# Patient Record
Sex: MALE | Race: WHITE | NOT HISPANIC OR LATINO | Employment: OTHER | URBAN - NONMETROPOLITAN AREA
[De-identification: names, ages, dates, MRNs, and addresses within clinical notes are randomized per-mention and may not be internally consistent; named-entity substitution may affect disease eponyms.]

---

## 2017-03-08 ENCOUNTER — TRANSCRIBE ORDERS (OUTPATIENT)
Dept: ADMINISTRATIVE | Facility: HOSPITAL | Age: 72
End: 2017-03-08

## 2017-03-08 DIAGNOSIS — S46.091A OTHER INJURY OF MUSCLE(S) AND TENDON(S) OF THE ROTATOR CUFF OF RIGHT SHOULDER, INITIAL ENCOUNTER: Primary | ICD-10-CM

## 2017-03-13 ENCOUNTER — HOSPITAL ENCOUNTER (OUTPATIENT)
Dept: MRI IMAGING | Facility: HOSPITAL | Age: 72
Discharge: HOME | End: 2017-03-13
Admitting: FAMILY MEDICINE

## 2017-03-13 DIAGNOSIS — S46.091A OTHER INJURY OF MUSCLE(S) AND TENDON(S) OF THE ROTATOR CUFF OF RIGHT SHOULDER, INITIAL ENCOUNTER: ICD-10-CM

## 2017-03-13 PROCEDURE — 73221 MRI JOINT UPR EXTREM W/O DYE: CPT

## 2017-04-11 ENCOUNTER — HOSPITAL ENCOUNTER (OUTPATIENT)
Dept: CT IMAGING | Facility: HOSPITAL | Age: 72
Discharge: HOME | End: 2017-04-11
Admitting: NURSE PRACTITIONER

## 2017-04-11 ENCOUNTER — HOSPITAL ENCOUNTER (OUTPATIENT)
Dept: ULTRASOUND IMAGING | Facility: HOSPITAL | Age: 72
Discharge: HOME | End: 2017-04-11

## 2017-04-11 ENCOUNTER — APPOINTMENT (OUTPATIENT)
Dept: CT IMAGING | Facility: HOSPITAL | Age: 72
End: 2017-04-11

## 2017-04-11 ENCOUNTER — OFFICE VISIT (OUTPATIENT)
Dept: VASCULAR SURGERY | Facility: CLINIC | Age: 72
End: 2017-04-11

## 2017-04-11 VITALS
WEIGHT: 289 LBS | HEART RATE: 58 BPM | SYSTOLIC BLOOD PRESSURE: 158 MMHG | BODY MASS INDEX: 35.93 KG/M2 | HEIGHT: 75 IN | DIASTOLIC BLOOD PRESSURE: 88 MMHG

## 2017-04-11 DIAGNOSIS — I65.23 BILATERAL CAROTID ARTERY STENOSIS: ICD-10-CM

## 2017-04-11 DIAGNOSIS — Z72.0 TOBACCO ABUSE: ICD-10-CM

## 2017-04-11 DIAGNOSIS — I71.40 ABDOMINAL AORTIC ANEURYSM (AAA) WITHOUT RUPTURE (HCC): Primary | ICD-10-CM

## 2017-04-11 DIAGNOSIS — I71.40 ABDOMINAL AORTIC ANEURYSM (AAA) WITHOUT RUPTURE (HCC): ICD-10-CM

## 2017-04-11 DIAGNOSIS — I10 ESSENTIAL HYPERTENSION: ICD-10-CM

## 2017-04-11 DIAGNOSIS — E78.5 HYPERLIPIDEMIA, UNSPECIFIED HYPERLIPIDEMIA TYPE: ICD-10-CM

## 2017-04-11 LAB — CREAT BLDA-MCNC: 1.2 MG/DL (ref 0.6–1.3)

## 2017-04-11 PROCEDURE — 93880 EXTRACRANIAL BILAT STUDY: CPT

## 2017-04-11 PROCEDURE — 0 IOPAMIDOL PER 1 ML: Performed by: SURGERY

## 2017-04-11 PROCEDURE — 99213 OFFICE O/P EST LOW 20 MIN: CPT | Performed by: SURGERY

## 2017-04-11 PROCEDURE — 93880 EXTRACRANIAL BILAT STUDY: CPT | Performed by: SURGERY

## 2017-04-11 PROCEDURE — 74174 CTA ABD&PLVS W/CONTRAST: CPT

## 2017-04-11 PROCEDURE — 82565 ASSAY OF CREATININE: CPT

## 2017-04-11 RX ORDER — MELOXICAM 15 MG/1
15 TABLET ORAL DAILY
COMMUNITY
End: 2018-10-04

## 2017-04-11 RX ORDER — NITROGLYCERIN 0.4 MG/1
TABLET SUBLINGUAL AS NEEDED
COMMUNITY
Start: 2017-01-03 | End: 2022-04-12

## 2017-04-11 RX ADMIN — IOPAMIDOL 150 ML: 755 INJECTION, SOLUTION INTRAVENOUS at 09:00

## 2017-04-11 NOTE — PROGRESS NOTES
"04/11/2017      Zafar Funk MD  [address] [place] [address]        Michael CAASNOVA [facility]  1945    Chief Complaint   Patient presents with   • Follow-up     Carotid study and abdominal ultrasound results.Denies symptoms.       Dear Zafar Funk MD:    HPI     I had the pleasure of seeing you patient in the office today for follow up.  As you recall, the patient is a 72 y.o. male who we are currently following for abdominal aortic aneurysm disease. The patient is had a previous endovascular repair and had a type IA endoleak which was being managed conservatively by Dr. Boudreaux. His aneurysm had gotten larger up to 7.1 cm in greatest dimensions and he underwent repair with an extension cuff. He is doing great ever since and has not had any abdominal pain or back pain. He had a repeat CAT scan of the abdomen and pelvis performed today which I personally reviewed.      /88  Pulse 58  Ht 75\" (190.5 cm)  Wt 289 lb (131 kg)  BMI 36.12 kg/m2  Physical Exam  Constitutional: He is oriented to person, place, and time. He appears well-developed and well-nourished. No distress.   HENT:   Head: Normocephalic and atraumatic.   Mouth/Throat: Oropharynx is clear and moist and mucous membranes are normal.   Eyes: Pupils are equal, round, and reactive to light. No scleral icterus.   Neck: Normal range of motion. Neck supple. No JVD present. Carotid bruit is not present. No thyromegaly present.   Cardiovascular: Normal rate, regular rhythm, S1 normal, S2 normal, normal heart sounds, intact distal pulses and normal pulses. Exam reveals no gallop and no friction rub.   No murmur heard.  Pulses:  Femoral pulses are 2+ on the right side, and 2+ on the left side.  Popliteal pulses are 2+ on the right side, and 2+ on the left side.   Dorsalis pedis pulses are 2+ on the right side, and 2+ on the left side.   Posterior tibial pulses are 2+ on the right side, and 2+ on the left side.   Pulmonary/Chest: Effort normal and " breath sounds normal.   Abdominal: Soft. Normal appearance, normal aorta and bowel sounds are normal. He exhibits no abdominal bruit. There is no hepatosplenomegaly. There is no tenderness.   Musculoskeletal: Normal range of motion.     Vascular Status - His exam exhibits no right foot edema. His exam exhibits no left foot edema.  Neurological: He is alert and oriented to person, place, and time. He has normal strength. No cranial nerve deficit.   Skin: Skin is warm, dry and intact. He is not diaphoretic.   Psychiatric: He has a normal mood and affect. His behavior is normal. Judgment and thought content normal. Cognition and memory are normal.   Nursing note and vitals reviewed.    DIAGNOSTIC DATA:  EXAMINATION: CT ANGIOGRAM ABDOMEN PELVIS W WO CONTRAST-      4/11/2017 9:27 AM EDT      HISTORY: AAA; I71.4-Abdominal aortic aneurysm, without rupture      In order to have a CT radiation dose as low as reasonably achievable  Automated Exposure Control was utilized for adjustment of the mA and/or  KV according to patient size.      DLP in mGycm= 3043.      CT angiography protocol.   CT imaging with bolus IV contrast injection.   Under concurrent supervision axial, sagittal, coronal, and  three-dimensional data sets were constructed.      Comparison is made with 12/24/2016.      Unchanged appearance of the endograft.  Maximum infrarenal aneurysm diameter = 5.2 x 6.7 cm which is unchanged.  No endograft leak is seen.      Heart size is within normal limits.  Lung base scarring noted.      Normal liver and spleen.  Small layering calcified gallstones are noted.  No biliary dilation.  Normal pancreas.  Normal and symmetric kidneys.  No bowel dilation or free fluid.      Summary:  1. Endograft in place with no evidence of leak.  2. Stable AAA size.      Patient Active Problem List   Diagnosis   • Tobacco abuse   • Hypertension   • Hyperlipidemia   • Endoleak post endovascular aneurysm repair   • AAA (abdominal aortic  aneurysm)   • Aneurysm   • CAD (coronary artery disease)         ICD-10-CM ICD-9-CM   1. Abdominal aortic aneurysm (AAA) without rupture I71.4 441.4   2. Essential hypertension I10 401.9   3. Hyperlipidemia, unspecified hyperlipidemia type E78.5 272.4   4. Tobacco abuse Z72.0 305.1       PLAN: After thoroughly evaluating Michael Paz, I believe the best course of action is to remain conservative from a vascular standpoint.  We will see him back in 1 year with repeat noninvasive testing including an abdominal ultrasound for continued surveillance.  I did  extensively on smoking cessation, and the patient was advised of the continued risks of smoking.  I provided over 10 minutes counseling on this matter.  The patient is to continue taking their medications as previously discussed.   This was all discussed in full with complete understanding.  Thank you for allowing me to participate in the care of your patient.  Please do not hesitate to call with any questions or concerns.  We will keep you aware of any further encounters with Michael Paz.      Sincerely Yours,        IVY Mares

## 2018-04-09 NOTE — PROGRESS NOTES
"04/10/2018       Zafar Funk MD  [address] [place] [address]        Michael CASANOVA [facility]  1945    Chief Complaint   Patient presents with   • Follow-up     1 Year Follow Up. Patient states no stroke like symptoms. Test 04/10/2018 US pad abd aorta poe. Patient states tired, shortness of breath       Dear Zafar Funk MD:    HPI     I had the pleasure of seeing you patient in the office today for follow up.  As you recall, the patient is a 73 y.o. male who we are currently following for abdominal aortic aneurysm disease. The patient is had a previous endovascular repair and had a type IA endoleak which was being managed conservatively by Dr. Boudreaux. His aneurysm had gotten larger up to 7.1 cm in greatest dimensions and he underwent repair with an extension cuff. He is doing great ever since and has not had any abdominal pain or back pain. He is complaining of fatigue, shortness of breath with any exertion.  He did have repeat noninvasive testing performed today which I personally reviewed.      /90   Pulse 57   Ht 190.5 cm (75\")   Wt 134 kg (295 lb)   SpO2 98%   BMI 36.87 kg/m²   Physical Exam  Constitutional: He is oriented to person, place, and time. He appears well-developed and well-nourished. No distress.   HENT:   Head: Normocephalic and atraumatic.   Mouth/Throat: Oropharynx is clear and moist and mucous membranes are normal.   Eyes: Pupils are equal, round, and reactive to light. No scleral icterus.   Neck: Normal range of motion. Neck supple. No JVD present. Carotid bruit is not present. No thyromegaly present.   Cardiovascular: Normal rate, regular rhythm, S1 normal, S2 normal, normal heart sounds, intact distal pulses and normal pulses. Exam reveals no gallop and no friction rub.   No murmur heard.  Pulses:  Femoral pulses are 2+ on the right side, and 2+ on the left side.  Popliteal pulses are 2+ on the right side, and 2+ on the left side.   Dorsalis pedis pulses are 2+ on the right " side, and 2+ on the left side.   Posterior tibial pulses are 2+ on the right side, and 2+ on the left side.   Pulmonary/Chest: Effort normal and breath sounds normal.   Abdominal: Soft. Normal appearance, normal aorta and bowel sounds are normal. He exhibits no abdominal bruit. There is no hepatosplenomegaly. There is no tenderness.   Musculoskeletal: Normal range of motion.     Vascular Status - His exam exhibits no right foot edema. His exam exhibits no left foot edema.  Neurological: He is alert and oriented to person, place, and time. He has normal strength. No cranial nerve deficit.   Skin: Skin is warm, dry and intact. He is not diaphoretic.   Psychiatric: He has a normal mood and affect. His behavior is normal. Judgment and thought content normal. Cognition and memory are normal.   Nursing note and vitals reviewed.    DIAGNOSTIC DATA:    Us Aorta Limited    Result Date: 4/10/2018  Narrative: Examination: US AORTA LIMITED-  Date:  4/10/2018  Indication:    73 years-year-old Male with aaa; I71.4-Abdominal aortic aneurysm, without rupture  Comparison: CT angiogram abdomen pelvis dated 4/11/2017.  Findings: Grayscale sonographic imaging of the abdominal aorta was performed. Please note that measurements reflect inner luminal diameters as the true outer aspect of the abdominal aorta is difficult to appreciate by sonography. Color and pulse Doppler imaging was also performed. There has been endovascular repair. The cephalad abdominal aorta measures 2.7 x 3.2 cm. The mid abdominal aorta measures 2.5 x 2.7 cm. The caudal abdominal aorta measures 7 x 6.5 cm. The right common iliac artery proximally measures 1.6 cm. The left common iliac artery proximally measures 1.8 cm.      Impression: Impression: Status post endovascular repair of distal abdominal aorta aneurysm, with the native aorta measuring up to 7 cm on today's examination. This report was finalized on 04/10/2018 08:50 by Dr. Magy Sow MD.     Patient  Active Problem List   Diagnosis   • Tobacco abuse   • Hypertension   • Hyperlipidemia   • Endoleak post endovascular aneurysm repair   • AAA (abdominal aortic aneurysm)   • Aneurysm   • CAD (coronary artery disease)         ICD-10-CM ICD-9-CM   1. Abdominal aortic aneurysm (AAA) without rupture I71.4 441.4   2. Tobacco abuse Z72.0 305.1   3. Essential hypertension I10 401.9   4. Hyperlipidemia, unspecified hyperlipidemia type E78.5 272.4       PLAN: After thoroughly evaluating Michael Paz, I believe the best course of action is to remain conservative from a vascular standpoint.  We will see him back in 6 months with repeat noninvasive testing including a CTA of the abdomen and pelvis for continued surveillance.  Dr. Frey did review his testing as well. We were going to refer to Dr. Vicente, but he stated he would make his own appointment.  I did  extensively on smoking cessation, and the patient was advised of the continued risks of smoking.  I provided over 10 minutes counseling on this matter.  The patient is to continue taking their medications as previously discussed.   This was all discussed in full with complete understanding.  Thank you for allowing me to participate in the care of your patient.  Please do not hesitate to call with any questions or concerns.  We will keep you aware of any further encounters with Michael Paz.      Sincerely Yours,        IVY Mares

## 2018-04-10 ENCOUNTER — OFFICE VISIT (OUTPATIENT)
Dept: VASCULAR SURGERY | Facility: CLINIC | Age: 73
End: 2018-04-10

## 2018-04-10 ENCOUNTER — HOSPITAL ENCOUNTER (OUTPATIENT)
Dept: ULTRASOUND IMAGING | Facility: HOSPITAL | Age: 73
Discharge: HOME | End: 2018-04-10
Admitting: NURSE PRACTITIONER

## 2018-04-10 VITALS
WEIGHT: 295 LBS | BODY MASS INDEX: 36.68 KG/M2 | HEART RATE: 57 BPM | HEIGHT: 75 IN | SYSTOLIC BLOOD PRESSURE: 158 MMHG | DIASTOLIC BLOOD PRESSURE: 90 MMHG | OXYGEN SATURATION: 98 %

## 2018-04-10 DIAGNOSIS — E78.5 HYPERLIPIDEMIA, UNSPECIFIED HYPERLIPIDEMIA TYPE: ICD-10-CM

## 2018-04-10 DIAGNOSIS — I10 ESSENTIAL HYPERTENSION: ICD-10-CM

## 2018-04-10 DIAGNOSIS — I71.40 ABDOMINAL AORTIC ANEURYSM (AAA) WITHOUT RUPTURE (HCC): ICD-10-CM

## 2018-04-10 DIAGNOSIS — I71.40 ABDOMINAL AORTIC ANEURYSM (AAA) WITHOUT RUPTURE (HCC): Primary | ICD-10-CM

## 2018-04-10 DIAGNOSIS — Z72.0 TOBACCO ABUSE: ICD-10-CM

## 2018-04-10 PROCEDURE — 76775 US EXAM ABDO BACK WALL LIM: CPT

## 2018-04-10 PROCEDURE — 99214 OFFICE O/P EST MOD 30 MIN: CPT | Performed by: NURSE PRACTITIONER

## 2018-04-10 NOTE — PATIENT INSTRUCTIONS
Steps to Quit Smoking  Smoking tobacco can be harmful to your health and can affect almost every organ in your body. Smoking puts you, and those around you, at risk for developing many serious chronic diseases. Quitting smoking is difficult, but it is one of the best things that you can do for your health. It is never too late to quit.  What are the benefits of quitting smoking?  When you quit smoking, you lower your risk of developing serious diseases and conditions, such as:  · Lung cancer or lung disease, such as COPD.  · Heart disease.  · Stroke.  · Heart attack.  · Infertility.  · Osteoporosis and bone fractures.  Additionally, symptoms such as coughing, wheezing, and shortness of breath may get better when you quit. You may also find that you get sick less often because your body is stronger at fighting off colds and infections. If you are pregnant, quitting smoking can help to reduce your chances of having a baby of low birth weight.  How do I get ready to quit?  When you decide to quit smoking, create a plan to make sure that you are successful. Before you quit:  · Pick a date to quit. Set a date within the next two weeks to give you time to prepare.  · Write down the reasons why you are quitting. Keep this list in places where you will see it often, such as on your bathroom mirror or in your car or wallet.  · Identify the people, places, things, and activities that make you want to smoke (triggers) and avoid them. Make sure to take these actions:  ¨ Throw away all cigarettes at home, at work, and in your car.  ¨ Throw away smoking accessories, such as ashtrays and lighters.  ¨ Clean your car and make sure to empty the ashtray.  ¨ Clean your home, including curtains and carpets.  · Tell your family, friends, and coworkers that you are quitting. Support from your loved ones can make quitting easier.  · Talk with your health care provider about your options for quitting smoking.  · Find out what treatment  options are covered by your health insurance.  What strategies can I use to quit smoking?  Talk with your healthcare provider about different strategies to quit smoking. Some strategies include:  · Quitting smoking altogether instead of gradually lessening how much you smoke over a period of time. Research shows that quitting “cold turkey” is more successful than gradually quitting.  · Attending in-person counseling to help you build problem-solving skills. You are more likely to have success in quitting if you attend several counseling sessions. Even short sessions of 10 minutes can be effective.  · Finding resources and support systems that can help you to quit smoking and remain smoke-free after you quit. These resources are most helpful when you use them often. They can include:  ¨ Online chats with a counselor.  ¨ Telephone quitlines.  ¨ Printed self-help materials.  ¨ Support groups or group counseling.  ¨ Text messaging programs.  ¨ Mobile phone applications.  · Taking medicines to help you quit smoking. (If you are pregnant or breastfeeding, talk with your health care provider first.) Some medicines contain nicotine and some do not. Both types of medicines help with cravings, but the medicines that include nicotine help to relieve withdrawal symptoms. Your health care provider may recommend:  ¨ Nicotine patches, gum, or lozenges.  ¨ Nicotine inhalers or sprays.  ¨ Non-nicotine medicine that is taken by mouth.  Talk with your health care provider about combining strategies, such as taking medicines while you are also receiving in-person counseling. Using these two strategies together makes you more likely to succeed in quitting than if you used either strategy on its own.  If you are pregnant or breastfeeding, talk with your health care provider about finding counseling or other support strategies to quit smoking. Do not take medicine to help you quit smoking unless told to do so by your health care  provider.  What things can I do to make it easier to quit?  Quitting smoking might feel overwhelming at first, but there is a lot that you can do to make it easier. Take these important actions:  · Reach out to your family and friends and ask that they support and encourage you during this time. Call telephone quitlines, reach out to support groups, or work with a counselor for support.  · Ask people who smoke to avoid smoking around you.  · Avoid places that trigger you to smoke, such as bars, parties, or smoke-break areas at work.  · Spend time around people who do not smoke.  · Lessen stress in your life, because stress can be a smoking trigger for some people. To lessen stress, try:  ¨ Exercising regularly.  ¨ Deep-breathing exercises.  ¨ Yoga.  ¨ Meditating.  ¨ Performing a body scan. This involves closing your eyes, scanning your body from head to toe, and noticing which parts of your body are particularly tense. Purposefully relax the muscles in those areas.  · Download or purchase mobile phone or tablet apps (applications) that can help you stick to your quit plan by providing reminders, tips, and encouragement. There are many free apps, such as QuitGuide from the CDC (Centers for Disease Control and Prevention). You can find other support for quitting smoking (smoking cessation) through smokefree.gov and other websites.  How will I feel when I quit smoking?  Within the first 24 hours of quitting smoking, you may start to feel some withdrawal symptoms. These symptoms are usually most noticeable 2-3 days after quitting, but they usually do not last beyond 2-3 weeks. Changes or symptoms that you might experience include:  · Mood swings.  · Restlessness, anxiety, or irritation.  · Difficulty concentrating.  · Dizziness.  · Strong cravings for sugary foods in addition to nicotine.  · Mild weight gain.  · Constipation.  · Nausea.  · Coughing or a sore throat.  · Changes in how your medicines work in your  body.  · A depressed mood.  · Difficulty sleeping (insomnia).  After the first 2-3 weeks of quitting, you may start to notice more positive results, such as:  · Improved sense of smell and taste.  · Decreased coughing and sore throat.  · Slower heart rate.  · Lower blood pressure.  · Clearer skin.  · The ability to breathe more easily.  · Fewer sick days.  Quitting smoking is very challenging for most people. Do not get discouraged if you are not successful the first time. Some people need to make many attempts to quit before they achieve long-term success. Do your best to stick to your quit plan, and talk with your health care provider if you have any questions or concerns.  This information is not intended to replace advice given to you by your health care provider. Make sure you discuss any questions you have with your health care provider.  Document Released: 12/12/2002 Document Revised: 08/15/2017 Document Reviewed: 05/03/2016  [employer] Interactive Patient Education © 2017 [employer]

## 2018-09-07 ENCOUNTER — TELEPHONE (OUTPATIENT)
Dept: VASCULAR SURGERY | Facility: CLINIC | Age: 73
End: 2018-09-07

## 2018-09-07 NOTE — TELEPHONE ENCOUNTER
Spoke with Mr Chicash letting him know that Dr Frey stated it was okay for him to go through with a Heart Cath with Dr Vicente. Mr Paz verbalized understanding.

## 2018-10-03 ENCOUNTER — TELEPHONE (OUTPATIENT)
Dept: VASCULAR SURGERY | Facility: CLINIC | Age: 73
End: 2018-10-03

## 2018-10-03 NOTE — TELEPHONE ENCOUNTER
Left message reminding Mr Paz of his appointments for Thursday, October 4th, 2018. Reminded Mr Paz to arrive at the [facility] Entr[facility] of the [facility] at 8 am for CTA with nothing to eat or drink 6 hours prior and follow up afterwards at 945 am with Dr Frey. Also advised if he had any questions or needs to reschedule to please call the office at [phone].

## 2018-10-04 ENCOUNTER — HOSPITAL ENCOUNTER (OUTPATIENT)
Dept: CT IMAGING | Facility: HOSPITAL | Age: 73
Discharge: HOME | End: 2018-10-04
Admitting: NURSE PRACTITIONER

## 2018-10-04 ENCOUNTER — OFFICE VISIT (OUTPATIENT)
Dept: VASCULAR SURGERY | Facility: CLINIC | Age: 73
End: 2018-10-04

## 2018-10-04 VITALS
HEIGHT: 75 IN | BODY MASS INDEX: 36.56 KG/M2 | WEIGHT: 294 LBS | DIASTOLIC BLOOD PRESSURE: 86 MMHG | OXYGEN SATURATION: 97 % | HEART RATE: 64 BPM | SYSTOLIC BLOOD PRESSURE: 132 MMHG

## 2018-10-04 DIAGNOSIS — I72.4 POPLITEAL ARTERY ANEURYSM, BILATERAL (HCC): ICD-10-CM

## 2018-10-04 DIAGNOSIS — I71.40 AAA (ABDOMINAL AORTIC ANEURYSM) WITHOUT RUPTURE (HCC): Primary | ICD-10-CM

## 2018-10-04 DIAGNOSIS — I65.23 BILATERAL CAROTID ARTERY STENOSIS: ICD-10-CM

## 2018-10-04 DIAGNOSIS — Z72.0 TOBACCO ABUSE: ICD-10-CM

## 2018-10-04 DIAGNOSIS — E78.5 HYPERLIPIDEMIA, UNSPECIFIED HYPERLIPIDEMIA TYPE: ICD-10-CM

## 2018-10-04 DIAGNOSIS — I10 ESSENTIAL HYPERTENSION: ICD-10-CM

## 2018-10-04 DIAGNOSIS — I71.40 ABDOMINAL AORTIC ANEURYSM (AAA) WITHOUT RUPTURE (HCC): ICD-10-CM

## 2018-10-04 LAB — CREAT BLDA-MCNC: 1.2 MG/DL (ref 0.6–1.3)

## 2018-10-04 PROCEDURE — 99214 OFFICE O/P EST MOD 30 MIN: CPT | Performed by: SURGERY

## 2018-10-04 PROCEDURE — 0 IOPAMIDOL PER 1 ML: Performed by: NURSE PRACTITIONER

## 2018-10-04 PROCEDURE — 82565 ASSAY OF CREATININE: CPT

## 2018-10-04 PROCEDURE — 74174 CTA ABD&PLVS W/CONTRAST: CPT

## 2018-10-04 RX ORDER — ISOSORBIDE MONONITRATE 30 MG/1
TABLET, EXTENDED RELEASE ORAL
COMMUNITY
Start: 2018-08-16 | End: 2019-04-09

## 2018-10-04 RX ADMIN — IOPAMIDOL 150 ML: 755 INJECTION, SOLUTION INTRAVENOUS at 08:41

## 2018-10-04 NOTE — PROGRESS NOTES
"10/04/2018      Zafar Funk MD  [address] [place] [address]        Michael CASANOVA [facility]  1945    Chief Complaint   Patient presents with   • Follow-up     6 month f/u with CTA.  Pt states that he has been experiencing SOB with tightness in his chest.  He has been discussing this with Dr. Vicente.       Dear Zafar Funk MD:    HPI     I had the pleasure of seeing you patient in the office today for follow up.  As you recall, the patient is a 73 y.o. male who we are currently following for abdominal aortic aneurysm disease. The patient is had a previous endovascular repair and had a type IA endoleak which was being managed conservatively by Dr. Boudreaux. His aneurysm had gotten larger up to 7.1 cm in greatest dimensions and he underwent repair with an extension cuff. He is doing great ever since and has not had any abdominal pain or back pain. He is complaining of fatigue, shortness of breath with any exertion.  He did have repeat noninvasive testing performed today which I personally reviewed.      /86 (BP Location: Left arm, Patient Position: Sitting, Cuff Size: Adult)   Pulse 64   Ht 190.5 cm (75\")   Wt 133 kg (294 lb)   SpO2 97%   BMI 36.75 kg/m²   Physical Exam  Constitutional: He is oriented to person, place, and time. He appears well-developed and well-nourished. No distress.   HENT:   Head: Normocephalic and atraumatic.   Mouth/Throat: Oropharynx is clear and moist and mucous membranes are normal.   Eyes: Pupils are equal, round, and reactive to light. No scleral icterus.   Neck: Normal range of motion. Neck supple. No JVD present. Carotid bruit is not present. No thyromegaly present.   Cardiovascular: Normal rate, regular rhythm, S1 normal, S2 normal, normal heart sounds, intact distal pulses and normal pulses. Exam reveals no gallop and no friction rub.   No murmur heard.  Pulses:  Femoral pulses are 2+ on the right side, and 2+ on the left side.  Popliteal pulses are 2+ on the right side, " and 2+ on the left side.   Dorsalis pedis pulses are 2+ on the right side, and 2+ on the left side.   Posterior tibial pulses are 2+ on the right side, and 2+ on the left side.   Pulmonary/Chest: Effort normal and breath sounds normal.   Abdominal: Soft. Normal appearance, normal aorta and bowel sounds are normal. He exhibits no abdominal bruit. There is no hepatosplenomegaly. There is no tenderness.   Musculoskeletal: Normal range of motion.     Vascular Status - His exam exhibits no right foot edema. His exam exhibits no left foot edema.  Neurological: He is alert and oriented to person, place, and time. He has normal strength. No cranial nerve deficit.   Skin: Skin is warm, dry and intact. He is not diaphoretic.   Psychiatric: He has a normal mood and affect. His behavior is normal. Judgment and thought content normal. Cognition and memory are normal.   Nursing note and vitals reviewed.    DIAGNOSTIC DATA:    Ct Angiogram Abdomen Pelvis With & Without Contrast    Result Date: 10/4/2018  Narrative: CT ANGIOGRAM ABDOMEN PELVIS W WO CONTRAST- 10/4/2018 8:41 AM CDT  HISTORY: Abdominal aortic aneurysm (AAA), known, follow up; I71.4-Abdominal aortic aneurysm, without rupture  COMPARISON: None  DOSE LENGTH PRODUCT: 1425 mGy cm. Automated exposure control was also utilized to decrease patient radiation dose.  TECHNIQUE: Axial images of the and pelvis are obtained following IV contrast. 2-D and maximal intensity projection images are reconstructed and reviewed.  FINDINGS:  There is a stable positioning of the endovascular graft of the aorta with limbs extending into the iliac arteries. The aneurysm sac is stable in size measuring 7.1 cm. There is stable atherosclerotic plaque. Similar aneurysmal distention of the common iliac arteries measuring 2.3 cm. The external and internal iliac arteries are normal in caliber. There is aneurysmal distention left common femoral artery to a measurement of 1.9 cm, stable. The right  common femoral artery measures up to 1.8 cm, also unchanged. There is no abnormal periaortic fluid collection. Celiac trunk, superior mesenteric, bilateral renal arteries are patent. There is an accessory left renal artery.  There is hepatic steatosis. No focal liver splenic mass is identified. There is no pancreatic cyst or mass.  Gallstones. There are no adrenal nodules. There is no enhancing renal mass. There is no hydronephrosis. Bladder wall thickening may be due to underdistention. There is a fatty containing left inguinal hernia.  There is sigmoid colonic diverticulosis. There is no acute diverticulitis. There is no bowel obstruction. There is no pathologic lymphadenopathy. There is cardiomegaly. There is basilar scarring.  There is osteopenia and degenerative change of the regional skeleton.      Impression: 1. Endovascular repair of an abdominal aortic aneurysm. Aneurysm sac is stable in size measuring 7.1 cm. No evidence of endoleak. Similar aneurysmal distention of the common iliac and common femoral arteries with measurements provided above. 2. Hepatic steatosis. 3. Cholelithiasis. 4. Sigmoid colonic diverticulosis without acute diverticulitis. 4. Fatty containing left inguinal hernia. This report was finalized on 10/04/2018 09:05 by Dr. Мария Griffin MD.     Patient Active Problem List   Diagnosis   • Tobacco abuse   • Hypertension   • Hyperlipidemia   • Endoleak post endovascular aneurysm repair (CMS/HCC)   • AAA (abdominal aortic aneurysm) (CMS/HCC)   • Aneurysm (CMS/HCC)   • CAD (coronary artery disease)         ICD-10-CM ICD-9-CM   1. AAA (abdominal aortic aneurysm) without rupture (CMS/HCC) I71.4 441.4   2. Essential hypertension I10 401.9   3. Hyperlipidemia, unspecified hyperlipidemia type E78.5 272.4   4. Tobacco abuse Z72.0 305.1   5. Popliteal artery aneurysm, bilateral (CMS/HCC) I72.4 442.3   6. Bilateral carotid artery stenosis I65.23 433.10     433.30       PLAN: After thoroughly  evaluating Michael Paz, I believe the best course of action is to remain conservative from a vascular standpoint.  His CTA stable without endoleak and the aneurysm still measures 7.1 cm.  We will see him back in 6 months with repeat noninvasive testing including an ultrasound of the aorta, a carotid duplex, and an arterial duplex of the popliteal arteries.  I also reassured him that heart catheterization from the groin would be okay.  I did  extensively on smoking cessation, and the patient was advised of the continued risks of smoking.  I provided over 10 minutes counseling on this matter.  The patient is to continue taking their medications as previously discussed.   This was all discussed in full with complete understanding.  Thank you for allowing me to participate in the care of your patient.  Please do not hesitate to call with any questions or concerns.  We will keep you aware of any further encounters with Michael Paz.      Sincerely Yours,        Jah Frye, DO

## 2019-01-10 ENCOUNTER — TELEPHONE (OUTPATIENT)
Dept: VASCULAR SURGERY | Facility: CLINIC | Age: 74
End: 2019-01-10

## 2019-01-10 NOTE — TELEPHONE ENCOUNTER
CALLED PT TO GIVE HIM NEW APPT AND TESTING TIMES. HOWEVER, PT DID NOT ANSWER, SO I LEFT A VOICEMAIL WITH INSTRUCTIONS, AND TOLD THE PT TO CALL THE OFFICE REGARDING ANY QUESTIONS OR CONCERNS. I WILL ALSO BE MAILING REMINDERS AS WELL.

## 2019-04-08 ENCOUNTER — TELEPHONE (OUTPATIENT)
Dept: VASCULAR SURGERY | Facility: CLINIC | Age: 74
End: 2019-04-08

## 2019-04-08 NOTE — TELEPHONE ENCOUNTER
Left message reminding Mr Paz of his appointments for Tuesday, April 9th. Rem[facility]ed [facility] and follow up afterwards at 230 pm with Dr Frey. Also advised if he had any questions or needed to reschedule to please call the office at [phone].

## 2019-04-09 ENCOUNTER — HOSPITAL ENCOUNTER (OUTPATIENT)
Dept: ULTRASOUND IMAGING | Facility: HOSPITAL | Age: 74
Discharge: HOME | End: 2019-04-09
Admitting: NURSE PRACTITIONER

## 2019-04-09 ENCOUNTER — HOSPITAL ENCOUNTER (OUTPATIENT)
Dept: ULTRASOUND IMAGING | Facility: HOSPITAL | Age: 74
Discharge: HOME | End: 2019-04-09

## 2019-04-09 ENCOUNTER — OFFICE VISIT (OUTPATIENT)
Dept: VASCULAR SURGERY | Facility: CLINIC | Age: 74
End: 2019-04-09

## 2019-04-09 ENCOUNTER — TRANSCRIBE ORDERS (OUTPATIENT)
Dept: VASCULAR SURGERY | Facility: CLINIC | Age: 74
End: 2019-04-09

## 2019-04-09 VITALS
BODY MASS INDEX: 36.18 KG/M2 | WEIGHT: 291 LBS | DIASTOLIC BLOOD PRESSURE: 84 MMHG | HEIGHT: 75 IN | SYSTOLIC BLOOD PRESSURE: 140 MMHG | OXYGEN SATURATION: 98 % | HEART RATE: 61 BPM

## 2019-04-09 DIAGNOSIS — E78.5 HYPERLIPIDEMIA, UNSPECIFIED HYPERLIPIDEMIA TYPE: ICD-10-CM

## 2019-04-09 DIAGNOSIS — I71.40 ABDOMINAL AORTIC ANEURYSM (AAA) WITHOUT RUPTURE (HCC): Primary | ICD-10-CM

## 2019-04-09 DIAGNOSIS — I10 ESSENTIAL HYPERTENSION: ICD-10-CM

## 2019-04-09 DIAGNOSIS — Z72.0 TOBACCO ABUSE: ICD-10-CM

## 2019-04-09 DIAGNOSIS — I72.4 POPLITEAL ARTERY ANEURYSM, BILATERAL (HCC): ICD-10-CM

## 2019-04-09 DIAGNOSIS — IMO0001 ENDOLEAK POST ENDOVASCULAR ANEURYSM REPAIR, SUBSEQUENT ENCOUNTER: ICD-10-CM

## 2019-04-09 DIAGNOSIS — I65.23 BILATERAL CAROTID ARTERY STENOSIS: ICD-10-CM

## 2019-04-09 DIAGNOSIS — I71.40 AAA (ABDOMINAL AORTIC ANEURYSM) WITHOUT RUPTURE (HCC): ICD-10-CM

## 2019-04-09 PROCEDURE — 93880 EXTRACRANIAL BILAT STUDY: CPT

## 2019-04-09 PROCEDURE — 93880 EXTRACRANIAL BILAT STUDY: CPT | Performed by: SURGERY

## 2019-04-09 PROCEDURE — 99407 BEHAV CHNG SMOKING > 10 MIN: CPT | Performed by: SURGERY

## 2019-04-09 PROCEDURE — 99214 OFFICE O/P EST MOD 30 MIN: CPT | Performed by: SURGERY

## 2019-04-09 PROCEDURE — 93925 LOWER EXTREMITY STUDY: CPT | Performed by: SURGERY

## 2019-04-09 PROCEDURE — 76775 US EXAM ABDO BACK WALL LIM: CPT

## 2019-04-09 PROCEDURE — 93923 UPR/LXTR ART STDY 3+ LVLS: CPT

## 2019-04-09 NOTE — PROGRESS NOTES
"4/9/2019      Zafar Funk MD  [address] [place] [address]        Michael CASANOVA [facility]  1945    Chief Complaint   Patient presents with   • Follow-up     6 Month Follow UP For Abdominal Aortic Aneurysm without Rupture, Bilateral Carotid Artery Stenosis, and Popliteal Artery Aneurysm, bilateral. Test 04/09/2019 US pad aorta poe, US pad carotid bilateral, and US pad lower ext arteries bilat. Patient denies any stroke like symptoms.        Dear Zafar Funk MD:    HPI     I had the pleasure of seeing you patient in the office today for follow up.  As you recall, the patient is a 74 y.o. male who we are currently following for abdominal aortic aneurysm disease. The patient is had a previous endovascular repair and had a type IA endoleak which was being managed conservatively by Dr. Boudreaux. His aneurysm had gotten larger up to 7.1 cm in greatest dimensions and he underwent repair with an extension cuff. He is doing great ever since and has not had any abdominal pain or back pain. He is complaining of fatigue, shortness of breath with any exertion.  He did have repeat noninvasive testing performed today which I personally reviewed.      /84 (BP Location: Left arm, Patient Position: Sitting, Cuff Size: Adult)   Pulse 61   Ht 190.5 cm (75\")   Wt 132 kg (291 lb)   SpO2 98%   BMI 36.37 kg/m²   Physical Exam  Constitutional: He is oriented to person, place, and time. He appears well-developed and well-nourished. No distress.   HENT:   Head: Normocephalic and atraumatic.   Mouth/Throat: Oropharynx is clear and moist and mucous membranes are normal.   Eyes: Pupils are equal, round, and reactive to light. No scleral icterus.   Neck: Normal range of motion. Neck supple. No JVD present. Carotid bruit is not present. No thyromegaly present.   Cardiovascular: Normal rate, regular rhythm, S1 normal, S2 normal, normal heart sounds, intact distal pulses and normal pulses. Exam reveals no gallop and no friction rub. "   No murmur heard.  Pulses:  Femoral pulses are 2+ on the right side, and 2+ on the left side.  Popliteal pulses are 2+ on the right side, and 2+ on the left side.   Dorsalis pedis pulses are 2+ on the right side, and 2+ on the left side.   Posterior tibial pulses are 2+ on the right side, and 2+ on the left side.   Pulmonary/Chest: Effort normal and breath sounds normal.   Abdominal: Soft. Normal appearance, normal aorta and bowel sounds are normal. He exhibits no abdominal bruit. There is no hepatosplenomegaly. There is no tenderness.   Musculoskeletal: Normal range of motion.     Vascular Status - His exam exhibits no right foot edema. His exam exhibits no left foot edema.  Neurological: He is alert and oriented to person, place, and time. He has normal strength. No cranial nerve deficit.   Skin: Skin is warm, dry and intact. He is not diaphoretic.   Psychiatric: He has a normal mood and affect. His behavior is normal. Judgment and thought content normal. Cognition and memory are normal.   Nursing note and vitals reviewed.    DIAGNOSTIC DATA:    Us Carotid Bilateral    Result Date: 4/9/2019  Narrative: History: Carotid occlusive disease      Impression: Impression: 1. There is less than 50% stenosis of the right internal carotid artery. 2. There is less than 50% stenosis of the left internal carotid artery. 3. Antegrade flow is demonstrated in bilateral vertebral arteries.  Comments: Bilateral carotid vertebral arterial duplex scan was performed.  Grayscale imaging shows intimal thickening and calcified elements at the carotid bifurcation. The right internal carotid artery peak systolic velocity is 97.4 cm/sec. The end-diastolic velocity is 33.8 cm/sec. The right ICA/CCA ratio is approximately 1.02 . These findings correlate with less than 50% stenosis of the right internal carotid artery.  Grayscale imaging shows intimal thickening and calcified elements at the carotid bifurcation. The left internal carotid  artery peak systolic velocity is 93.5 cm/sec. The end-diastolic velocity is 32.2 cm/sec. The left ICA/CCA ratio is approximately 0.77 . These findings correlate with less than 50% stenosis of the left internal carotid artery.  Antegrade flow is demonstrated in bilateral vertebral arteries. Greater than 50% stenosis of the proximal right external carotid artery. This report was finalized on 04/09/2019 16:24 by Dr. Sahil Padron MD.    Us Aorta Limited    Result Date: 4/9/2019  Narrative: EXAMINATION: US AORTA LIMITED- 4/9/2019 1:16 PM CDT  HISTORY: Abdominal aortic aneurysm  COMPARISON: 04/10/2018  FINDINGS: Sonographic evaluation of the aorta was performed in the transverse and longitudinal projections.  Proximal abdominal aorta measures 2.7 x 3.2 cm. The mid abdominal aorta measures 2.9 x 2.4 cm. The distal abdominal aorta measures 7.5 x 6.7 cm with an endovascular stent graft identified. Just proximal to the bifurcation, the aorta measures up to 4.8 cm in diameter. The common iliac arteries are dilated to 1.9 cm bilaterally.      Impression: Infrarenal abdominal aortic aneurysm measuring up to 7.5 cm in diameter, previously measuring 7.0 cm in diameter. An endovascular stent graft is noted. This report was finalized on 04/09/2019 13:17 by Dr. Cal Almazan MD.    Us Arterial Doppler Lower Extremity Bilateral    Result Date: 4/9/2019  Narrative:  History: Screen for popliteal artery aneurysm  Comment: Limited bilateral lower extremity arterial duplex was performed using gray scale imaging and color flow Doppler.  FINDINGS:  On the right the proximal popliteal artery measures 0.96 x 1.04 cm. The mid popliteal artery measures 0.64 x 0.49 cm. The distal popliteal artery measures 0.59 x 0.54 cm. The artery appears widely patent with a peak systolic velocity of 90.8 cm/s.  On the left the proximal popliteal artery measures 1.20 x 1.28 cm. The mid popliteal artery measures 0.90 x 0.80 cm. The distal popliteal artery  measures 0.65 x 0.53 cm. The artery appears patent with peak systolic velocity of 75.47 m/s.      Impression: Aneurysmal dilatation of the proximal right popliteal artery to maximal dimensions of 0.96 x 1.04 cm. As well as aneurysmal dilatation of the proximal left popliteal artery with maximum dimensions of 1.20 x 1.28 cm. Both arteries appear patent.   This report was finalized on 04/09/2019 16:27 by Dr. Sahil Padron MD.     Patient Active Problem List   Diagnosis   • Tobacco abuse   • Hypertension   • Hyperlipidemia   • Endoleak post endovascular aneurysm repair (CMS/HCC)   • AAA (abdominal aortic aneurysm) (CMS/[facility])   • Aneurysm (CMS/[facility])   • CAD (coronary artery disease)         ICD-10-CM ICD-9-CM   1. Abdominal aortic aneurysm (AAA) without rupture (CMS/[facility]) I71.4 441.4   2. Endoleak post endovascular aneurysm repair, subsequent encounter T82.330D V58.89     996.1   3. Tobacco abuse Z72.0 305.1   4. Essential hypertension I10 401.9   5. Hyperlipidemia, unspecified hyperlipidemia type E78.5 272.4       PLAN: After thoroughly evaluating Michael Paz, I believe the best course of action is to proceed with some further testing.  I would like for him to undergo a repeat CTA of the abdomen and pelvis.  The abdominal ultrasound was not as accurate as I would have expected.  Since he had a previous endoleak that was repaired I would like to recheck it and have him come back in the next week or two.  The rest of his testing including the carotids and popliteal arteries are all essentially unchanged.  I will recheck those in 1 year.  Otherwise we will continue with CTAs of the abdomen and pelvis every 6 months.  I did  extensively on smoking cessation, and the patient was advised of the continued risks of smoking.  I provided over 10 minutes counseling on this matter.  The patient is to continue taking their medications as previously discussed.   This was all discussed in full with complete  understanding.  Thank you for allowing me to participate in the care of your patient.  Please do not hesitate to call with any questions or concerns.  We will keep you aware of any further encounters with Michael Paz.      Sincerely Yours,        Jah Frey DO    Scribed for Jah Frey DO by Winsome Couch 4/9/2019  5:26 PM

## 2019-04-10 ENCOUNTER — TELEPHONE (OUTPATIENT)
Dept: VASCULAR SURGERY | Facility: CLINIC | Age: 74
End: 2019-04-10

## 2019-04-10 NOTE — TELEPHONE ENCOUNTER
I called patient with his testing and office visit for 4/25/19.  I went over the directions and he expressed understanding.  I will also mail the reminder.

## 2019-04-24 ENCOUNTER — TELEPHONE (OUTPATIENT)
Dept: VASCULAR SURGERY | Facility: CLINIC | Age: 74
End: 2019-04-24

## 2019-04-24 NOTE — TELEPHONE ENCOUNTER
Left message reminding Mr Paz of his appointments for Wednesday, April 25th, 2019. Reminded Mr Paz to arrive at the [facility] at 815 am with nothing to eat or drink 6 hours prior to testing and follow up afterwards at 9 am with Dr Frey. Mr Paz confirmed he would be here.

## 2019-04-25 ENCOUNTER — HOSPITAL ENCOUNTER (OUTPATIENT)
Dept: CT IMAGING | Facility: HOSPITAL | Age: 74
Discharge: HOME | End: 2019-04-25
Admitting: SURGERY

## 2019-04-25 ENCOUNTER — OFFICE VISIT (OUTPATIENT)
Dept: VASCULAR SURGERY | Facility: CLINIC | Age: 74
End: 2019-04-25

## 2019-04-25 VITALS
OXYGEN SATURATION: 98 % | DIASTOLIC BLOOD PRESSURE: 82 MMHG | BODY MASS INDEX: 36.18 KG/M2 | WEIGHT: 291 LBS | HEART RATE: 69 BPM | SYSTOLIC BLOOD PRESSURE: 130 MMHG | HEIGHT: 75 IN

## 2019-04-25 DIAGNOSIS — I71.40 ABDOMINAL AORTIC ANEURYSM (AAA) WITHOUT RUPTURE (HCC): Primary | ICD-10-CM

## 2019-04-25 DIAGNOSIS — I72.4 POPLITEAL ARTERY ANEURYSM, BILATERAL (HCC): ICD-10-CM

## 2019-04-25 DIAGNOSIS — I71.40 ABDOMINAL AORTIC ANEURYSM (AAA) WITHOUT RUPTURE (HCC): ICD-10-CM

## 2019-04-25 DIAGNOSIS — I65.23 BILATERAL CAROTID ARTERY STENOSIS: ICD-10-CM

## 2019-04-25 LAB — CREAT BLDA-MCNC: 1.2 MG/DL (ref 0.6–1.3)

## 2019-04-25 PROCEDURE — 99214 OFFICE O/P EST MOD 30 MIN: CPT | Performed by: SURGERY

## 2019-04-25 PROCEDURE — 74174 CTA ABD&PLVS W/CONTRAST: CPT

## 2019-04-25 PROCEDURE — 0 IOPAMIDOL PER 1 ML: Performed by: SURGERY

## 2019-04-25 PROCEDURE — 82565 ASSAY OF CREATININE: CPT

## 2019-04-25 RX ADMIN — IOPAMIDOL 100 ML: 755 INJECTION, SOLUTION INTRAVENOUS at 08:50

## 2019-04-25 NOTE — PROGRESS NOTES
"4/25/2019      Zafar Funk MD  [address] [place] [address]        Michael CASANOVA [facility]  1945    Chief Complaint   Patient presents with   • Follow-up     2 Week Follow Up For Abdominal Aortic Aneurysm without Rupture. Test 04/25/19 CT pad angiogram abd pelvis w wo. Patient denies any stroke like symptoms.        Dear Zafar Funk MD:    HPI     I had the pleasure of seeing you patient in the office today for follow up.  As you recall, the patient is a 74 y.o. male who we are currently following for abdominal aortic aneurysm disease. The patient is had a previous endovascular repair and had a type IA endoleak which was being managed conservatively by Dr. Boudreaux. His aneurysm had gotten larger up to 7.1 cm in greatest dimensions and he underwent repair with an extension cuff. He is doing great ever since and has not had any abdominal pain or back pain. He is complaining of fatigue, shortness of breath with any exertion.  He did have repeat noninvasive testing performed today which I did review in office.      Review of Systems   Constitutional: Negative.    HENT: Negative.    Eyes: Negative.    Respiratory: Negative.    Cardiovascular: Negative.    Gastrointestinal: Negative.    Endocrine: Negative.    Genitourinary: Negative.    Musculoskeletal: Negative.    Skin: Negative.    Allergic/Immunologic: Negative.    Neurological: Negative.    Hematological: Negative.    Psychiatric/Behavioral: Negative.    All other systems reviewed and are negative.       /82 (BP Location: Right arm, Patient Position: Sitting, Cuff Size: Adult)   Pulse 69   Ht 190.5 cm (75\")   Wt 132 kg (291 lb)   SpO2 98%   BMI 36.37 kg/m²   Physical Exam   Constitutional: He is oriented to person, place, and time. He appears well-developed and well-nourished. No distress.   HENT:   Head: Normocephalic and atraumatic.   Mouth/Throat: Oropharynx is clear and moist and mucous membranes are normal.   Neck: No JVD present. Carotid " bruit is not present.   Cardiovascular: Normal rate, regular rhythm, S1 normal, S2 normal, normal heart sounds, intact distal pulses and normal pulses. Exam reveals no gallop and no friction rub.   No murmur heard.  Pulses:       Femoral pulses are 2+ on the right side, and 2+ on the left side.       Popliteal pulses are 2+ on the right side, and 2+ on the left side.        Dorsalis pedis pulses are 2+ on the right side, and 2+ on the left side.        Posterior tibial pulses are 2+ on the right side, and 2+ on the left side.   Pulmonary/Chest: Effort normal and breath sounds normal.   Abdominal: Soft. Normal appearance, normal aorta and bowel sounds are normal. He exhibits no abdominal bruit. There is no hepatosplenomegaly. There is no tenderness.   Musculoskeletal: Normal range of motion.     Vascular Status -  His right foot exhibits no edema. His left foot exhibits no edema.  Neurological: He is alert and oriented to person, place, and time. He has normal strength. No cranial nerve deficit.   Skin: Skin is warm, dry and intact. He is not diaphoretic.   Psychiatric: He has a normal mood and affect. His behavior is normal. Judgment and thought content normal. Cognition and memory are normal.         DIAGNOSTIC DATA:    Ct Angiogram Abdomen Pelvis With & Without Contrast    Result Date: 4/25/2019  Narrative: EXAMINATION: CT angiogram of the abdomen and pelvis with and without contrast 04/25/2019  HISTORY: Abdominal aortic aneurysm with history of repair.  DOSE: 2424 mGycm. Automated exposure control was utilized to diminish patient radiation dose..  FINDINGS: Multiple contiguous axial images are obtained through the abdomen and pelvis both with and without contrast-enhancement with reformatted images obtained in sagittal and coronal projections from the original dataset as well as MIPS.  There is linear scarring within the lung bases. Lung bases are otherwise clear. There is mild cardiomegaly. Coronary artery  calcifications are noted in the right coronary distribution. No evidence of pericardial effusion. A small hiatal hernia is present.  No evidence of focal hepatic mass. There is mild diffuse steatosis of the liver. There is normal enhancement of the hepatic vasculature.  Cholelithiasis is present with dependent stones layering within the gallbladder. No pericholecystic inflammatory change or biliary dilatation are present.  The pancreas and spleen are normal in appearance.  Both adrenals are unremarkable. No evidence of discrete renal mass or nephrolithiasis.  A infrarenal abdominal aortic aneurysm is present which has been treated with an endovascular stent graft. The patient's native aneurysmal sac measures 7.2 cm in anterior to posterior dimension by 5.4 cm in transverse dimension essentially unchanged from the previous study. Both common iliac arteries are mildly aneurysmal with the right measuring 2.6 cm in maximum transverse dimension unchanged from the previous study. The left common iliac measures 2.5 cm in transverse dimension also unchanged from the previous study. No evidence of endovascular stent graft leak. Mild aneurysmal dilatation of the common femoral arteries are also present with the right common femoral measuring 1.8 cm in anterior to posterior dimension and the left common femoral 1.9 cm. This is unchanged from the previous study. No evidence of retroperitoneal hematoma or adenopathy.  No evidence of mechanical bowel obstruction. Diverticulosis is noted of the descending and sigmoid colon without evidence of acute diverticulitis. No focal bowel wall thickening is present. The appendix is surgically absent.  The prostate gland is enlarged. Small bilateral fat-containing inguinal hernias are present. The urinary bladder wall is thickened but this may be related to incomplete distention. No free fluid or localized inflammation is present.  There is calcific plaquing at the origin of the celiac, SMA  and SLOANE with associated stenosis involving the proximal segment of the SLOANE. No evidence of rate limiting stenosis of the more proximal mesenteric vessels. Calcific plaquing is also noted at the origin of the renal arteries with minimal associated stenosis.      Impression: 1.. Endovascular stent graft placement for an abdominal aortic aneurysm as well as aneurysmal dilatation of both common iliac arteries. No evidence of endoleak. Aneurysms are stable from the previous study. There is also mild aneurysmal dilatation of both common femoral arteries. No evidence of retroperitoneal hematoma or stranding. 2. There is mild calcific plaquing involving the origin of the mesenteric vessels and renal arteries with associated stenosis of the proximal SLOANE. No evidence of rate limiting stenosis of the other branch vessels. 3. Cholelithiasis. No pericholecystic inflammatory changes are present. 4. Steatosis of the liver. 5. Small bilateral fat-containing inguinal hernias. 6. Diverticulosis of the descending and sigmoid colon without evidence of acute diverticulitis. 7. Urinary bladder wall is prominent. This may be related to incomplete distention. This report was finalized on 04/25/2019 09:34 by Dr. Dio Ulloa MD.    Us Carotid Bilateral    Result Date: 4/9/2019  Narrative: History: Carotid occlusive disease      Impression: Impression: 1. There is less than 50% stenosis of the right internal carotid artery. 2. There is less than 50% stenosis of the left internal carotid artery. 3. Antegrade flow is demonstrated in bilateral vertebral arteries.  Comments: Bilateral carotid vertebral arterial duplex scan was performed.  Grayscale imaging shows intimal thickening and calcified elements at the carotid bifurcation. The right internal carotid artery peak systolic velocity is 97.4 cm/sec. The end-diastolic velocity is 33.8 cm/sec. The right ICA/CCA ratio is approximately 1.02 . These findings correlate with less than 50%  stenosis of the right internal carotid artery.  Grayscale imaging shows intimal thickening and calcified elements at the carotid bifurcation. The left internal carotid artery peak systolic velocity is 93.5 cm/sec. The end-diastolic velocity is 32.2 cm/sec. The left ICA/CCA ratio is approximately 0.77 . These findings correlate with less than 50% stenosis of the left internal carotid artery.  Antegrade flow is demonstrated in bilateral vertebral arteries. Greater than 50% stenosis of the proximal right external carotid artery. This report was finalized on 04/09/2019 16:24 by Dr. Sahil Padron MD.    Us Aorta Limited    Result Date: 4/9/2019  Narrative: EXAMINATION: US AORTA LIMITED- 4/9/2019 1:16 PM CDT  HISTORY: Abdominal aortic aneurysm  COMPARISON: 04/10/2018  FINDINGS: Sonographic evaluation of the aorta was performed in the transverse and longitudinal projections.  Proximal abdominal aorta measures 2.7 x 3.2 cm. The mid abdominal aorta measures 2.9 x 2.4 cm. The distal abdominal aorta measures 7.5 x 6.7 cm with an endovascular stent graft identified. Just proximal to the bifurcation, the aorta measures up to 4.8 cm in diameter. The common iliac arteries are dilated to 1.9 cm bilaterally.      Impression: Infrarenal abdominal aortic aneurysm measuring up to 7.5 cm in diameter, previously measuring 7.0 cm in diameter. An endovascular stent graft is noted. This report was finalized on 04/09/2019 13:17 by Dr. Cal Almazan MD.    Us Arterial Doppler Lower Extremity Bilateral    Result Date: 4/9/2019  Narrative:  History: Screen for popliteal artery aneurysm  Comment: Limited bilateral lower extremity arterial duplex was performed using gray scale imaging and color flow Doppler.  FINDINGS:  On the right the proximal popliteal artery measures 0.96 x 1.04 cm. The mid popliteal artery measures 0.64 x 0.49 cm. The distal popliteal artery measures 0.59 x 0.54 cm. The artery appears widely patent with a peak systolic  velocity of 90.8 cm/s.  On the left the proximal popliteal artery measures 1.20 x 1.28 cm. The mid popliteal artery measures 0.90 x 0.80 cm. The distal popliteal artery measures 0.65 x 0.53 cm. The artery appears patent with peak systolic velocity of 75.47 m/s.      Impression: Aneurysmal dilatation of the proximal right popliteal artery to maximal dimensions of 0.96 x 1.04 cm. As well as aneurysmal dilatation of the proximal left popliteal artery with maximum dimensions of 1.20 x 1.28 cm. Both arteries appear patent.   This report was finalized on 04/09/2019 16:27 by Dr. Sahil Padron MD.     Patient Active Problem List   Diagnosis   • Tobacco abuse   • Hypertension   • Hyperlipidemia   • Endoleak post endovascular aneurysm repair (CMS/HCC)   • AAA (abdominal aortic aneurysm) (CMS/HCC)   • Aneurysm (CMS/HCC)   • CAD (coronary artery disease)         ICD-10-CM ICD-9-CM   1. Abdominal aortic aneurysm (AAA) without rupture (CMS/HCC) I71.4 441.4   2. 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 Bilateral carotid artery stenosis I65.23 433.10     433.30       PLAN: After thoroughly evaluating Michael Paz, I believe the best course of action is to remain conservative from vascular standpoint.  I did review his CTA closely and shows no evidence of endoleak.  The rest of his testing including the carotids and popliteal arteries are all essentially unchanged. Otherwise we will continue with CTAs of the abdomen and pelvis every year, as the ultrasounds have not been very accurate.  I did  extensively on smoking cessation, and the patient was advised of the continued risks of smoking.  I provided over 10 minutes counseling on this matter.  The patient is to continue taking their medications as previously discussed.   This was all discussed in full with complete understanding.  Thank you for allowing me to participate in the care of your patient.  Please do not hesitate to call with any questions  or concerns.  We will keep you aware of any further encounters with Michael Paz.      Sincerely Yours,        IVY Mares

## 2020-03-26 ENCOUNTER — TELEPHONE (OUTPATIENT)
Dept: VASCULAR SURGERY | Facility: CLINIC | Age: 75
End: 2020-03-26

## 2020-04-06 ENCOUNTER — TELEPHONE (OUTPATIENT)
Dept: VASCULAR SURGERY | Facility: CLINIC | Age: 75
End: 2020-04-06

## 2020-04-13 ENCOUNTER — TELEPHONE (OUTPATIENT)
Dept: VASCULAR SURGERY | Facility: CLINIC | Age: 75
End: 2020-04-13

## 2020-04-13 NOTE — TELEPHONE ENCOUNTER
Patient called and stated that he wanted to RS the up coming appointment with Dr. Frey due to Covid 19.  Advised that once we were able to RS we would contact the patient.

## 2020-04-16 ENCOUNTER — APPOINTMENT (OUTPATIENT)
Dept: ULTRASOUND IMAGING | Facility: HOSPITAL | Age: 75
End: 2020-04-16

## 2020-04-16 ENCOUNTER — APPOINTMENT (OUTPATIENT)
Dept: CT IMAGING | Facility: HOSPITAL | Age: 75
End: 2020-04-16

## 2020-05-04 ENCOUNTER — TELEPHONE (OUTPATIENT)
Dept: VASCULAR SURGERY | Facility: CLINIC | Age: 75
End: 2020-05-04

## 2020-05-04 NOTE — TELEPHONE ENCOUNTER
Left message for a return call. We are trying to get testing and appointment rescheduled.  Pt must have both on the same day .

## 2020-05-12 ENCOUNTER — TELEPHONE (OUTPATIENT)
Dept: VASCULAR SURGERY | Facility: CLINIC | Age: 75
End: 2020-05-12

## 2020-06-29 ENCOUNTER — TELEPHONE (OUTPATIENT)
Dept: VASCULAR SURGERY | Facility: CLINIC | Age: 75
End: 2020-06-29

## 2020-06-29 NOTE — TELEPHONE ENCOUNTER
Spoke with Mr Paz reminding him of his appointments for Tuesday, June 30th, 2020. Reminded Mr Paz to arrive at 830 am for testing with nothing to eat or drink 6 hours prior and follow up afterwards at 1145 am with Dr Frey. Mr Paz confirmed he would be here.

## 2020-06-30 ENCOUNTER — HOSPITAL ENCOUNTER (OUTPATIENT)
Dept: ULTRASOUND IMAGING | Facility: HOSPITAL | Age: 75
Discharge: HOME | End: 2020-06-30

## 2020-06-30 ENCOUNTER — HOSPITAL ENCOUNTER (OUTPATIENT)
Dept: CT IMAGING | Facility: HOSPITAL | Age: 75
Discharge: HOME | End: 2020-06-30
Admitting: NURSE PRACTITIONER

## 2020-06-30 ENCOUNTER — OFFICE VISIT (OUTPATIENT)
Dept: VASCULAR SURGERY | Facility: CLINIC | Age: 75
End: 2020-06-30

## 2020-06-30 VITALS
BODY MASS INDEX: 36.18 KG/M2 | HEART RATE: 68 BPM | OXYGEN SATURATION: 95 % | SYSTOLIC BLOOD PRESSURE: 128 MMHG | WEIGHT: 291 LBS | DIASTOLIC BLOOD PRESSURE: 80 MMHG | HEIGHT: 75 IN

## 2020-06-30 DIAGNOSIS — I72.4 POPLITEAL ARTERY ANEURYSM, BILATERAL (HCC): ICD-10-CM

## 2020-06-30 DIAGNOSIS — E78.5 HYPERLIPIDEMIA, UNSPECIFIED HYPERLIPIDEMIA TYPE: ICD-10-CM

## 2020-06-30 DIAGNOSIS — I65.23 BILATERAL CAROTID ARTERY STENOSIS: ICD-10-CM

## 2020-06-30 DIAGNOSIS — I71.40 ABDOMINAL AORTIC ANEURYSM (AAA) WITHOUT RUPTURE (HCC): ICD-10-CM

## 2020-06-30 DIAGNOSIS — I10 ESSENTIAL HYPERTENSION: ICD-10-CM

## 2020-06-30 DIAGNOSIS — Z72.0 TOBACCO ABUSE: ICD-10-CM

## 2020-06-30 DIAGNOSIS — I71.40 ABDOMINAL AORTIC ANEURYSM (AAA) WITHOUT RUPTURE (HCC): Primary | ICD-10-CM

## 2020-06-30 LAB — CREAT BLDA-MCNC: 1.1 MG/DL (ref 0.6–1.3)

## 2020-06-30 PROCEDURE — 93880 EXTRACRANIAL BILAT STUDY: CPT

## 2020-06-30 PROCEDURE — 99214 OFFICE O/P EST MOD 30 MIN: CPT | Performed by: SURGERY

## 2020-06-30 PROCEDURE — 74174 CTA ABD&PLVS W/CONTRAST: CPT

## 2020-06-30 PROCEDURE — 93925 LOWER EXTREMITY STUDY: CPT

## 2020-06-30 PROCEDURE — 93880 EXTRACRANIAL BILAT STUDY: CPT | Performed by: SURGERY

## 2020-06-30 PROCEDURE — 82565 ASSAY OF CREATININE: CPT

## 2020-06-30 PROCEDURE — 93925 LOWER EXTREMITY STUDY: CPT | Performed by: SURGERY

## 2020-06-30 PROCEDURE — 0 IOPAMIDOL PER 1 ML: Performed by: NURSE PRACTITIONER

## 2020-06-30 RX ADMIN — IOPAMIDOL 100 ML: 755 INJECTION, SOLUTION INTRAVENOUS at 09:22

## 2020-06-30 NOTE — PROGRESS NOTES
"6/30/2020      Zafar Funk MD  [address] [place] [address]        Michael CASANOVA [facility]  1945    Chief Complaint   Patient presents with   • Follow-up     1 Year Follow Up For Popliteal Artery Aneurysm, Bilateral Carotid Artery Stenosis, and Abdominal Aortic Aneurysm without Rupture. Test [id number] US pad lower ext arteries bilat, US pad carotid bilateral, and CT pad angiogram abd pelvis w wo. Patient denies any stroke like symptoms.        Dear Zafar Funk MD:    HPI     I had the pleasure of seeing you patient in the office today for follow up.  As you recall, the patient is a 75 y.o. male who we are currently following for abdominal aortic aneurysm disease. The patient is had a previous endovascular repair and had a type IA endoleak which was being managed conservatively by Dr. Boudreaux. His aneurysm had gotten larger up to 7.1 cm in greatest dimensions and he underwent repair with an extension cuff. He is doing great since that time and has not had any abdominal pain or back pain.  He is maintained on aspirin, Coumadin, and Zocor.  He did have repeat noninvasive testing performed today which I did review in office.      Review of Systems   Constitutional: Negative.    HENT: Negative.    Eyes: Negative.    Respiratory: Negative.    Cardiovascular: Negative.    Gastrointestinal: Negative.    Endocrine: Negative.    Genitourinary: Negative.    Musculoskeletal: Negative.    Skin: Negative.    Allergic/Immunologic: Negative.    Neurological: Negative.    Hematological: Negative.    Psychiatric/Behavioral: Negative.    All other systems reviewed and are negative.       /80 (BP Location: Right arm, Patient Position: Sitting, Cuff Size: Adult)   Pulse 68   Ht 190.5 cm (75\")   Wt 132 kg (291 lb)   SpO2 95%   BMI 36.37 kg/m²   Physical Exam   Constitutional: He is oriented to person, place, and time. Vital signs are normal. He appears well-developed and well-nourished. He is cooperative. No distress. "   HENT:   Head: Normocephalic and atraumatic.   Mouth/Throat: Oropharynx is clear and moist and mucous membranes are normal.   Eyes: Pupils are equal, round, and reactive to light.   Neck: Neck supple. No JVD present. Carotid bruit is not present.   Cardiovascular: Normal rate, regular rhythm, S1 normal, S2 normal, normal heart sounds, intact distal pulses and normal pulses. Exam reveals no gallop and no friction rub.   No murmur heard.  Pulses:       Femoral pulses are 2+ on the right side, and 2+ on the left side.       Popliteal pulses are 2+ on the right side, and 2+ on the left side.        Dorsalis pedis pulses are 2+ on the right side, and 2+ on the left side.        Posterior tibial pulses are 2+ on the right side, and 2+ on the left side.   Pulmonary/Chest: Effort normal and breath sounds normal.   Abdominal: Soft. Normal appearance, normal aorta and bowel sounds are normal. He exhibits no abdominal bruit. There is no hepatosplenomegaly. There is no tenderness.   Obese   Musculoskeletal: Normal range of motion.     Vascular Status -  His right foot exhibits no edema. His left foot exhibits no edema.  Neurological: He is alert and oriented to person, place, and time. He has normal strength. No cranial nerve deficit.   Skin: Skin is warm, dry and intact. He is not diaphoretic.   Psychiatric: He has a normal mood and affect. His behavior is normal. Judgment and thought content normal. Cognition and memory are normal.   Nursing note and vitals reviewed.        DIAGNOSTIC DATA:    Ct Angiogram Abdomen Pelvis    Result Date: 6/30/2020  Narrative: EXAM: CT ANGIOGRAM ABDOMEN PELVIS- - 6/30/2020 9:12 AM CDT  HISTORY: Abdominal aortic aneurysm (AAA), known, follow up; I71.4-Abdominal aortic aneurysm, without rupture   COMPARISON: 4/25/2019.  DOSE LENGTH PRODUCT: 2201 mGy cm. Automatic exposure control was utilized to make radiation dose as low as reasonably achievable.  TECHNIQUE: Unenhanced and enhanced axial images  of the abdomen and pelvis obtained with multiplanar reformats. 3D postprocessing, including MIPs, performed and images saved to PACS.  FINDINGS: Arterial phase of imaging limits evaluation of solid organs.  VISUALIZED CHEST: No pleural or pericardial effusion. Lung bases clear.  LIVER: Diffuse low-density of the liver, may be due to fatty liver phase of imaging.  BILIARY: Gallstones. No bile duct dilation.  PANCREAS: Normal pancreas contour and enhancement.  SPLEEN: Normal size and contour.  ADRENAL: Normal appearance of the bilateral adrenal glands.  GENITOURINARY: No hydronephrosis, urolithiasis or solid renal lesion. Renovascular calcifications. Thickening of the urinary bladder, which can be seen with cystitis or chronic bladder outlet obstruction. Normal prostate size.  PERITONEUM: No free air or ascites.  GI TRACT: Normal configuration of the stomach and duodenum.  Colonic diverticula. No evidence of acute diverticulitis. No evidence of bowel obstruction. Nonvisualized appendix. No secondary signs of appendicitis.  VESSELS: Infrarenal abdominal aortic aneurysm with stent graft. Native aneurysm sac measures 7.2 x 5.4 cm. Patent stent graft. No evidence of leak.  Celiac, superior mesenteric, single right renal, 2 left renal arteries are normally opacified. Inferior mesenteric artery diminutive, limited in evaluation.  RETROPERITONEUM: No mass, lymphadenopathy or hemorrhage.  SOFT TISSUES: Normal appearance of the overlying abdominal soft tissues.   BONES: No acute or aggressive bony lesion. Degenerative changes in the visualized spine.       Impression: 1. Similar size of fusiform infrarenal abdominal aortic aneurysm with native sac measuring 7.2 x 5.4 cm. Patent stent graft without evidence of leak. 2. Thickening of the urinary bladder wall, could be seen with cystitis or chronic bladder outlet obstruction. 3. Gallstones. Diffuse low-density of the liver, may be due to fatty liver phase of imaging. 4. Colonic  diverticula. No evidence of acute diverticulitis. This report was finalized on 06/30/2020 09:58 by Dr Celine Barrios MD.    Us Carotid Bilateral    Result Date: 6/30/2020  Narrative: History: Carotid occlusive disease      Impression: Impression: 1. There is less than 50% stenosis of the right internal carotid artery. 2. There is less than 50% stenosis of the left internal carotid artery. 3. Antegrade flow is demonstrated in bilateral vertebral arteries.  Comments: Bilateral carotid vertebral arterial duplex scan was performed.  Grayscale imaging shows intimal thickening and calcified elements at the carotid bifurcation. The right internal carotid artery peak systolic velocity is 92.6 cm/sec. The end-diastolic velocity is 34 cm/sec. The right ICA/CCA ratio is approximately 0.88 . These findings correlate with less than 50% stenosis of the right internal carotid artery.  Grayscale imaging shows intimal thickening and calcified elements at the carotid bifurcation. The left internal carotid artery peak systolic velocity is 122 cm/sec. The end-diastolic velocity is 32.2 cm/sec. The left ICA/CCA ratio is approximately 1.05 . These findings correlate with less than 50% stenosis of the left internal carotid artery.  Antegrade flow is demonstrated in bilateral vertebral arteries. There is greater than 50% stenosis of the right external carotid artery. This report was finalized on 06/30/2020 10:25 by Dr. Jah Frey MD.    Us Arterial Doppler Lower Extremity Complete    Result Date: 6/30/2020  Narrative: History: Popliteal aneurysm  Comments: Arterial duplex exam of bilateral popliteal arteries was performed.  Right leg: The proximal left popliteal artery diameter is 1.23 cm.  The mid left popliteal artery diameter is 0.93 cm.  The distal left popliteal artery diameter is 0.83 cm.  The peak systolic velocities in the popliteal artery measured 49.8 cm/s.   Left leg: The proximal right popliteal artery diameter is 1.41  cm.  The mid right popliteal artery diameter is 1.25 cm.  The distal right  popliteal artery diameter is 0.70 cm. The peak systolic velocity in the popliteal artery measured 84.2 cm/s.      Impression: Impression: Patent bilateral popliteal arteries without evidence of significant stenosis. There are small popliteal aneurysms bilaterally.   This report was finalized on 06/30/2020 10:21 by Dr. Jah Frey MD.     Patient Active Problem List   Diagnosis   • Tobacco abuse   • Hypertension   • Hyperlipidemia   • Endoleak post endovascular aneurysm repair (CMS/HCC)   • AAA (abdominal aortic aneurysm) (CMS/HCC)   • Aneurysm (CMS/HCC)   • CAD (coronary artery disease)         ICD-10-CM ICD-9-CM   1. Abdominal aortic aneurysm (AAA) without rupture (CMS/HCC) I71.4 441.4   2. 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 Bilateral carotid artery stenosis I65.23 433.10     433.30   4. Tobacco abuse Z72.0 305.1   5. Essential hypertension I10 401.9   6. Hyperlipidemia, unspecified hyperlipidemia type E78.5 272.4       PLAN: After thoroughly evaluating Michael Paz, I believe the best course of action is to remain conservative from vascular surgery standpoint.  I did review his testing which has remains stable.  There is no change in size of his aneurysm and no evidence of endoleak.  He does have small popliteal artery aneurysms bilaterally however this only requires annual monitoring.  His carotid duplex shows less than 50% carotid stenosis bilaterally.  We will see him back in 1 year with repeat noninvasive testing for continued surveillance, including ABIs, a carotid duplex and a CTA of the abdomen pelvis.  We will not proceed with ultrasounds due to the inaccuracies.   I did  extensively on smoking cessation, and the patient was advised of the continued risks of smoking.  I provided over 3 minutes counseling on this matter.  He does not have a desire to stop smoking at this time.  I did discuss  vascular risk factors as they pertain to the progression of vascular disease including controlling his hypertension and hyperlipidemia.  His blood pressure is well controlled on his current medication regimen.  He is maintained on Zocor for his hyperlipidemia.  The patient is to continue taking their medications as previously discussed.   This was all discussed in full with complete understanding.  Thank you for allowing me to participate in the care of your patient.  Please do not hesitate to call with any questions or concerns.  We will keep you aware of any further encounters with Michael Paz.      Sincerely Yours,        IVY Mares

## 2020-06-30 NOTE — PATIENT INSTRUCTIONS

## 2020-07-07 ENCOUNTER — HOSPITAL ENCOUNTER (EMERGENCY)
Facility: HOSPITAL | Age: 75
Discharge: HOME | End: 2020-07-08
Attending: EMERGENCY MEDICINE | Admitting: EMERGENCY MEDICINE

## 2020-07-07 DIAGNOSIS — R04.0 EPISTAXIS: Primary | ICD-10-CM

## 2020-07-07 PROCEDURE — 85730 THROMBOPLASTIN TIME PARTIAL: CPT | Performed by: EMERGENCY MEDICINE

## 2020-07-07 PROCEDURE — 85025 COMPLETE CBC W/AUTO DIFF WBC: CPT | Performed by: EMERGENCY MEDICINE

## 2020-07-07 PROCEDURE — 99284 EMERGENCY DEPT VISIT MOD MDM: CPT

## 2020-07-07 PROCEDURE — 85610 PROTHROMBIN TIME: CPT | Performed by: EMERGENCY MEDICINE

## 2020-07-08 ENCOUNTER — APPOINTMENT (OUTPATIENT)
Dept: GENERAL RADIOLOGY | Facility: HOSPITAL | Age: 75
End: 2020-07-08

## 2020-07-08 ENCOUNTER — HOSPITAL ENCOUNTER (OUTPATIENT)
Facility: HOSPITAL | Age: 75
Setting detail: OBSERVATION
Discharge: HOME | End: 2020-07-13
Attending: EMERGENCY MEDICINE | Admitting: FAMILY MEDICINE

## 2020-07-08 VITALS
TEMPERATURE: 97.9 F | WEIGHT: 280 LBS | DIASTOLIC BLOOD PRESSURE: 84 MMHG | SYSTOLIC BLOOD PRESSURE: 172 MMHG | BODY MASS INDEX: 34.82 KG/M2 | OXYGEN SATURATION: 93 % | HEART RATE: 52 BPM | RESPIRATION RATE: 18 BRPM | HEIGHT: 75 IN

## 2020-07-08 DIAGNOSIS — Z79.01 CHRONIC ANTICOAGULATION: ICD-10-CM

## 2020-07-08 DIAGNOSIS — I10 ESSENTIAL HYPERTENSION: ICD-10-CM

## 2020-07-08 DIAGNOSIS — Z78.9 IMPAIRED MOBILITY AND ADLS: ICD-10-CM

## 2020-07-08 DIAGNOSIS — Z74.09 IMPAIRED MOBILITY AND ADLS: ICD-10-CM

## 2020-07-08 DIAGNOSIS — R04.0 EPISTAXIS: Primary | ICD-10-CM

## 2020-07-08 PROBLEM — Z86.711 HX PULMONARY EMBOLISM: Status: ACTIVE | Noted: 2020-07-08

## 2020-07-08 PROBLEM — Z86.718 HX OF DEEP VENOUS THROMBOSIS: Status: ACTIVE | Noted: 2020-07-08

## 2020-07-08 LAB
ABO GROUP BLD: NORMAL
ALBUMIN SERPL-MCNC: 4.3 G/DL (ref 3.5–5.2)
ALBUMIN/GLOB SERPL: 1.6 G/DL
ALP SERPL-CCNC: 64 U/L (ref 39–117)
ALT SERPL W P-5'-P-CCNC: 23 U/L (ref 1–41)
ANION GAP SERPL CALCULATED.3IONS-SCNC: 10 MMOL/L (ref 5–15)
APTT PPP: 38.7 SECONDS (ref 24.1–35)
APTT PPP: 39 SECONDS (ref 24.1–35)
AST SERPL-CCNC: 29 U/L (ref 1–40)
BASOPHILS # BLD AUTO: 0.02 10*3/MM3 (ref 0–0.2)
BASOPHILS # BLD AUTO: 0.03 10*3/MM3 (ref 0–0.2)
BASOPHILS NFR BLD AUTO: 0.3 % (ref 0–1.5)
BASOPHILS NFR BLD AUTO: 0.4 % (ref 0–1.5)
BILIRUB SERPL-MCNC: 0.9 MG/DL (ref 0–1.2)
BLD GP AB SCN SERPL QL: NEGATIVE
BUN SERPL-MCNC: 18 MG/DL (ref 8–23)
BUN/CREAT SERPL: 20.9 (ref 7–25)
CALCIUM SPEC-SCNC: 9.2 MG/DL (ref 8.6–10.5)
CHLORIDE SERPL-SCNC: 104 MMOL/L (ref 98–107)
CO2 SERPL-SCNC: 25 MMOL/L (ref 22–29)
CREAT SERPL-MCNC: 0.86 MG/DL (ref 0.76–1.27)
DEPRECATED RDW RBC AUTO: 44.5 FL (ref 37–54)
DEPRECATED RDW RBC AUTO: 45 FL (ref 37–54)
EOSINOPHIL # BLD AUTO: 0.16 10*3/MM3 (ref 0–0.4)
EOSINOPHIL # BLD AUTO: 0.27 10*3/MM3 (ref 0–0.4)
EOSINOPHIL NFR BLD AUTO: 2.1 % (ref 0.3–6.2)
EOSINOPHIL NFR BLD AUTO: 4.3 % (ref 0.3–6.2)
ERYTHROCYTE [DISTWIDTH] IN BLOOD BY AUTOMATED COUNT: 13.3 % (ref 12.3–15.4)
ERYTHROCYTE [DISTWIDTH] IN BLOOD BY AUTOMATED COUNT: 13.3 % (ref 12.3–15.4)
GFR SERPL CREATININE-BSD FRML MDRD: 87 ML/MIN/1.73
GLOBULIN UR ELPH-MCNC: 2.7 GM/DL
GLUCOSE SERPL-MCNC: 124 MG/DL (ref 65–99)
HCT VFR BLD AUTO: 39.9 % (ref 37.5–51)
HCT VFR BLD AUTO: 41.3 % (ref 37.5–51)
HGB BLD-MCNC: 13.7 G/DL (ref 13–17.7)
HGB BLD-MCNC: 14.3 G/DL (ref 13–17.7)
HOLD SPECIMEN: NORMAL
IMM GRANULOCYTES # BLD AUTO: 0.02 10*3/MM3 (ref 0–0.05)
IMM GRANULOCYTES # BLD AUTO: 0.02 10*3/MM3 (ref 0–0.05)
IMM GRANULOCYTES NFR BLD AUTO: 0.3 % (ref 0–0.5)
IMM GRANULOCYTES NFR BLD AUTO: 0.3 % (ref 0–0.5)
INR PPP: 2.86 (ref 0.91–1.09)
INR PPP: 3.21 (ref 0.91–1.09)
LYMPHOCYTES # BLD AUTO: 1.19 10*3/MM3 (ref 0.7–3.1)
LYMPHOCYTES # BLD AUTO: 1.43 10*3/MM3 (ref 0.7–3.1)
LYMPHOCYTES NFR BLD AUTO: 15.9 % (ref 19.6–45.3)
LYMPHOCYTES NFR BLD AUTO: 22.7 % (ref 19.6–45.3)
MCH RBC QN AUTO: 31.4 PG (ref 26.6–33)
MCH RBC QN AUTO: 31.4 PG (ref 26.6–33)
MCHC RBC AUTO-ENTMCNC: 34.3 G/DL (ref 31.5–35.7)
MCHC RBC AUTO-ENTMCNC: 34.6 G/DL (ref 31.5–35.7)
MCV RBC AUTO: 90.8 FL (ref 79–97)
MCV RBC AUTO: 91.3 FL (ref 79–97)
MONOCYTES # BLD AUTO: 0.74 10*3/MM3 (ref 0.1–0.9)
MONOCYTES # BLD AUTO: 0.83 10*3/MM3 (ref 0.1–0.9)
MONOCYTES NFR BLD AUTO: 13.2 % (ref 5–12)
MONOCYTES NFR BLD AUTO: 9.9 % (ref 5–12)
NEUTROPHILS NFR BLD AUTO: 3.74 10*3/MM3 (ref 1.7–7)
NEUTROPHILS NFR BLD AUTO: 5.35 10*3/MM3 (ref 1.7–7)
NEUTROPHILS NFR BLD AUTO: 59.2 % (ref 42.7–76)
NEUTROPHILS NFR BLD AUTO: 71.4 % (ref 42.7–76)
NRBC BLD AUTO-RTO: 0 /100 WBC (ref 0–0.2)
NRBC BLD AUTO-RTO: 0 /100 WBC (ref 0–0.2)
NT-PROBNP SERPL-MCNC: 936.1 PG/ML (ref 0–900)
PLATELET # BLD AUTO: 161 10*3/MM3 (ref 140–450)
PLATELET # BLD AUTO: 172 10*3/MM3 (ref 140–450)
PMV BLD AUTO: 10.1 FL (ref 6–12)
PMV BLD AUTO: 10.5 FL (ref 6–12)
POTASSIUM SERPL-SCNC: 4.2 MMOL/L (ref 3.5–5.2)
PROT SERPL-MCNC: 7 G/DL (ref 6–8.5)
PROTHROMBIN TIME: 30.1 SECONDS (ref 11.9–14.6)
PROTHROMBIN TIME: 33 SECONDS (ref 11.9–14.6)
RBC # BLD AUTO: 4.37 10*6/MM3 (ref 4.14–5.8)
RBC # BLD AUTO: 4.55 10*6/MM3 (ref 4.14–5.8)
RH BLD: POSITIVE
SARS-COV-2 RNA PNL SPEC NAA+PROBE: NOT DETECTED
SODIUM SERPL-SCNC: 139 MMOL/L (ref 136–145)
T&S EXPIRATION DATE: NORMAL
TROPONIN T SERPL-MCNC: <0.01 NG/ML (ref 0–0.03)
WBC # BLD AUTO: 6.31 10*3/MM3 (ref 3.4–10.8)
WBC # BLD AUTO: 7.49 10*3/MM3 (ref 3.4–10.8)
WHOLE BLOOD HOLD SPECIMEN: NORMAL

## 2020-07-08 PROCEDURE — 85610 PROTHROMBIN TIME: CPT | Performed by: EMERGENCY MEDICINE

## 2020-07-08 PROCEDURE — 25010000002 TDAP 5-2.5-18.5 LF-MCG/0.5 SUSPENSION: Performed by: FAMILY MEDICINE

## 2020-07-08 PROCEDURE — 87635 SARS-COV-2 COVID-19 AMP PRB: CPT | Performed by: EMERGENCY MEDICINE

## 2020-07-08 PROCEDURE — 93010 ELECTROCARDIOGRAM REPORT: CPT | Performed by: INTERNAL MEDICINE

## 2020-07-08 PROCEDURE — 25010000002 ONDANSETRON PER 1 MG: Performed by: EMERGENCY MEDICINE

## 2020-07-08 PROCEDURE — 85025 COMPLETE CBC W/AUTO DIFF WBC: CPT | Performed by: EMERGENCY MEDICINE

## 2020-07-08 PROCEDURE — 36430 TRANSFUSION BLD/BLD COMPNT: CPT

## 2020-07-08 PROCEDURE — 86850 RBC ANTIBODY SCREEN: CPT | Performed by: EMERGENCY MEDICINE

## 2020-07-08 PROCEDURE — G0378 HOSPITAL OBSERVATION PER HR: HCPCS

## 2020-07-08 PROCEDURE — 96374 THER/PROPH/DIAG INJ IV PUSH: CPT

## 2020-07-08 PROCEDURE — 96375 TX/PRO/DX INJ NEW DRUG ADDON: CPT

## 2020-07-08 PROCEDURE — 94640 AIRWAY INHALATION TREATMENT: CPT

## 2020-07-08 PROCEDURE — 90471 IMMUNIZATION ADMIN: CPT | Performed by: FAMILY MEDICINE

## 2020-07-08 PROCEDURE — 71045 X-RAY EXAM CHEST 1 VIEW: CPT

## 2020-07-08 PROCEDURE — 85730 THROMBOPLASTIN TIME PARTIAL: CPT | Performed by: EMERGENCY MEDICINE

## 2020-07-08 PROCEDURE — 93005 ELECTROCARDIOGRAM TRACING: CPT | Performed by: EMERGENCY MEDICINE

## 2020-07-08 PROCEDURE — 25010000003 AMPICILLIN-SULBACTAM PER 1.5 G: Performed by: FAMILY MEDICINE

## 2020-07-08 PROCEDURE — C9803 HOPD COVID-19 SPEC COLLECT: HCPCS

## 2020-07-08 PROCEDURE — 96376 TX/PRO/DX INJ SAME DRUG ADON: CPT

## 2020-07-08 PROCEDURE — 99285 EMERGENCY DEPT VISIT HI MDM: CPT

## 2020-07-08 PROCEDURE — 36415 COLL VENOUS BLD VENIPUNCTURE: CPT

## 2020-07-08 PROCEDURE — 80053 COMPREHEN METABOLIC PANEL: CPT | Performed by: EMERGENCY MEDICINE

## 2020-07-08 PROCEDURE — 25010000002 ONDANSETRON PER 1 MG: Performed by: FAMILY MEDICINE

## 2020-07-08 PROCEDURE — 96361 HYDRATE IV INFUSION ADD-ON: CPT

## 2020-07-08 PROCEDURE — 25010000002 PIPERACILLIN SOD-TAZOBACTAM PER 1 G: Performed by: FAMILY MEDICINE

## 2020-07-08 PROCEDURE — 86901 BLOOD TYPING SEROLOGIC RH(D): CPT | Performed by: EMERGENCY MEDICINE

## 2020-07-08 PROCEDURE — P9017 PLASMA 1 DONOR FRZ W/IN 8 HR: HCPCS

## 2020-07-08 PROCEDURE — 90715 TDAP VACCINE 7 YRS/> IM: CPT | Performed by: FAMILY MEDICINE

## 2020-07-08 PROCEDURE — 86927 PLASMA FRESH FROZEN: CPT

## 2020-07-08 PROCEDURE — 83880 ASSAY OF NATRIURETIC PEPTIDE: CPT | Performed by: EMERGENCY MEDICINE

## 2020-07-08 PROCEDURE — 84484 ASSAY OF TROPONIN QUANT: CPT | Performed by: EMERGENCY MEDICINE

## 2020-07-08 PROCEDURE — 86900 BLOOD TYPING SEROLOGIC ABO: CPT | Performed by: EMERGENCY MEDICINE

## 2020-07-08 RX ORDER — MORPHINE SULFATE 2 MG/ML
2 INJECTION, SOLUTION INTRAMUSCULAR; INTRAVENOUS EVERY 4 HOURS PRN
Status: DISCONTINUED | OUTPATIENT
Start: 2020-07-08 | End: 2020-07-09

## 2020-07-08 RX ORDER — LABETALOL HYDROCHLORIDE 5 MG/ML
10 INJECTION, SOLUTION INTRAVENOUS EVERY 4 HOURS PRN
Status: DISCONTINUED | OUTPATIENT
Start: 2020-07-08 | End: 2020-07-13 | Stop reason: HOSPADM

## 2020-07-08 RX ORDER — IPRATROPIUM BROMIDE AND ALBUTEROL SULFATE 2.5; .5 MG/3ML; MG/3ML
3 SOLUTION RESPIRATORY (INHALATION) EVERY 6 HOURS PRN
Status: DISCONTINUED | OUTPATIENT
Start: 2020-07-08 | End: 2020-07-13 | Stop reason: HOSPADM

## 2020-07-08 RX ORDER — ONDANSETRON 2 MG/ML
4 INJECTION INTRAMUSCULAR; INTRAVENOUS EVERY 6 HOURS PRN
Status: DISCONTINUED | OUTPATIENT
Start: 2020-07-08 | End: 2020-07-13 | Stop reason: HOSPADM

## 2020-07-08 RX ORDER — NITROGLYCERIN 0.4 MG/1
0.4 TABLET SUBLINGUAL
Status: DISCONTINUED | OUTPATIENT
Start: 2020-07-08 | End: 2020-07-13 | Stop reason: HOSPADM

## 2020-07-08 RX ORDER — ASPIRIN 81 MG/1
81 TABLET ORAL DAILY
Status: DISCONTINUED | OUTPATIENT
Start: 2020-07-09 | End: 2020-07-13 | Stop reason: HOSPADM

## 2020-07-08 RX ORDER — ATORVASTATIN CALCIUM 40 MG/1
40 TABLET, FILM COATED ORAL DAILY
Status: DISCONTINUED | OUTPATIENT
Start: 2020-07-09 | End: 2020-07-13 | Stop reason: HOSPADM

## 2020-07-08 RX ORDER — LABETALOL HYDROCHLORIDE 5 MG/ML
20 INJECTION, SOLUTION INTRAVENOUS ONCE
Status: DISCONTINUED | OUTPATIENT
Start: 2020-07-08 | End: 2020-07-08

## 2020-07-08 RX ORDER — METOPROLOL SUCCINATE 50 MG/1
50 TABLET, EXTENDED RELEASE ORAL DAILY
Status: DISCONTINUED | OUTPATIENT
Start: 2020-07-09 | End: 2020-07-13 | Stop reason: HOSPADM

## 2020-07-08 RX ORDER — FAMOTIDINE 10 MG/ML
20 INJECTION, SOLUTION INTRAVENOUS 2 TIMES DAILY
Status: DISCONTINUED | OUTPATIENT
Start: 2020-07-08 | End: 2020-07-13 | Stop reason: HOSPADM

## 2020-07-08 RX ORDER — SODIUM CHLORIDE, SODIUM LACTATE, POTASSIUM CHLORIDE, CALCIUM CHLORIDE 600; 310; 30; 20 MG/100ML; MG/100ML; MG/100ML; MG/100ML
75 INJECTION, SOLUTION INTRAVENOUS CONTINUOUS
Status: DISCONTINUED | OUTPATIENT
Start: 2020-07-08 | End: 2020-07-11

## 2020-07-08 RX ORDER — SODIUM CHLORIDE 0.9 % (FLUSH) 0.9 %
10 SYRINGE (ML) INJECTION AS NEEDED
Status: DISCONTINUED | OUTPATIENT
Start: 2020-07-08 | End: 2020-07-13 | Stop reason: HOSPADM

## 2020-07-08 RX ORDER — HYDRALAZINE HYDROCHLORIDE 20 MG/ML
10 INJECTION INTRAMUSCULAR; INTRAVENOUS EVERY 4 HOURS PRN
Status: DISCONTINUED | OUTPATIENT
Start: 2020-07-08 | End: 2020-07-13 | Stop reason: HOSPADM

## 2020-07-08 RX ORDER — ONDANSETRON 2 MG/ML
4 INJECTION INTRAMUSCULAR; INTRAVENOUS ONCE
Status: COMPLETED | OUTPATIENT
Start: 2020-07-08 | End: 2020-07-08

## 2020-07-08 RX ORDER — SODIUM CHLORIDE 0.9 % (FLUSH) 0.9 %
10 SYRINGE (ML) INJECTION EVERY 12 HOURS SCHEDULED
Status: DISCONTINUED | OUTPATIENT
Start: 2020-07-08 | End: 2020-07-13 | Stop reason: HOSPADM

## 2020-07-08 RX ORDER — CLONIDINE HYDROCHLORIDE 0.1 MG/1
0.1 TABLET ORAL ONCE
Status: COMPLETED | OUTPATIENT
Start: 2020-07-08 | End: 2020-07-08

## 2020-07-08 RX ORDER — LOSARTAN POTASSIUM 50 MG/1
100 TABLET ORAL
Status: DISCONTINUED | OUTPATIENT
Start: 2020-07-09 | End: 2020-07-13 | Stop reason: HOSPADM

## 2020-07-08 RX ORDER — ACETAMINOPHEN 325 MG/1
650 TABLET ORAL EVERY 6 HOURS PRN
Status: DISCONTINUED | OUTPATIENT
Start: 2020-07-08 | End: 2020-07-13 | Stop reason: HOSPADM

## 2020-07-08 RX ORDER — HYDROCODONE BITARTRATE AND ACETAMINOPHEN 5; 325 MG/1; MG/1
1 TABLET ORAL EVERY 6 HOURS PRN
Status: DISCONTINUED | OUTPATIENT
Start: 2020-07-08 | End: 2020-07-13 | Stop reason: HOSPADM

## 2020-07-08 RX ADMIN — ONDANSETRON HYDROCHLORIDE 4 MG: 2 SOLUTION INTRAMUSCULAR; INTRAVENOUS at 22:41

## 2020-07-08 RX ADMIN — FAMOTIDINE 20 MG: 10 INJECTION, SOLUTION INTRAVENOUS at 21:52

## 2020-07-08 RX ADMIN — SILVER NITRATE APPLICATORS 1 APPLICATION: 25; 75 STICK TOPICAL at 00:53

## 2020-07-08 RX ADMIN — ONDANSETRON HYDROCHLORIDE 4 MG: 2 SOLUTION INTRAMUSCULAR; INTRAVENOUS at 16:33

## 2020-07-08 RX ADMIN — Medication 2 SPRAY: at 00:19

## 2020-07-08 RX ADMIN — TAZOBACTAM SODIUM AND PIPERACILLIN SODIUM 3.38 G: 375; 3 INJECTION, SOLUTION INTRAVENOUS at 22:42

## 2020-07-08 RX ADMIN — AMPICILLIN SODIUM AND SULBACTAM SODIUM 3 G: 2; 1 INJECTION, POWDER, FOR SOLUTION INTRAMUSCULAR; INTRAVENOUS at 21:49

## 2020-07-08 RX ADMIN — RACEPINEPHRINE HYDROCHLORIDE 0.5 ML: 11.25 SOLUTION RESPIRATORY (INHALATION) at 16:26

## 2020-07-08 RX ADMIN — HYDROCODONE BITARTRATE AND ACETAMINOPHEN 1 TABLET: 5; 325 TABLET ORAL at 22:32

## 2020-07-08 RX ADMIN — Medication 2 SPRAY: at 16:05

## 2020-07-08 RX ADMIN — SODIUM CHLORIDE, POTASSIUM CHLORIDE, SODIUM LACTATE AND CALCIUM CHLORIDE 75 ML/HR: 600; 310; 30; 20 INJECTION, SOLUTION INTRAVENOUS at 21:46

## 2020-07-08 RX ADMIN — TETANUS TOXOID, REDUCED DIPHTHERIA TOXOID AND ACELLULAR PERTUSSIS VACCINE, ADSORBED 0.5 ML: 5; 2.5; 8; 8; 2.5 SUSPENSION INTRAMUSCULAR at 19:29

## 2020-07-08 RX ADMIN — CLONIDINE HYDROCHLORIDE 0.1 MG: 0.1 TABLET ORAL at 00:27

## 2020-07-08 NOTE — ED PROVIDER NOTES
Subjective   74 y/o male arrives via EMS for evaluation of nose bleed that has since stopped. He noted this while sitting in his chair stating he felt he had to clear his throat. He states he then noted the nose bleed from the right nare that would not stop. In route here his bleeding did stop but he was noted his BP was high. He does have history of CAD on coumadin with his last INR being 2.3. He denies any trauma to his nose, oxygen usage, fevers, chills, falls, trauma or coughing currently. He denies any other bleeding at this time.         Family, social and past history reviewed as below, prior documentation of H and Ps and other documentation are reviewed:    Past Medical History:  No date: AAA (abdominal aortic aneurysm) (CMS/[facility])  No date: Aneurysm (CMS/[facility])  No date: Arthritis  No date: CAD (coronary artery disease)  No date: CAD (coronary artery disease)  No date: Endoleak post endovascular aneurysm repair (CMS/[facility])  No date: History of DVT (deep vein thrombosis)  No date: History of foot fracture      Comment:  BILATERAL FOOT FRACTURES  No date: History of fracture of left ankle  No date: History of pulmonary embolus (PE)  No date: Hyperlipidemia  No date: Hypertension  No date: Tobacco abuse    Past Surgical History:  No date: ABDOMINAL AORTIC ANEURYSM REPAIR  No date: APPENDECTOMY  No date: BACK SURGERY  No date: CORONARY ARTERY BYPASS GRAFT  No date: HEMORRHOIDECTOMY  No date: INGUINAL HERNIA REPAIR  No date: KNEE SURGERY; Right  No date: OTHER SURGICAL HISTORY      Comment:  TYPE IA ENDOLEAK REPAIR  No date: OTHER SURGICAL HISTORY      Comment:  CRANIAL CYST    Social History    Socioeconomic History      Marital status:       Spouse name: Not on file      Number of children: Not on file      Years of education: Not on file      Highest education level: Not on file    Tobacco Use      Smoking status: Current Every Day Smoker        Packs/day: 1.00      Smokeless tobacco: Never Used     Substance and Sexual Activity      Alcohol use: Yes        Comment: SOCIAL      Drug use: No      Sexual activity: Defer      Family history: reviewed and\ noncontributory           Review of Systems   All other systems reviewed and are negative.      Past Medical History:   Diagnosis Date   • AAA (abdominal aortic aneurysm) (CMS/HCC)    • Aneurysm (CMS/HCC)    • Arthritis    • CAD (coronary artery disease)    • CAD (coronary artery disease)    • Endoleak post endovascular aneurysm repair (CMS/[facility])    • History of DVT (deep vein thrombosis)    • History of foot fracture     BILATERAL FOOT FRACTURES   • History of fracture of left ankle    • History of pulmonary embolus (PE)    • Hyperlipidemia    • Hypertension    • Tobacco abuse        No Known Allergies    Past Surgical History:   Procedure Laterality Date   • ABDOMINAL AORTIC ANEURYSM REPAIR     • APPENDECTOMY     • BACK SURGERY     • CORONARY ARTERY BYPASS GRAFT     • HEMORRHOIDECTOMY     • INGUINAL HERNIA REPAIR     • KNEE SURGERY Right    • OTHER SURGICAL HISTORY      TYPE IA ENDOLEAK REPAIR   • OTHER SURGICAL HISTORY      CRANIAL CYST       Family History   Problem Relation Age of Onset   • Heart disease Mother    • Heart disease Father    • Heart disease Other    • Hypertension Other    • Diabetes Other        Social History     Socioeconomic History   • Marital status:      Spouse name: Not on file   • Number of children: Not on file   • Years of education: Not on file   • Highest education level: Not on file   Tobacco Use   • Smoking status: Current Every Day Smoker     Packs/day: 1.00   • Smokeless tobacco: Never Used   Substance and Sexual Activity   • Alcohol use: Yes     Comment: SOCIAL   • Drug use: No   • Sexual activity: Defer           Objective   Physical Exam   Constitutional: He is oriented to person, place, and time. He appears well-developed and well-nourished.   HENT:   Head: Normocephalic and atraumatic.   Mouth/Throat: Oropharynx is  clear and moist. No oropharyngeal exudate.   Right anterior nose at Kiesselbach's plexus there is a noted clot without active bleeding. Left nare is normal    I see no posterior bleeding.    Eyes: Pupils are equal, round, and reactive to light. Conjunctivae and EOM are normal.   Neck: Normal range of motion. Neck supple.   Musculoskeletal: Normal range of motion.   Neurological: He is alert and oriented to person, place, and time.   Skin: Skin is warm. Capillary refill takes less than 2 seconds.   Psychiatric: He has a normal mood and affect. His behavior is normal.   Vitals reviewed.      Epistaxis Management  Date/Time: 7/8/2020 1:09 AM  Performed by: Matthew Nuno MD  Authorized by: Matthew Nuno MD     Consent:     Consent obtained:  Verbal    Consent given by:  Patient    Risks discussed:  Bleeding, infection, nasal injury and pain    Alternatives discussed:  No treatment  Anesthesia (see MAR for exact dosages):     Anesthesia method:  None  Procedure details:     Treatment site:  R anterior    Treatment method:  Silver nitrate    Treatment complexity:  Limited    Treatment episode: initial    Post-procedure details:     Assessment:  Bleeding stopped    Patient tolerance of procedure:  Tolerated well, no immediate complications               ED Course  ED Course as of Jul 09 0502 Wed Jul 08, 2020   0001 Currently bleeding but he is attempting to mictruate so will allow him to do this before examining further.     []   0025 No bleeding at this time but I do see small area in anterior right nare with clot noted.     [JH]   0120 I have now watched for 1.3 hours without any bleeding observed in the ED. I do feel he is stable for DC and will have him follow up about his elevated INR.     []      ED Course User Index  [] Matthew Nuno MD            No orders to display     Labs Reviewed   PROTIME-INR - Abnormal; Notable for the following components:       Result Value    Protime 33.0  (*)     INR 3.21 (*)     All other components within normal limits   APTT - Abnormal; Notable for the following components:    PTT 38.7 (*)     All other components within normal limits   CBC WITH AUTO DIFFERENTIAL - Abnormal; Notable for the following components:    Monocyte % 13.2 (*)     All other components within normal limits   RAINBOW DRAW    Narrative:     The following orders were created for panel order Chloride Draw.  Procedure                               Abnormality         Status                     ---------                               -----------         ------                     Light Blue Top[[id number]]                                   Final result               Green Top (Gel)[[id number]]                                  Final result               Lavender Top[[id number]]                                     Final result               Red Top[[id number]]                                          Final result                 Please view results for these tests on the individual orders.   LIGHT BLUE TOP   GREEN TOP   LAVENDER TOP   RED TOP   CBC AND DIFFERENTIAL    Narrative:     The following orders were created for panel order CBC & Differential.  Procedure                               Abnormality         Status                     ---------                               -----------         ------                     CBC Auto Differential[[id number]]        Abnormal            Final result                 Please view results for these tests on the individual orders.                                       [facility]    Final diagnoses:   Epistaxis            Matthew Nuno MD  07/09/20 0502

## 2020-07-08 NOTE — DISCHARGE INSTRUCTIONS
Michael,     Please return here for any further bleeding.     Your coumadin level (what you call your pro time) is 3.2 which is high. Please talk to the doctor that manges your coumadin for further advice.

## 2020-07-09 PROBLEM — J31.0 RHINITIS, CHRONIC: Status: ACTIVE | Noted: 2020-07-09

## 2020-07-09 PROBLEM — T45.515A ANTICOAGULANT ADVERSE REACTION: Status: ACTIVE | Noted: 2020-07-09

## 2020-07-09 LAB
ALBUMIN SERPL-MCNC: 4.2 G/DL (ref 3.5–5.2)
ALBUMIN/GLOB SERPL: 1.6 G/DL
ALP SERPL-CCNC: 60 U/L (ref 39–117)
ALT SERPL W P-5'-P-CCNC: 24 U/L (ref 1–41)
ANION GAP SERPL CALCULATED.3IONS-SCNC: 11 MMOL/L (ref 5–15)
AST SERPL-CCNC: 35 U/L (ref 1–40)
BASOPHILS # BLD AUTO: 0.02 10*3/MM3 (ref 0–0.2)
BASOPHILS NFR BLD AUTO: 0.3 % (ref 0–1.5)
BILIRUB SERPL-MCNC: 0.9 MG/DL (ref 0–1.2)
BUN SERPL-MCNC: 20 MG/DL (ref 8–23)
BUN/CREAT SERPL: 23.5 (ref 7–25)
CALCIUM SPEC-SCNC: 8.9 MG/DL (ref 8.6–10.5)
CHLORIDE SERPL-SCNC: 103 MMOL/L (ref 98–107)
CHOLEST SERPL-MCNC: 126 MG/DL (ref 0–200)
CO2 SERPL-SCNC: 27 MMOL/L (ref 22–29)
CREAT SERPL-MCNC: 0.85 MG/DL (ref 0.76–1.27)
DEPRECATED RDW RBC AUTO: 45.1 FL (ref 37–54)
EOSINOPHIL # BLD AUTO: 0.06 10*3/MM3 (ref 0–0.4)
EOSINOPHIL NFR BLD AUTO: 0.8 % (ref 0.3–6.2)
ERYTHROCYTE [DISTWIDTH] IN BLOOD BY AUTOMATED COUNT: 13.3 % (ref 12.3–15.4)
GFR SERPL CREATININE-BSD FRML MDRD: 88 ML/MIN/1.73
GLOBULIN UR ELPH-MCNC: 2.6 GM/DL
GLUCOSE SERPL-MCNC: 93 MG/DL (ref 65–99)
HBA1C MFR BLD: 5.6 % (ref 4.8–5.6)
HCT VFR BLD AUTO: 36.4 % (ref 37.5–51)
HCT VFR BLD AUTO: 36.9 % (ref 37.5–51)
HDLC SERPL-MCNC: 35 MG/DL (ref 40–60)
HGB BLD-MCNC: 12.5 G/DL (ref 13–17.7)
HGB BLD-MCNC: 12.6 G/DL (ref 13–17.7)
IMM GRANULOCYTES # BLD AUTO: 0.01 10*3/MM3 (ref 0–0.05)
IMM GRANULOCYTES NFR BLD AUTO: 0.1 % (ref 0–0.5)
INR PPP: 2.28 (ref 0.91–1.09)
LDLC SERPL CALC-MCNC: 73 MG/DL (ref 0–100)
LDLC/HDLC SERPL: 2.09 {RATIO}
LYMPHOCYTES # BLD AUTO: 1.39 10*3/MM3 (ref 0.7–3.1)
LYMPHOCYTES NFR BLD AUTO: 18.1 % (ref 19.6–45.3)
MCH RBC QN AUTO: 31.5 PG (ref 26.6–33)
MCHC RBC AUTO-ENTMCNC: 34.3 G/DL (ref 31.5–35.7)
MCV RBC AUTO: 91.7 FL (ref 79–97)
MONOCYTES # BLD AUTO: 0.8 10*3/MM3 (ref 0.1–0.9)
MONOCYTES NFR BLD AUTO: 10.4 % (ref 5–12)
NEUTROPHILS NFR BLD AUTO: 5.39 10*3/MM3 (ref 1.7–7)
NEUTROPHILS NFR BLD AUTO: 70.3 % (ref 42.7–76)
NRBC BLD AUTO-RTO: 0 /100 WBC (ref 0–0.2)
PLATELET # BLD AUTO: 152 10*3/MM3 (ref 140–450)
PMV BLD AUTO: 10.3 FL (ref 6–12)
POTASSIUM SERPL-SCNC: 4.3 MMOL/L (ref 3.5–5.2)
PROT SERPL-MCNC: 6.8 G/DL (ref 6–8.5)
PROTHROMBIN TIME: 25 SECONDS (ref 11.9–14.6)
RBC # BLD AUTO: 3.97 10*6/MM3 (ref 4.14–5.8)
SODIUM SERPL-SCNC: 141 MMOL/L (ref 136–145)
TRIGL SERPL-MCNC: 90 MG/DL (ref 0–150)
TSH SERPL DL<=0.05 MIU/L-ACNC: 2.56 UIU/ML (ref 0.27–4.2)
VLDLC SERPL-MCNC: 18 MG/DL
WBC # BLD AUTO: 7.67 10*3/MM3 (ref 3.4–10.8)

## 2020-07-09 PROCEDURE — 96375 TX/PRO/DX INJ NEW DRUG ADDON: CPT

## 2020-07-09 PROCEDURE — 85610 PROTHROMBIN TIME: CPT | Performed by: FAMILY MEDICINE

## 2020-07-09 PROCEDURE — 83036 HEMOGLOBIN GLYCOSYLATED A1C: CPT | Performed by: FAMILY MEDICINE

## 2020-07-09 PROCEDURE — 25010000003 HYDROMORPHONE 1 MG/ML SOLUTION: Performed by: FAMILY MEDICINE

## 2020-07-09 PROCEDURE — 85014 HEMATOCRIT: CPT | Performed by: FAMILY MEDICINE

## 2020-07-09 PROCEDURE — 96361 HYDRATE IV INFUSION ADD-ON: CPT

## 2020-07-09 PROCEDURE — 99204 OFFICE O/P NEW MOD 45 MIN: CPT | Performed by: OTOLARYNGOLOGY

## 2020-07-09 PROCEDURE — 85025 COMPLETE CBC W/AUTO DIFF WBC: CPT | Performed by: FAMILY MEDICINE

## 2020-07-09 PROCEDURE — 25010000002 HYDROMORPHONE PER 4 MG: Performed by: OTOLARYNGOLOGY

## 2020-07-09 PROCEDURE — 80053 COMPREHEN METABOLIC PANEL: CPT | Performed by: FAMILY MEDICINE

## 2020-07-09 PROCEDURE — 85018 HEMOGLOBIN: CPT | Performed by: FAMILY MEDICINE

## 2020-07-09 PROCEDURE — G0378 HOSPITAL OBSERVATION PER HR: HCPCS

## 2020-07-09 PROCEDURE — 84443 ASSAY THYROID STIM HORMONE: CPT | Performed by: FAMILY MEDICINE

## 2020-07-09 PROCEDURE — 25010000002 ONDANSETRON PER 1 MG: Performed by: FAMILY MEDICINE

## 2020-07-09 PROCEDURE — 25010000002 PIPERACILLIN SOD-TAZOBACTAM PER 1 G: Performed by: FAMILY MEDICINE

## 2020-07-09 PROCEDURE — 96376 TX/PRO/DX INJ SAME DRUG ADON: CPT

## 2020-07-09 PROCEDURE — 80061 LIPID PANEL: CPT | Performed by: FAMILY MEDICINE

## 2020-07-09 RX ORDER — HYDROMORPHONE HYDROCHLORIDE 2 MG/1
2 TABLET ORAL EVERY 4 HOURS PRN
Status: DISCONTINUED | OUTPATIENT
Start: 2020-07-09 | End: 2020-07-13 | Stop reason: HOSPADM

## 2020-07-09 RX ORDER — ECHINACEA PURPUREA EXTRACT 125 MG
2 TABLET ORAL CONTINUOUS PRN
Status: DISCONTINUED | OUTPATIENT
Start: 2020-07-09 | End: 2020-07-13 | Stop reason: HOSPADM

## 2020-07-09 RX ORDER — ECHINACEA PURPUREA EXTRACT 125 MG
2 TABLET ORAL
Status: DISCONTINUED | OUTPATIENT
Start: 2020-07-09 | End: 2020-07-13 | Stop reason: HOSPADM

## 2020-07-09 RX ORDER — HYDROMORPHONE HYDROCHLORIDE 1 MG/ML
0.5 INJECTION, SOLUTION INTRAMUSCULAR; INTRAVENOUS; SUBCUTANEOUS
Status: DISCONTINUED | OUTPATIENT
Start: 2020-07-09 | End: 2020-07-12 | Stop reason: ALTCHOICE

## 2020-07-09 RX ORDER — OXYMETAZOLINE HYDROCHLORIDE 0.05 G/100ML
2 SPRAY NASAL 3 TIMES DAILY
Status: DISCONTINUED | OUTPATIENT
Start: 2020-07-09 | End: 2020-07-13 | Stop reason: HOSPADM

## 2020-07-09 RX ADMIN — TAZOBACTAM SODIUM AND PIPERACILLIN SODIUM 3.38 G: 375; 3 INJECTION, SOLUTION INTRAVENOUS at 11:13

## 2020-07-09 RX ADMIN — Medication 2 SPRAY: at 20:02

## 2020-07-09 RX ADMIN — SODIUM CHLORIDE, POTASSIUM CHLORIDE, SODIUM LACTATE AND CALCIUM CHLORIDE 75 ML/HR: 600; 310; 30; 20 INJECTION, SOLUTION INTRAVENOUS at 23:59

## 2020-07-09 RX ADMIN — SODIUM CHLORIDE, POTASSIUM CHLORIDE, SODIUM LACTATE AND CALCIUM CHLORIDE 75 ML/HR: 600; 310; 30; 20 INJECTION, SOLUTION INTRAVENOUS at 14:04

## 2020-07-09 RX ADMIN — ONDANSETRON HYDROCHLORIDE 4 MG: 2 SOLUTION INTRAMUSCULAR; INTRAVENOUS at 07:11

## 2020-07-09 RX ADMIN — Medication 2 SPRAY: at 09:42

## 2020-07-09 RX ADMIN — SALINE NASAL SPRAY 2 SPRAY: 1.5 SOLUTION NASAL at 23:59

## 2020-07-09 RX ADMIN — HYDROMORPHONE HYDROCHLORIDE 0.5 MG: 1 INJECTION, SOLUTION INTRAMUSCULAR; INTRAVENOUS; SUBCUTANEOUS at 19:56

## 2020-07-09 RX ADMIN — ASPIRIN 81 MG: 81 TABLET ORAL at 09:41

## 2020-07-09 RX ADMIN — HYDROMORPHONE HYDROCHLORIDE 1 MG: 1 INJECTION, SOLUTION INTRAMUSCULAR; INTRAVENOUS; SUBCUTANEOUS at 02:24

## 2020-07-09 RX ADMIN — SALINE NASAL SPRAY 2 SPRAY: 1.5 SOLUTION NASAL at 11:13

## 2020-07-09 RX ADMIN — TAZOBACTAM SODIUM AND PIPERACILLIN SODIUM 3.38 G: 375; 3 INJECTION, SOLUTION INTRAVENOUS at 19:56

## 2020-07-09 RX ADMIN — HYDROMORPHONE HYDROCHLORIDE 0.5 MG: 1 INJECTION, SOLUTION INTRAMUSCULAR; INTRAVENOUS; SUBCUTANEOUS at 09:41

## 2020-07-09 RX ADMIN — TAZOBACTAM SODIUM AND PIPERACILLIN SODIUM 3.38 G: 375; 3 INJECTION, SOLUTION INTRAVENOUS at 02:24

## 2020-07-09 RX ADMIN — FAMOTIDINE 20 MG: 10 INJECTION, SOLUTION INTRAVENOUS at 20:02

## 2020-07-09 RX ADMIN — SALINE NASAL SPRAY 2 SPRAY: 1.5 SOLUTION NASAL at 17:46

## 2020-07-09 RX ADMIN — SALINE NASAL SPRAY 2 SPRAY: 1.5 SOLUTION NASAL at 14:03

## 2020-07-09 RX ADMIN — LOSARTAN POTASSIUM 100 MG: 50 TABLET, FILM COATED ORAL at 09:17

## 2020-07-09 RX ADMIN — SODIUM CHLORIDE, PRESERVATIVE FREE 10 ML: 5 INJECTION INTRAVENOUS at 20:02

## 2020-07-09 RX ADMIN — Medication 2 SPRAY: at 16:10

## 2020-07-09 RX ADMIN — ONDANSETRON HYDROCHLORIDE 4 MG: 2 SOLUTION INTRAMUSCULAR; INTRAVENOUS at 19:56

## 2020-07-09 RX ADMIN — METOPROLOL SUCCINATE 50 MG: 50 TABLET, EXTENDED RELEASE ORAL at 09:17

## 2020-07-10 ENCOUNTER — APPOINTMENT (OUTPATIENT)
Dept: CT IMAGING | Facility: HOSPITAL | Age: 75
End: 2020-07-10

## 2020-07-10 PROBLEM — R04.0 EPISTAXIS: Status: ACTIVE | Noted: 2020-07-10

## 2020-07-10 LAB
ANION GAP SERPL CALCULATED.3IONS-SCNC: 8 MMOL/L (ref 5–15)
BASOPHILS # BLD AUTO: 0.02 10*3/MM3 (ref 0–0.2)
BASOPHILS NFR BLD AUTO: 0.2 % (ref 0–1.5)
BH BB BLOOD EXPIRATION DATE: NORMAL
BH BB BLOOD TYPE BARCODE: 5100
BH BB DISPENSE STATUS: NORMAL
BH BB PRODUCT CODE: NORMAL
BH BB UNIT NUMBER: NORMAL
BUN SERPL-MCNC: 15 MG/DL (ref 8–23)
BUN/CREAT SERPL: 15.6 (ref 7–25)
CALCIUM SPEC-SCNC: 9.2 MG/DL (ref 8.6–10.5)
CHLORIDE SERPL-SCNC: 101 MMOL/L (ref 98–107)
CO2 SERPL-SCNC: 31 MMOL/L (ref 22–29)
CREAT SERPL-MCNC: 0.96 MG/DL (ref 0.76–1.27)
DEPRECATED RDW RBC AUTO: 46.5 FL (ref 37–54)
EOSINOPHIL # BLD AUTO: 0.11 10*3/MM3 (ref 0–0.4)
EOSINOPHIL NFR BLD AUTO: 1.4 % (ref 0.3–6.2)
ERYTHROCYTE [DISTWIDTH] IN BLOOD BY AUTOMATED COUNT: 13.6 % (ref 12.3–15.4)
GFR SERPL CREATININE-BSD FRML MDRD: 77 ML/MIN/1.73
GLUCOSE SERPL-MCNC: 114 MG/DL (ref 65–99)
HCT VFR BLD AUTO: 36.3 % (ref 37.5–51)
HGB BLD-MCNC: 12.3 G/DL (ref 13–17.7)
IMM GRANULOCYTES # BLD AUTO: 0.09 10*3/MM3 (ref 0–0.05)
IMM GRANULOCYTES NFR BLD AUTO: 1.1 % (ref 0–0.5)
INR PPP: 2.37 (ref 0.91–1.09)
LYMPHOCYTES # BLD AUTO: 1.11 10*3/MM3 (ref 0.7–3.1)
LYMPHOCYTES NFR BLD AUTO: 13.7 % (ref 19.6–45.3)
MCH RBC QN AUTO: 31.7 PG (ref 26.6–33)
MCHC RBC AUTO-ENTMCNC: 33.9 G/DL (ref 31.5–35.7)
MCV RBC AUTO: 93.6 FL (ref 79–97)
MONOCYTES # BLD AUTO: 0.72 10*3/MM3 (ref 0.1–0.9)
MONOCYTES NFR BLD AUTO: 8.9 % (ref 5–12)
NEUTROPHILS NFR BLD AUTO: 6.06 10*3/MM3 (ref 1.7–7)
NEUTROPHILS NFR BLD AUTO: 74.7 % (ref 42.7–76)
NRBC BLD AUTO-RTO: 0 /100 WBC (ref 0–0.2)
PLATELET # BLD AUTO: 142 10*3/MM3 (ref 140–450)
PMV BLD AUTO: 10.6 FL (ref 6–12)
POTASSIUM SERPL-SCNC: 4.3 MMOL/L (ref 3.5–5.2)
PROTHROMBIN TIME: 25.8 SECONDS (ref 11.9–14.6)
RBC # BLD AUTO: 3.88 10*6/MM3 (ref 4.14–5.8)
SODIUM SERPL-SCNC: 140 MMOL/L (ref 136–145)
UNIT  ABO: NORMAL
UNIT  RH: NORMAL
WBC # BLD AUTO: 8.11 10*3/MM3 (ref 3.4–10.8)

## 2020-07-10 PROCEDURE — 99214 OFFICE O/P EST MOD 30 MIN: CPT | Performed by: OTOLARYNGOLOGY

## 2020-07-10 PROCEDURE — 80048 BASIC METABOLIC PNL TOTAL CA: CPT | Performed by: FAMILY MEDICINE

## 2020-07-10 PROCEDURE — 96361 HYDRATE IV INFUSION ADD-ON: CPT

## 2020-07-10 PROCEDURE — 99406 BEHAV CHNG SMOKING 3-10 MIN: CPT

## 2020-07-10 PROCEDURE — 85025 COMPLETE CBC W/AUTO DIFF WBC: CPT | Performed by: FAMILY MEDICINE

## 2020-07-10 PROCEDURE — 25010000002 HYDROMORPHONE PER 4 MG: Performed by: OTOLARYNGOLOGY

## 2020-07-10 PROCEDURE — 96376 TX/PRO/DX INJ SAME DRUG ADON: CPT

## 2020-07-10 PROCEDURE — G0378 HOSPITAL OBSERVATION PER HR: HCPCS

## 2020-07-10 PROCEDURE — 70487 CT MAXILLOFACIAL W/DYE: CPT

## 2020-07-10 PROCEDURE — 31237 NSL/SINS NDSC SURG BX POLYPC: CPT | Performed by: OTOLARYNGOLOGY

## 2020-07-10 PROCEDURE — 25010000002 IOPAMIDOL 61 % SOLUTION: Performed by: FAMILY MEDICINE

## 2020-07-10 PROCEDURE — 30901 CONTROL OF NOSEBLEED: CPT | Performed by: OTOLARYNGOLOGY

## 2020-07-10 PROCEDURE — 94664 DEMO&/EVAL PT USE INHALER: CPT

## 2020-07-10 PROCEDURE — 25010000002 ONDANSETRON PER 1 MG: Performed by: FAMILY MEDICINE

## 2020-07-10 PROCEDURE — 96366 THER/PROPH/DIAG IV INF ADDON: CPT

## 2020-07-10 PROCEDURE — 96365 THER/PROPH/DIAG IV INF INIT: CPT

## 2020-07-10 PROCEDURE — 85610 PROTHROMBIN TIME: CPT | Performed by: FAMILY MEDICINE

## 2020-07-10 PROCEDURE — 25010000002 PIPERACILLIN SOD-TAZOBACTAM PER 1 G: Performed by: FAMILY MEDICINE

## 2020-07-10 RX ADMIN — ONDANSETRON HYDROCHLORIDE 4 MG: 2 SOLUTION INTRAMUSCULAR; INTRAVENOUS at 02:58

## 2020-07-10 RX ADMIN — SALINE NASAL SPRAY 2 SPRAY: 1.5 SOLUTION NASAL at 10:47

## 2020-07-10 RX ADMIN — SODIUM CHLORIDE, POTASSIUM CHLORIDE, SODIUM LACTATE AND CALCIUM CHLORIDE 75 ML/HR: 600; 310; 30; 20 INJECTION, SOLUTION INTRAVENOUS at 18:28

## 2020-07-10 RX ADMIN — TAZOBACTAM SODIUM AND PIPERACILLIN SODIUM 3.38 G: 375; 3 INJECTION, SOLUTION INTRAVENOUS at 03:37

## 2020-07-10 RX ADMIN — Medication 2 SPRAY: at 18:29

## 2020-07-10 RX ADMIN — IOPAMIDOL 100 ML: 612 INJECTION, SOLUTION INTRAVENOUS at 17:01

## 2020-07-10 RX ADMIN — TAZOBACTAM SODIUM AND PIPERACILLIN SODIUM 3.38 G: 375; 3 INJECTION, SOLUTION INTRAVENOUS at 18:30

## 2020-07-10 RX ADMIN — HYDROMORPHONE HYDROCHLORIDE 0.5 MG: 1 INJECTION, SOLUTION INTRAMUSCULAR; INTRAVENOUS; SUBCUTANEOUS at 02:58

## 2020-07-10 RX ADMIN — SALINE NASAL SPRAY 2 SPRAY: 1.5 SOLUTION NASAL at 18:28

## 2020-07-10 RX ADMIN — HYDROMORPHONE HYDROCHLORIDE 0.5 MG: 1 INJECTION, SOLUTION INTRAMUSCULAR; INTRAVENOUS; SUBCUTANEOUS at 20:16

## 2020-07-10 RX ADMIN — Medication 2 SPRAY: at 09:03

## 2020-07-10 RX ADMIN — FAMOTIDINE 20 MG: 10 INJECTION, SOLUTION INTRAVENOUS at 10:47

## 2020-07-10 RX ADMIN — ATORVASTATIN CALCIUM 40 MG: 40 TABLET, FILM COATED ORAL at 20:42

## 2020-07-10 RX ADMIN — Medication 2 SPRAY: at 20:43

## 2020-07-10 RX ADMIN — SALINE NASAL SPRAY 2 SPRAY: 1.5 SOLUTION NASAL at 22:02

## 2020-07-10 RX ADMIN — TAZOBACTAM SODIUM AND PIPERACILLIN SODIUM 3.38 G: 375; 3 INJECTION, SOLUTION INTRAVENOUS at 10:48

## 2020-07-10 RX ADMIN — SALINE NASAL SPRAY 2 SPRAY: 1.5 SOLUTION NASAL at 09:03

## 2020-07-10 RX ADMIN — ONDANSETRON HYDROCHLORIDE 4 MG: 2 SOLUTION INTRAMUSCULAR; INTRAVENOUS at 09:12

## 2020-07-10 RX ADMIN — HYDROMORPHONE HYDROCHLORIDE 0.5 MG: 1 INJECTION, SOLUTION INTRAMUSCULAR; INTRAVENOUS; SUBCUTANEOUS at 09:12

## 2020-07-10 RX ADMIN — METOPROLOL SUCCINATE 50 MG: 50 TABLET, EXTENDED RELEASE ORAL at 10:47

## 2020-07-10 RX ADMIN — LOSARTAN POTASSIUM 100 MG: 50 TABLET, FILM COATED ORAL at 10:47

## 2020-07-10 RX ADMIN — HYDROMORPHONE HYDROCHLORIDE 2 MG: 2 TABLET ORAL at 03:43

## 2020-07-11 LAB
ANION GAP SERPL CALCULATED.3IONS-SCNC: 8 MMOL/L (ref 5–15)
BASOPHILS # BLD AUTO: 0.03 10*3/MM3 (ref 0–0.2)
BASOPHILS NFR BLD AUTO: 0.4 % (ref 0–1.5)
BUN SERPL-MCNC: 12 MG/DL (ref 8–23)
BUN/CREAT SERPL: 11.8 (ref 7–25)
CALCIUM SPEC-SCNC: 8.8 MG/DL (ref 8.6–10.5)
CHLORIDE SERPL-SCNC: 101 MMOL/L (ref 98–107)
CO2 SERPL-SCNC: 31 MMOL/L (ref 22–29)
CREAT SERPL-MCNC: 1.02 MG/DL (ref 0.76–1.27)
DEPRECATED RDW RBC AUTO: 46.9 FL (ref 37–54)
EOSINOPHIL # BLD AUTO: 0.26 10*3/MM3 (ref 0–0.4)
EOSINOPHIL NFR BLD AUTO: 3.3 % (ref 0.3–6.2)
ERYTHROCYTE [DISTWIDTH] IN BLOOD BY AUTOMATED COUNT: 13.4 % (ref 12.3–15.4)
GFR SERPL CREATININE-BSD FRML MDRD: 72 ML/MIN/1.73
GLUCOSE SERPL-MCNC: 110 MG/DL (ref 65–99)
HCT VFR BLD AUTO: 34.4 % (ref 37.5–51)
HGB BLD-MCNC: 11.4 G/DL (ref 13–17.7)
IMM GRANULOCYTES # BLD AUTO: 0.03 10*3/MM3 (ref 0–0.05)
IMM GRANULOCYTES NFR BLD AUTO: 0.4 % (ref 0–0.5)
INR PPP: 2.11 (ref 0.91–1.09)
LYMPHOCYTES # BLD AUTO: 1.31 10*3/MM3 (ref 0.7–3.1)
LYMPHOCYTES NFR BLD AUTO: 16.6 % (ref 19.6–45.3)
MCH RBC QN AUTO: 31.4 PG (ref 26.6–33)
MCHC RBC AUTO-ENTMCNC: 33.1 G/DL (ref 31.5–35.7)
MCV RBC AUTO: 94.8 FL (ref 79–97)
MONOCYTES # BLD AUTO: 0.89 10*3/MM3 (ref 0.1–0.9)
MONOCYTES NFR BLD AUTO: 11.3 % (ref 5–12)
NEUTROPHILS NFR BLD AUTO: 5.38 10*3/MM3 (ref 1.7–7)
NEUTROPHILS NFR BLD AUTO: 68 % (ref 42.7–76)
NRBC BLD AUTO-RTO: 0 /100 WBC (ref 0–0.2)
PLATELET # BLD AUTO: 142 10*3/MM3 (ref 140–450)
PMV BLD AUTO: 10.3 FL (ref 6–12)
POTASSIUM SERPL-SCNC: 4.1 MMOL/L (ref 3.5–5.2)
PROTHROMBIN TIME: 23.5 SECONDS (ref 11.9–14.6)
RBC # BLD AUTO: 3.63 10*6/MM3 (ref 4.14–5.8)
SODIUM SERPL-SCNC: 140 MMOL/L (ref 136–145)
WBC # BLD AUTO: 7.9 10*3/MM3 (ref 3.4–10.8)

## 2020-07-11 PROCEDURE — 96361 HYDRATE IV INFUSION ADD-ON: CPT

## 2020-07-11 PROCEDURE — G0378 HOSPITAL OBSERVATION PER HR: HCPCS

## 2020-07-11 PROCEDURE — 25010000002 ONDANSETRON PER 1 MG: Performed by: FAMILY MEDICINE

## 2020-07-11 PROCEDURE — 99213 OFFICE O/P EST LOW 20 MIN: CPT | Performed by: OTOLARYNGOLOGY

## 2020-07-11 PROCEDURE — 85025 COMPLETE CBC W/AUTO DIFF WBC: CPT | Performed by: FAMILY MEDICINE

## 2020-07-11 PROCEDURE — 85610 PROTHROMBIN TIME: CPT | Performed by: FAMILY MEDICINE

## 2020-07-11 PROCEDURE — 25010000002 HYDROMORPHONE PER 4 MG: Performed by: OTOLARYNGOLOGY

## 2020-07-11 PROCEDURE — 80048 BASIC METABOLIC PNL TOTAL CA: CPT | Performed by: FAMILY MEDICINE

## 2020-07-11 PROCEDURE — 25010000002 PIPERACILLIN SOD-TAZOBACTAM PER 1 G: Performed by: FAMILY MEDICINE

## 2020-07-11 PROCEDURE — 96376 TX/PRO/DX INJ SAME DRUG ADON: CPT

## 2020-07-11 RX ADMIN — Medication 2 SPRAY: at 15:35

## 2020-07-11 RX ADMIN — TAZOBACTAM SODIUM AND PIPERACILLIN SODIUM 3.38 G: 375; 3 INJECTION, SOLUTION INTRAVENOUS at 03:17

## 2020-07-11 RX ADMIN — TAZOBACTAM SODIUM AND PIPERACILLIN SODIUM 3.38 G: 375; 3 INJECTION, SOLUTION INTRAVENOUS at 11:23

## 2020-07-11 RX ADMIN — TAZOBACTAM SODIUM AND PIPERACILLIN SODIUM 3.38 G: 375; 3 INJECTION, SOLUTION INTRAVENOUS at 20:05

## 2020-07-11 RX ADMIN — SALINE NASAL SPRAY 2 SPRAY: 1.5 SOLUTION NASAL at 21:56

## 2020-07-11 RX ADMIN — Medication 2 SPRAY: at 09:34

## 2020-07-11 RX ADMIN — LOSARTAN POTASSIUM 100 MG: 50 TABLET, FILM COATED ORAL at 09:31

## 2020-07-11 RX ADMIN — ASPIRIN 81 MG: 81 TABLET ORAL at 09:31

## 2020-07-11 RX ADMIN — SALINE NASAL SPRAY 2 SPRAY: 1.5 SOLUTION NASAL at 06:26

## 2020-07-11 RX ADMIN — SODIUM CHLORIDE, POTASSIUM CHLORIDE, SODIUM LACTATE AND CALCIUM CHLORIDE 75 ML/HR: 600; 310; 30; 20 INJECTION, SOLUTION INTRAVENOUS at 03:25

## 2020-07-11 RX ADMIN — ONDANSETRON HYDROCHLORIDE 4 MG: 2 SOLUTION INTRAMUSCULAR; INTRAVENOUS at 20:05

## 2020-07-11 RX ADMIN — SALINE NASAL SPRAY 2 SPRAY: 1.5 SOLUTION NASAL at 11:23

## 2020-07-11 RX ADMIN — SODIUM CHLORIDE, PRESERVATIVE FREE 10 ML: 5 INJECTION INTRAVENOUS at 20:13

## 2020-07-11 RX ADMIN — ONDANSETRON HYDROCHLORIDE 4 MG: 2 SOLUTION INTRAMUSCULAR; INTRAVENOUS at 06:23

## 2020-07-11 RX ADMIN — HYDROMORPHONE HYDROCHLORIDE 0.5 MG: 1 INJECTION, SOLUTION INTRAMUSCULAR; INTRAVENOUS; SUBCUTANEOUS at 06:23

## 2020-07-11 RX ADMIN — ATORVASTATIN CALCIUM 40 MG: 40 TABLET, FILM COATED ORAL at 20:05

## 2020-07-11 RX ADMIN — SALINE NASAL SPRAY 2 SPRAY: 1.5 SOLUTION NASAL at 17:22

## 2020-07-11 RX ADMIN — HYDROMORPHONE HYDROCHLORIDE 0.5 MG: 1 INJECTION, SOLUTION INTRAMUSCULAR; INTRAVENOUS; SUBCUTANEOUS at 00:07

## 2020-07-11 RX ADMIN — Medication 2 SPRAY: at 20:08

## 2020-07-11 RX ADMIN — HYDROMORPHONE HYDROCHLORIDE 0.5 MG: 1 INJECTION, SOLUTION INTRAMUSCULAR; INTRAVENOUS; SUBCUTANEOUS at 20:05

## 2020-07-11 RX ADMIN — SALINE NASAL SPRAY 2 SPRAY: 1.5 SOLUTION NASAL at 13:41

## 2020-07-11 RX ADMIN — METOPROLOL SUCCINATE 50 MG: 50 TABLET, EXTENDED RELEASE ORAL at 09:31

## 2020-07-11 RX ADMIN — ONDANSETRON HYDROCHLORIDE 4 MG: 2 SOLUTION INTRAMUSCULAR; INTRAVENOUS at 00:08

## 2020-07-12 LAB
ANION GAP SERPL CALCULATED.3IONS-SCNC: 7 MMOL/L (ref 5–15)
BASOPHILS # BLD AUTO: 0.03 10*3/MM3 (ref 0–0.2)
BASOPHILS NFR BLD AUTO: 0.6 % (ref 0–1.5)
BUN SERPL-MCNC: 13 MG/DL (ref 8–23)
BUN/CREAT SERPL: 12.7 (ref 7–25)
CALCIUM SPEC-SCNC: 8.8 MG/DL (ref 8.6–10.5)
CHLORIDE SERPL-SCNC: 102 MMOL/L (ref 98–107)
CO2 SERPL-SCNC: 32 MMOL/L (ref 22–29)
CREAT SERPL-MCNC: 1.02 MG/DL (ref 0.76–1.27)
DEPRECATED RDW RBC AUTO: 46.4 FL (ref 37–54)
EOSINOPHIL # BLD AUTO: 0.32 10*3/MM3 (ref 0–0.4)
EOSINOPHIL NFR BLD AUTO: 6 % (ref 0.3–6.2)
ERYTHROCYTE [DISTWIDTH] IN BLOOD BY AUTOMATED COUNT: 13.5 % (ref 12.3–15.4)
GFR SERPL CREATININE-BSD FRML MDRD: 72 ML/MIN/1.73
GLUCOSE SERPL-MCNC: 110 MG/DL (ref 65–99)
HCT VFR BLD AUTO: 34.3 % (ref 37.5–51)
HGB BLD-MCNC: 11.3 G/DL (ref 13–17.7)
INR PPP: 1.51 (ref 0.91–1.09)
LYMPHOCYTES # BLD AUTO: 1.15 10*3/MM3 (ref 0.7–3.1)
LYMPHOCYTES NFR BLD AUTO: 21.5 % (ref 19.6–45.3)
MCH RBC QN AUTO: 31 PG (ref 26.6–33)
MCHC RBC AUTO-ENTMCNC: 32.9 G/DL (ref 31.5–35.7)
MCV RBC AUTO: 94.2 FL (ref 79–97)
MONOCYTES # BLD AUTO: 0.71 10*3/MM3 (ref 0.1–0.9)
MONOCYTES NFR BLD AUTO: 13.3 % (ref 5–12)
NEUTROPHILS NFR BLD AUTO: 3.12 10*3/MM3 (ref 1.7–7)
NEUTROPHILS NFR BLD AUTO: 58.4 % (ref 42.7–76)
PLATELET # BLD AUTO: 138 10*3/MM3 (ref 140–450)
PMV BLD AUTO: 10.3 FL (ref 6–12)
POTASSIUM SERPL-SCNC: 3.8 MMOL/L (ref 3.5–5.2)
PROTHROMBIN TIME: 17.9 SECONDS (ref 11.9–14.6)
RBC # BLD AUTO: 3.64 10*6/MM3 (ref 4.14–5.8)
SODIUM SERPL-SCNC: 141 MMOL/L (ref 136–145)
WBC # BLD AUTO: 5.34 10*3/MM3 (ref 3.4–10.8)

## 2020-07-12 PROCEDURE — 96376 TX/PRO/DX INJ SAME DRUG ADON: CPT

## 2020-07-12 PROCEDURE — 85025 COMPLETE CBC W/AUTO DIFF WBC: CPT | Performed by: FAMILY MEDICINE

## 2020-07-12 PROCEDURE — 99213 OFFICE O/P EST LOW 20 MIN: CPT | Performed by: OTOLARYNGOLOGY

## 2020-07-12 PROCEDURE — G0378 HOSPITAL OBSERVATION PER HR: HCPCS

## 2020-07-12 PROCEDURE — 80048 BASIC METABOLIC PNL TOTAL CA: CPT | Performed by: FAMILY MEDICINE

## 2020-07-12 PROCEDURE — 94799 UNLISTED PULMONARY SVC/PX: CPT

## 2020-07-12 PROCEDURE — 85610 PROTHROMBIN TIME: CPT | Performed by: FAMILY MEDICINE

## 2020-07-12 PROCEDURE — 97166 OT EVAL MOD COMPLEX 45 MIN: CPT | Performed by: OCCUPATIONAL THERAPIST

## 2020-07-12 PROCEDURE — 25010000002 ONDANSETRON PER 1 MG: Performed by: FAMILY MEDICINE

## 2020-07-12 PROCEDURE — 25010000002 PIPERACILLIN SOD-TAZOBACTAM PER 1 G: Performed by: FAMILY MEDICINE

## 2020-07-12 RX ORDER — WARFARIN SODIUM 2 MG/1
4 TABLET ORAL
Status: DISCONTINUED | OUTPATIENT
Start: 2020-07-12 | End: 2020-07-12

## 2020-07-12 RX ORDER — WARFARIN SODIUM 2 MG/1
4 TABLET ORAL
Status: COMPLETED | OUTPATIENT
Start: 2020-07-12 | End: 2020-07-12

## 2020-07-12 RX ADMIN — SODIUM CHLORIDE, PRESERVATIVE FREE 10 ML: 5 INJECTION INTRAVENOUS at 20:47

## 2020-07-12 RX ADMIN — METOPROLOL SUCCINATE 50 MG: 50 TABLET, EXTENDED RELEASE ORAL at 08:21

## 2020-07-12 RX ADMIN — ONDANSETRON HYDROCHLORIDE 4 MG: 2 SOLUTION INTRAMUSCULAR; INTRAVENOUS at 20:45

## 2020-07-12 RX ADMIN — SALINE NASAL SPRAY 2 SPRAY: 1.5 SOLUTION NASAL at 22:00

## 2020-07-12 RX ADMIN — SALINE NASAL SPRAY 2 SPRAY: 1.5 SOLUTION NASAL at 11:14

## 2020-07-12 RX ADMIN — ASPIRIN 81 MG: 81 TABLET ORAL at 08:21

## 2020-07-12 RX ADMIN — TAZOBACTAM SODIUM AND PIPERACILLIN SODIUM 3.38 G: 375; 3 INJECTION, SOLUTION INTRAVENOUS at 20:24

## 2020-07-12 RX ADMIN — SODIUM CHLORIDE, PRESERVATIVE FREE 10 ML: 5 INJECTION INTRAVENOUS at 08:23

## 2020-07-12 RX ADMIN — Medication 2 SPRAY: at 16:02

## 2020-07-12 RX ADMIN — HYDROMORPHONE HYDROCHLORIDE 2 MG: 2 TABLET ORAL at 20:45

## 2020-07-12 RX ADMIN — SALINE NASAL SPRAY 2 SPRAY: 1.5 SOLUTION NASAL at 17:11

## 2020-07-12 RX ADMIN — LOSARTAN POTASSIUM 100 MG: 50 TABLET, FILM COATED ORAL at 08:21

## 2020-07-12 RX ADMIN — Medication 2 SPRAY: at 08:22

## 2020-07-12 RX ADMIN — TAZOBACTAM SODIUM AND PIPERACILLIN SODIUM 3.38 G: 375; 3 INJECTION, SOLUTION INTRAVENOUS at 04:11

## 2020-07-12 RX ADMIN — WARFARIN SODIUM 4 MG: 2 TABLET ORAL at 17:11

## 2020-07-12 RX ADMIN — HYDROMORPHONE HYDROCHLORIDE 2 MG: 2 TABLET ORAL at 00:31

## 2020-07-12 RX ADMIN — SALINE NASAL SPRAY 2 SPRAY: 1.5 SOLUTION NASAL at 06:27

## 2020-07-12 RX ADMIN — SALINE NASAL SPRAY 2 SPRAY: 1.5 SOLUTION NASAL at 14:12

## 2020-07-12 RX ADMIN — SALINE NASAL SPRAY 2 SPRAY: 1.5 SOLUTION NASAL at 00:33

## 2020-07-12 RX ADMIN — ATORVASTATIN CALCIUM 40 MG: 40 TABLET, FILM COATED ORAL at 20:25

## 2020-07-12 RX ADMIN — TAZOBACTAM SODIUM AND PIPERACILLIN SODIUM 3.38 G: 375; 3 INJECTION, SOLUTION INTRAVENOUS at 11:14

## 2020-07-12 RX ADMIN — Medication 2 SPRAY: at 20:29

## 2020-07-13 VITALS
DIASTOLIC BLOOD PRESSURE: 78 MMHG | RESPIRATION RATE: 16 BRPM | BODY MASS INDEX: 34.12 KG/M2 | OXYGEN SATURATION: 92 % | WEIGHT: 274.4 LBS | TEMPERATURE: 98.1 F | HEIGHT: 75 IN | SYSTOLIC BLOOD PRESSURE: 174 MMHG | HEART RATE: 64 BPM

## 2020-07-13 LAB
ANION GAP SERPL CALCULATED.3IONS-SCNC: 12 MMOL/L (ref 5–15)
BASOPHILS # BLD AUTO: 0.02 10*3/MM3 (ref 0–0.2)
BASOPHILS NFR BLD AUTO: 0.3 % (ref 0–1.5)
BUN SERPL-MCNC: 12 MG/DL (ref 8–23)
BUN/CREAT SERPL: 11.7 (ref 7–25)
CALCIUM SPEC-SCNC: 8.6 MG/DL (ref 8.6–10.5)
CHLORIDE SERPL-SCNC: 102 MMOL/L (ref 98–107)
CO2 SERPL-SCNC: 27 MMOL/L (ref 22–29)
CREAT SERPL-MCNC: 1.03 MG/DL (ref 0.76–1.27)
DEPRECATED RDW RBC AUTO: 46.1 FL (ref 37–54)
EOSINOPHIL # BLD AUTO: 0.38 10*3/MM3 (ref 0–0.4)
EOSINOPHIL NFR BLD AUTO: 5.6 % (ref 0.3–6.2)
ERYTHROCYTE [DISTWIDTH] IN BLOOD BY AUTOMATED COUNT: 13.5 % (ref 12.3–15.4)
GFR SERPL CREATININE-BSD FRML MDRD: 71 ML/MIN/1.73
GLUCOSE SERPL-MCNC: 91 MG/DL (ref 65–99)
HCT VFR BLD AUTO: 33.8 % (ref 37.5–51)
HGB BLD-MCNC: 11.4 G/DL (ref 13–17.7)
IMM GRANULOCYTES # BLD AUTO: 0.02 10*3/MM3 (ref 0–0.05)
IMM GRANULOCYTES NFR BLD AUTO: 0.3 % (ref 0–0.5)
INR PPP: 1.35 (ref 0.91–1.09)
LYMPHOCYTES # BLD AUTO: 1.25 10*3/MM3 (ref 0.7–3.1)
LYMPHOCYTES NFR BLD AUTO: 18.5 % (ref 19.6–45.3)
MCH RBC QN AUTO: 31.6 PG (ref 26.6–33)
MCHC RBC AUTO-ENTMCNC: 33.7 G/DL (ref 31.5–35.7)
MCV RBC AUTO: 93.6 FL (ref 79–97)
MONOCYTES # BLD AUTO: 0.83 10*3/MM3 (ref 0.1–0.9)
MONOCYTES NFR BLD AUTO: 12.3 % (ref 5–12)
NEUTROPHILS NFR BLD AUTO: 4.26 10*3/MM3 (ref 1.7–7)
NEUTROPHILS NFR BLD AUTO: 63 % (ref 42.7–76)
NRBC BLD AUTO-RTO: 0 /100 WBC (ref 0–0.2)
PLATELET # BLD AUTO: 131 10*3/MM3 (ref 140–450)
PMV BLD AUTO: 10.2 FL (ref 6–12)
POTASSIUM SERPL-SCNC: 3.9 MMOL/L (ref 3.5–5.2)
PROTHROMBIN TIME: 16.3 SECONDS (ref 11.9–14.6)
RBC # BLD AUTO: 3.61 10*6/MM3 (ref 4.14–5.8)
SODIUM SERPL-SCNC: 141 MMOL/L (ref 136–145)
WBC # BLD AUTO: 6.76 10*3/MM3 (ref 3.4–10.8)

## 2020-07-13 PROCEDURE — G0378 HOSPITAL OBSERVATION PER HR: HCPCS

## 2020-07-13 PROCEDURE — 85610 PROTHROMBIN TIME: CPT | Performed by: FAMILY MEDICINE

## 2020-07-13 PROCEDURE — 85025 COMPLETE CBC W/AUTO DIFF WBC: CPT | Performed by: FAMILY MEDICINE

## 2020-07-13 PROCEDURE — 80048 BASIC METABOLIC PNL TOTAL CA: CPT | Performed by: FAMILY MEDICINE

## 2020-07-13 PROCEDURE — 25010000002 PIPERACILLIN SOD-TAZOBACTAM PER 1 G: Performed by: FAMILY MEDICINE

## 2020-07-13 RX ORDER — OXYMETAZOLINE HYDROCHLORIDE 0.05 G/100ML
2 SPRAY NASAL 2 TIMES DAILY
Qty: 1 ML | Refills: 0 | Status: SHIPPED | OUTPATIENT
Start: 2020-07-13 | End: 2020-07-14

## 2020-07-13 RX ORDER — WARFARIN SODIUM 4 MG/1
TABLET ORAL
Qty: 1 TABLET | Refills: 0
Start: 2020-07-13

## 2020-07-13 RX ADMIN — HYDROMORPHONE HYDROCHLORIDE 2 MG: 2 TABLET ORAL at 01:01

## 2020-07-13 RX ADMIN — SALINE NASAL SPRAY 2 SPRAY: 1.5 SOLUTION NASAL at 06:50

## 2020-07-13 RX ADMIN — METOPROLOL SUCCINATE 50 MG: 50 TABLET, EXTENDED RELEASE ORAL at 09:35

## 2020-07-13 RX ADMIN — ASPIRIN 81 MG: 81 TABLET ORAL at 09:35

## 2020-07-13 RX ADMIN — TAZOBACTAM SODIUM AND PIPERACILLIN SODIUM 3.38 G: 375; 3 INJECTION, SOLUTION INTRAVENOUS at 04:26

## 2020-07-13 RX ADMIN — LOSARTAN POTASSIUM 100 MG: 50 TABLET, FILM COATED ORAL at 09:35

## 2020-08-13 ENCOUNTER — OFFICE VISIT (OUTPATIENT)
Dept: OTOLARYNGOLOGY | Facility: CLINIC | Age: 75
End: 2020-08-13

## 2020-08-13 VITALS
WEIGHT: 280 LBS | BODY MASS INDEX: 35 KG/M2 | TEMPERATURE: 97.2 F | HEART RATE: 73 BPM | DIASTOLIC BLOOD PRESSURE: 90 MMHG | SYSTOLIC BLOOD PRESSURE: 160 MMHG

## 2020-08-13 DIAGNOSIS — Z72.0 TOBACCO ABUSE: ICD-10-CM

## 2020-08-13 DIAGNOSIS — J34.2 ACQUIRED DEVIATED NASAL SEPTUM: ICD-10-CM

## 2020-08-13 DIAGNOSIS — Z79.01 ANTICOAGULATED: ICD-10-CM

## 2020-08-13 DIAGNOSIS — R04.0 EPISTAXIS: Primary | ICD-10-CM

## 2020-08-13 PROCEDURE — 99213 OFFICE O/P EST LOW 20 MIN: CPT | Performed by: OTOLARYNGOLOGY

## 2020-08-13 NOTE — PROGRESS NOTES
Nathanael Pierce Jr, MD     ENT FOLLOW UP NOTE     Chief Complaint   Patient presents with   • Follow-up     Follow up from procedure done in July.  Pt reports spotting of blood from nose the last couple of days.        HISTORY OF PRESENT ILLNESS:  Accompanied by:  No one  Michael Paz is a  75 y.o. male who is here for follow up. He was seen for nosebleeds.  He has had blood clots last Friday that gagged him. No frontward nasal bleeding.  Blood thinners- Wafarin  Smoke- none    Review of Systems  Reviewed per patient intake note and confirmed by me    Past History:  Past medical and surgical history, family history and social history reviewed and updated when appropriate.  Current medications and allergies reviewed and updated when appropriate.  Allergies:  Patient has no known allergies.        Vital Signs:   Temp:  [97.2 °F (36.2 °C)] 97.2 °F (36.2 °C)  Heart Rate:  [73] 73  BP: (160)/(90) 160/90  EXAMINATION:  CONSTITUTIONAL:    well developed  well nourished  Lying in bed     BODY HABITUS:    obese  severe     COMMUNICATION:    able to communicate normally, normal voice quality     HEAD:     Normocephalic, without obvious abnormality, atraumatic     FACE:    R face mildly distored from nasal pack, L face normal     SALIVARY GLANDS:    parotid glands with no tenderness, no swelling, no masses, submandibular glands with normal size, nontender      EYE:    ocular motility normal, eyelids normal, orbits normal, no proptosis, conjunctiva clear, sclera non-icteric, pupils equal, round, reactive to light and accomodation     HEARING:    response to conversational voice normal     EARS:     PINNA:     normal, non-tender, skin intact without lesions      NOSE EXTERNAL:    APPEARANCE: normal, straight, with good projection, no tenderness, no lesions, no tenderness, good nasal support, patent nares     NOSE INTERNAL:    Anterior rhinoscopy   NASALMUCOSA:    abnormal:        Left- Dry, peal, excoriated, Right- open,  Little's area well healed   NASAL PASSAGES:     abnormal   Left- narrowed, Right-open   NASAL VALVE:     abnormal  Left- weak, Right- weak   SEPTUM:     mucosa abnormal   Left- dry, pale, Right- healed Little's area    deviation present:      deviated Left very anterior mod to sevre obstructing anterior nasal passage   INFERIOR TURBINATES:     Bilateral - normal     ORAL CAVITY:      LIPS:      structure normal, no tenderness on palpation, no lesions, no evidence of trauma, normal mobility and oral competence    TEETH:       edentulous:  upper and lower    GUMS:      gingivae healthy, no lesions    ORAL MUCOSA:       abnormal: blood staining present    FLOOR OF MOUTH:       Warthin's duct patent, mucosa normal  TONGUE:       lingual mucosa normal without lesions, normal tongue mobility      hypertrophy  Severe, prolapse   Severe    PALATE:       hard palate with normal mucosa and structure      soft palate with normal mucosa and structure, normal mobility uvula intact     OROPHARYNX:    Direct examination  OROPHARYNGEAL MUCOSA:     abnormal-        lateral walls-          Bilateral- blood coated, dried, no active bleeding       posterior wall-  blood coated  TONSIL:        Bilateral- atrophic      NECK:    short, thick  severe  adiposis   LARYNGEAL POSITION: low   LARYNGEAL ELEVATION:  Abnormal:slowed  TRACHEA:   midline and Deep     LYMPH NODES :    no adenopathy     NEURO/PSYCHIATRIC :    oriented appropriately for age, mood normal, affect appropriate, cranial nerves intact grossly (unless specifically described), gait normal for age    RESULTS REVIEW:    I have reviewed the patients old records in the chart.     {SnapShot  Notes  Encounters  Result Review  Labs  Imaging  Lysanda  Media :23}      ASSESSMENT:      Diagnosis Plan   1. Epistaxis      resolved   2. Tobacco abuse     3. Acquired deviated nasal septum      R to L very anterior all levels   4. Anticoagulated      Warfarin           PLAN:       Conservative management.  Patient has had no lore bleeding. He will continue nasal saline. I will have him call for nosebleed.  Nasa saline  First aid or nosebleeds  Resume normal activity  MY CHART:  Patient is Patient is using My Chart          Patient understand(s) and agree(s) with the treatment plan as described.    Return if symptoms worsen or fail to improve, for Recheck nose.     Nathanael Pierce Jr, MD  08/13/20  14:08

## 2020-08-13 NOTE — PATIENT INSTRUCTIONS
NASAL SALINE:  Use 2 puffs each nostril 4-6 times daily and more frequently if possible.  You can buy saline spray or you can make your own and use an old spray bottle to administer  Use a humidifier at bedside  Recipe for saline:  Water                                 1 quart  Salt (table)                        1 tablespoon  Gylcerin (or Tayla Syrup)    1 teaspoon  Sodium bicarbonate           1 teaspoon  Sprays or Amy pots are recommended    You may blow nose    Call for nose bleeding    Resume normal activity    First aid for nosebleed  NASAL SALINE:  Use 2 puffs each nostril 4-6 times daily and more frequently if possible.  You can buy saline spray or you can make your own and use an old spray bottle to administer  Use a humidifier at bedside  Recipe for saline:  Water                                 1 quart  Salt (table)                        1 tablespoon  Gylcerin (or Tayla Syrup)    1 teaspoon  Sodium bicarbonate           1 teaspoon  Sprays or [place] pots are recommended    Resume normal activity    Call for nosebleed     [email or web address]

## 2020-08-13 NOTE — PROGRESS NOTES
Subjective   Michael Paz is a 75 y.o. male.     History of Present Illness     The following portions of the patient's history were reviewed and updated as appropriate: allergies, current medications, past family history, past medical history, past social history, past surgical history and problem list.    Review of Systems   Constitutional: Negative for fatigue and fever.   HENT: Positive for nosebleeds and sneezing. Negative for sinus pressure.    Eyes: Negative for blurred vision.   Respiratory: Positive for cough. Negative for chest tightness and shortness of breath.    Cardiovascular: Negative for chest pain.   Gastrointestinal: Negative for nausea and vomiting.   Skin: Negative for color change and dry skin.   Allergic/Immunologic: Negative for food allergies.   Neurological: Negative for dizziness, numbness, headache and memory problem.   Psychiatric/Behavioral: The patient is not nervous/anxious.        Objective   Physical Exam      Assessment/Plan   Michael was seen today for follow-up.    Diagnoses and all orders for this visit:    Epistaxis  Comments:  resolved    Tobacco abuse    Acquired deviated nasal septum  Comments:  R to L very anterior all levels    Anticoagulated  Comments:  Warfarin

## 2021-04-19 ENCOUNTER — TELEPHONE (OUTPATIENT)
Dept: VASCULAR SURGERY | Facility: CLINIC | Age: 76
End: 2021-04-19

## 2021-04-19 NOTE — TELEPHONE ENCOUNTER
Left message reminding Mr Paz of his appointments for Tuesday, April 20th, 2021. Reminded Mr Paz to arrive at the Main [facility], [facility] at 730 am for 8 o'clock testing with nothing to eat or drink 6 hours prior and follow up afterwards at 1145 am with Dr Frey. Also advised Mr Paz if he had any questions, concerns, or needs to reschedule to please call the office at [phone].

## 2021-04-20 ENCOUNTER — HOSPITAL ENCOUNTER (OUTPATIENT)
Dept: ULTRASOUND IMAGING | Facility: HOSPITAL | Age: 76
Discharge: HOME | End: 2021-04-20

## 2021-04-20 ENCOUNTER — TELEPHONE (OUTPATIENT)
Dept: VASCULAR SURGERY | Facility: CLINIC | Age: 76
End: 2021-04-20

## 2021-04-20 ENCOUNTER — HOSPITAL ENCOUNTER (OUTPATIENT)
Dept: CT IMAGING | Facility: HOSPITAL | Age: 76
Discharge: HOME | End: 2021-04-20

## 2021-04-20 ENCOUNTER — OFFICE VISIT (OUTPATIENT)
Dept: VASCULAR SURGERY | Facility: CLINIC | Age: 76
End: 2021-04-20

## 2021-04-20 VITALS
HEIGHT: 75 IN | HEART RATE: 84 BPM | BODY MASS INDEX: 36.68 KG/M2 | OXYGEN SATURATION: 93 % | SYSTOLIC BLOOD PRESSURE: 140 MMHG | WEIGHT: 295 LBS | DIASTOLIC BLOOD PRESSURE: 90 MMHG

## 2021-04-20 DIAGNOSIS — I71.40 ABDOMINAL AORTIC ANEURYSM (AAA) WITHOUT RUPTURE (HCC): ICD-10-CM

## 2021-04-20 DIAGNOSIS — I10 ESSENTIAL HYPERTENSION: ICD-10-CM

## 2021-04-20 DIAGNOSIS — I71.40 ABDOMINAL AORTIC ANEURYSM (AAA) WITHOUT RUPTURE (HCC): Primary | ICD-10-CM

## 2021-04-20 DIAGNOSIS — E78.5 HYPERLIPIDEMIA, UNSPECIFIED HYPERLIPIDEMIA TYPE: ICD-10-CM

## 2021-04-20 DIAGNOSIS — I65.23 BILATERAL CAROTID ARTERY STENOSIS: ICD-10-CM

## 2021-04-20 DIAGNOSIS — Z72.0 TOBACCO ABUSE: ICD-10-CM

## 2021-04-20 DIAGNOSIS — I72.4 POPLITEAL ARTERY ANEURYSM, BILATERAL (HCC): ICD-10-CM

## 2021-04-20 LAB — CREAT BLDA-MCNC: 1.2 MG/DL (ref 0.6–1.3)

## 2021-04-20 PROCEDURE — 74174 CTA ABD&PLVS W/CONTRAST: CPT

## 2021-04-20 PROCEDURE — 93880 EXTRACRANIAL BILAT STUDY: CPT | Performed by: SURGERY

## 2021-04-20 PROCEDURE — 99214 OFFICE O/P EST MOD 30 MIN: CPT | Performed by: SURGERY

## 2021-04-20 PROCEDURE — 93880 EXTRACRANIAL BILAT STUDY: CPT

## 2021-04-20 PROCEDURE — 93923 UPR/LXTR ART STDY 3+ LVLS: CPT | Performed by: SURGERY

## 2021-04-20 PROCEDURE — 0 IOPAMIDOL PER 1 ML: Performed by: NURSE PRACTITIONER

## 2021-04-20 PROCEDURE — 93923 UPR/LXTR ART STDY 3+ LVLS: CPT

## 2021-04-20 PROCEDURE — 82565 ASSAY OF CREATININE: CPT

## 2021-04-20 RX ADMIN — IOPAMIDOL 100 ML: 755 INJECTION, SOLUTION INTRAVENOUS at 08:02

## 2021-04-20 NOTE — TELEPHONE ENCOUNTER
PT CAME IN AND WANTED TO BE  SEEN AT 9:15 AND HIS APPT IS NOT UNTIL 11:45. CALLED DR NEREYDA CLARK AND SHE SAID I COULD CHECK HIM IN BUT THAT DID NOT GUARANTEE THAT HE WOULD BE SEEN BEFORE HIS SCHEDULED APPT DUE TO US HAVING A FULL SHCEDULE TODAY. PT GOT UPSET AND WALKED OUT.

## 2021-04-20 NOTE — PROGRESS NOTES
"4/20/2021      Zafar Funk MD  [address] [address]        Michael CASANOVA [facility]  1945    Chief Complaint   Patient presents with   • Follow-up     1 Year Follow Up For Abdominal Aortic Aneurysm without Rupture, Popliteal Artery Aneurysm and Bilateral Carotid Artery Stenosis. Test [id number] US pad lower ext arteries bilat, US pad carotid bilateral, and CT pad angiogram abd pelvis w wo. Patient denies any stroke like symptoms.    • Smoker     Patient is a Current Everyday Smoker    • Med Management     Verbally verified medications with patient        Dear Zafar Funk MD:    HPI     I had the pleasure of seeing you patient in the office today for follow up.  As you recall, the patient is a 76 y.o. male who we are currently following for abdominal aortic aneurysm disease. The patient has had a previous endovascular repair and had a type IA endoleak which was being managed conservatively by Dr. Boudreaux. His aneurysm had gotten larger up to 7.1 cm in greatest dimensions and he underwent repair with an extension cuff. He is doing great since that time and has not had any abdominal pain or back pain.  He is maintained on aspirin, Coumadin, and Zocor.  He was admitted for epistaxis and INR was supra therapeutic at that time, 3.2. He did have repeat noninvasive testing performed today which I did review in office.      Review of Systems   Constitutional: Negative.    HENT: Negative.    Eyes: Negative.    Respiratory: Negative.    Cardiovascular: Negative.    Gastrointestinal: Negative.    Endocrine: Negative.    Genitourinary: Negative.    Musculoskeletal: Negative.    Skin: Negative.    Allergic/Immunologic: Negative.    Neurological: Negative.    Hematological: Negative.    Psychiatric/Behavioral: Negative.    All other systems reviewed and are negative.       /90 (BP Location: Right arm, Patient Position: Sitting, Cuff Size: Adult)   Pulse 84   Ht 190.5 cm (75\")   Wt 134 kg (295 lb)   SpO2 93%  "  BMI 36.87 kg/m²   Physical Exam  Constitutional:       General: He is not in acute distress.     Appearance: Normal appearance. He is well-developed. He is obese. He is not diaphoretic.   HENT:      Head: Normocephalic and atraumatic.   Neck:      Vascular: No carotid bruit or JVD.   Cardiovascular:      Rate and Rhythm: Normal rate and regular rhythm.      Pulses: Normal pulses.           Femoral pulses are 2+ on the right side and 2+ on the left side.       Popliteal pulses are 2+ on the right side and 2+ on the left side.        Dorsalis pedis pulses are 2+ on the right side and 2+ on the left side.        Posterior tibial pulses are 2+ on the right side and 2+ on the left side.      Heart sounds: Normal heart sounds, S1 normal and S2 normal. No murmur heard.   No friction rub. No gallop.    Pulmonary:      Effort: Pulmonary effort is normal.      Breath sounds: Normal breath sounds.   Abdominal:      General: Bowel sounds are normal. There is no abdominal bruit.      Palpations: Abdomen is soft.      Tenderness: There is no abdominal tenderness.   Musculoskeletal:         General: Normal range of motion.      Cervical back: Neck supple.   Skin:     General: Skin is warm and dry.   Neurological:      General: No focal deficit present.      Mental Status: He is alert and oriented to person, place, and time.      Cranial Nerves: No cranial nerve deficit.   Psychiatric:         Mood and Affect: Mood normal.         Behavior: Behavior normal.         Thought Content: Thought content normal.         Judgment: Judgment normal.           DIAGNOSTIC DATA:    CT Angiogram Abdomen Pelvis    Result Date: 4/20/2021  Narrative: EXAM: CT abdomen pelvis angiography with 3D MIP images with IV contrast  INDICATION: Abdominal aortic aneurysm (AAA), known, follow up; I71.4-Abdominal aortic aneurysm, without rupture  COMPARISON: 6/30/2020  DOSE LENGTH PRODUCT: 2012 mGy cm. Automated exposure control was also utilized to decrease  patient radiation dose.  FINDINGS:  Postoperative change of aortobiiliac stent graft repair for treatment of abdominal aortic aneurysm. Graft begins immediately distal to the renal artery origins and terminates in the common iliac arteries. No evidence of endoleak. Some scattered areas of hyperdensity and calcification within the aneurysm sac are present on precontrast imaging. Aneurysm sac has decreased in size measuring 6 x 6 cm on axial image 100 (previously 6.5 x 6.5 cm).  Both common iliac arteries are ectatic but stable in caliber. Heavy atherosclerotic calcification is present throughout the bilateral external iliac arteries and common femoral arteries but no evidence of flow-limiting stenosis is present. Stable mild ectasia of the LEFT common femoral artery. The celiac artery and SMA are widely patent. Moderate atherosclerotic narrowing of the RIGHT renal artery origin without evidence of flow-limiting stenosis.  Included lung bases are clear. Hepatic venous reflux of contrast, which can indicate poor cardiac output. No suspicious liver lesion. Multiple calcified gallstones within the gallbladder lumen. No gallbladder inflammation or biliary ductal dilatation. Pancreas, spleen, and adrenal glands are unremarkable. No solid renal mass. No urolithiasis or hydronephrosis. Urinary bladder wall appears diffusely thickened.  Sigmoid diverticulosis without evidence of diverticulitis. No evidence of bowel obstruction or active bowel inflammation. No ascites or free pelvic fluid. No pelvic mass or pelvic collection. No abdominal or pelvic lymphadenopathy. No aggressive osseous lesions.      Impression:  1.  Postoperative change of aortobiiliac endograft repair of abdominal aortic aneurysm. Decreased size of aneurysm sac now measuring 6 cm. No evidence of endoleak. 2.  Cholelithiasis without evidence of cholecystitis. This report was finalized on 04/20/2021 08:54 by Dr. Zafar Infante MD.     There is less than 50%  carotid occlusive disease bilaterally with antegrade bilateral vertebral artery flow.    Popliteal duplex shows both popliteal to be widely patent and maximal diameter of approximately 1.3 cm bilaterally.    Patient Active Problem List   Diagnosis   • Tobacco abuse   • Hypertension   • Hyperlipidemia   • Endoleak post endovascular aneurysm repair   • AAA (abdominal aortic aneurysm) (CMS/HCC)   • Aneurysm (CMS/HCC)   • CAD (coronary artery disease)   • Right-sided epistaxis   • Hx pulmonary embolism   • Hx of deep venous thrombosis   • Rhinitis, chronic   • Anticoagulant adverse reaction   • Epistaxis         ICD-10-CM ICD-9-CM   1. Abdominal aortic aneurysm (AAA) without rupture (CMS/HCC)  I71.4 441.4   2. Popliteal artery aneurysm, bilateral (CMS/HCC)  I72.4 442.3   3. Bilateral carotid artery stenosis  I65.23 433.10     433.30   4. Tobacco abuse  Z72.0 305.1   5. Essential hypertension  I10 401.9   6. Hyperlipidemia, unspecified hyperlipidemia type  E78.5 272.4       PLAN: After thoroughly evaluating Michael Paz, I believe the best course of action is to remain conservative from vascular surgery standpoint.  I did review his testing which has remained stable.  There is a decrease in the size of his aneurysm and no evidence of endoleak.  He does have small popliteal artery aneurysms bilaterally however this only requires annual monitoring.  His carotid duplex shows less than 50% carotid stenosis bilaterally.  We will see him back in 1 year with repeat noninvasive testing for continued surveillance including a popliteal duplex, a carotid duplex and a CTA of the abdomen pelvis.  I did  extensively on smoking cessation, and the patient was advised of the continued risks of smoking.  I provided over 3 minutes counseling on this matter.  He does not have a desire to stop smoking at this time.  I did discuss vascular risk factors as they pertain to the progression of vascular disease including controlling his  hypertension and hyperlipidemia.  His blood pressure is well controlled on his current medication regimen.  He is maintained on Zocor for his hyperlipidemia.  The patient is to continue taking their medications as previously discussed.   This was all discussed in full with complete understanding.  Thank you for allowing me to participate in the care of your patient.  Please do not hesitate to call with any questions or concerns.  We will keep you aware of any further encounters with Michael Paz.      Sincerely Yours,        Jah Frey, DO

## 2022-04-11 ENCOUNTER — TELEPHONE (OUTPATIENT)
Dept: VASCULAR SURGERY | Facility: CLINIC | Age: 77
End: 2022-04-11

## 2022-04-11 NOTE — TELEPHONE ENCOUNTER
Spoke with Mr Paz reminding him of his appointment for Tuesday, April 12th, 2022. Reminded [facility] Paz to [facility] at 630 am for 7 o'clock testing and follow up afterwards at 1030 am with Dr Frey.  Brandi confirmed he would be here.

## 2022-04-12 ENCOUNTER — HOSPITAL ENCOUNTER (OUTPATIENT)
Dept: CT IMAGING | Facility: HOSPITAL | Age: 77
Discharge: HOME | End: 2022-04-12

## 2022-04-12 ENCOUNTER — HOSPITAL ENCOUNTER (OUTPATIENT)
Dept: ULTRASOUND IMAGING | Facility: HOSPITAL | Age: 77
Discharge: HOME | End: 2022-04-12

## 2022-04-12 ENCOUNTER — OFFICE VISIT (OUTPATIENT)
Dept: VASCULAR SURGERY | Facility: CLINIC | Age: 77
End: 2022-04-12

## 2022-04-12 VITALS
RESPIRATION RATE: 18 BRPM | OXYGEN SATURATION: 95 % | HEIGHT: 75 IN | BODY MASS INDEX: 36.93 KG/M2 | HEART RATE: 74 BPM | SYSTOLIC BLOOD PRESSURE: 150 MMHG | WEIGHT: 297 LBS | DIASTOLIC BLOOD PRESSURE: 100 MMHG

## 2022-04-12 DIAGNOSIS — I72.4 POPLITEAL ARTERY ANEURYSM, BILATERAL: ICD-10-CM

## 2022-04-12 DIAGNOSIS — E78.5 HYPERLIPIDEMIA, UNSPECIFIED HYPERLIPIDEMIA TYPE: ICD-10-CM

## 2022-04-12 DIAGNOSIS — I71.40 ABDOMINAL AORTIC ANEURYSM (AAA) WITHOUT RUPTURE: ICD-10-CM

## 2022-04-12 DIAGNOSIS — I71.40 ABDOMINAL AORTIC ANEURYSM (AAA) WITHOUT RUPTURE: Primary | ICD-10-CM

## 2022-04-12 DIAGNOSIS — I65.23 BILATERAL CAROTID ARTERY STENOSIS: ICD-10-CM

## 2022-04-12 DIAGNOSIS — I10 PRIMARY HYPERTENSION: ICD-10-CM

## 2022-04-12 LAB — CREAT BLDA-MCNC: 1.3 MG/DL (ref 0.6–1.3)

## 2022-04-12 PROCEDURE — 93880 EXTRACRANIAL BILAT STUDY: CPT

## 2022-04-12 PROCEDURE — 99214 OFFICE O/P EST MOD 30 MIN: CPT | Performed by: SURGERY

## 2022-04-12 PROCEDURE — 93925 LOWER EXTREMITY STUDY: CPT

## 2022-04-12 PROCEDURE — 0 IOPAMIDOL PER 1 ML: Performed by: SURGERY

## 2022-04-12 PROCEDURE — 93880 EXTRACRANIAL BILAT STUDY: CPT | Performed by: SURGERY

## 2022-04-12 PROCEDURE — 82565 ASSAY OF CREATININE: CPT

## 2022-04-12 PROCEDURE — 93925 LOWER EXTREMITY STUDY: CPT | Performed by: SURGERY

## 2022-04-12 PROCEDURE — 74174 CTA ABD&PLVS W/CONTRAST: CPT

## 2022-04-12 RX ADMIN — IOPAMIDOL 100 ML: 755 INJECTION, SOLUTION INTRAVENOUS at 08:00

## 2022-10-03 ENCOUNTER — TELEPHONE (OUTPATIENT)
Dept: VASCULAR SURGERY | Facility: CLINIC | Age: 77
End: 2022-10-03

## 2022-10-03 NOTE — TELEPHONE ENCOUNTER
Pt expressed understanding of updated testing and follow up with Dr. Frey.  I offered for patient to come in at 1 so he could go have some lunch. Per patient he would rather be seen after his last test .    I will also mail new reminders to patient.

## 2023-03-27 ENCOUNTER — TELEPHONE (OUTPATIENT)
Dept: VASCULAR SURGERY | Facility: CLINIC | Age: 78
End: 2023-03-27
Payer: MEDICARE

## 2023-03-28 ENCOUNTER — HOSPITAL ENCOUNTER (OUTPATIENT)
Dept: ULTRASOUND IMAGING | Facility: HOSPITAL | Age: 78
Discharge: HOME | End: 2023-03-28
Payer: MEDICARE

## 2023-03-28 ENCOUNTER — HOSPITAL ENCOUNTER (OUTPATIENT)
Dept: CT IMAGING | Facility: HOSPITAL | Age: 78
Discharge: HOME | End: 2023-03-28
Payer: MEDICARE

## 2023-03-28 ENCOUNTER — OFFICE VISIT (OUTPATIENT)
Dept: VASCULAR SURGERY | Facility: CLINIC | Age: 78
End: 2023-03-28
Payer: MEDICARE

## 2023-03-28 VITALS
WEIGHT: 290 LBS | DIASTOLIC BLOOD PRESSURE: 82 MMHG | OXYGEN SATURATION: 98 % | HEART RATE: 55 BPM | HEIGHT: 75 IN | SYSTOLIC BLOOD PRESSURE: 142 MMHG | BODY MASS INDEX: 36.06 KG/M2

## 2023-03-28 DIAGNOSIS — I71.40 ABDOMINAL AORTIC ANEURYSM (AAA) WITHOUT RUPTURE: ICD-10-CM

## 2023-03-28 DIAGNOSIS — E78.5 HYPERLIPIDEMIA, UNSPECIFIED HYPERLIPIDEMIA TYPE: ICD-10-CM

## 2023-03-28 DIAGNOSIS — Z72.0 TOBACCO ABUSE: ICD-10-CM

## 2023-03-28 DIAGNOSIS — I10 PRIMARY HYPERTENSION: ICD-10-CM

## 2023-03-28 DIAGNOSIS — I72.4 POPLITEAL ARTERY ANEURYSM, BILATERAL: ICD-10-CM

## 2023-03-28 DIAGNOSIS — I65.23 BILATERAL CAROTID ARTERY STENOSIS: ICD-10-CM

## 2023-03-28 DIAGNOSIS — I71.43 INFRARENAL ABDOMINAL AORTIC ANEURYSM (AAA) WITHOUT RUPTURE: Primary | ICD-10-CM

## 2023-03-28 LAB — CREAT BLDA-MCNC: 1.2 MG/DL (ref 0.6–1.3)

## 2023-03-28 PROCEDURE — 74174 CTA ABD&PLVS W/CONTRAST: CPT

## 2023-03-28 PROCEDURE — 93880 EXTRACRANIAL BILAT STUDY: CPT

## 2023-03-28 PROCEDURE — 99214 OFFICE O/P EST MOD 30 MIN: CPT | Performed by: SURGERY

## 2023-03-28 PROCEDURE — 25510000001 IOPAMIDOL PER 1 ML: Performed by: SURGERY

## 2023-03-28 PROCEDURE — 3079F DIAST BP 80-89 MM HG: CPT | Performed by: SURGERY

## 2023-03-28 PROCEDURE — 1160F RVW MEDS BY RX/DR IN RCRD: CPT | Performed by: SURGERY

## 2023-03-28 PROCEDURE — 82565 ASSAY OF CREATININE: CPT

## 2023-03-28 PROCEDURE — 93925 LOWER EXTREMITY STUDY: CPT

## 2023-03-28 PROCEDURE — 93880 EXTRACRANIAL BILAT STUDY: CPT | Performed by: SURGERY

## 2023-03-28 PROCEDURE — 1159F MED LIST DOCD IN RCRD: CPT | Performed by: SURGERY

## 2023-03-28 PROCEDURE — 93925 LOWER EXTREMITY STUDY: CPT | Performed by: SURGERY

## 2023-03-28 PROCEDURE — 3077F SYST BP >= 140 MM HG: CPT | Performed by: SURGERY

## 2023-03-28 RX ADMIN — IOPAMIDOL 69 ML: 755 INJECTION, SOLUTION INTRAVENOUS at 09:46

## 2023-03-28 NOTE — PROGRESS NOTES
"3/28/2023      Henrietta Jin, APRN  [address] [facility] [address]        Michael CASANOVA [facility]  1945    Chief Complaint   Patient presents with   • Follow-up     1 yr f/u with CTA of the abdomen/pelvis, Us lower extremities arterial bilateral and carotid testing.  Last seen in the office on 4/12/22.  Pt denies any changes or any stroke type symptoms.  Denies any new or worsening back or abdominal pains.        Dear Henrietta Jin, APRTUAN:    HPI     I had the pleasure of seeing you patient in the office today for follow up.  As you recall, the patient is a 78 y.o. male who we are currently following for abdominal aortic aneurysm disease. The patient has had a previous endovascular repair and had a type IA endoleak which was being managed conservatively by Dr. Boudreaux. His aneurysm had gotten larger up to 7.1 cm in greatest dimensions and he underwent repair with an extension cuff.  He is doing well and denies any worsening abdominal or back pain.  He is maintained on Coumadin, full dose aspirin, and Zocor.  He did have noninvasive testing performed today, which I did review in office.      Review of Systems   Constitutional: Negative.    HENT: Negative.    Eyes: Negative.    Respiratory: Negative.    Cardiovascular: Negative.    Gastrointestinal: Negative.    Endocrine: Negative.    Genitourinary: Negative.    Musculoskeletal: Negative.    Skin: Negative.    Allergic/Immunologic: Negative.    Neurological: Negative.    Hematological: Negative.    Psychiatric/Behavioral: Negative.    All other systems reviewed and are negative.       /82   Pulse 55   Ht 190.5 cm (75\")   Wt 132 kg (290 lb)   SpO2 98%   BMI 36.25 kg/m²   Physical Exam  Vitals reviewed.   Constitutional:       General: He is not in acute distress.     Appearance: Normal appearance. He is well-developed. He is obese. He is not diaphoretic.   HENT:      Head: Normocephalic and atraumatic.   Neck:      Vascular: No carotid bruit or " JVD.   Cardiovascular:      Rate and Rhythm: Normal rate and regular rhythm.      Pulses: Normal pulses.           Femoral pulses are 2+ on the right side and 2+ on the left side.       Popliteal pulses are 2+ on the right side and 2+ on the left side.        Dorsalis pedis pulses are 2+ on the right side and 2+ on the left side.        Posterior tibial pulses are 2+ on the right side and 2+ on the left side.      Heart sounds: Normal heart sounds, S1 normal and S2 normal. No murmur heard.    No friction rub. No gallop.   Pulmonary:      Effort: Pulmonary effort is normal.      Breath sounds: Normal breath sounds.   Abdominal:      General: Bowel sounds are normal. There is no abdominal bruit.      Palpations: Abdomen is soft.      Tenderness: There is no abdominal tenderness.   Musculoskeletal:         General: Normal range of motion.      Cervical back: Neck supple.   Skin:     General: Skin is warm and dry.   Neurological:      General: No focal deficit present.      Mental Status: He is alert and oriented to person, place, and time.      Cranial Nerves: No cranial nerve deficit.   Psychiatric:         Mood and Affect: Mood normal.         Behavior: Behavior normal.         Thought Content: Thought content normal.         Judgment: Judgment normal.         DIAGNOSTIC DATA:    CT Angiogram Abdomen Pelvis    Result Date: 3/28/2023  Narrative: EXAMINATION: CT ANGIOGRAM ABDOMEN PELVIS-   3/28/2023 9:44 AM CDT  HISTORY: Aortic aneurysm (AAA), surveillance; I71.40-Abdominal aortic aneurysm, without rupture, unspecified  In order to have a CT radiation dose as low as reasonably achievable Automated Exposure Control was utilized for adjustment of the mA and/or KV according to patient size.  DLP in mGycm= 1502.  CT angiogram abdomen and pelvis without and with IV contrast injection. CT angiography protocol. CT imaging with bolus IV contrast injection. Under concurrent supervision axial, sagittal, coronal, and MIP data  sets were constructed on an independent work station.  Comparison is made with April 12, 2022.  Tortuous though nondilated distal thoracic aorta. Upper abdominal aorta = 30 x 32 mm. The abdominal aorta = 25 x 26 mm.  Endograft present. Infrarenal AAA diameter = 60 x 55 mm compared with 67 x 59 mm.  No endograft leak. Patent iliac limbs. Stable mildly dilated iliac arteries measuring 27 mm in diameter.  Normal liver, pancreas, and spleen. Multiple small calcified gallstones are present. No biliary dilation and no sign of cholecystitis.  Normal and symmetric kidneys. No bowel dilation. No free fluid. Sigmoid diverticula. No diverticulitis.  Summary: 1. Decreasing size of the AAA after endograft treatment. 2. No endograft leak.            This report was finalized on 03/28/2023 10:05 by Dr. Ricardo Jernigan MD.    US Carotid Bilateral    Result Date: 3/28/2023  Narrative: History: Carotid occlusive disease      Impression: Impression: 1. There is less than 50% stenosis of the right internal carotid artery. 2. There is less than 50% stenosis of the left internal carotid artery. 3. Antegrade flow is demonstrated in bilateral vertebral arteries.  Comments: Bilateral carotid vertebral arterial duplex scan was performed.  Grayscale imaging shows intimal thickening and calcified elements at the carotid bifurcation. The right internal carotid artery peak systolic velocity is 91.5 cm/sec. The end-diastolic velocity is 24 cm/sec. The right ICA/CCA ratio is approximately 1.0 . These findings correlate with less than 50% stenosis of the right internal carotid artery.  Grayscale imaging shows intimal thickening and calcified elements at the carotid bifurcation. The left internal carotid artery peak systolic velocity is 102.5 cm/sec. The end-diastolic velocity is 31.8 cm/sec. The left ICA/CCA ratio is approximately 1.1 . These findings correlate with less than 50% stenosis of the left internal carotid artery.  Antegrade flow is  demonstrated in bilateral vertebral arteries.  This report was finalized on 03/28/2023 15:19 by Dr. Jah Frey MD.    US Arterial Doppler Lower Extremity Complete    Result Date: 3/28/2023  Narrative:  History: Popliteal aneurysm  Comments: Arterial duplex exam of bilateral popliteal arteries was performed.  Right leg: The proximal right popliteal artery diameter is 1.2 cm.  The mid right popliteal artery diameter is 0.75 cm.  The distal right  popliteal artery diameter is 0.61 cm. The peak systolic velocity in the popliteal artery measured 94.9 cm/s.  Left leg: The proximal left popliteal artery diameter is 1.32 cm.  The mid left popliteal artery diameter is 1.33 cm.  The distal left popliteal artery diameter is 0.83 cm.  The peak systolic velocity in the popliteal artery measured 99.2 cm/s       Impression: Impression: Small popliteal artery aneurysms bilaterally without evidence of significant stenosis.     This report was finalized on 03/28/2023 15:11 by Dr. Jah Frey MD.       Patient Active Problem List   Diagnosis   • Tobacco abuse   • Hypertension   • Hyperlipidemia   • Endoleak post endovascular aneurysm repair   • AAA (abdominal aortic aneurysm)   • Aneurysm (HCC)   • CAD (coronary artery disease)   • Right-sided epistaxis   • Hx pulmonary embolism   • Hx of deep venous thrombosis   • Rhinitis, chronic   • Anticoagulant adverse reaction   • Epistaxis         ICD-10-CM ICD-9-CM   1. Infrarenal abdominal aortic aneurysm (AAA) without rupture  I71.43 441.4   2. Popliteal artery aneurysm, bilateral (HCC)  I72.4 442.3   3. Bilateral carotid artery stenosis  I65.23 433.10     433.30   4. Primary hypertension  I10 401.9   5. Hyperlipidemia, unspecified hyperlipidemia type  E78.5 272.4   6. Tobacco abuse  Z72.0 305.1       PLAN: After thoroughly evaluating Michael Paz, I believe the best course of action is to remain conservative from a vascular surgery standpoint.  I did review her testing which  shows his aneurysm measuring small and endograft without endoleak.  His carotid duplex shows less than 50% carotid stenosis bilaterally.  He does have small popliteal artery aneurysms bilaterally.    I will see her back in 1 year with repeat noninvasive testing including a CTA of the abdomen pelvis.  I will repeat his carotid duplex and popliteal duplex in 2 years.  They pertain to the progression of vascular disease including controlling his hypertension and hyperlipidemia.  His blood pressure is elevated today which he states was expected as he had an extended wait regarding testing.  Unfortunately he is a current daily smoker and has no desire to quit smoking at this time.  The patient is to continue taking their medications as previously discussed.   This was all discussed in full with complete understanding.  Thank you for allowing me to participate in the care of your patient.  Please do not hesitate to call with any questions or concerns.  We will keep you aware of any further encounters with Michael Paz.      Sincerely Yours,        Jah Frey, DO

## 2023-04-04 ENCOUNTER — APPOINTMENT (OUTPATIENT)
Dept: ULTRASOUND IMAGING | Facility: HOSPITAL | Age: 78
End: 2023-04-04

## 2024-02-20 ENCOUNTER — TELEPHONE (OUTPATIENT)
Dept: VASCULAR SURGERY | Facility: CLINIC | Age: 79
End: 2024-02-20
Payer: MEDICARE

## 2024-02-20 NOTE — TELEPHONE ENCOUNTER
Caller: Michael Paz    Relationship: Self    Best call back number:     [phone] (Home)       What is the best time to reach you: ANY    Who are you requesting to speak with (clinical staff, provider,  specific staff member): ANY    Do you know the name of the person who called: N/A    What was the call regarding: PT WANTED TO CONFIRM HE ONLY NEEDED A CT SCAN PRIOR TO F/U WITH DR DAWN BECAUSE IN THE PAST HE HAS ALWAYS HAD A CT AND 2 US.     Is it okay if the provider responds through MyChart: NO CALL BACK

## 2024-02-21 NOTE — TELEPHONE ENCOUNTER
Called patient to inform him he only needs the CTA of Abd and pelvis before his appointment in March. Patient did not answer left VM.

## 2024-03-11 ENCOUNTER — TELEPHONE (OUTPATIENT)
Dept: VASCULAR SURGERY | Facility: CLINIC | Age: 79
End: 2024-03-11
Payer: MEDICARE

## 2024-03-11 NOTE — TELEPHONE ENCOUNTER
Called patient to confirm appointment on 03/12/24 at 11:00 am. Patient also has testing at the main hospital on 03/12/24 at 10:00 am. Patient did not answer left .    HUB to Relay

## 2024-03-12 ENCOUNTER — OFFICE VISIT (OUTPATIENT)
Dept: VASCULAR SURGERY | Facility: CLINIC | Age: 79
End: 2024-03-12
Payer: MEDICARE

## 2024-03-12 ENCOUNTER — HOSPITAL ENCOUNTER (OUTPATIENT)
Dept: CT IMAGING | Facility: HOSPITAL | Age: 79
Discharge: HOME | End: 2024-03-12
Admitting: SURGERY
Payer: MEDICARE

## 2024-03-12 VITALS
DIASTOLIC BLOOD PRESSURE: 78 MMHG | SYSTOLIC BLOOD PRESSURE: 160 MMHG | HEART RATE: 56 BPM | WEIGHT: 283 LBS | HEIGHT: 75 IN | BODY MASS INDEX: 35.19 KG/M2 | OXYGEN SATURATION: 97 %

## 2024-03-12 DIAGNOSIS — I65.23 BILATERAL CAROTID ARTERY STENOSIS: ICD-10-CM

## 2024-03-12 DIAGNOSIS — Z72.0 TOBACCO ABUSE: ICD-10-CM

## 2024-03-12 DIAGNOSIS — I72.4 POPLITEAL ARTERY ANEURYSM, BILATERAL: ICD-10-CM

## 2024-03-12 DIAGNOSIS — I71.43 INFRARENAL ABDOMINAL AORTIC ANEURYSM (AAA) WITHOUT RUPTURE: ICD-10-CM

## 2024-03-12 DIAGNOSIS — I10 PRIMARY HYPERTENSION: ICD-10-CM

## 2024-03-12 DIAGNOSIS — E78.5 HYPERLIPIDEMIA, UNSPECIFIED HYPERLIPIDEMIA TYPE: ICD-10-CM

## 2024-03-12 DIAGNOSIS — I71.43 INFRARENAL ABDOMINAL AORTIC ANEURYSM (AAA) WITHOUT RUPTURE: Primary | ICD-10-CM

## 2024-03-12 LAB — CREAT BLDA-MCNC: 1.1 MG/DL (ref 0.6–1.3)

## 2024-03-12 PROCEDURE — 25510000001 IOPAMIDOL PER 1 ML: Performed by: SURGERY

## 2024-03-12 PROCEDURE — 3077F SYST BP >= 140 MM HG: CPT | Performed by: NURSE PRACTITIONER

## 2024-03-12 PROCEDURE — 99214 OFFICE O/P EST MOD 30 MIN: CPT | Performed by: NURSE PRACTITIONER

## 2024-03-12 PROCEDURE — 3078F DIAST BP <80 MM HG: CPT | Performed by: NURSE PRACTITIONER

## 2024-03-12 PROCEDURE — 1159F MED LIST DOCD IN RCRD: CPT | Performed by: NURSE PRACTITIONER

## 2024-03-12 PROCEDURE — 82565 ASSAY OF CREATININE: CPT

## 2024-03-12 PROCEDURE — 1160F RVW MEDS BY RX/DR IN RCRD: CPT | Performed by: NURSE PRACTITIONER

## 2024-03-12 PROCEDURE — 74174 CTA ABD&PLVS W/CONTRAST: CPT

## 2024-03-12 RX ADMIN — IOPAMIDOL 100 ML: 755 INJECTION, SOLUTION INTRAVENOUS at 11:25

## 2024-03-12 NOTE — PROGRESS NOTES
"3/12/2024      Henrietta Jin, APRN  [address] [facility] [address]        Michael CASANOVA [facility]  1945    Chief Complaint   Patient presents with    Follow-up     1 year follow up w/ testing. Last seen 3/28/23. Patient states that about a month ago, he was cutting down trees and had sharp pains across top of belly. Hasn't happened since.        Dear Henrietta Jin, IVY:    HPI     I had the pleasure of seeing you patient in the office today for follow up.  As you recall, the patient is a 79 y.o. male who we are currently following for abdominal aortic aneurysm disease.  The patient has had a previous endovascular repair and had a type Ia endoleak which was being managed conservatively by Dr. Boudreaux.  His aneurysm has gotten larger up to 7.1 cm in greatest dimensions and he underwent repair with an extension cuff.  Today he states that when he was cutting down trees he had a sharp pain go across the top of his abdomen.  He is maintained on aspirin, Zocor, and Coumadin.  He denies any other abdominal pain or back pain.  He did have noninvasive testing performed today, which I did review in office.        Review of Systems   Constitutional: Negative.  Negative for diaphoresis and fever.   HENT: Negative.     Eyes: Negative.    Respiratory: Negative.  Negative for shortness of breath and wheezing.    Cardiovascular: Negative.  Negative for chest pain and leg swelling.   Gastrointestinal:  Positive for abdominal pain.   Endocrine: Negative.    Genitourinary: Negative.    Musculoskeletal: Negative.    Skin: Negative.    Allergic/Immunologic: Negative.    Neurological: Negative.  Negative for dizziness (s) and weakness.   Hematological: Negative.    Psychiatric/Behavioral: Negative.          /78   Pulse 56   Ht 190.5 cm (75\")   Wt 128 kg (283 lb)   SpO2 97%   BMI 35.37 kg/m²       Physical Exam  Vitals and nursing note reviewed.   Constitutional:       General: He is not in acute distress.     " Appearance: Normal appearance. He is not diaphoretic.   HENT:      Head: Normocephalic. No right periorbital erythema or left periorbital erythema.      Nose: Nose normal.   Eyes:      General: No scleral icterus.     Pupils: Pupils are equal.   Cardiovascular:      Rate and Rhythm: Normal rate and regular rhythm.      Pulses: Normal pulses.      Heart sounds: Normal heart sounds. No murmur heard.  Pulmonary:      Effort: Pulmonary effort is normal. No respiratory distress.      Breath sounds: Normal breath sounds.   Abdominal:      General: Bowel sounds are normal. There is no distension.      Palpations: Abdomen is soft.      Tenderness: There is no abdominal tenderness. There is no guarding.   Musculoskeletal:         General: No swelling or tenderness. Normal range of motion.      Cervical back: Normal range of motion and neck supple.      Right lower leg: No edema.      Left lower leg: No edema.   Feet:      Right foot:      Skin integrity: Skin integrity normal.      Left foot:      Skin integrity: Skin integrity normal.   Skin:     General: Skin is warm and dry.      Findings: No erythema or rash.   Neurological:      General: No focal deficit present.      Mental Status: He is alert and oriented to person, place, and time. Mental status is at baseline.      Cranial Nerves: No cranial nerve deficit.      Gait: Gait normal.   Psychiatric:         Attention and Perception: Attention normal.         Mood and Affect: Mood normal.       DIAGNOSTIC DATA:  Narrative & Impression   EXAMINATION: CT ANGIOGRAM ABDOMEN PELVIS-      3/12/2024 10:06 AM     HISTORY: Aortic aneurysm (AAA), surveillance; I71.43-Infrarenal  abdominal aortic aneurysm, without rupture     In order to have a CT radiation dose as low as reasonably achievable  Automated Exposure Control was utilized for adjustment of the mA and/or  KV according to patient size.     Total DLP = 1747.79 mGy.cm     The CT angiography of the abdomen and pelvis is  performed before and  after intravenous contrast enhancement.     The images are acquired in axial plane with subsequent 2D reconstruction  in coronal and sagittal planes and 3D maximum intensity projection image  reconstruction.     The comparison is made with the previous study dated 3/28/2023.     Severe atheromatous change of the abdominal aorta iliac and femoral  arteries are noted. There is a fusiform aneurysmal dilatation of the  distal/infrarenal abdominal aorta extending over 8.5 cm length of the  distal abdominal aorta up to the level of bifurcation. It measures 5.7 x  4.8 cm. It previously measured 6 cm x 5.5 cm.     There is evidence of aortoiliac endograft in place. The proximal end of  the graft is above the aneurysm and adjacent and just below the origin  of the left renal artery.     There are no finding to suggest endoleak.     Atheromatous plaques are seen at the origin of celiac and superior  mesenteric arteries. No significant stenosis. Normal branches.     There is a large atheromatous plaque at the origin of the right renal  artery with approximately 60% stenosis. Origin of the left renal artery  is not optimally visualized due to the metallic struts of the endograft  projected in the area. The remaining visualized left renal artery is  normal. There is an accessory left renal artery which arises above the  level of the main left renal artery. There is an area of mild ectasia of  the distal accessory renal artery before entering the renal hilum. It  measures 6 mm in diameter at this level. The accessory renal artery  proximal to this area measures 3 mm.     The origin of the inferior mesenteric artery is not opacified from the  abdominal aorta. It appears to arise from the mid to distal aortic  aneurysm level. Severe atheromatous changes of both common iliac  arteries are seen. There is normal opacification of the iliac graft  bilaterally. There is ectasia of both common iliac arteries. Left  common  iliac artery measures 3 cm. The right common iliac artery measures 2.5  cm. Normal external iliac and common femoral arteries bilaterally are  seen. Moderately ectatic left common femoral artery measures 2 cm. For  comparison right common iliac artery at the same level measures 1.5 cm.  Both divide into normal appearing superficial and deep femoral arteries.     The limited included lungs appear unremarkable. There is a lobulated  thick walled mass adjacent and anterior to the right atrium and right  ventricle measuring 4.9 x 4.5 cm in axial plane images. It is  incompletely included in the study. There are some calcifications. It  shows some peripheral contrast enhancement. This may be an iatrogenic,  vascular mass/anomaly?. This may represent aneurysmal dilatation of some  of the graft vessels?. There is also evidence of a 2 cm subpleural  nodule in the left lower lobe, image 18 series 8.           The liver and spleen are normal.     Multiple small radiopaque gallstones.     Pancreas is normal.     The adrenal glands bilaterally are normal.     Kidneys bilaterally are normal. No mass. No calculi. No hydronephrosis.  The ureters are nondilated. Urinary bladder is poorly distended with  moderate thickening of the walls which may partly be due to incomplete  distention.     Moderately enlarged prostate. There are fine intrinsic calcification.     There are fat-containing inguinal hernias, left larger than the right.     Stomach and duodenum are normal. Small bowel is nondilated. Moderate gas  and stool is seen in the colon. There is diverticulosis of the colon. No  finding to suggest diverticulitis.     No evidence of abdominal or pelvic lymphadenopathy.     Images reviewed in bone window show chronic degenerative changes of the  thoracolumbar spine. No acute bony abnormality.     IMPRESSION:  1. Severe atheromatous change of the abdominal aorta iliac and femoral  arteries. Fusiform aneurysmal dilatation of  the infrarenal abdominal  aorta which have decreased in size since the previous study. Details  given above.  2. Aortoiliac endograft in place. No endoleak.  3. Stable ectatic common iliac arteries.  4. Lobulated thick-walled structures in the right lower chest  anteriorly, compressing on the right atrium and right ventricle which is  similar to the previous study. It has not changed since the previous  study in 2016. This may represent an iatrogenic structure/postsurgical  changes in the area. This may represent a partially thrombosed vascular  structure?. Further follow-up with CT scan of the chest may be obtained.  5. A 2 cm nodule in the left lower lobe is suboptimally visualized and  evaluated in this study. This was not present in the previous CT scan of  the chest in 2016. This need to further evaluated with CT scan of the  chest.  6. The stable nonacute nonvascular findings in the remaining abdomen  similar to the previous study.                   Patient Active Problem List   Diagnosis    Tobacco abuse    Hypertension    Hyperlipidemia    Endoleak post endovascular aneurysm repair    AAA (abdominal aortic aneurysm)    Aneurysm    CAD (coronary artery disease)    Right-sided epistaxis    Hx pulmonary embolism    Hx of deep venous thrombosis    Rhinitis, chronic    Anticoagulant adverse reaction    Epistaxis         ICD-10-CM ICD-9-CM   1. Infrarenal abdominal aortic aneurysm (AAA) without rupture  I71.43 441.4   2. Popliteal artery aneurysm, bilateral  I72.4 442.3   3. Bilateral carotid artery stenosis  I65.23 433.10     433.30   4. Primary hypertension  I10 401.9   5. Hyperlipidemia, unspecified hyperlipidemia type  E78.5 272.4   6. Tobacco abuse  Z72.0 305.1         PLAN: After thoroughly evaluating Michael Paz, I believe the best course of action is to remain conservative from a vascular surgery standpoint.  I did review his testing which shows his aneurysm measuring small and endograft without  endoleak.  He does have small popliteal artery aneurysms bilaterally.  We will see him back in 1 year with CTA of abdomen pelvis, carotid duplex, and popliteal duplex, for continued surveillance.  I did discuss vascular risk factors as they pertain to the progression of vascular disease including controlling hypertension and hyperlipidemia.  His blood pressure is elevated today at 160/78, if this continues he needs to follow-up with his PCP for further recommendation.  Unfortunately, he is a current daily smoker and has no desire to quit smoking at this time.  He is maintained on aspirin 325 mg daily, Zocor 80 mg nightly, and Coumadin 4 mg tablet.  The patient is to continue taking their medications as previously discussed.  This was all discussed in full with complete understanding.  Thank you for allowing me to participate in the care of your patient.  Please do not hesitate to call with any questions or concerns.  We will keep you aware of any further encounters with Michael Paz.      Sincerely Yours,        IVY Sin

## 2024-03-13 ENCOUNTER — TELEPHONE (OUTPATIENT)
Dept: VASCULAR SURGERY | Facility: CLINIC | Age: 79
End: 2024-03-13
Payer: MEDICARE

## 2024-03-13 NOTE — TELEPHONE ENCOUNTER
Call placed to PCP office of Henrietta HAYNES and spoke with Nolvia, informed of  nodule in lung and requested fax number to send copy. Fax number given is [phone] .  Will notify patient.  
Normal for race

## 2024-03-19 ENCOUNTER — TELEPHONE (OUTPATIENT)
Dept: CT IMAGING | Facility: HOSPITAL | Age: 79
End: 2024-03-19
Payer: MEDICARE

## 2024-03-19 NOTE — TELEPHONE ENCOUNTER
I spoke with the patient regarding an incidental finding seen on a recent imaging exam.The patient verbalized understanding the Radiologist's recommendations for follow-up.

## 2024-03-19 NOTE — TELEPHONE ENCOUNTER
A notification letter was sent to the patient explaining that a recent imaging exam showed an incidental finding, for which follow-up testing may be recommended.  Another message was left for patient  Routed again to PCP.

## 2024-06-13 ENCOUNTER — TELEPHONE (OUTPATIENT)
Dept: CT IMAGING | Facility: HOSPITAL | Age: 79
End: 2024-06-13
Payer: MEDICARE

## 2024-06-13 NOTE — TELEPHONE ENCOUNTER
Called and spoke to Aubrey Jin office about f/u imaging being past due on this patient. She said she would send to provider and have it ordered. Also left message for patient to call back.

## 2024-07-29 ENCOUNTER — TELEPHONE (OUTPATIENT)
Dept: MRI IMAGING | Facility: HOSPITAL | Age: 79
End: 2024-07-29
Payer: MEDICARE

## 2024-07-29 NOTE — TELEPHONE ENCOUNTER
Spoke with patient to touch base about f/u imaging and consult for incidental lung findings. He said he has a lot of problems with insurance and doesn't live close to [place]. He will contact us if he decides to have followed up. I informed him that I would deactivate his tracking in our lung nodule program until he decided. He voiced understanding.

## 2024-10-16 PROBLEM — I48.0 PAROXYSMAL ATRIAL FIBRILLATION: Chronic | Status: ACTIVE | Noted: 2024-10-16

## 2024-10-16 PROBLEM — D68.59 PROTEIN S DEFICIENCY: Status: ACTIVE | Noted: 2024-10-16

## 2024-10-16 PROBLEM — R91.1 LUNG NODULE: Chronic | Status: ACTIVE | Noted: 2024-10-16

## 2024-10-16 PROBLEM — D68.59 PROTEIN S DEFICIENCY: Chronic | Status: ACTIVE | Noted: 2024-10-16

## 2024-10-16 PROBLEM — D68.59 PROTEIN C DEFICIENCY: Chronic | Status: ACTIVE | Noted: 2024-10-16

## 2024-10-16 PROBLEM — R91.1 LUNG NODULE: Status: ACTIVE | Noted: 2024-10-16

## 2024-10-16 PROBLEM — D68.59 PROTEIN C DEFICIENCY: Status: ACTIVE | Noted: 2024-10-16

## 2024-10-16 PROBLEM — I48.0 PAROXYSMAL ATRIAL FIBRILLATION: Status: ACTIVE | Noted: 2024-10-16

## 2024-10-16 PROBLEM — Z86.718 HX OF DEEP VENOUS THROMBOSIS: Chronic | Status: ACTIVE | Noted: 2020-07-08

## 2024-10-16 PROBLEM — Z86.711 HX PULMONARY EMBOLISM: Chronic | Status: ACTIVE | Noted: 2020-07-08

## 2024-10-16 NOTE — PROGRESS NOTES
"Chief Complaint  Shortness of Breath    Subjective    History of Present Illness {CC  Problem List  Visit Diagnosis   Encounters  Notes  Medications  Labs  Result Review Imaging  Media: 23}    Mcihael Paz presents to [facility] for:    History of Present Illness  Management of shortness of breath and incidental lung nodule.  Lung nodule identified on his CT of his abdomen in March 2024 for follow-up of repaired aneurysm.  He did not keep recommended follow-up appointments in regards to this.  This was secondary to having insurance issues.  He is since resolved those issues and would like to be established.    He denies having any childhood history of asthma or COPD.  He did have pneumonia at the age of 19.  He denies any family history of lung disease.  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    He reports having bilateral pulmonary emboli in 1986 on 2 separate occasions.  First occasion on the right side, second occasion on the left side.      He has had 5 open heart surgeries.  The first 1 occurring at the age of 45.  The second 1 occurring in 2008 following a \"leak from one of my bypass grafts\".  He reports having multiple surgeries during that hospitalization in 2008 for leaking as well as MRSA in the wounds.  Due to his extended stay in the hospital he did have to go to a rehab facility.  During some of that time in 2008 he developed a left lower extremity DVT.  They did determine a few years later that he has protein C and protein S deficiency.  They recommended lifelong anticoagulation.  He is on Coumadin.  He is compliant with it but admits he does not get his labs obtained monthly as he should.    He has a problem with GI bleeding in the past requiring blood transfusions.  He has not noted any bloody stools recently but has had black tarry stools as recent as " this week.  In addition to Coumadin he is also on a full-strength aspirin.    He has a history of atrial fibrillation.  Since August of this year he said increased fatigue.  He sought evaluation at his cardiology office, Dr. Vicente.  He indicates Dr. Vicente felt there may be an issue with his heart rate and rhythm.  He was told it was beating too slow.  They recommended he decrease his beta-blocker to 25 mg daily.  They also referred him to an electrophysiologist.  He cannot get into see them until February 2025.  Dr. Vicente also recommended discontinuing the beta-blocker if heart rate did not improve.  He has not been back to Dr. Vicente.  They did order an echo which was completed on October 10.  We reached out to the clinic yesterday and again today.  It has not been read yet.    He has had an aneurysm repair in the past.  Follow-up imaging with his vascular surgeon in March 2024 showed a partially calcified mass around the right atrium and right ventricle stable going back to 2016 as well as a new 2 cm nodule left lower lobe.  They recommended follow-up imaging.  Again he was unable to do that due to lack of insurance.      He has been smoking since the age of 12.  1 pack/day.  He is not on any inhaler therapy or oxygen.  He does cough and wheeze on a daily basis.  Wheeze resolves with coughing.  He is not on any inhaler therapy currently.  He did take Advair years ago when he had pneumonia.  He did not recall that it helped.     He does not sleep well.  He has not ever been tested for sleep apnea.       Prior to Admission medications    Medication Sig Start Date End Date Taking? Authorizing Provider   Ascorbic Acid (VITAMIN C PO) Take 2,000 mg by mouth Daily.    Pedro Martin MD   aspirin 325 MG tablet Take 1 tablet by mouth.    Pedro Martin MD   candesartan (ATACAND) 16 MG tablet Take 1 tablet by mouth Daily.    Pedro Martin MD   metoprolol succinate XL (TOPROL-XL) 50 MG 24 hr tablet Take 1  "tablet by mouth Daily.    ProviderPedro MD   multivitamin with minerals (CENTRUM SILVER PO) Take 1 tablet by mouth Daily.    Pedro Martin MD   simvastatin (ZOCOR) 80 MG tablet Take 1 tablet by mouth.    Pedro Martin MD   warfarin (COUMADIN) 4 MG tablet 4 mg PO Daily, then 5 mg PO the next day.  Continue to alternate as you have been doing 7/13/20   Ermias Ta MD       Social History     Socioeconomic History    Marital status:    Tobacco Use    Smoking status: Every Day     Current packs/day: 1.00     Average packs/day: 1 pack/day for 67.4 years (67.4 ttl pk-yrs)     Types: Cigarettes     Start date: 6/1/1957     Passive exposure: Current    Smokeless tobacco: Never    Tobacco comments:     Don't inhale.   Vaping Use    Vaping status: Never Used   Substance and Sexual Activity    Alcohol use: Yes     Alcohol/week: 3.0 - 4.0 standard drinks of alcohol     Types: 3 - 4 Cans of beer per week     Comment: SOCIAL    Drug use: No    Sexual activity: Not Currently     Partners: Female       Objective   Vital Signs:   /62   Pulse 53   Ht 190.5 cm (75\")   Wt 120 kg (265 lb)   SpO2 94% Comment: RA  BMI 33.12 kg/m²     Physical Exam  Constitutional:       Appearance: He is obese.   Cardiovascular:      Rate and Rhythm: Normal rate and regular rhythm.      Heart sounds: No murmur heard.  Pulmonary:      Effort: Pulmonary effort is normal.      Breath sounds: Normal breath sounds.   Musculoskeletal:      Right lower leg: No edema.      Left lower leg: No edema.   Skin:     Coloration: Skin is pale.   Neurological:      Mental Status: He is alert and oriented to person, place, and time.        Result Review :      My interpretation of the PFT : none    No results found for this or any previous visit.    Rest/Exercise Pulse Ox Values          10/23/2024    13:00   Rest/Exercise Pulse Ox Results   Rest room air SAT % 98   Exercise room air SAT % 95   CT Angiogram Abdomen " Pelvis (03/12/2024 11:25)     My interpretation of imaging: Lobulated structure right lower lobe, compression to the right atrium and right ventricle, stable going back to 2016, 2 cm left lower lobe nodule, not present in 2016    My interpretation of labs: None      Assessment and Plan {CC Problem List  Visit Diagnosis  ROS  Review (Popup)  Health Maintenance  Quality  BestPractice  Medications  SmartSets  SnapShot Encounters  Media : 23}    Diagnoses and all orders for this visit:    1. Lung nodule (Primary)  Comments:  Overdue for follow-up imaging, order placed  Orders:  -     CT Chest Without Contrast; Future    2. Tobacco abuse  Comments:  Recommend cessation.  CT due.  Order placed    3. Hx pulmonary embolism  Comments:  Lifelong anticoagulation, Coumadin.  History of hypercoagulable state    4. Hx of deep venous thrombosis  Comments:  Lifelong anticoagulation, Coumadin. History of hypercoagulable state    5. Abdominal aortic aneurysm (AAA) without rupture, unspecified part  Comments:  Follow-up imaging due March 2025 with vascular surgery, defer    6. Protein C deficiency  Comments:  Lifelong anticoagulation, Coumadin. History of hypercoagulable state    7. Protein S deficiency  Comments:  Lifelong anticoagulation, Coumadin. History of hypercoagulable state    8. Shortness of breath  Comments:  Echo from 10-10 pending.  Will order PFTs when he returns in CT now.  Trial of Trelegy 100.  Use the device demonstrated.  Oxygen evaluation  Orders:  -     CBC & Differential  -     Comprehensive Metabolic Panel  -     Fluticasone-Umeclidin-Vilant (Trelegy Ellipta) 100-62.5-25 MCG/ACT inhaler; Inhale 1 puff Daily for 14 days.  Dispense: 1 each; Refill: 0  -     Walking Oximetry; Future  -     Overnight Sleep Oximetry Study; Future  -     Walking Oximetry    9. Paroxysmal atrial fibrillation  Comments:  Will avoid excessive beta agonist in the setting.  He is tolerating his beta-blocker.  He is  anticoagulated with Coumadin    10. Other fatigue  Comments:  He has had some black stools.  Will obtain CBC.  Awaiting echo results.  Will obtain PFTs and CT to assess for underlying lung disease  Orders:  -     CBC & Differential  -     Comprehensive Metabolic Panel  -     Overnight Sleep Oximetry Study; Future      He did not qualify for portable oxygen.  Will order overnight oximetry for further evaluation.    Body mass index is 33.12 kg/m².    Melisa López, APRN  10/23/2024  14:15 CDT    Follow Up   Return in about 2 weeks (around 11/6/2024) for FVL with diffusion.    Patient was given instructions and counseling regarding his condition or for health maintenance advice. Please see specific information pulled into the AVS if appropriate.

## 2024-10-23 ENCOUNTER — OFFICE VISIT (OUTPATIENT)
Dept: PULMONOLOGY | Facility: CLINIC | Age: 79
End: 2024-10-23
Payer: MEDICARE

## 2024-10-23 VITALS
WEIGHT: 265 LBS | SYSTOLIC BLOOD PRESSURE: 122 MMHG | OXYGEN SATURATION: 94 % | DIASTOLIC BLOOD PRESSURE: 62 MMHG | BODY MASS INDEX: 32.95 KG/M2 | HEIGHT: 75 IN | HEART RATE: 53 BPM

## 2024-10-23 DIAGNOSIS — R53.83 OTHER FATIGUE: ICD-10-CM

## 2024-10-23 DIAGNOSIS — I71.40 ABDOMINAL AORTIC ANEURYSM (AAA) WITHOUT RUPTURE, UNSPECIFIED PART: Chronic | ICD-10-CM

## 2024-10-23 DIAGNOSIS — D68.59 PROTEIN S DEFICIENCY: Chronic | ICD-10-CM

## 2024-10-23 DIAGNOSIS — I48.0 PAROXYSMAL ATRIAL FIBRILLATION: Chronic | ICD-10-CM

## 2024-10-23 DIAGNOSIS — R91.1 LUNG NODULE: Primary | Chronic | ICD-10-CM

## 2024-10-23 DIAGNOSIS — Z86.718 HX OF DEEP VENOUS THROMBOSIS: Chronic | ICD-10-CM

## 2024-10-23 DIAGNOSIS — D68.59 PROTEIN C DEFICIENCY: Chronic | ICD-10-CM

## 2024-10-23 DIAGNOSIS — Z86.711 HX PULMONARY EMBOLISM: Chronic | ICD-10-CM

## 2024-10-23 DIAGNOSIS — R06.02 SHORTNESS OF BREATH: ICD-10-CM

## 2024-10-23 DIAGNOSIS — Z72.0 TOBACCO ABUSE: Chronic | ICD-10-CM

## 2024-10-23 RX ORDER — FLUTICASONE FUROATE, UMECLIDINIUM BROMIDE AND VILANTEROL TRIFENATATE 100; 62.5; 25 UG/1; UG/1; UG/1
1 POWDER RESPIRATORY (INHALATION)
Qty: 1 EACH | Refills: 0 | Status: ON HOLD | COMMUNITY
Start: 2024-10-23 | End: 2024-10-25

## 2024-10-23 NOTE — PROCEDURES
Walking Oximetry    Performed by: Ruth Yen MA  Authorized by: Melisa López APRN    Rest room air SAT %:  98  Exercise room air SAT %:  95

## 2024-10-23 NOTE — PROGRESS NOTES
"Chief Complaint  Lung Nodule    Subjective    History of Present Illness {CC  Problem List  Visit Diagnosis   Encounters  Notes  Medications  Labs  Result Review Imaging  Media: 23}    Michael Paz presents to [facility]UL[facility]IT[facility] MEDICINE for:    History of Present Illness  Management of shortness of breath and incidental lung nodule.  Lung nodule identified on his CT of his abdomen in March 2024 for follow-up of repaired aneurysm.  He did not keep recommended follow-up appointments in regards to this.  This was secondary to having insurance issues.  He came in the clinic recently requesting updated imaging in regards to this nodule as it was only found on a CT of his abdomen.      He denies having any childhood history of asthma or COPD.  He did have pneumonia at the age of 19.  He denies any family history of lung disease.  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    He has been smoking since the age of 12.  1 pack/day.  He was on Advair years ago with no benefit.  I gave him Trelegy 100 to try.  He believes that was beneficial.  We did ambulate him for portable oxygen.  He did not qualify.  I ordered an overnight oximetry.  This was not given due to him being hospitalized.      He reports having bilateral pulmonary emboli in 1986 on 2 separate occasions.  First occasion on the right side, second occasion on the left side.       He has had 5 open heart surgeries.  The first 1 occurring at the age of 45.  He was also diagnosed with \"a leaky valve\" at that time.  The other 4 operations all occurring in 2008 following a \"leak from one of my bypass grafts\".  He reports having multiple surgeries during that hospitalization in 2008 for leaking as well as MRSA in the wounds.  Due to his extended stay in the hospital he did have to go to a rehab facility.  During some of that time in 2008 he " developed a left lower extremity DVT.  They did determine a few years later that he has protein C and protein S deficiency.  They recommended lifelong anticoagulation.  He is on Coumadin.  He is also on a full-strength aspirin.     When I saw him in the clinic recently he was complaining of very severe fatigue and black tarry stools.  Also noted to be quite pale upon exam.  Due to him being on Coumadin and full-strength aspirin I obtained labs. Hemoglobin was noted to be 7.7.  I referred him to GI.  He was unable to wait for that appointment and presented to the emergency room.  Hemoglobin is found to be 6.8.  He was transferred from [facility] to [facility].  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  They recommended he continue Coumadin but to lower his INR threshold.  They also started him on Protonix twice daily.  He is taking the Protonix as prescribed.  Recent recheck of labs showed his hemoglobin to be greater than 10.  He is now on iron replacement.      He has a history of atrial fibrillation.  Since August of this year he said increased fatigue.  He sought evaluation at his cardiology office, Dr. Vicente.  He indicates Dr. Vicente felt there may be an issue with his heart rate and rhythm.  He was told it was beating too slow.  They recommended he decrease his beta-blocker to 25 mg daily.  They also referred him to an electrophysiologist.  He cannot get into see them until February 2025.  Dr. Vicente also recommended discontinuing the beta-blocker if heart rate did not improve.  He had an echo as well.  No signs of pulmonary hypertension.      At his recent clinic visit I ordered a follow-up CT scan given the nodule previously identified was on a CT of his abdomen.  Unfortunately and up in the hospital before CT could be completed.  1 was completed while he was hospitalized.  It showed a stable 2 cm lesion in the left lower lobe and a new finding of a 6 cm mass in the right upper  lobe.  Dr. Lazo evaluated him and recommended an outpatient PET scan.  He is here today to go over those results.    Overall his fatigue is better.  His breathing is better.  He likes the Trelegy but is uncertain he needs to stay on it.  Dr. Pagan gave him an albuterol inhaler when he left the hospital.  He has follow-up with hematology tomorrow for his blood and Coumadin as well as vascular for his aneurysm.       Prior to Admission medications    Medication Sig Start Date End Date Taking? Authorizing Provider   Ascorbic Acid (VITAMIN C PO) Take 2,000 mg by mouth Daily.    Pedro Martin MD   aspirin 325 MG tablet Take 1 tablet by mouth.    Pedro Martin MD   candesartan (ATACAND) 16 MG tablet Take 1 tablet by mouth Daily.    Pedro Martin MD   Fluticasone-Umeclidin-Vilant (Trelegy Ellipta) 100-62.5-25 MCG/ACT inhaler Inhale 1 puff Daily for 14 days. 10/23/24 11/6/24  Melisa López APRN   metoprolol succinate XL (TOPROL-XL) 50 MG 24 hr tablet Take 0.5 tablets by mouth Daily. Patient takes 25mg one time a day    Pedro Martin MD   Multiple Vitamins-Minerals (ONE A DAY MEN 50 PLUS PO) Take  by mouth.    Pedro Martin MD   simvastatin (ZOCOR) 80 MG tablet Take 1 tablet by mouth.    Pedro Martin MD   warfarin (COUMADIN) 4 MG tablet 4 mg PO Daily, then 5 mg PO the next day.  Continue to alternate as you have been doing 7/13/20   Ermias Ta MD   multivitamin with minerals (CENTRUM SILVER PO) Take 1 tablet by mouth Daily.  10/23/24  Pedro Martin MD       Social History     Socioeconomic History    Marital status:    Tobacco Use    Smoking status: Every Day     Current packs/day: 1.00     Average packs/day: 1 pack/day for 67.4 years (67.4 ttl pk-yrs)     Types: Cigarettes     Start date: 6/1/1957     Passive exposure: Current    Smokeless tobacco: Never    Tobacco comments:     Don't inhale.   Vaping Use    Vaping status: Never Used  "  Substance and Sexual Activity    Alcohol use: Yes     Alcohol/week: 3.0 - 4.0 standard drinks of alcohol     Types: 3 - 4 Cans of beer per week     Comment: SOCIAL    Drug use: No    Sexual activity: Not Currently     Partners: Female       Objective   Vital Signs:   /82   Pulse 76   Ht 184.2 cm (72.5\")   Wt 118 kg (260 lb)   SpO2 95% Comment: RA  BMI 34.78 kg/m²     Physical Exam  Constitutional:       Appearance: He is obese.   Cardiovascular:      Rate and Rhythm: Normal rate and regular rhythm.      Heart sounds: No murmur heard.  Pulmonary:      Effort: Pulmonary effort is normal.      Breath sounds: Normal breath sounds.   Musculoskeletal:      Right lower leg: No edema.      Left lower leg: No edema.   Skin:     Coloration: Skin is pale.   Neurological:      Mental Status: He is alert and oriented to person, place, and time.        Result Review :    PFT Values          11/7/2024    13:45   Pre Drug PFT Results   FVC 90   FEV1 71   FEF 25-75% 33   FEV1/FVC 61.06   Other Tests PFT Results   DLCO 116   D/VAsb 120     Results for orders placed in visit on 11/07/24    Spirometry with Diffusion Capacity    Narrative  Spirometry with Diffusion Capacity    Performed by: James Hadley CMA  Authorized by: Melisa López APRN  Pre Drug % Predicted  FVC: 90%  FEV1: 71%  FEF 25-75%: 33%  FEV1/FVC: 61.06%  DLCO: 116%  D/VAsb: 120%    Interpretation  Spirometry  Spirometry shows moderate obstruction. There is reduced midflow suggesting small airway/airflow obstruction.  Diffusion Capacity  The patient's diffusion capacity is normal.  Diffusion capacity is normal when corrected for alveolar volume.    Rest/Exercise Pulse Ox Values          10/23/2024    13:00   Rest/Exercise Pulse Ox Results   Rest room air SAT % 98   Exercise room air SAT % 95     CT Chest With Contrast Diagnostic (10/29/2024 10:35)   NM PET/CT Skull Base to Mid Thigh (11/07/2024 12:49)     My interpretation of imaging: Enlarging left " lower lobe lung nodule, stable mediastinal vascular abnormality, new 6 cm mass right upper lobe    PET scan showing hypermetabolic mass in the right upper lobe PET scan showing hypermetabolic mass in the right upper lobe, no other evidence of metastatic disease    My interpretation of labs: Hemoglobin 6.8      Assessment and Plan {CC Problem List  Visit Diagnosis  ROS  Review (Popup)  Health Maintenance  Quality  BestPractice  Medications  SmartSets  SnapShot Encounters  Media : 23}    Diagnoses and all orders for this visit:    1. Lung nodule (Primary)  Comments:  2 cm left lower lobe.  Not PET avid.  Suggestive of hamartoma  Orders:  -     Spirometry with Diffusion Capacity    2. Abdominal aortic aneurysm (AAA) without rupture, unspecified part  Comments:  Will need ongoing follow-up with specialist    3. Paroxysmal atrial fibrillation  Comments:  Will avoid excessive beta agonist in the setting.  He is tolerating his beta-blocker.  He is anticoagulated with Coumadin    4. Hx of deep venous thrombosis  Comments:  On Coumadin    5. Hx pulmonary embolism  Comments:  On Coumadin    6. Protein C deficiency  Comments:  See hematology tomorrow.  Will defer management of anticoagulation prior to lung biopsy to them    7. Protein S deficiency  Comments:  See hematology tomorrow. Will defer management of anticoagulation prior to lung biopsy to them    8. Tobacco abuse  Comments:  Recommend smoking cessation    9. Other fatigue  Comments:  Related to blood loss anemia, better.  On iron    10. Mass of upper lobe of right lung  Comments:  PET avid.  Not amendable to Ion due to proximity of pulmonary artery.  Will refer to CT surgery.  Hematology will need to address anticoagulation  Orders:  -     Ambulatory Referral to Cardiothoracic Surgery    11. 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  Continue Trelegy 100 if beneficial  Orders:  -     Fluticasone-Umeclidin-Vilant  (Pete Cantrell) 100-62.5-25 MCG/ACT inhaler; Inhale 1 puff Daily for 30 days.  Dispense: 1 each; Refill: 11            Melisa López, IVY  11/7/2024  15:21 CST    Follow Up   Return in about 2 months (around 1/7/2025).    Patient was given instructions and counseling regarding his condition or for health maintenance advice. Please see specific information pulled into the AVS if appropriate.

## 2024-10-24 ENCOUNTER — APPOINTMENT (OUTPATIENT)
Dept: OTHER | Facility: HOSPITAL | Age: 79
DRG: 378 | End: 2024-10-24
Payer: MEDICARE

## 2024-10-24 ENCOUNTER — TELEPHONE (OUTPATIENT)
Dept: PULMONOLOGY | Facility: CLINIC | Age: 79
End: 2024-10-24
Payer: MEDICARE

## 2024-10-24 ENCOUNTER — HOSPITAL ENCOUNTER (INPATIENT)
Facility: HOSPITAL | Age: 79
LOS: 5 days | Discharge: HOME | DRG: 378 | End: 2024-11-01
Attending: INTERNAL MEDICINE | Admitting: HOSPITALIST
Payer: MEDICARE

## 2024-10-24 DIAGNOSIS — Z74.09 IMPAIRED MOBILITY: Primary | ICD-10-CM

## 2024-10-24 DIAGNOSIS — R91.1 LEFT LOWER LOBE PULMONARY NODULE: ICD-10-CM

## 2024-10-24 DIAGNOSIS — R53.83 OTHER FATIGUE: Primary | ICD-10-CM

## 2024-10-24 DIAGNOSIS — D64.9 ANEMIA, UNSPECIFIED TYPE: ICD-10-CM

## 2024-10-24 DIAGNOSIS — K92.1 BLACK TARRY STOOLS: ICD-10-CM

## 2024-10-24 PROBLEM — I48.20 ATRIAL FIBRILLATION, CHRONIC: Status: ACTIVE | Noted: 2024-10-16

## 2024-10-24 PROBLEM — Z95.1 S/P CABG (CORONARY ARTERY BYPASS GRAFT): Status: ACTIVE | Noted: 2024-10-24

## 2024-10-24 LAB
ABO GROUP BLD: NORMAL
ALBUMIN SERPL-MCNC: 4.3 G/DL (ref 3.8–4.8)
ALP SERPL-CCNC: 73 IU/L (ref 44–121)
ALT SERPL-CCNC: 18 IU/L (ref 0–44)
AST SERPL-CCNC: 27 IU/L (ref 0–40)
BASOPHILS # BLD AUTO: 0 X10E3/UL (ref 0–0.2)
BASOPHILS NFR BLD AUTO: 0 %
BILIRUB SERPL-MCNC: 0.6 MG/DL (ref 0–1.2)
BLD GP AB SCN SERPL QL: NEGATIVE
BUN SERPL-MCNC: 21 MG/DL (ref 8–27)
BUN/CREAT SERPL: 19 (ref 10–24)
CALCIUM SERPL-MCNC: 9.3 MG/DL (ref 8.6–10.2)
CHLORIDE SERPL-SCNC: 105 MMOL/L (ref 96–106)
CO2 SERPL-SCNC: 20 MMOL/L (ref 20–29)
CREAT SERPL-MCNC: 1.12 MG/DL (ref 0.76–1.27)
EGFRCR SERPLBLD CKD-EPI 2021: 68 ML/MIN/1.73
EOSINOPHIL # BLD AUTO: 0.1 X10E3/UL (ref 0–0.4)
EOSINOPHIL NFR BLD AUTO: 1 %
ERYTHROCYTE [DISTWIDTH] IN BLOOD BY AUTOMATED COUNT: 15.6 % (ref 11.6–15.4)
GLOBULIN SER CALC-MCNC: 2.7 G/DL (ref 1.5–4.5)
GLUCOSE SERPL-MCNC: 84 MG/DL (ref 70–99)
HCT VFR BLD AUTO: 27.1 % (ref 37.5–51)
HGB BLD-MCNC: 7.7 G/DL (ref 13–17.7)
HGB BLD-MCNC: 8.5 G/DL (ref 13–17.7)
IMM GRANULOCYTES # BLD AUTO: 0 X10E3/UL (ref 0–0.1)
IMM GRANULOCYTES NFR BLD AUTO: 0 %
INR PPP: 2.74 (ref 0.91–1.09)
LYMPHOCYTES # BLD AUTO: 1.3 X10E3/UL (ref 0.7–3.1)
LYMPHOCYTES NFR BLD AUTO: 17 %
MCH RBC QN AUTO: 23.5 PG (ref 26.6–33)
MCHC RBC AUTO-ENTMCNC: 28.4 G/DL (ref 31.5–35.7)
MCV RBC AUTO: 83 FL (ref 79–97)
MONOCYTES # BLD AUTO: 0.9 X10E3/UL (ref 0.1–0.9)
MONOCYTES NFR BLD AUTO: 12 %
NEUTROPHILS # BLD AUTO: 5.3 X10E3/UL (ref 1.4–7)
NEUTROPHILS NFR BLD AUTO: 70 %
PLATELET # BLD AUTO: 262 X10E3/UL (ref 150–450)
POTASSIUM SERPL-SCNC: 4.9 MMOL/L (ref 3.5–5.2)
PROT SERPL-MCNC: 7 G/DL (ref 6–8.5)
PROTHROMBIN TIME: 30.1 SECONDS (ref 11.8–14.8)
RBC # BLD AUTO: 3.27 X10E6/UL (ref 4.14–5.8)
RH BLD: POSITIVE
SODIUM SERPL-SCNC: 139 MMOL/L (ref 134–144)
T&S EXPIRATION DATE: NORMAL
WBC # BLD AUTO: 7.7 X10E3/UL (ref 3.4–10.8)

## 2024-10-24 PROCEDURE — 94799 UNLISTED PULMONARY SVC/PX: CPT

## 2024-10-24 PROCEDURE — 86901 BLOOD TYPING SEROLOGIC RH(D): CPT | Performed by: INTERNAL MEDICINE

## 2024-10-24 PROCEDURE — 94640 AIRWAY INHALATION TREATMENT: CPT

## 2024-10-24 PROCEDURE — 85610 PROTHROMBIN TIME: CPT | Performed by: INTERNAL MEDICINE

## 2024-10-24 PROCEDURE — 86900 BLOOD TYPING SEROLOGIC ABO: CPT | Performed by: INTERNAL MEDICINE

## 2024-10-24 PROCEDURE — 87102 FUNGUS ISOLATION CULTURE: CPT | Performed by: INTERNAL MEDICINE

## 2024-10-24 PROCEDURE — G0378 HOSPITAL OBSERVATION PER HR: HCPCS

## 2024-10-24 PROCEDURE — 94761 N-INVAS EAR/PLS OXIMETRY MLT: CPT

## 2024-10-24 PROCEDURE — 86850 RBC ANTIBODY SCREEN: CPT | Performed by: INTERNAL MEDICINE

## 2024-10-24 PROCEDURE — 85018 HEMOGLOBIN: CPT | Performed by: INTERNAL MEDICINE

## 2024-10-24 RX ORDER — VALSARTAN 80 MG/1
80 TABLET ORAL
Status: DISCONTINUED | OUTPATIENT
Start: 2024-10-25 | End: 2024-11-01 | Stop reason: HOSPADM

## 2024-10-24 RX ORDER — ONDANSETRON 2 MG/ML
4 INJECTION INTRAMUSCULAR; INTRAVENOUS EVERY 6 HOURS PRN
Status: DISCONTINUED | OUTPATIENT
Start: 2024-10-24 | End: 2024-11-01 | Stop reason: HOSPADM

## 2024-10-24 RX ORDER — BISACODYL 5 MG/1
5 TABLET, DELAYED RELEASE ORAL DAILY PRN
Status: DISCONTINUED | OUTPATIENT
Start: 2024-10-24 | End: 2024-11-01 | Stop reason: HOSPADM

## 2024-10-24 RX ORDER — BISACODYL 10 MG
10 SUPPOSITORY, RECTAL RECTAL DAILY PRN
Status: DISCONTINUED | OUTPATIENT
Start: 2024-10-24 | End: 2024-11-01 | Stop reason: HOSPADM

## 2024-10-24 RX ORDER — ACETAMINOPHEN 650 MG/1
650 SUPPOSITORY RECTAL EVERY 4 HOURS PRN
Status: DISCONTINUED | OUTPATIENT
Start: 2024-10-24 | End: 2024-11-01 | Stop reason: HOSPADM

## 2024-10-24 RX ORDER — PANTOPRAZOLE SODIUM 40 MG/10ML
40 INJECTION, POWDER, LYOPHILIZED, FOR SOLUTION INTRAVENOUS EVERY 12 HOURS SCHEDULED
Status: DISCONTINUED | OUTPATIENT
Start: 2024-10-24 | End: 2024-10-26

## 2024-10-24 RX ORDER — POLYETHYLENE GLYCOL 3350 17 G/17G
17 POWDER, FOR SOLUTION ORAL DAILY PRN
Status: DISCONTINUED | OUTPATIENT
Start: 2024-10-24 | End: 2024-11-01 | Stop reason: HOSPADM

## 2024-10-24 RX ORDER — SODIUM CHLORIDE 0.9 % (FLUSH) 0.9 %
10 SYRINGE (ML) INJECTION AS NEEDED
Status: DISCONTINUED | OUTPATIENT
Start: 2024-10-24 | End: 2024-11-01 | Stop reason: HOSPADM

## 2024-10-24 RX ORDER — SODIUM CHLORIDE 0.9 % (FLUSH) 0.9 %
10 SYRINGE (ML) INJECTION EVERY 12 HOURS SCHEDULED
Status: DISCONTINUED | OUTPATIENT
Start: 2024-10-24 | End: 2024-11-01 | Stop reason: HOSPADM

## 2024-10-24 RX ORDER — BUDESONIDE AND FORMOTEROL FUMARATE DIHYDRATE 160; 4.5 UG/1; UG/1
2 AEROSOL RESPIRATORY (INHALATION)
Status: DISCONTINUED | OUTPATIENT
Start: 2024-10-24 | End: 2024-10-25

## 2024-10-24 RX ORDER — ACETAMINOPHEN 325 MG/1
650 TABLET ORAL EVERY 4 HOURS PRN
Status: DISCONTINUED | OUTPATIENT
Start: 2024-10-24 | End: 2024-11-01 | Stop reason: HOSPADM

## 2024-10-24 RX ORDER — ATORVASTATIN CALCIUM 40 MG/1
40 TABLET, FILM COATED ORAL NIGHTLY
Status: DISCONTINUED | OUTPATIENT
Start: 2024-10-24 | End: 2024-11-01 | Stop reason: HOSPADM

## 2024-10-24 RX ORDER — METOPROLOL SUCCINATE 25 MG/1
25 TABLET, EXTENDED RELEASE ORAL DAILY
Status: DISCONTINUED | OUTPATIENT
Start: 2024-10-25 | End: 2024-11-01 | Stop reason: HOSPADM

## 2024-10-24 RX ORDER — ACETAMINOPHEN 160 MG/5ML
650 SOLUTION ORAL EVERY 4 HOURS PRN
Status: DISCONTINUED | OUTPATIENT
Start: 2024-10-24 | End: 2024-11-01 | Stop reason: HOSPADM

## 2024-10-24 RX ORDER — AMOXICILLIN 250 MG
2 CAPSULE ORAL 2 TIMES DAILY PRN
Status: DISCONTINUED | OUTPATIENT
Start: 2024-10-24 | End: 2024-11-01 | Stop reason: HOSPADM

## 2024-10-24 RX ADMIN — ATORVASTATIN CALCIUM 40 MG: 40 TABLET, FILM COATED ORAL at 21:59

## 2024-10-24 RX ADMIN — BUDESONIDE AND FORMOTEROL FUMARATE DIHYDRATE 2 PUFF: 160; 4.5 AEROSOL RESPIRATORY (INHALATION) at 23:55

## 2024-10-24 RX ADMIN — Medication 10 ML: at 22:04

## 2024-10-24 RX ADMIN — IPRATROPIUM BROMIDE 0.5 MG: 0.5 SOLUTION RESPIRATORY (INHALATION) at 23:55

## 2024-10-24 RX ADMIN — PANTOPRAZOLE SODIUM 40 MG: 40 INJECTION, POWDER, FOR SOLUTION INTRAVENOUS at 21:59

## 2024-10-24 NOTE — TELEPHONE ENCOUNTER
Patient notified and voiced understanding.    Patient prefers a GI doctor at [facility], but does not have a preference on which one.

## 2024-10-24 NOTE — TELEPHONE ENCOUNTER
----- Message from Melisa López sent at 10/24/2024  7:20 AM CDT -----  Please let him know his hemoglobin was low at 7.7.  Other labs appropriate.  I would like to refer him to a GI doctor for evaluation.  Please see if he has a preference.

## 2024-10-24 NOTE — TELEPHONE ENCOUNTER
I was called by the answering service last night that the patient's hemoglobin came back 7.7.  Thank you.

## 2024-10-25 ENCOUNTER — ANESTHESIA (OUTPATIENT)
Dept: GASTROENTEROLOGY | Facility: HOSPITAL | Age: 79
End: 2024-10-25
Payer: MEDICARE

## 2024-10-25 ENCOUNTER — ANESTHESIA EVENT (OUTPATIENT)
Dept: GASTROENTEROLOGY | Facility: HOSPITAL | Age: 79
End: 2024-10-25
Payer: MEDICARE

## 2024-10-25 LAB
ALBUMIN SERPL-MCNC: 3.5 G/DL (ref 3.5–5.2)
ALBUMIN/GLOB SERPL: 1.3 G/DL
ALP SERPL-CCNC: 63 U/L (ref 39–117)
ALT SERPL W P-5'-P-CCNC: 13 U/L (ref 1–41)
ANION GAP SERPL CALCULATED.3IONS-SCNC: 12 MMOL/L (ref 5–15)
APTT PPP: 35.6 SECONDS (ref 24.5–36)
AST SERPL-CCNC: 19 U/L (ref 1–40)
BASOPHILS # BLD AUTO: 0.02 10*3/MM3 (ref 0–0.2)
BASOPHILS NFR BLD AUTO: 0.4 % (ref 0–1.5)
BILIRUB SERPL-MCNC: 0.7 MG/DL (ref 0–1.2)
BUN SERPL-MCNC: 20 MG/DL (ref 8–23)
BUN/CREAT SERPL: 22.7 (ref 7–25)
CALCIUM SPEC-SCNC: 8.9 MG/DL (ref 8.6–10.5)
CHLORIDE SERPL-SCNC: 106 MMOL/L (ref 98–107)
CO2 SERPL-SCNC: 21 MMOL/L (ref 22–29)
CREAT SERPL-MCNC: 0.88 MG/DL (ref 0.76–1.27)
DEPRECATED RDW RBC AUTO: 49 FL (ref 37–54)
EGFRCR SERPLBLD CKD-EPI 2021: 88.6 ML/MIN/1.73
EOSINOPHIL # BLD AUTO: 0.19 10*3/MM3 (ref 0–0.4)
EOSINOPHIL NFR BLD AUTO: 4.3 % (ref 0.3–6.2)
ERYTHROCYTE [DISTWIDTH] IN BLOOD BY AUTOMATED COUNT: 16.4 % (ref 12.3–15.4)
FERRITIN SERPL-MCNC: 40.63 NG/ML (ref 30–400)
GLOBULIN UR ELPH-MCNC: 2.6 GM/DL
GLUCOSE SERPL-MCNC: 99 MG/DL (ref 65–99)
HCT VFR BLD AUTO: 25.5 % (ref 37.5–51)
HCT VFR BLD AUTO: 25.8 % (ref 37.5–51)
HGB BLD-MCNC: 7.4 G/DL (ref 13–17.7)
HGB BLD-MCNC: 7.5 G/DL (ref 13–17.7)
IMM GRANULOCYTES # BLD AUTO: 0.02 10*3/MM3 (ref 0–0.05)
IMM GRANULOCYTES NFR BLD AUTO: 0.4 % (ref 0–0.5)
INR PPP: 1.86 (ref 0.91–1.09)
INR PPP: 2.94 (ref 0.91–1.09)
IRON 24H UR-MRATE: 26 MCG/DL (ref 59–158)
IRON SATN MFR SERPL: 6 % (ref 20–50)
LYMPHOCYTES # BLD AUTO: 0.85 10*3/MM3 (ref 0.7–3.1)
LYMPHOCYTES NFR BLD AUTO: 19 % (ref 19.6–45.3)
MAGNESIUM SERPL-MCNC: 2.2 MG/DL (ref 1.6–2.4)
MCH RBC QN AUTO: 23.7 PG (ref 26.6–33)
MCHC RBC AUTO-ENTMCNC: 29 G/DL (ref 31.5–35.7)
MCV RBC AUTO: 81.7 FL (ref 79–97)
MONOCYTES # BLD AUTO: 0.58 10*3/MM3 (ref 0.1–0.9)
MONOCYTES NFR BLD AUTO: 13 % (ref 5–12)
NEUTROPHILS NFR BLD AUTO: 2.81 10*3/MM3 (ref 1.7–7)
NEUTROPHILS NFR BLD AUTO: 62.9 % (ref 42.7–76)
NRBC BLD AUTO-RTO: 0 /100 WBC (ref 0–0.2)
PLATELET # BLD AUTO: 169 10*3/MM3 (ref 140–450)
PMV BLD AUTO: 8.9 FL (ref 6–12)
POTASSIUM SERPL-SCNC: 4.2 MMOL/L (ref 3.5–5.2)
PROT SERPL-MCNC: 6.1 G/DL (ref 6–8.5)
PROTHROMBIN TIME: 22.2 SECONDS (ref 11.8–14.8)
PROTHROMBIN TIME: 31.8 SECONDS (ref 11.8–14.8)
RBC # BLD AUTO: 3.12 10*6/MM3 (ref 4.14–5.8)
SODIUM SERPL-SCNC: 139 MMOL/L (ref 136–145)
TIBC SERPL-MCNC: 429 MCG/DL (ref 298–536)
TRANSFERRIN SERPL-MCNC: 288 MG/DL (ref 200–360)
WBC NRBC COR # BLD AUTO: 4.47 10*3/MM3 (ref 3.4–10.8)

## 2024-10-25 PROCEDURE — 0W3P8ZZ CONTROL BLEEDING IN GASTROINTESTINAL TRACT, VIA NATURAL OR ARTIFICIAL OPENING ENDOSCOPIC: ICD-10-PCS | Performed by: INTERNAL MEDICINE

## 2024-10-25 PROCEDURE — P9059 PLASMA, FRZ BETWEEN 8-24HOUR: HCPCS

## 2024-10-25 PROCEDURE — 94799 UNLISTED PULMONARY SVC/PX: CPT

## 2024-10-25 PROCEDURE — 84466 ASSAY OF TRANSFERRIN: CPT | Performed by: INTERNAL MEDICINE

## 2024-10-25 PROCEDURE — P9016 RBC LEUKOCYTES REDUCED: HCPCS

## 2024-10-25 PROCEDURE — 83735 ASSAY OF MAGNESIUM: CPT | Performed by: INTERNAL MEDICINE

## 2024-10-25 PROCEDURE — G0378 HOSPITAL OBSERVATION PER HR: HCPCS

## 2024-10-25 PROCEDURE — 85610 PROTHROMBIN TIME: CPT | Performed by: INTERNAL MEDICINE

## 2024-10-25 PROCEDURE — 94761 N-INVAS EAR/PLS OXIMETRY MLT: CPT

## 2024-10-25 PROCEDURE — 85014 HEMATOCRIT: CPT | Performed by: INTERNAL MEDICINE

## 2024-10-25 PROCEDURE — 99204 OFFICE O/P NEW MOD 45 MIN: CPT | Performed by: INTERNAL MEDICINE

## 2024-10-25 PROCEDURE — 36430 TRANSFUSION BLD/BLD COMPNT: CPT

## 2024-10-25 PROCEDURE — 86900 BLOOD TYPING SEROLOGIC ABO: CPT

## 2024-10-25 PROCEDURE — 85025 COMPLETE CBC W/AUTO DIFF WBC: CPT | Performed by: INTERNAL MEDICINE

## 2024-10-25 PROCEDURE — 85018 HEMOGLOBIN: CPT | Performed by: INTERNAL MEDICINE

## 2024-10-25 PROCEDURE — 85730 THROMBOPLASTIN TIME PARTIAL: CPT | Performed by: INTERNAL MEDICINE

## 2024-10-25 PROCEDURE — 25010000002 PROPOFOL 10 MG/ML EMULSION: Performed by: NURSE ANESTHETIST, CERTIFIED REGISTERED

## 2024-10-25 PROCEDURE — 43255 EGD CONTROL BLEEDING ANY: CPT | Performed by: INTERNAL MEDICINE

## 2024-10-25 PROCEDURE — 86923 COMPATIBILITY TEST ELECTRIC: CPT

## 2024-10-25 PROCEDURE — 80053 COMPREHEN METABOLIC PANEL: CPT | Performed by: INTERNAL MEDICINE

## 2024-10-25 PROCEDURE — 86927 PLASMA FRESH FROZEN: CPT

## 2024-10-25 PROCEDURE — 86901 BLOOD TYPING SEROLOGIC RH(D): CPT

## 2024-10-25 PROCEDURE — 25010000002 LIDOCAINE PF 2% 2 % SOLUTION: Performed by: NURSE ANESTHETIST, CERTIFIED REGISTERED

## 2024-10-25 PROCEDURE — 82728 ASSAY OF FERRITIN: CPT | Performed by: INTERNAL MEDICINE

## 2024-10-25 PROCEDURE — 83540 ASSAY OF IRON: CPT | Performed by: INTERNAL MEDICINE

## 2024-10-25 RX ORDER — WARFARIN SODIUM 4 MG/1
4 TABLET ORAL
Status: ON HOLD | COMMUNITY
End: 2024-10-25

## 2024-10-25 RX ORDER — WARFARIN SODIUM 5 MG/1
5 TABLET ORAL
Status: ON HOLD | COMMUNITY
End: 2024-10-25

## 2024-10-25 RX ORDER — PROPOFOL 10 MG/ML
VIAL (ML) INTRAVENOUS AS NEEDED
Status: DISCONTINUED | OUTPATIENT
Start: 2024-10-25 | End: 2024-10-25 | Stop reason: SURG

## 2024-10-25 RX ORDER — LIDOCAINE HYDROCHLORIDE 20 MG/ML
INJECTION, SOLUTION EPIDURAL; INFILTRATION; INTRACAUDAL; PERINEURAL AS NEEDED
Status: DISCONTINUED | OUTPATIENT
Start: 2024-10-25 | End: 2024-10-25 | Stop reason: SURG

## 2024-10-25 RX ORDER — WARFARIN SODIUM 4 MG/1
4 TABLET ORAL EVERY OTHER DAY
COMMUNITY

## 2024-10-25 RX ORDER — WARFARIN SODIUM 5 MG/1
5 TABLET ORAL EVERY OTHER DAY
COMMUNITY

## 2024-10-25 RX ADMIN — PROPOFOL 220 MG: 10 INJECTION, EMULSION INTRAVENOUS at 13:55

## 2024-10-25 RX ADMIN — LIDOCAINE HYDROCHLORIDE 100 MG: 20 INJECTION, SOLUTION EPIDURAL; INFILTRATION; INTRACAUDAL; PERINEURAL at 13:55

## 2024-10-25 RX ADMIN — Medication 10 ML: at 08:52

## 2024-10-25 RX ADMIN — Medication 5 MG: at 20:25

## 2024-10-25 RX ADMIN — VALSARTAN 80 MG: 80 TABLET, FILM COATED ORAL at 08:52

## 2024-10-25 RX ADMIN — ATORVASTATIN CALCIUM 40 MG: 40 TABLET, FILM COATED ORAL at 20:25

## 2024-10-25 RX ADMIN — PANTOPRAZOLE SODIUM 40 MG: 40 INJECTION, POWDER, FOR SOLUTION INTRAVENOUS at 20:25

## 2024-10-25 RX ADMIN — PANTOPRAZOLE SODIUM 40 MG: 40 INJECTION, POWDER, FOR SOLUTION INTRAVENOUS at 08:52

## 2024-10-25 RX ADMIN — METOPROLOL SUCCINATE 25 MG: 25 TABLET, EXTENDED RELEASE ORAL at 08:52

## 2024-10-25 RX ADMIN — Medication 10 ML: at 20:26

## 2024-10-25 NOTE — CASE MANAGEMENT/SOCIAL WORK
Discharge Planning Assessment  [facility]     Patient Name: Michael Paz  MRN: [id number]  Today's Date: 10/25/2024    Admit Date: 10/24/2024        Discharge Needs Assessment       Row Name 10/25/24 1239       Living Environment    People in Home alone    Current Living Arrangements home    Primary Care Provided by self    Provides Primary Care For no one    Family Caregiver if Needed sibling(s)    Quality of Family Relationships helpful;involved;supportive    Able to Return to Prior Arrangements yes       Transition Planning    Patient/Family Anticipates Transition to home    Patient/Family Anticipated Services at Transition none    Transportation Anticipated family or friend will provide       Discharge Needs Assessment    Readmission Within the Last 30 Days no previous admission in last 30 days    Equipment Currently Used at Home crutches;walker, standard    Concerns to be Addressed no discharge needs identified    Anticipated Changes Related to Illness none    Equipment Needed After Discharge none    Current Discharge Risk lives alone    Discharge Coordination/Progress Pt resides alone.  Pt has PCP, RX coverage and can afford medications.  SW will follow for therapy evaluations to determine appropriate level of care.                   Discharge Plan       Row Name 10/25/24 1233       Plan    Final Discharge Disposition Code 01 - home or self-care                  Continued Care and Services - Admitted Since 10/24/2024    No active coordination exists for this encounter.          Demographic Summary    No documentation.                  Functional Status    No documentation.                  Psychosocial    No documentation.                  Abuse/Neglect    No documentation.                  Legal    No documentation.                  Substance Abuse    No documentation.                  Patient Forms    No documentation.                     CLAU Rose

## 2024-10-25 NOTE — CONSULTS
[facility] Gastroenterology  Inpatient Consult Note  Today's date:  10/25/24    Michael CASANOVA [facility]  1945       Referring Provider: No Known Provider  Primary Physician: Henrietta Jin APRN     Date of Admission: 10/24/2024  Date of Service:  10/25/24    Reason for Consultation/Chief Complaint:   Anemia  GI bleed    History of present illness:    Michael is a 78 y/o male that presented to [facility] ER yesterday with weakness and reported dark stools. In the emergency room there, he was found to be anemic. Due to this, he was transferred to [facility] for further evaluation and treatment. Over the past few months, he has had worsening fatigue and weakness. He also reports having dark stools over the past month. Denies hematemesis or hematochezia. On admission, found to have iron deficiency anemia with hgb 7.7 and iron saturation 6%. He received 1 unit of PRBC yesterday and hgb improved to 8.5, but repeat this morning was 7.4. He also has elevated INR of 2.94 this morning. He is on Coumadin, which has been held. Past medical history significant for CAD with prior CABG, atrial fibrillation, DVT/PE, protein S deficiency, AAA s/p endograft, hypertension, hyperlipidemia.     Past Medical History:   Diagnosis Date    AAA (abdominal aortic aneurysm)     Aneurysm     Arthritis     Atrial fibrillation 1990    CAD (coronary artery disease)     CAD (coronary artery disease)     Clotting disorder 2012    Deep vein thrombosis [id number]    Depression     Endoleak post endovascular aneurysm repair     History of DVT (deep vein thrombosis)     History of foot fracture     BILATERAL FOOT FRACTURES    History of fracture of left ankle     History of pulmonary embolus (PE)     History of transfusion 2012 and 2020    Hyperlipidemia     Hypertension     Myocardial infarction 1990    Peripheral vascular disease     Pneumonia 4 times    Pulmonary embolism 1986    Tobacco abuse          Past Surgical History:    Procedure Laterality Date    ABDOMINAL AORTIC ANEURYSM REPAIR      APPENDECTOMY      BACK SURGERY      CARDIAC CATHETERIZATION      CORONARY ANGIOPLASTY      CORONARY ARTERY BYPASS GRAFT      CORONARY STENT PLACEMENT      HEMORRHOIDECTOMY      INGUINAL HERNIA REPAIR      KNEE SURGERY Right     OTHER SURGICAL HISTORY      TYPE IA ENDOLEAK REPAIR    OTHER SURGICAL HISTORY      CRANIAL CYST          No Known Allergies      Social History     Tobacco Use    Smoking status: Every Day     Current packs/day: 1.00     Average packs/day: 1 pack/day for 67.4 years (67.4 ttl pk-yrs)     Types: Cigarettes     Start date: 6/1/1957     Passive exposure: Current    Smokeless tobacco: Never    Tobacco comments:     Don't inhale.   Substance Use Topics    Alcohol use: Yes     Alcohol/week: 3.0 - 4.0 standard drinks of alcohol     Types: 3 - 4 Cans of beer per week     Comment: SOCIAL          Family History   Problem Relation Age of Onset    Heart disease Mother     Heart disease Father     Heart disease Other     Hypertension Other     Diabetes Other          Medications Prior to Admission   Medication Sig Dispense Refill Last Dose/Taking    Ascorbic Acid (VITAMIN C PO) Take 2,000 mg by mouth Daily.   10/24/2024    aspirin 325 MG tablet Take 1 tablet by mouth.   10/24/2024    candesartan (ATACAND) 16 MG tablet Take 1 tablet by mouth Daily.   10/24/2024    Fluticasone-Umeclidin-Vilant (Trelegy Ellipta) 100-62.5-25 MCG/ACT inhaler Inhale 1 puff Daily for 14 days. 1 each 0 10/23/2024    metoprolol succinate XL (TOPROL-XL) 50 MG 24 hr tablet Take 0.5 tablets by mouth Daily. Patient takes 25mg one time a day   10/24/2024    Multiple Vitamins-Minerals (ONE A DAY MEN 50 PLUS PO) Take  by mouth.   10/23/2024    simvastatin (ZOCOR) 80 MG tablet Take 1 tablet by mouth.   10/23/2024    warfarin (COUMADIN) 4 MG tablet 4 mg PO Daily, then 5 mg PO the next day.  Continue to alternate as you have been doing 1 tablet 0 10/24/2024              Review of Systems:  Constitutional: No unexpected weight change, no fatigue, no unexplained fever, no sweats or chills.   HEENT: No icteric sclera.  No hearing or visual deficits.  No sore throat.  No chronic nasal discharge.  Pulmonary: No chronic cough.  No hemoptysis.  No shortness of breath.  Cardiovascular: No chest pain.  No palpitations.  No shortness of breath.  Gastrointestinal: As above.  Musculoskeletal/extremities: No peripheral edema.  No cyanosis.  No claudications.  No back pain.  Genitourinary: No dysuria.  No blood in stool.  No urethral discharges.  Neurologic: No seizures.  No headaches.  No dizziness.  No gait problems.  Skin: No rash.  No icterus.  Mental: No psychosis.  No confusions.  No hallucinations.      Physical Exam:  Temp:  [97.6 °F (36.4 °C)-98.1 °F (36.7 °C)] 97.6 °F (36.4 °C)  Heart Rate:  [90-96] 90  Resp:  [18-20] 18  BP: (141-160)/(53-82) 160/82    General:   HEENT: Nonicteric sclerae.  Moist oral mucosa.  PERRLA.  EOMI.  Clear pharynx.  Lungs: Clear to auscultation bilaterally.  No wheezing, rales or rhonchi.  Heart: Regular rate and rhythm.  Normal S1 and S2, no S3, S4 or murmur.  Abdomen: Soft, nondistended, nontender to palpation, with normoactive bowel sounds, no hepatosplenomegaly, no palpable masses.  Extremities: No cyanosis, edema or pulse deficits.  Skin: No rash or jaundice.      Results Review:  Lab Results (last 24 hours)       Procedure Component Value Units Date/Time    Ferritin [[id number]]  (Normal) Collected: 10/25/24 0656    Specimen: Blood Updated: 10/25/24 0741     Ferritin 40.63 ng/mL     Narrative:      Results may be falsely decreased if patient taking Biotin.      Iron Profile [[id number]]  (Abnormal) Collected: 10/25/24 0656    Specimen: Blood Updated: 10/25/24 0736     Iron 26 mcg/dL      Iron Saturation (TSAT) 6 %      Transferrin 288 mg/dL      TIBC 429 mcg/dL     Comprehensive Metabolic Panel [[id number]]  (Abnormal) Collected: 10/25/24 0656     Specimen: Blood Updated: 10/25/24 0736     Glucose 99 mg/dL      BUN 20 mg/dL      Creatinine 0.88 mg/dL      Sodium 139 mmol/L      Potassium 4.2 mmol/L      Chloride 106 mmol/L      CO2 21.0 mmol/L      Calcium 8.9 mg/dL      Total Protein 6.1 g/dL      Albumin 3.5 g/dL      ALT (SGPT) 13 U/L      AST (SGOT) 19 U/L      Alkaline Phosphatase 63 U/L      Total Bilirubin 0.7 mg/dL      Globulin 2.6 gm/dL      A/G Ratio 1.3 g/dL      BUN/Creatinine Ratio 22.7     Anion Gap 12.0 mmol/L      eGFR 88.6 mL/min/1.73     Narrative:      GFR Normal >60  Chronic Kidney Disease <60  Kidney Failure <15    The GFR formula is only valid for adults with stable renal function between ages 18 and 70.    Magnesium [[id number]]  (Normal) Collected: 10/25/24 0656    Specimen: Blood Updated: 10/25/24 0732     Magnesium 2.2 mg/dL     aPTT [[id number]]  (Normal) Collected: 10/25/24 0656    Specimen: Blood Updated: 10/25/24 0727     PTT 35.6 seconds     Protime-INR [[id number]]  (Abnormal) Collected: 10/25/24 0656    Specimen: Blood Updated: 10/25/24 0727     Protime 31.8 Seconds      INR 2.94    CBC & Differential [[id number]]  (Abnormal) Collected: 10/25/24 0656    Specimen: Blood Updated: 10/25/24 0717    Narrative:      The following orders were created for panel order CBC & Differential.  Procedure                               Abnormality         Status                     ---------                               -----------         ------                     CBC Auto Differential[[id number]]        Abnormal            Final result                 Please view results for these tests on the individual orders.    CBC Auto Differential [[id number]]  (Abnormal) Collected: 10/25/24 0656    Specimen: Blood Updated: 10/25/24 0717     WBC 4.47 10*3/mm3      RBC 3.12 10*6/mm3      Hemoglobin 7.4 g/dL      Hematocrit 25.5 %      MCV 81.7 fL      MCH 23.7 pg      MCHC 29.0 g/dL      RDW 16.4 %      RDW-SD 49.0 fl      MPV 8.9 fL      Platelets 169  10*3/mm3      Neutrophil % 62.9 %      Lymphocyte % 19.0 %      Monocyte % 13.0 %      Eosinophil % 4.3 %      Basophil % 0.4 %      Immature Grans % 0.4 %      Neutrophils, Absolute 2.81 10*3/mm3      Lymphocytes, Absolute 0.85 10*3/mm3      Monocytes, Absolute 0.58 10*3/mm3      Eosinophils, Absolute 0.19 10*3/mm3      Basophils, Absolute 0.02 10*3/mm3      Immature Grans, Absolute 0.02 10*3/mm3      nRBC 0.0 /100 WBC     CANDIDA AURIS SCREEN - Swab, Axilla Right, Axilla Left and Groin [[id number]] Collected: 10/24/24 2222    Specimen: Swab from Axilla Right, Axilla Left and Groin Updated: 10/24/24 2228    Protime-INR [[id number]]  (Abnormal) Collected: 10/24/24 2023    Specimen: Blood Updated: 10/24/24 2110     Protime 30.1 Seconds      INR 2.74    Hemoglobin [[id number]]  (Abnormal) Collected: 10/24/24 2023    Specimen: Blood Updated: 10/24/24 2100     Hemoglobin 8.5 g/dL               Radiology Review:  Imaging Results (Last 72 Hours)       ** No results found for the last 72 hours. **            Impression:  Iron deficiency anemia.  Melena.  Supratherapeutic INR on coumadin.  Atrial fibrillation.  Coronary artery disease.  History or DVT/PE with Protein S deficiency.     Plan:  Recommend transfusing another unit of PRBC if hgb continues to trend down. Will order 2 units of FFP to reverse his INR. Plan for possible EGD this afternoon once he has received the FFP. If he is high risk for thromboembolic event due to protein S deficiency, can bridge with heparin after procedure is completed. Will defer this to medicine team. Continue IV Protonix.      Fareed Velasco MD  10/25/24   09:13 CDT

## 2024-10-25 NOTE — PROGRESS NOTES
[facility] Medicine Services  INPATIENT PROGRESS NOTE    Patient Name: Michael Paz  Date of Admission: 10/24/2024  Today's Date: 10/25/24  Length of Stay: 1  Primary Care Physician: Henrietta Jin APRN    Subjective   Chief Complaint: Dark stool symptomatic anemia  HPI   Patient was transferred from [facility] ER today to our facility for evaluation.  He was seen in room 463 after arrival with nursing at bedside.  This is a 79-year-old male with a history of CAD status post prior CABG on full dose aspirin.  He is also on warfarin for history of A-fib as well as prior DVT/PE with protein S deficiency.  Other history includes infrarenal AAA status post endograft, hypertension, hyperlipidemia, tobacco use, right upper lobe pulmonary nodule being followed by pulmonary.  Patient had a cyst removed from his back surgically in August.  He says ever since then things have gone downhill for him.  For the last 2 months he has had some progressive weakness, fatigue, shortness of breath with exertion, dizziness especially upon standing.  Patient denies any syncopal events.  Patient does state that symptoms seem to have slowly increased over the last month and he also has noted some dark tarry stools over the last month.  Patient went to see Dr. Vicente of cardiology a few weeks ago and reportedly had an echocardiogram done but I cannot find the echo test or the office visit in our system.  He was referred to Dr. Lyons reportedly for slow afib, his metoprolol xL was reduced from 50 to 25mg.  I do see that patient has an upcoming appointment with Dr. Lyons in February 2025.  Patient went to see pulmonary yesterday for follow-up and monitoring of his known right upper lobe pulmonary nodule.  Labs were drawn during evaluation and patient was found to have a hemoglobin of 7.7.  Due to continued not feeling well he went to the ER today and there his hemoglobin was found to be 6.8.  He  was transfused a unit of blood which was completed prior to arrival here.  Patient tells me his INR was 2.3 but I cannot find it in transfer records.  Currently on arrival patient still feels tired but denies any current chest pain, shortness of breath, dizziness, nausea, abdominal pain, new focal numbness or weakness.  No fevers or chills   10/25  As before admitted for symptomatic anemia started on PPI hold anticoagulation GI on board n.p.o. EGD was given fresh frozen plasma per GI      Review of Systems   All pertinent negatives and positives are as above. All other systems have been reviewed and are negative unless otherwise stated.     Objective    Temp:  [97.6 °F (36.4 °C)-98.6 °F (37 °C)] 98.6 °F (37 °C)  Heart Rate:  [78-96] 78  Resp:  [18-20] 18  BP: (141-160)/(53-82) 160/80  Physical Exam  Constitutional:       Appearance: Normal appearance.   HENT:      Head: Normocephalic.      Nose: Nose normal.   Eyes:      Pupils: Pupils are equal, round, and reactive to light.   Cardiovascular:      Rate and Rhythm: Normal rate and regular rhythm.   Pulmonary:      Breath sounds: Stridor present.   Abdominal:      General: Abdomen is flat.      Palpations: Abdomen is soft.   Musculoskeletal:         General: Normal range of motion.      Cervical back: Normal range of motion.   Skin:     Capillary Refill: Capillary refill takes less than 2 seconds.   Neurological:      Mental Status: He is alert.             Results Review:  I have reviewed the labs, radiology results, and diagnostic studies.    Laboratory Data:   Results from last 7 days   Lab Units 10/25/24  0656 10/24/24  2023 10/23/24  1300   WBC 10*3/mm3 4.47  --  7.7   HEMOGLOBIN g/dL 7.4* 8.5* 7.7*   HEMATOCRIT % 25.5*  --  27.1*   PLATELETS 10*3/mm3 169  --  262        Results from last 7 days   Lab Units 10/25/24  0656 10/23/24  1300   SODIUM mmol/L 139 139   POTASSIUM mmol/L 4.2 4.9   CHLORIDE mmol/L 106 105   CO2 mmol/L 21.0* 20   BUN mg/dL 20 21  "  CREATININE mg/dL 0.88 1.12   CALCIUM mg/dL 8.9 9.3   BILIRUBIN mg/dL 0.7 0.6   ALK PHOS U/L 63 73   ALT (SGPT) U/L 13 18   AST (SGOT) U/L 19 27   GLUCOSE mg/dL 99 84       Culture Data:   No results found for: \"BLOODCX\", \"URINECX\", \"WOUNDCX\", \"MRSACX\", \"RESPCX\", \"STOOLCX\"    Radiology Data:   Imaging Results (Last 24 Hours)       Procedure Component Value Units Date/Time    XR Outside Films [[id number]] Resulted: 10/25/24 0946     Updated: 10/25/24 0946    Narrative:      This procedure was auto-finalized with no dictation required.    CT Outside Films [[id number]] Resulted: 10/25/24 0946     Updated: 10/25/24 0946    Narrative:      This procedure was auto-finalized with no dictation required.            I have reviewed the patient's current medications.     Assessment/Plan   Assessment  Active Hospital Problems    Diagnosis     **Symptomatic anemia     S/P CABG (coronary artery bypass graft)     Protein S deficiency     Atrial fibrillation, chronic     Lung nodule     Tobacco abuse     Hypertension     Hyperlipidemia     CAD (coronary artery disease)        Treatment Plan  . #1 symptomatic anemia -patient has been feeling weak, tired, fatigued, dizzy with standing.  Will check orthostatics.  He has a history of CAD and CABG.  He is on Coumadin and full dose aspirin.  His hemoglobin yesterday was 7.7 and at outside facility today was reported 6.8.  He was given 1 unit of PRBC.  Rectal exam was not performed.  Patient reports dark stools for the last month.  Will check a fecal occult.  Check a blood count now posttransfusion and trend.  PPI IV twice daily.  Will ask GI to evaluate for possible EGD if warranted.      #2 CAD -with a history of CABG.  No active chest pain currently.  Continue beta-blocker and statin.  Hold aspirin.     #3 A-fib -rate controlled.  Continue beta-blocker.  Hold Coumadin.  Check INR.  Cannot find INR result from outside hospital although patient tells me was 2.3.     #4 history protein S " deficiency/DVT/PE -again given above will have to hold Coumadin for tonight.  Monitor INR.       #5 hypertension -blood pressure is 158 on arrival.  Continue home blood pressure meds with holding parameters while monitoring for any bleeding.     #6 hyperlipidemia -statin     #7 right upper lobe pulmonary nodule -known history and follows with pulmonary for surveillance.     Medical Decision Making  Number and Complexity of problems: 1-2 acute, multiple chronic  Differential Diagnosis: As above     Conditions and Status        New to me.  Clinically looks stable.  Stable vitals.  Nontoxic.  Currently mild fatigue is main symptom post initial 1 unit PRBC.     MDM Data  External documents reviewed: Transfer documents reviewed  Cardiac tracing (EKG, telemetry) interpretation: Currently rate controlled fib on the bedside monitor  Radiology interpretation: Outside imaging reports reviewed  Labs reviewed: Outside transfer labs reviewed  Any tests that were considered but not ordered: None     Decision rules/scores evaluated (example [id number], [place], etc): None     Discussed with: Patient, nursing     Care Planning  Shared decision making: Patient apprised of current labs, vitals, imaging and treatment plan.  They are agreeable with proceeding with plans as discussed.     Code status and discussions: DNR/DNI     Disposition  Social Determinants of Health that impact treatment or disposition: none  Estimated length of stay is less than 2 midnights.      I confirmed that the patient's advanced care plan is present, code status is documented, and a surrogate decision maker is listed in the patient's medical record.      The patient's surrogate decision maker is his sister Kira.    Electronically signed by Sebastian Mckeon MD, 10/25/24, 10:16 CDT.

## 2024-10-25 NOTE — PROGRESS NOTES
Gastroenterology Note:  Patient presented with melena and symptomatic anemia with elevated INR on coumadin. Received 1 units of PRBC at outside hospital. Received 2 units of FFP here. Repeat INR after FFP 1.86. Repeat hgb 7.5. 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 Treated with APC and epinephrine injection with good hemostasis. No further bleeding at the end of the procedure. Will give an additional unit of FFP now and also transfuse 1 units of PRBC. Continue holding Coumadin. Continue Pantoprazole 40 mg IV every 12 hours. Keep NPO this afternoon, but ok for ice chips. If no further bleeding, will consider advancing to clear liquids later this evening.

## 2024-10-25 NOTE — H&P
[facility] Medicine Services  HISTORY AND PHYSICAL    Date of Admission: 10/24/2024  Primary Care Physician: Henrietta Jin APRN    Subjective   Primary Historian: patient    Chief Complaint: weakness, fatigue, anemia, dark stool    History of Present Illness  Patient was transferred from [facility] ER today to our facility for evaluation.  He was seen in room 463 after arrival with nursing at bedside.  This is a 79-year-old male with a history of CAD status post prior CABG on full dose aspirin.  He is also on warfarin for history of A-fib as well as prior DVT/PE with protein S deficiency.  Other history includes infrarenal AAA status post endograft, hypertension, hyperlipidemia, tobacco use, right upper lobe pulmonary nodule being followed by pulmonary.  Patient had a cyst removed from his back surgically in August.  He says ever since then things have gone downhill for him.  For the last 2 months he has had some progressive weakness, fatigue, shortness of breath with exertion, dizziness especially upon standing.  Patient denies any syncopal events.  Patient does state that symptoms seem to have slowly increased over the last month and he also has noted some dark tarry stools over the last month.  Patient went to see Dr. Vicente of cardiology a few weeks ago and reportedly had an echocardiogram done but I cannot find the echo test or the office visit in our system.  He was referred to Dr. Lyons reportedly for slow afib, his metoprolol xL was reduced from 50 to 25mg.  I do see that patient has an upcoming appointment with Dr. Lyons in February 2025.  Patient went to see pulmonary yesterday for follow-up and monitoring of his known right upper lobe pulmonary nodule.  Labs were drawn during evaluation and patient was found to have a hemoglobin of 7.7.  Due to continued not feeling well he went to the ER today and there his hemoglobin was found to be 6.8.  He was  transfused a unit of blood which was completed prior to arrival here.  Patient tells me his INR was 2.3 but I cannot find it in transfer records.  Currently on arrival patient still feels tired but denies any current chest pain, shortness of breath, dizziness, nausea, abdominal pain, new focal numbness or weakness.  No fevers or chills.        Review of Systems   Otherwise complete ROS reviewed and negative except as mentioned in the HPI.    Past Medical History:   Past Medical History:   Diagnosis Date    AAA (abdominal aortic aneurysm)     Aneurysm     Arthritis     Atrial fibrillation 1990    CAD (coronary artery disease)     CAD (coronary artery disease)     Clotting disorder 2012    Deep vein thrombosis [id number]    Depression     Endoleak post endovascular aneurysm repair     History of DVT (deep vein thrombosis)     History of foot fracture     BILATERAL FOOT FRACTURES    History of fracture of left ankle     History of pulmonary embolus (PE)     History of transfusion 2012 and 2020    Hyperlipidemia     Hypertension     Myocardial infarction 1990    Peripheral vascular disease     Pneumonia 4 times    Pulmonary embolism 1986    Tobacco abuse      Past Surgical History:  Past Surgical History:   Procedure Laterality Date    ABDOMINAL AORTIC ANEURYSM REPAIR      APPENDECTOMY      BACK SURGERY      CARDIAC CATHETERIZATION      CORONARY ANGIOPLASTY      CORONARY ARTERY BYPASS GRAFT      CORONARY STENT PLACEMENT      HEMORRHOIDECTOMY      INGUINAL HERNIA REPAIR      KNEE SURGERY Right     OTHER SURGICAL HISTORY      TYPE IA ENDOLEAK REPAIR    OTHER SURGICAL HISTORY      CRANIAL CYST     Social History:  reports that he has been smoking cigarettes. He started smoking about 67 years ago. He has a 67.4 pack-year smoking history. He has been exposed to tobacco smoke. He has never used smokeless tobacco. He reports current alcohol use of about 3.0 - 4.0 standard drinks of alcohol per week. He reports that he does not  "use drugs.    Family History: family history includes Diabetes in an other family member; Heart disease in his father, mother, and another family member; Hypertension in an other family member.       Allergies:  No Known Allergies    Medications:  Prior to Admission medications    Medication Sig Start Date End Date Taking? Authorizing Provider   Ascorbic Acid (VITAMIN C PO) Take 2,000 mg by mouth Daily.    Pedro Martin MD   aspirin 325 MG tablet Take 1 tablet by mouth.    Pedro Martin MD   candesartan (ATACAND) 16 MG tablet Take 1 tablet by mouth Daily.    Pedro Martin MD   Fluticasone-Umeclidin-Vilant (Trelegy Ellipta) 100-62.5-25 MCG/ACT inhaler Inhale 1 puff Daily for 14 days. 10/23/24 11/6/24  Melisa López APRN   metoprolol succinate XL (TOPROL-XL) 50 MG 24 hr tablet Take 0.5 tablets by mouth Daily. Patient takes 25mg one time a day    ProviderPedro MD   Multiple Vitamins-Minerals (ONE A DAY MEN 50 PLUS PO) Take  by mouth.    ProviderPedro MD   simvastatin (ZOCOR) 80 MG tablet Take 1 tablet by mouth.    ProviderPedro MD   warfarin (COUMADIN) 4 MG tablet 4 mg PO Daily, then 5 mg PO the next day.  Continue to alternate as you have been doing 7/13/20   Ermias Ta MD     I have utilized all available immediate resources to obtain, update, or review the patient's current medications (including all prescriptions, over-the-counter products, herbals, cannabis/cannabidiol products, and vitamin/mineral/dietary (nutritional) supplements).    Objective     Vital Signs: /80 (BP Location: Left arm, Patient Position: Sitting)   Pulse 96   Temp 98.1 °F (36.7 °C) (Oral)   Resp 20   Ht 190.5 cm (75\")   Wt 123 kg (271 lb 3.2 oz)   SpO2 98%   BMI 33.90 kg/m²   Physical Exam   GEN: Awake, alert, interactive, in NAD  HEENT:  PERRLA, EOMI, Anicteric, Trachea midline  Lungs:  no wheezing/rales/rhonchi  Heart: irreg/irreg, +S1/s2, no rub  ABD: obese, soft, " nt +BS, no guarding/rebound  Extremities: atraumatic, no cyanosis, no significant pitting edema, some varicose veins  Skin: no rashes or petechiae  Neuro: AAOx3, no focal deficits  Psych: normal mood & affect        Results Reviewed:  Labs and imaging reports reviewed from [facility] ER transfer paperwork    CT abd/pelv - no acute process, hepatomegaly, prior infrarenal AAA s/p endograft    CXR - mild cardiomegaly, spiculated RUL nodule    CBC - wbc 5.2, hgb, 6.8, hct 24.1, plt 210    BMP - na 143, c02 24, k 4.7, cl 107, bun 23, creat 1.07, gfr 86, ca 8.9, glucose 104, alb 3.5, t bili 0.8, ast 26, alt 24    I have personally reviewed and interpreted the radiology studies and ECG obtained at time of admission.     Assessment / Plan   Assessment:   Active Hospital Problems    Diagnosis     **Symptomatic anemia     S/P CABG (coronary artery bypass graft)     Protein S deficiency     Atrial fibrillation, chronic     Lung nodule     Tobacco abuse     Hypertension     Hyperlipidemia     CAD (coronary artery disease)        Treatment Plan  The patient will be admitted to my service here at [facility].     #1 symptomatic anemia -patient has been feeling weak, tired, fatigued, dizzy with standing.  Will check orthostatics.  He has a history of CAD and CABG.  He is on Coumadin and full dose aspirin.  His hemoglobin yesterday was 7.7 and at outside facility today was reported 6.8.  He was given 1 unit of PRBC.  Rectal exam was not performed.  Patient reports dark stools for the last month.  Will check a fecal occult.  Check a blood count now posttransfusion and trend.  PPI IV twice daily.  Will ask GI to evaluate for possible EGD if warranted.     #2 CAD -with a history of CABG.  No active chest pain currently.  Continue beta-blocker and statin.  Hold aspirin.    #3 A-fib -rate controlled.  Continue beta-blocker.  Hold Coumadin.  Check INR.  Cannot find INR result from outside hospital although patient tells  me was 2.3.    #4 history protein S deficiency/DVT/PE -again given above will have to hold Coumadin for tonight.  Monitor INR.      #5 hypertension -blood pressure is 158 on arrival.  Continue home blood pressure meds with holding parameters while monitoring for any bleeding.    #6 hyperlipidemia -statin    #7 right upper lobe pulmonary nodule -known history and follows with pulmonary for surveillance.    Medical Decision Making  Number and Complexity of problems: 1-2 acute, multiple chronic  Differential Diagnosis: As above    Conditions and Status        New to me.  Clinically looks stable.  Stable vitals.  Nontoxic.  Currently mild fatigue is main symptom post initial 1 unit PRBC.     MDM Data  External documents reviewed: Transfer documents reviewed  Cardiac tracing (EKG, telemetry) interpretation: Currently rate controlled fib on the bedside monitor  Radiology interpretation: Outside imaging reports reviewed  Labs reviewed: Outside transfer labs reviewed  Any tests that were considered but not ordered: None     Decision rules/scores evaluated (example [id number], [place], etc): None     Discussed with: Patient, nursing     Care Planning  Shared decision making: Patient apprised of current labs, vitals, imaging and treatment plan.  They are agreeable with proceeding with plans as discussed.    Code status and discussions: DNR/DNI    Disposition  Social Determinants of Health that impact treatment or disposition: none  Estimated length of stay is less than 2 midnights.     I confirmed that the patient's advanced care plan is present, code status is documented, and a surrogate decision maker is listed in the patient's medical record.     The patient's surrogate decision maker is his sister Kira.     The patient was seen and examined by me on 10/24/24 at 7:35 PM.    Electronically signed by Rick Vance DO, 10/24/24, 20:06 CDT.

## 2024-10-25 NOTE — PROGRESS NOTES
Assessment tool to be used for patients with existing breathing treatments ordered by hospitalist                               Respiratory Therapist Driven Protocol - RT to Assess and Treat Algorithm    Item 0 Points 1 Point 2 Points 3 Points 4 Points Subtotal   Mental Status Alert, orientated, cooperative Lethargic, follows commands Confused, not following commands Obtunded or Somnolent Comatose 0   Respiratory Pattern Regular RR  8-16 breaths/minute Increased RR  18-25 breaths/minute Dyspnea on exertion, irregular RR  26-30/minute Shortness of breath,  RR 31-35 breaths/minute Accessory muscle use, severe SOB  RR > 35 breath/minute 0   Breath Sounds Clear Decreased unilaterally Decreased bilaterally Basilar crackles Wheezing and/or rhonchi 1   Cough Strong, spontaneous non-productive Strong productive Weak, non-productive Weak productive or weak with rhonchi Absent or may require suctioning 0   Pulmonary Status Nonsmoker or no previous history > 1 year quit < 1 PPD  < 1 year quit >  or = 1 PPD Diagnosed pulmonary disease (severe or chronic) Severe or chronic pulmonary disease with exacerbation 0   Surgical Status None General surgery (non-abdominal or non-thoracic) Lower abdominal Thoracic or upper abdominal Thoracic with pulmonary disease 0   Chest X-ray Clear Chronic changes Infiltrates, atelectasis or pleural effusion Infiltrates > 1 lobe Diffuse infiltrates and atelectasis and/or effusions 1   Activity level Ambulatory Ambulatory with assistance Non-ambulatory Paraplegic Quadriplegic 0                     Total Score 2   Score    Drug Therapy Frequency  20 or >    Q4 Duoneb & Q3 Albuterol PRN 15 - 19     Q6 Duoneb & Q4 Albuterol PRN 10 - 14    QID Duoneb & Q4 Albuterol PRN 5 - 9    TID Duoneb & Q6 Albuterol PRN 0 - 4    Q4 PRN Duoneb or Q4 PRN Albuterol    Incentive Spirometry - Initial RT instruct    Lung Expansion Therapy (PEP) Bronchopulmonary Hygiene (CPT)   Q4 & PRN - Severe atelectasis,  poor oxygenation Q4 - copious secretions, dyspnea, unable to sleep, mucus plugging   QID - High risk for persistent atelectasis, existence of atelectasis QID & Q4 PRN - Moderate secretion production   TID - At risk for developing atelectasis TID - small amounts of secretions with poor cough   BID - prevention of atelectasis BID - unable to breathe deeply and cough spontaneously   *RT Protocol patients will be re-assessed/re-evaluated every 48 hours.    *Patients who are home nebulizer treatments will be protocoled to no less than their home regimen and will remain     on their home regimen with re-evaluations as needed with changes in patient condition.    RT Comments/Recommendations: 2 Pt don't take any home respiratory med's and don't want any. Pt was given trelegy from [place] to try. Pt has not had a chance to try and is not interested in our breathing tx, or Symbicort. Will D/C respiratory med's.

## 2024-10-25 NOTE — ANESTHESIA PREPROCEDURE EVALUATION
Anesthesia Evaluation     Patient summary reviewed   NPO Solid Status: > 8 hours  NPO Liquid Status: > 2 hours           Airway   Dental    (+) edentulous    Pulmonary    (+) pneumonia , pulmonary embolism,  Cardiovascular   Exercise tolerance: good (4-7 METS)    (+) hypertension, past MI , CAD, CABG, dysrhythmias, PVD, DVT, hyperlipidemia      Neuro/Psych  (+) psychiatric history  GI/Hepatic/Renal/Endo      Musculoskeletal     Abdominal    Substance History      OB/GYN          Other   arthritis,                   Anesthesia Plan    ASA 4 - emergent     MAC     intravenous induction     Anesthetic plan, risks, benefits, and alternatives have been provided, discussed and informed consent has been obtained with: patient.    CODE STATUS:    Medical Intervention Limits: No intubation (DNI)  Level Of Support Discussed With: Patient  Code Status (Patient has no pulse and is not breathing): No CPR (Do Not Attempt to Resuscitate)  Medical Interventions (Patient has pulse or is breathing): Limited Support

## 2024-10-26 ENCOUNTER — APPOINTMENT (OUTPATIENT)
Dept: GENERAL RADIOLOGY | Facility: HOSPITAL | Age: 79
DRG: 378 | End: 2024-10-26
Payer: MEDICARE

## 2024-10-26 LAB
BASOPHILS # BLD AUTO: 0.02 10*3/MM3 (ref 0–0.2)
BASOPHILS NFR BLD AUTO: 0.4 % (ref 0–1.5)
BH BB BLOOD EXPIRATION DATE: NORMAL
BH BB BLOOD TYPE BARCODE: 5100
BH BB DISPENSE STATUS: NORMAL
BH BB PRODUCT CODE: NORMAL
BH BB UNIT NUMBER: NORMAL
CROSSMATCH INTERPRETATION: NORMAL
DEPRECATED RDW RBC AUTO: 49.5 FL (ref 37–54)
EOSINOPHIL # BLD AUTO: 0.2 10*3/MM3 (ref 0–0.4)
EOSINOPHIL NFR BLD AUTO: 4.3 % (ref 0.3–6.2)
ERYTHROCYTE [DISTWIDTH] IN BLOOD BY AUTOMATED COUNT: 16.4 % (ref 12.3–15.4)
HCT VFR BLD AUTO: 25.7 % (ref 37.5–51)
HCT VFR BLD AUTO: 26 % (ref 37.5–51)
HCT VFR BLD AUTO: 26.2 % (ref 37.5–51)
HCT VFR BLD AUTO: 27.5 % (ref 37.5–51)
HGB BLD-MCNC: 7.6 G/DL (ref 13–17.7)
HGB BLD-MCNC: 7.8 G/DL (ref 13–17.7)
HGB BLD-MCNC: 7.8 G/DL (ref 13–17.7)
HGB BLD-MCNC: 8.5 G/DL (ref 13–17.7)
IMM GRANULOCYTES # BLD AUTO: 0.02 10*3/MM3 (ref 0–0.05)
IMM GRANULOCYTES NFR BLD AUTO: 0.4 % (ref 0–0.5)
INR PPP: 1.7 (ref 0.91–1.09)
LYMPHOCYTES # BLD AUTO: 0.71 10*3/MM3 (ref 0.7–3.1)
LYMPHOCYTES NFR BLD AUTO: 15.4 % (ref 19.6–45.3)
MCH RBC QN AUTO: 24.6 PG (ref 26.6–33)
MCHC RBC AUTO-ENTMCNC: 29.2 G/DL (ref 31.5–35.7)
MCV RBC AUTO: 84.1 FL (ref 79–97)
MONOCYTES # BLD AUTO: 0.55 10*3/MM3 (ref 0.1–0.9)
MONOCYTES NFR BLD AUTO: 12 % (ref 5–12)
NEUTROPHILS NFR BLD AUTO: 3.1 10*3/MM3 (ref 1.7–7)
NEUTROPHILS NFR BLD AUTO: 67.5 % (ref 42.7–76)
NRBC BLD AUTO-RTO: 0 /100 WBC (ref 0–0.2)
PLATELET # BLD AUTO: 143 10*3/MM3 (ref 140–450)
PMV BLD AUTO: 8.9 FL (ref 6–12)
PROTHROMBIN TIME: 20.6 SECONDS (ref 11.8–14.8)
RBC # BLD AUTO: 3.09 10*6/MM3 (ref 4.14–5.8)
UNIT  ABO: NORMAL
UNIT  RH: NORMAL
WBC NRBC COR # BLD AUTO: 4.6 10*3/MM3 (ref 3.4–10.8)

## 2024-10-26 PROCEDURE — 85610 PROTHROMBIN TIME: CPT | Performed by: INTERNAL MEDICINE

## 2024-10-26 PROCEDURE — G0378 HOSPITAL OBSERVATION PER HR: HCPCS

## 2024-10-26 PROCEDURE — 97162 PT EVAL MOD COMPLEX 30 MIN: CPT

## 2024-10-26 PROCEDURE — 85014 HEMATOCRIT: CPT | Performed by: INTERNAL MEDICINE

## 2024-10-26 PROCEDURE — 99214 OFFICE O/P EST MOD 30 MIN: CPT | Performed by: INTERNAL MEDICINE

## 2024-10-26 PROCEDURE — 85025 COMPLETE CBC W/AUTO DIFF WBC: CPT | Performed by: INTERNAL MEDICINE

## 2024-10-26 PROCEDURE — 74018 RADEX ABDOMEN 1 VIEW: CPT

## 2024-10-26 PROCEDURE — 85018 HEMOGLOBIN: CPT | Performed by: INTERNAL MEDICINE

## 2024-10-26 RX ORDER — LACTULOSE 10 G/15ML
20 SOLUTION ORAL 3 TIMES DAILY
Status: DISCONTINUED | OUTPATIENT
Start: 2024-10-26 | End: 2024-11-01 | Stop reason: HOSPADM

## 2024-10-26 RX ORDER — PANTOPRAZOLE SODIUM 40 MG/1
40 TABLET, DELAYED RELEASE ORAL EVERY 12 HOURS
Status: DISCONTINUED | OUTPATIENT
Start: 2024-10-26 | End: 2024-11-01 | Stop reason: HOSPADM

## 2024-10-26 RX ORDER — PANTOPRAZOLE SODIUM 40 MG/1
40 TABLET, DELAYED RELEASE ORAL NIGHTLY
Status: DISCONTINUED | OUTPATIENT
Start: 2024-10-26 | End: 2024-10-26

## 2024-10-26 RX ADMIN — Medication 10 ML: at 08:34

## 2024-10-26 RX ADMIN — PANTOPRAZOLE SODIUM 40 MG: 40 INJECTION, POWDER, FOR SOLUTION INTRAVENOUS at 08:33

## 2024-10-26 RX ADMIN — Medication 5 MG: at 21:40

## 2024-10-26 RX ADMIN — LACTULOSE 20 G: 20 SOLUTION ORAL at 21:39

## 2024-10-26 RX ADMIN — ATORVASTATIN CALCIUM 40 MG: 40 TABLET, FILM COATED ORAL at 21:40

## 2024-10-26 RX ADMIN — VALSARTAN 80 MG: 80 TABLET, FILM COATED ORAL at 08:34

## 2024-10-26 RX ADMIN — LACTULOSE 20 G: 20 SOLUTION ORAL at 16:03

## 2024-10-26 RX ADMIN — METOPROLOL SUCCINATE 25 MG: 25 TABLET, EXTENDED RELEASE ORAL at 08:34

## 2024-10-26 RX ADMIN — PANTOPRAZOLE SODIUM 40 MG: 40 TABLET, DELAYED RELEASE ORAL at 21:40

## 2024-10-26 NOTE — THERAPY EVALUATION
Patient Name: Michael Paz  : 1945    MRN: [id number]                              Today's Date: 10/26/2024       Admit Date: 10/24/2024    Visit Dx:     ICD-10-CM ICD-9-CM   1. Impaired mobility [Z74.09]  Z74.09 799.89     Patient Active Problem List   Diagnosis    Tobacco abuse    Hypertension    Hyperlipidemia    Endoleak post endovascular aneurysm repair    AAA (abdominal aortic aneurysm)    Aneurysm    CAD (coronary artery disease)    Right-sided epistaxis    Hx pulmonary embolism    Hx of deep venous thrombosis    Rhinitis, chronic    Anticoagulant adverse reaction    Epistaxis    Lung nodule    Protein C deficiency    Protein S deficiency    Atrial fibrillation, chronic    Symptomatic anemia    S/P CABG (coronary artery bypass graft)     Past Medical History:   Diagnosis Date    AAA (abdominal aortic aneurysm)     Aneurysm     Arthritis     Atrial fibrillation     CAD (coronary artery disease)     CAD (coronary artery disease)     Clotting disorder     Deep vein thrombosis [id number]    Depression     Endoleak post endovascular aneurysm repair     History of DVT (deep vein thrombosis)     History of foot fracture     BILATERAL FOOT FRACTURES    History of fracture of left ankle     History of pulmonary embolus (PE)     History of transfusion  and     Hyperlipidemia     Hypertension     Myocardial infarction     Peripheral vascular disease     Pneumonia 4 times    Pulmonary embolism     Tobacco abuse      Past Surgical History:   Procedure Laterality Date    ABDOMINAL AORTIC ANEURYSM REPAIR      APPENDECTOMY      BACK SURGERY      CARDIAC CATHETERIZATION      CORONARY ANGIOPLASTY      CORONARY ARTERY BYPASS GRAFT      CORONARY STENT PLACEMENT      HEMORRHOIDECTOMY      INGUINAL HERNIA REPAIR      KNEE SURGERY Right     OTHER SURGICAL HISTORY      TYPE IA ENDOLEAK REPAIR    OTHER SURGICAL HISTORY      CRANIAL CYST      General Information       Row Name 10/26/24 1033           Physical Therapy Time and Intention    Document Type evaluation  presents with weakness and darkened stool Dx; symptomatic anemia. H/o lung nodules, CAD< Htn, Hld, previous CABG  -AJ     Mode of Treatment physical therapy  -       Row Name 10/26/24 1033          General Information    Patient Profile Reviewed yes  -AJ     Prior Level of Function independent:;all household mobility;community mobility;home management;gait;ADL's  -AJ     Existing Precautions/Restrictions fall  -AJ     Barriers to Rehab medically complex  -AJ       Row Name 10/26/24 1033          Living Environment    People in Home alone  -AJ       Row Name 10/26/24 1033          Home Main Entrance    Number of Stairs, Main Entrance five  -AJ     Stair Railings, Main Entrance railings on both sides of stairs  -AJ       Row Name 10/26/24 1033          Stairs Within Home, Primary    Number of Stairs, Within Home, Primary none  -AJ       Row Name 10/26/24 1033          Cognition    Orientation Status (Cognition) oriented x 4  -       Row Name 10/26/24 1033          Safety Issues/Impairments Affecting Functional Mobility    Safety Issues Affecting Function (Mobility) insight into deficits/self-awareness  -AJ     Impairments Affecting Function (Mobility) balance;sensation/sensory awareness;shortness of breath  -               User Key  (r) = Recorded By, (t) = Taken By, (c) = Cosigned By      Initials Name Provider Type    Kailash Jama PT DPT Physical Therapist                   Mobility       Row Name 10/26/24 1033          Bed Mobility    Bed Mobility rolling right;supine-sit  -AJ     Rolling Right [place] (Bed Mobility) contact guard;verbal cues  -AJ     Supine-Sit [place] (Bed Mobility) contact guard;verbal cues  -AJ     Assistive Device (Bed Mobility) head of bed elevated  -AJ       Row Name 10/26/24 1033          Bed-Chair Transfer    Bed-Chair [place] (Transfers) minimum assist (75% patient effort);1 person assist  -AJ      Comment, (Bed-Chair Transfer) HHA but may benefit from FWW  -       Row Name 10/26/24 1033          Sit-Stand Transfer    Sit-Stand [place] (Transfers) contact guard  -       Row Name 10/26/24 1033          Gait/Stairs (Locomotion)    [place] Level (Gait) contact guard;minimum assist (75% patient effort);1 person assist  -     Patient was able to Ambulate yes  -     Distance in Feet (Gait) 60  -AJ     Deviations/Abnormal Patterns (Gait) base of support, wide;stride length decreased;gait speed decreased  -               User Key  (r) = Recorded By, (t) = Taken By, (c) = Cosigned By      Initials Name Provider Type    Kailash Jama PT DPT Physical Therapist                   Obj/Interventions       Row Name 10/26/24 1033          Range of Motion Comprehensive    General Range of Motion bilateral lower extremity ROM WFL;bilateral upper extremity ROM WFL  -       Row Name 10/26/24 1033          Strength Comprehensive (MMT)    General Manual Muscle Testing (MMT) Assessment no strength deficits identified  -     Comment, General Manual Muscle Testing (MMT) Assessment remains WFL 5/5 globally  -       Row Name 10/26/24 1033          Balance    Balance Assessment sitting static balance;standing static balance;sitting dynamic balance;standing dynamic balance  -     Static Sitting Balance independent  -     Dynamic Sitting Balance independent  -AJ     Position, Sitting Balance supported;sitting edge of bed  -     Static Standing Balance contact guard  -     Dynamic Standing Balance contact guard;minimal assist  -     Position/Device Used, Standing Balance supported  -     Balance Interventions sitting;standing;sit to stand;supported;static;dynamic;occupation based/functional task  -       Row Name 10/26/24 1033          Sensory Assessment (Somatosensory)    Sensory Assessment (Somatosensory) other (see comments)  B feet numbness  -               User Key  (r) = Recorded By, (t) =  Taken By, (c) = Cosigned By      Initials Name Provider Type    Kailash Jama, PT DPT Physical Therapist                   Goals/Plan       Row Name 10/26/24 1033          Transfer Goal 1 (PT)    Activity/Assistive Device (Transfer Goal 1, PT) sit-to-stand/stand-to-sit;bed-to-chair/chair-to-bed;toilet;car transfer;walk-in shower  -AJ     [place] Level/Cues Needed (Transfer Goal 1, PT) independent  -AJ     Time Frame (Transfer Goal 1, PT) long term goal (LTG);10 days  -AJ     Progress/Outcome (Transfer Goal 1, PT) new goal  -AJ       Row Name 10/26/24 1033          Gait Training Goal 1 (PT)    Activity/Assistive Device (Gait Training Goal 1, PT) gait (walking locomotion);decrease asymmetrical patterns;decrease fall risk;diminish gait deviation;forward stepping;improve balance and speed;increase endurance/gait distance  -AJ     [place] Level (Gait Training Goal 1, PT) independent  -AJ     Distance (Gait Training Goal 1, PT) 250' while carrying weighted basin to simulate household tasks as pt needs to return home safely  -AJ     Time Frame (Gait Training Goal 1, PT) long term goal (LTG);10 days  -AJ     Progress/Outcome (Gait Training Goal 1, PT) new goal  -AJ       Row Name 10/26/24 1033          Balance Goal 1 (PT)    Activity/Assistive Device (Balance Goal) sitting static balance;sitting dynamic balance;sit to stand dynamic balance;standing static balance;standing dynamic balance;unsupported;with functional activities/occupations;with ADLs;with functional mobility activities;with functional reaching activities  -AJ     [place] Level/Cues Needed (Balance Goal 1, PT) supervision required  -AJ     Time Frame (Balance Goal 1, PT) long-term goal (LTG);by discharge  -AJ     Strategies/Barriers (Balance Goal 1, PT) simulate home tasks  -AJ     Progress/Outcomes (Balance Goal 1, PT) other (see comments)  new goal  -       Row Name 10/26/24 1033          Stairs Goal 1 (PT)    Activity/Assistive Device  (Stairs Goal 1, PT) ascending stairs;descending stairs;using handrail, left;using handrail, right;step-to-step;decrease fall risk;improve balance and speed  -AJ     [place] Level/Cues Needed (Stairs Goal 1, PT) independent  -AJ     Number of Stairs (Stairs Goal 1, PT) 5 as needed for home safety  -AJ     Time Frame (Stairs Goal 1, PT) long term goal (LTG);10 days  -AJ     Progress/Outcome (Stairs Goal 1, PT) new goal  -       Row Name 10/26/24 1033          Therapy Assessment/Plan (PT)    Planned Therapy Interventions (PT) balance training;bed mobility training;gait training;home exercise program;postural re-education;patient/family education;transfer training;neuromuscular re-education;strengthening;stair training;ROM (range of motion)  -AJ               User Key  (r) = Recorded By, (t) = Taken By, (c) = Cosigned By      Initials Name Provider Type    Kailash Jama, PT DPT Physical Therapist                   Clinical Impression       Row Name 10/26/24 1033          Pain    Pretreatment Pain Rating 0/10 - no pain  -AJ     Posttreatment Pain Rating 0/10 - no pain  -AJ     Pain Management Interventions nursing notified;exercise or physical activity utilized  -AJ     Response to Pain Interventions no change per patient report;mobility function improved  -       Row Name 10/26/24 1033          Plan of Care Review    Plan of Care Reviewed With patient  -AJ     Outcome Evaluation PT eval complete. Pt in fowlers position, AOx4 and agreeable to session. Mr. Paz reports of being indep at baseline but has noticed his activity tolerance has signfiicantly decreased over the past 2 months, due to feeling generally weak and SOA with all mobility. Today the pt brought self to EOB with CGA and verbal cues, both UE and LE AROM and strength remain WFL and 5/5 strength. He reports numbness in B feet but is baseline. Pt performed sit<>stand and ambulated short distance of 10' to recliner and demo shuffling type gait,  which he reports is normal lately due to feeling weakness. During 2nd trial, Pt ambulated again around room 25'x2 reps with HHA and responded well to verbal cues to take more normal steps as he would at home and gait cycle improved. Pt had no complaints and was motivated as he wants to return home and maintain high level of independence. Pt left in recliner with RN in room. Pt will benefit from skilled PT services to improve gait, stair safety, decrease fall risk and return to OF. Recommend home with assist and OPPT when medically stable pending progress.  -MICHELLE       Row Name 10/26/24 1033          Therapy Assessment/Plan (PT)    Rehab Potential (PT) good  -     Criteria for Skilled Interventions Met (PT) yes;meets criteria;skilled treatment is necessary  -AJ     Therapy Frequency (PT) 2 times/day  -AJ     Predicted Duration of Therapy Intervention (PT) until d/c  -MICHELLE       Row Name 10/26/24 1033          Vital Signs    Pre Patient Position Supine  -AJ     Intra Patient Position Standing  -AJ     Post Patient Position Sitting  -       Row Name 10/26/24 1033          Positioning and Restraints    Pre-Treatment Position in bed  -AJ     Post Treatment Position chair  -AJ     In Chair notified nsg;sitting;call light within reach;with nsg  -AJ               User Key  (r) = Recorded By, (t) = Taken By, (c) = Cosigned By      Initials Name Provider Type    Kailash Jama, PT DPT Physical Therapist                   Outcome Measures       Row Name 10/26/24 1033 10/26/24 0834       How much help from another person do you currently need...    Turning from your back to your side while in flat bed without using bedrails? 4  -AJ 4  -MS    Moving from lying on back to sitting on the side of a flat bed without bedrails? 4  -AJ 4  -MS    Moving to and from a bed to a chair (including a wheelchair)? 3  -AJ 3  -MS    Standing up from a chair using your arms (e.g., wheelchair, bedside chair)? 4  -AJ 3  -MS    Climbing 3-5  steps with a railing? 2  -AJ 3  -MS    To walk in hospital room? 3  -AJ 3  -MS    AM-PAC 6 Clicks Score (PT) 20  - 20  -MS    Highest Level of Mobility Goal 6 --> Walk 10 steps or more  -AJ 6 --> Walk 10 steps or more  -MS      Row Name 10/26/24 1033          Functional Assessment    Outcome Measure Options AM-PAC 6 Clicks Basic Mobility (PT)  -               User Key  (r) = Recorded By, (t) = Taken By, (c) = Cosigned By      Initials Name Provider Type    Ligia Alfred, RN Registered Nurse    Kailash Jama, PT DPT Physical Therapist                                 Physical Therapy Education       Title: PT OT SLP Therapies (In Progress)       Topic: Physical Therapy (Done)       Point: Mobility training (Done)       Learning Progress Summary            Patient Acceptance, E, VU by  at 10/26/2024 1159    Comment: role of PT, d//c planning, fall risk/call for assist, goal setting for home sdafety                      Point: Home exercise program (Done)       Learning Progress Summary            Patient Acceptance, E, VU by  at 10/26/2024 1159    Comment: role of PT, d//c planning, fall risk/call for assist, goal setting for home sdafety                      Point: Body mechanics (Done)       Learning Progress Summary            Patient Acceptance, E, VU by  at 10/26/2024 1159    Comment: role of PT, d//c planning, fall risk/call for assist, goal setting for home sdafety                      Point: Precautions (Done)       Learning Progress Summary            Patient Acceptance, E, VU by  at 10/26/2024 1159    Comment: role of PT, d//c planning, fall risk/call for assist, goal setting for home sdafety                                      User Key       Initials Effective Dates Name Provider Type [facility] 08/15/24 -  Kailash Jin, PT DPT Physical Therapist PT                  PT Recommendation and Plan  Planned Therapy Interventions (PT): balance training, bed mobility training, gait  training, home exercise program, postural re-education, patient/family education, transfer training, neuromuscular re-education, strengthening, stair training, ROM (range of motion)  Outcome Evaluation: PT eval complete. Pt in fowlers position, AOx4 and agreeable to session. Mr. Paz reports of being indep at baseline but has noticed his activity tolerance has signfiicantly decreased over the past 2 months, due to feeling generally weak and SOA with all mobility. Today the pt brought self to EOB with CGA and verbal cues, both UE and LE AROM and strength remain WFL and 5/5 strength. He reports numbness in B feet but is baseline. Pt performed sit<>stand and ambulated short distance of 10' to recliner and demo shuffling type gait, which he reports is normal lately due to feeling weakness. During 2nd trial, Pt ambulated again around room 25'x2 reps with HHA and responded well to verbal cues to take more normal steps as he would at home and gait cycle improved. Pt had no complaints and was motivated as he wants to return home and maintain high level of independence. Pt left in recliner with RN in room. Pt will benefit from skilled PT services to improve gait, stair safety, decrease fall risk and return to PLOF. Recommend home with assist and OPPT when medically stable pending progress.     Time Calculation:         PT Charges       Row Name 10/26/24 1033             Time Calculation    Start Time 1033  -AJ      Stop Time 1128  included chart review and staff communication  -      Time Calculation (min) 55 min  -AJ      PT Received On 10/26/24  -AJ      PT Goal Re-Cert Due Date 11/05/24  -AJ         Untimed Charges    PT Eval/Re-eval Minutes 55  -AJ         Total Minutes    Untimed Charges Total Minutes 55  -AJ       Total Minutes 55  -AJ                User Key  (r) = Recorded By, (t) = Taken By, (c) = Cosigned By      Initials Name Provider Type    Kailash Jama PT DPT Physical Therapist                  Therapy  Charges for Today       Code Description Service Date Service Provider Modifiers Qty    03019034825 HC PT EVAL MOD COMPLEXITY 4 10/26/2024 Kailash Jin, PT DPT GP 1            PT G-Codes  Outcome Measure Options: AM-PAC 6 Clicks Basic Mobility (PT)  AM-PAC 6 Clicks Score (PT): 20  PT Discharge Summary  Anticipated Discharge Disposition (PT): home with assist, home with outpatient therapy services    Kialash Jin PT DPT  10/26/2024

## 2024-10-26 NOTE — PROGRESS NOTES
[facility]  Inpatient Progress Note  Today's date:  10/26/24    Michael CASANOVA [facility]  1945       Reason for Follow Up:   GI bleed    Subjective:   No issues overnight. Had some mild nausea after having liquids. No vomiting. No reported melena, hematemesis, or hematochezia.     Received 2 units of FFP yesterday morning and a 3rd unit after EGD yesterday. Received 1 unit of PRBC on 10/24 prior to transfer here. Also received 1 unit of PRBC yesterday after EGD yesterday.       Current Facility-Administered Medications:     acetaminophen (TYLENOL) tablet 650 mg, 650 mg, Oral, Q4H PRN **OR** acetaminophen (TYLENOL) 160 MG/5ML oral solution 650 mg, 650 mg, Oral, Q4H PRN **OR** acetaminophen (TYLENOL) suppository 650 mg, 650 mg, Rectal, Q4H PRN, Rick Vance DO    atorvastatin (LIPITOR) tablet 40 mg, 40 mg, Oral, Nightly, Rick Vance DO, 40 mg at 10/25/24 2025    sennosides-docusate (PERICOLACE) 8.6-50 MG per tablet 2 tablet, 2 tablet, Oral, BID PRN **AND** polyethylene glycol (MIRALAX) packet 17 g, 17 g, Oral, Daily PRN **AND** bisacodyl (DULCOLAX) EC tablet 5 mg, 5 mg, Oral, Daily PRN **AND** bisacodyl (DULCOLAX) suppository 10 mg, 10 mg, Rectal, Daily PRN, Rick Vance DO    melatonin tablet 5 mg, 5 mg, Oral, Nightly PRN, Sebastian Mckeon MD, 5 mg at 10/25/24 2025    metoprolol succinate XL (TOPROL-XL) 24 hr tablet 25 mg, 25 mg, Oral, Daily, Rick Vance DO, 25 mg at 10/26/24 0834    ondansetron (ZOFRAN) injection 4 mg, 4 mg, Intravenous, Q6H PRN, Rick Vance DO    pantoprazole (PROTONIX) injection 40 mg, 40 mg, Intravenous, Q12H, Rick Vance DO, 40 mg at 10/26/24 0833    sodium chloride 0.9 % flush 10 mL, 10 mL, Intravenous, Q12H, Rick Vance DO, 10 mL at 10/26/24 0834    sodium chloride 0.9 % flush 10 mL, 10 mL, Intravenous, PRN, Rick Vance DO    valsartan (DIOVAN) tablet 80 mg, 80 mg, Oral, Q24H, Rick Vance DO, 80 mg at 10/26/24  0834      Vital Signs:  Temp:  [97.4 °F (36.3 °C)-98.6 °F (37 °C)] 98 °F (36.7 °C)  Heart Rate:  [47-78] 64  Resp:  [15-24] 18  BP: (107-163)/(44-82) 157/80    Physical Exam:  General: no acute distress, alert and oriented  HEENT: perrl, no sclera icterus  CV: rrr, no murmur  Resp: lungs clear to auscultation, no wheeze or rhonchi  Abd: soft, nontender, nondistended, no rebound our guarding  Skin: no rash, no jaundice     Results Review:   I have reviewed all of the patient's current test results    Results from last 7 days   Lab Units 10/26/24  0651 10/25/24  2334 10/25/24  1246 10/25/24  0656 10/24/24  2023 10/23/24  1300   WBC 10*3/mm3 4.60  --   --  4.47  --  7.7   HEMOGLOBIN g/dL 7.6* 7.8* 7.5* 7.4*   < > 7.7*   HEMATOCRIT % 26.0* 26.2* 25.8* 25.5*   < > 27.1*   PLATELETS 10*3/mm3 143  --   --  169  --  262    < > = values in this interval not displayed.       Results from last 7 days   Lab Units 10/25/24  0656 10/23/24  1300   SODIUM mmol/L 139 139   POTASSIUM mmol/L 4.2 4.9   CHLORIDE mmol/L 106 105   CO2 mmol/L 21.0* 20   BUN mg/dL 20 21   CREATININE mg/dL 0.88 1.12   CALCIUM mg/dL 8.9 9.3   BILIRUBIN mg/dL 0.7 0.6   ALK PHOS U/L 63 73   ALT (SGPT) U/L 13 18   AST (SGOT) U/L 19 27   GLUCOSE mg/dL 99 84       Results from last 7 days   Lab Units 10/25/24  1303 10/25/24  0656 10/24/24  2023   INR  1.86* 2.94* 2.74*       Summary:  80 y/o male presented as transfer from outside hospital with melena and iron deficiency anemia. Hgb 7.7 on admission here after receiving 1 units of PRBC at outside hospital. 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 Received a 2nd unit PRBC here on 10/25. Received total of 3 units of FFP 10/25. On coumadin with history of atrial fibrillation as well as DVT/PE with Protein S deficiency.    Impression:  Upper GI bleed presenting with melena with bleeding gastric AVM.  Iron deficiency  anemia.  Supratherapeutic INR, on Coumadin.  Atrial fibrillation.  Coronary artery disease.  History of DVT/PE with Protein S deficiency.    Plan:  Repeat hgb/hct this afternoon. Will transfuse additional PRBC if needed. Continue holding Coumadin. If he is high risk for thromboembolic event due to Protein S deficiency and anticoagulation is needed, then can bridge with heparin for now. Will defer this to medicine team. Ok for full liquid diet. If no further bleeding and hgb remains stable, can advance diet this evening.    Fareed Velasco MD  10/26/24  08:55 CDT

## 2024-10-26 NOTE — PROGRESS NOTES
[facility] Medicine Services  INPATIENT PROGRESS NOTE    Patient Name: Michael Paz  Date of Admission: 10/24/2024  Today's Date: 10/26/24  Length of Stay: 1  Primary Care Physician: Henrietta Jin APRN    Subjective   Chief Complaint: Dark stool symptomatic anemia  HPI   Patient was transferred from [facility] ER today to our facility for evaluation.  He was seen in room 463 after arrival with nursing at bedside.  This is a 79-year-old male with a history of CAD status post prior CABG on full dose aspirin.  He is also on warfarin for history of A-fib as well as prior DVT/PE with protein S deficiency.  Other history includes infrarenal AAA status post endograft, hypertension, hyperlipidemia, tobacco use, right upper lobe pulmonary nodule being followed by pulmonary.  Patient had a cyst removed from his back surgically in August.  He says ever since then things have gone downhill for him.  For the last 2 months he has had some progressive weakness, fatigue, shortness of breath with exertion, dizziness especially upon standing.  Patient denies any syncopal events.  Patient does state that symptoms seem to have slowly increased over the last month and he also has noted some dark tarry stools over the last month.  Patient went to see Dr. Vicente of cardiology a few weeks ago and reportedly had an echocardiogram done but I cannot find the echo test or the office visit in our system.  He was referred to Dr. Lyons reportedly for slow afib, his metoprolol xL was reduced from 50 to 25mg.  I do see that patient has an upcoming appointment with Dr. Lyons in February 2025.  Patient went to see pulmonary yesterday for follow-up and monitoring of his known right upper lobe pulmonary nodule.  Labs were drawn during evaluation and patient was found to have a hemoglobin of 7.7.  Due to continued not feeling well he went to the ER today and there his hemoglobin was found to be 6.8.  He  was transfused a unit of blood which was completed prior to arrival here.  Patient tells me his INR was 2.3 but I cannot find it in transfer records.  Currently on arrival patient still feels tired but denies any current chest pain, shortness of breath, dizziness, nausea, abdominal pain, new focal numbness or weakness.  No fevers or chills   10/25  As before admitted for symptomatic anemia started on PPI hold anticoagulation GI on board n.p.o. EGD was given fresh frozen plasma per GI  10/26  80 y/o male presented as transfer from outside hospital with melena and iron deficiency anemia. Hgb 7.7 on admission here after receiving 1 units of PRBC at outside hospital. 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 Received a 2nd unit PRBC here on 10/25. Received total of 3 units of FFP 10/25. On coumadin with history of atrial fibrillation as well as DVT/PE with Protein S deficiency.  For this patient Eliquis might be a safer choice than Coumadin he was he told me before that his insurance would not pay for Eliquis for now we will not anticoagulate this man with the fear of recurrent bleeding as I suspect the risks of bleeding outweighs the risk of thrombosis    Review of Systems   All pertinent negatives and positives are as above. All other systems have been reviewed and are negative unless otherwise stated.     Objective    Temp:  [97.4 °F (36.3 °C)-98.6 °F (37 °C)] 98 °F (36.7 °C)  Heart Rate:  [47-77] 64  Resp:  [15-24] 18  BP: (107-163)/(44-82) 157/80  Physical Exam  Constitutional:       Appearance: Normal appearance.   HENT:      Head: Normocephalic.      Nose: Nose normal.   Eyes:      Pupils: Pupils are equal, round, and reactive to light.   Cardiovascular:      Rate and Rhythm: Normal rate and regular rhythm.   Pulmonary:      Breath sounds: Stridor present.   Abdominal:      General: Abdomen is flat.      Palpations: Abdomen is soft.  "  Musculoskeletal:         General: Normal range of motion.      Cervical back: Normal range of motion.   Skin:     Capillary Refill: Capillary refill takes less than 2 seconds.   Neurological:      Mental Status: He is alert.             Results Review:  I have reviewed the labs, radiology results, and diagnostic studies.    Laboratory Data:   Results from last 7 days   Lab Units 10/26/24  0651 10/25/24  2334 10/25/24  1246 10/25/24  0656 10/24/24  2023 10/23/24  1300   WBC 10*3/mm3 4.60  --   --  4.47  --  7.7   HEMOGLOBIN g/dL 7.6* 7.8* 7.5* 7.4*   < > 7.7*   HEMATOCRIT % 26.0* 26.2* 25.8* 25.5*   < > 27.1*   PLATELETS 10*3/mm3 143  --   --  169  --  262    < > = values in this interval not displayed.        Results from last 7 days   Lab Units 10/25/24  0656 10/23/24  1300   SODIUM mmol/L 139 139   POTASSIUM mmol/L 4.2 4.9   CHLORIDE mmol/L 106 105   CO2 mmol/L 21.0* 20   BUN mg/dL 20 21   CREATININE mg/dL 0.88 1.12   CALCIUM mg/dL 8.9 9.3   BILIRUBIN mg/dL 0.7 0.6   ALK PHOS U/L 63 73   ALT (SGPT) U/L 13 18   AST (SGOT) U/L 19 27   GLUCOSE mg/dL 99 84       Culture Data:   No results found for: \"BLOODCX\", \"URINECX\", \"WOUNDCX\", \"MRSACX\", \"RESPCX\", \"STOOLCX\"    Radiology Data:   Imaging Results (Last 24 Hours)       ** No results found for the last 24 hours. **            I have reviewed the patient's current medications.     Assessment/Plan   Assessment  Active Hospital Problems    Diagnosis     **Symptomatic anemia     S/P CABG (coronary artery bypass graft)     Protein S deficiency     Atrial fibrillation, chronic     Lung nodule     Tobacco abuse     Hypertension     Hyperlipidemia     CAD (coronary artery disease)        Treatment Plan  . #1 symptomatic anemia -patient has been feeling weak, tired, fatigued, dizzy with standing.  Will check orthostatics.  He has a history of CAD and CABG.  He is on Coumadin and full dose aspirin.  His hemoglobin yesterday was 7.7 and at outside facility today was reported " 6.8.  He was given 1 unit of PRBC.  Rectal exam was not performed.  Patient reports dark stools for the last month.  Will check a fecal occult.  Check a blood count now posttransfusion and trend.  PPI IV twice daily.  Will ask GI to evaluate for possible EGD if warranted.      #2 CAD -with a history of CABG.  No active chest pain currently.  Continue beta-blocker and statin.  Hold aspirin.     #3 A-fib -rate controlled.  Continue beta-blocker.  Hold Coumadin.  Check INR.  Cannot find INR result from outside hospital although patient tells me was 2.3.     #4 history protein S deficiency/DVT/PE -again given above will have to hold Coumadin for tonight.  Monitor INR.       #5 hypertension -blood pressure is 158 on arrival.  Continue home blood pressure meds with holding parameters while monitoring for any bleeding.     #6 hyperlipidemia -statin     #7 right upper lobe pulmonary nodule -known history and follows with pulmonary for surveillance.     Medical Decision Making  Number and Complexity of problems: 1-2 acute, multiple chronic  Differential Diagnosis: As above     Conditions and Status        New to me.  Clinically looks stable.  Stable vitals.  Nontoxic.  Currently mild fatigue is main symptom post initial 1 unit PRBC.     [facility] Data  External documents reviewed: Transfer documents reviewed  Cardiac tracing (EKG, telemetry) interpretation: Currently rate controlled fib on the bedside monitor  Radiology interpretation: Outside imaging reports reviewed  Labs reviewed: Outside transfer labs reviewed  Any tests that were considered but not ordered: None     Decision rules/scores evaluated (example [id number], Wells, etc): None     Discussed with: Patient, nursing     Care Planning  Shared decision making: Patient apprised of current labs, vitals, imaging and treatment plan.  They are agreeable with proceeding with plans as discussed.     Code status and discussions: DNR/DNI     Disposition  Social Determinants of  Health that impact treatment or disposition: none  Estimated length of stay is less than 2 midnights.      I confirmed that the patient's advanced care plan is present, code status is documented, and a surrogate decision maker is listed in the patient's medical record.      The patient's surrogate decision maker is his sister Kira.    Electronically signed by Sebastian Mckeon MD, 10/26/24, 11:13 CDT.

## 2024-10-27 ENCOUNTER — APPOINTMENT (OUTPATIENT)
Dept: GENERAL RADIOLOGY | Facility: HOSPITAL | Age: 79
DRG: 378 | End: 2024-10-27
Payer: MEDICARE

## 2024-10-27 LAB
HCT VFR BLD AUTO: 28.3 % (ref 37.5–51)
HCT VFR BLD AUTO: 29.1 % (ref 37.5–51)
HCT VFR BLD AUTO: 29.7 % (ref 37.5–51)
HGB BLD-MCNC: 8.5 G/DL (ref 13–17.7)
HGB BLD-MCNC: 8.6 G/DL (ref 13–17.7)
HGB BLD-MCNC: 8.7 G/DL (ref 13–17.7)

## 2024-10-27 PROCEDURE — 85018 HEMOGLOBIN: CPT | Performed by: INTERNAL MEDICINE

## 2024-10-27 PROCEDURE — 99232 SBSQ HOSP IP/OBS MODERATE 35: CPT | Performed by: INTERNAL MEDICINE

## 2024-10-27 PROCEDURE — 85014 HEMATOCRIT: CPT | Performed by: INTERNAL MEDICINE

## 2024-10-27 PROCEDURE — 71045 X-RAY EXAM CHEST 1 VIEW: CPT

## 2024-10-27 RX ORDER — FUROSEMIDE 40 MG/1
40 TABLET ORAL DAILY
Status: DISCONTINUED | OUTPATIENT
Start: 2024-10-27 | End: 2024-11-01 | Stop reason: HOSPADM

## 2024-10-27 RX ORDER — FUROSEMIDE 10 MG/ML
40 INJECTION INTRAMUSCULAR; INTRAVENOUS ONCE
Status: DISCONTINUED | OUTPATIENT
Start: 2024-10-27 | End: 2024-10-27

## 2024-10-27 RX ADMIN — PANTOPRAZOLE SODIUM 40 MG: 40 TABLET, DELAYED RELEASE ORAL at 08:50

## 2024-10-27 RX ADMIN — ATORVASTATIN CALCIUM 40 MG: 40 TABLET, FILM COATED ORAL at 20:50

## 2024-10-27 RX ADMIN — PANTOPRAZOLE SODIUM 40 MG: 40 TABLET, DELAYED RELEASE ORAL at 20:50

## 2024-10-27 RX ADMIN — METOPROLOL SUCCINATE 25 MG: 25 TABLET, EXTENDED RELEASE ORAL at 08:50

## 2024-10-27 RX ADMIN — VALSARTAN 80 MG: 80 TABLET, FILM COATED ORAL at 08:50

## 2024-10-27 RX ADMIN — LACTULOSE 20 G: 20 SOLUTION ORAL at 08:50

## 2024-10-27 RX ADMIN — FUROSEMIDE 40 MG: 40 TABLET ORAL at 14:20

## 2024-10-27 RX ADMIN — Medication 5 MG: at 20:50

## 2024-10-27 NOTE — PROGRESS NOTES
[facility] Medicine Services  INPATIENT PROGRESS NOTE    Patient Name: Michael Paz  Date of Admission: 10/24/2024  Today's Date: 10/27/24  Length of Stay: 1  Primary Care Physician: Henrietta Jin APRN    Subjective   Chief Complaint: Dark stool symptomatic anemia  HPI   Patient was transferred from [facility] ER today to our facility for evaluation.  He was seen in room 463 after arrival with nursing at bedside.  This is a 79-year-old male with a history of CAD status post prior CABG on full dose aspirin.  He is also on warfarin for history of A-fib as well as prior DVT/PE with protein S deficiency.  Other history includes infrarenal AAA status post endograft, hypertension, hyperlipidemia, tobacco use, right upper lobe pulmonary nodule being followed by pulmonary.  Patient had a cyst removed from his back surgically in August.  He says ever since then things have gone downhill for him.  For the last 2 months he has had some progressive weakness, fatigue, shortness of breath with exertion, dizziness especially upon standing.  Patient denies any syncopal events.  Patient does state that symptoms seem to have slowly increased over the last month and he also has noted some dark tarry stools over the last month.  Patient went to see Dr. Vicente of cardiology a few weeks ago and reportedly had an echocardiogram done but I cannot find the echo test or the office visit in our system.  He was referred to Dr. Lyons reportedly for slow afib, his metoprolol xL was reduced from 50 to 25mg.  I do see that patient has an upcoming appointment with Dr. Lyons in February 2025.  Patient went to see pulmonary yesterday for follow-up and monitoring of his known right upper lobe pulmonary nodule.  Labs were drawn during evaluation and patient was found to have a hemoglobin of 7.7.  Due to continued not feeling well he went to the ER today and there his hemoglobin was found to be 6.8.  He  was transfused a unit of blood which was completed prior to arrival here.  Patient tells me his INR was 2.3 but I cannot find it in transfer records.  Currently on arrival patient still feels tired but denies any current chest pain, shortness of breath, dizziness, nausea, abdominal pain, new focal numbness or weakness.  No fevers or chills   10/25  As before admitted for symptomatic anemia started on PPI hold anticoagulation GI on board n.p.o. EGD was given fresh frozen plasma per GI  10/26  80 y/o male presented as transfer from outside hospital with melena and iron deficiency anemia. Hgb 7.7 on admission here after receiving 1 units of PRBC at outside hospital. 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 Received a 2nd unit PRBC here on 10/25. Received total of 3 units of FFP 10/25. On coumadin with history of atrial fibrillation as well as DVT/PE with Protein S deficiency.  For this patient Eliquis might be a safer choice than Coumadin he was he told me before that his insurance would not pay for Eliquis for now we will not anticoagulate this man with the fear of recurrent bleeding as I suspect the risks of bleeding outweighs the risk of thrombosis  10/27  As before EGD was showing AVM in the stomach fundus treated with APC and epinephrine injections hemoglobin remained stable remains on Protonix  Review of Systems   All pertinent negatives and positives are as above. All other systems have been reviewed and are negative unless otherwise stated.     Objective    Temp:  [97.8 °F (36.6 °C)-98.8 °F (37.1 °C)] 98.1 °F (36.7 °C)  Heart Rate:  [60-86] 64  Resp:  [16-18] 18  BP: (128-154)/(55-79) 154/66  Physical Exam  Constitutional:       Appearance: Normal appearance.   HENT:      Head: Normocephalic.      Nose: Nose normal.   Eyes:      Pupils: Pupils are equal, round, and reactive to light.   Cardiovascular:      Rate and Rhythm: Normal  "rate and regular rhythm.   Pulmonary:      Breath sounds: Stridor present.   Abdominal:      General: Abdomen is flat.      Palpations: Abdomen is soft.   Musculoskeletal:         General: Normal range of motion.      Cervical back: Normal range of motion.   Skin:     Capillary Refill: Capillary refill takes less than 2 seconds.   Neurological:      Mental Status: He is alert.             Results Review:  I have reviewed the labs, radiology results, and diagnostic studies.    Laboratory Data:   Results from last 7 days   Lab Units 10/27/24  0929 10/26/24  2333 10/26/24  1634 10/26/24  0651 10/25/24  1246 10/25/24  0656 10/24/24  2023 10/23/24  1300   WBC 10*3/mm3  --   --   --  4.60  --  4.47  --  7.7   HEMOGLOBIN g/dL 8.5* 7.8* 8.5* 7.6*   < > 7.4*   < > 7.7*   HEMATOCRIT % 28.3* 25.7* 27.5* 26.0*   < > 25.5*  --  27.1*   PLATELETS 10*3/mm3  --   --   --  143  --  169  --  262    < > = values in this interval not displayed.        Results from last 7 days   Lab Units 10/25/24  0656 10/23/24  1300   SODIUM mmol/L 139 139   POTASSIUM mmol/L 4.2 4.9   CHLORIDE mmol/L 106 105   CO2 mmol/L 21.0* 20   BUN mg/dL 20 21   CREATININE mg/dL 0.88 1.12   CALCIUM mg/dL 8.9 9.3   BILIRUBIN mg/dL 0.7 0.6   ALK PHOS U/L 63 73   ALT (SGPT) U/L 13 18   AST (SGOT) U/L 19 27   GLUCOSE mg/dL 99 84       Culture Data:   No results found for: \"BLOODCX\", \"URINECX\", \"WOUNDCX\", \"MRSACX\", \"RESPCX\", \"STOOLCX\"    Radiology Data:   Imaging Results (Last 24 Hours)       Procedure Component Value Units Date/Time    XR Abdomen KUB [[id number]] Collected: 10/26/24 1413     Updated: 10/26/24 1417    Narrative:      EXAMINATION: XR ABDOMEN KUB-     10/26/2024 1:09 PM     HISTORY: abdominal distention; Z74.09-Other reduced mobility     1 view abdomen/KUB.     Mild degenerative lumbar spine changes.  Aortic endograft present.     Diffuse bowel gas pattern with no sign of bowel obstruction or free air.       Impression:      1. No acute abnormality is " seen.  2. No bowel distention is seen.           This report was signed and finalized on 10/26/2024 2:14 PM by Dr. Ricardo Jernigan MD.               I have reviewed the patient's current medications.     Assessment/Plan   Assessment  Active Hospital Problems    Diagnosis     **Symptomatic anemia     S/P CABG (coronary artery bypass graft)     Protein S deficiency     Atrial fibrillation, chronic     Lung nodule     Tobacco abuse     Hypertension     Hyperlipidemia     CAD (coronary artery disease)        Treatment Plan  . #1 symptomatic anemia -patient has been feeling weak, tired, fatigued, dizzy with standing.  Will check orthostatics.  He has a history of CAD and CABG.  He is on Coumadin and full dose aspirin.  His hemoglobin yesterday was 7.7 and at outside facility today was reported 6.8.  He was given 1 unit of PRBC.  Rectal exam was not performed.  Patient reports dark stools for the last month.  Will check a fecal occult.  Check a blood count now posttransfusion and trend.  PPI IV twice daily.  Will ask GI to evaluate for possible EGD if warranted.      #2 CAD -with a history of CABG.  No active chest pain currently.  Continue beta-blocker and statin.  Hold aspirin.     #3 A-fib -rate controlled.  Continue beta-blocker.  Hold Coumadin.  Check INR.  Cannot find INR result from outside hospital although patient tells me was 2.3.     #4 history protein S deficiency/DVT/PE -again given above will have to hold Coumadin for tonight.  Monitor INR.       #5 hypertension -blood pressure is 158 on arrival.  Continue home blood pressure meds with holding parameters while monitoring for any bleeding.     #6 hyperlipidemia -statin     #7 right upper lobe pulmonary nodule -known history and follows with pulmonary for surveillance.     Medical Decision Making  Number and Complexity of problems: 1-2 acute, multiple chronic  Differential Diagnosis: As above     Conditions and Status        New to me.  Clinically looks stable.   Stable vitals.  Nontoxic.  Currently mild fatigue is main symptom post initial 1 unit PRBC.     [facility] Data  External documents reviewed: Transfer documents reviewed  Cardiac tracing (EKG, telemetry) interpretation: Currently rate controlled fib on the bedside monitor  Radiology interpretation: Outside imaging reports reviewed  Labs reviewed: Outside transfer labs reviewed  Any tests that were considered but not ordered: None     Decision rules/scores evaluated (example [id number], Wells, etc): None     Discussed with: Patient, nursing     Care Planning  Shared decision making: Patient apprised of current labs, vitals, imaging and treatment plan.  They are agreeable with proceeding with plans as discussed.     Code status and discussions: DNR/DNI     Disposition  Social Determinants of Health that impact treatment or disposition: none  Estimated length of stay is less than 2 midnights.      I confirmed that the patient's advanced care plan is present, code status is documented, and a surrogate decision maker is listed in the patient's medical record.      The patient's surrogate decision maker is his sister Kira.    Electronically signed by Sebastian Mckeon MD, 10/27/24, 11:22 CDT.

## 2024-10-27 NOTE — NURSING NOTE
IV accesss lost during shift change. Patient is up for possible discharge home tomorrow and is currently refusing an additional IV placement. Provider notified--see orders/MAR.

## 2024-10-28 ENCOUNTER — TELEPHONE (OUTPATIENT)
Dept: PULMONOLOGY | Facility: CLINIC | Age: 79
End: 2024-10-28

## 2024-10-28 ENCOUNTER — TELEPHONE (OUTPATIENT)
Dept: CARDIOLOGY | Facility: CLINIC | Age: 79
End: 2024-10-28
Payer: MEDICARE

## 2024-10-28 ENCOUNTER — APPOINTMENT (OUTPATIENT)
Dept: CT IMAGING | Facility: HOSPITAL | Age: 79
DRG: 378 | End: 2024-10-28
Payer: MEDICARE

## 2024-10-28 LAB
HCT VFR BLD AUTO: 30.6 % (ref 37.5–51)
HCT VFR BLD AUTO: 31.6 % (ref 37.5–51)
HGB BLD-MCNC: 8.9 G/DL (ref 13–17.7)
HGB BLD-MCNC: 9.5 G/DL (ref 13–17.7)
INR PPP: 1.42 (ref 0.91–1.09)
PROTHROMBIN TIME: 17.9 SECONDS (ref 11.8–14.8)

## 2024-10-28 PROCEDURE — 85610 PROTHROMBIN TIME: CPT | Performed by: HOSPITALIST

## 2024-10-28 PROCEDURE — 99231 SBSQ HOSP IP/OBS SF/LOW 25: CPT | Performed by: INTERNAL MEDICINE

## 2024-10-28 PROCEDURE — 97116 GAIT TRAINING THERAPY: CPT

## 2024-10-28 PROCEDURE — 97530 THERAPEUTIC ACTIVITIES: CPT

## 2024-10-28 PROCEDURE — 85014 HEMATOCRIT: CPT | Performed by: INTERNAL MEDICINE

## 2024-10-28 PROCEDURE — 85018 HEMOGLOBIN: CPT | Performed by: INTERNAL MEDICINE

## 2024-10-28 RX ORDER — WARFARIN SODIUM 5 MG/1
5 TABLET ORAL
Status: DISCONTINUED | OUTPATIENT
Start: 2024-10-28 | End: 2024-11-01 | Stop reason: HOSPADM

## 2024-10-28 RX ADMIN — Medication 5 MG: at 21:34

## 2024-10-28 RX ADMIN — ATORVASTATIN CALCIUM 40 MG: 40 TABLET, FILM COATED ORAL at 21:34

## 2024-10-28 RX ADMIN — METOPROLOL SUCCINATE 25 MG: 25 TABLET, EXTENDED RELEASE ORAL at 08:52

## 2024-10-28 RX ADMIN — PANTOPRAZOLE SODIUM 40 MG: 40 TABLET, DELAYED RELEASE ORAL at 21:34

## 2024-10-28 RX ADMIN — Medication 10 ML: at 21:34

## 2024-10-28 RX ADMIN — PANTOPRAZOLE SODIUM 40 MG: 40 TABLET, DELAYED RELEASE ORAL at 08:52

## 2024-10-28 RX ADMIN — VALSARTAN 80 MG: 80 TABLET, FILM COATED ORAL at 08:52

## 2024-10-28 RX ADMIN — WARFARIN 5 MG: 5 TABLET ORAL at 17:09

## 2024-10-28 RX ADMIN — FUROSEMIDE 40 MG: 40 TABLET ORAL at 08:51

## 2024-10-28 NOTE — THERAPY TREATMENT NOTE
Acute Care - Physical Therapy Treatment Note  [facility]     Patient Name: Mihcael Paz  : 1945  MRN: [id number]  Today's Date: 10/28/2024      Visit Dx:     ICD-10-CM ICD-9-CM   1. Impaired mobility [Z74.09]  Z74.09 799.89     Patient Active Problem List   Diagnosis    Tobacco abuse    Hypertension    Hyperlipidemia    Endoleak post endovascular aneurysm repair    AAA (abdominal aortic aneurysm)    Aneurysm    CAD (coronary artery disease)    Right-sided epistaxis    Hx pulmonary embolism    Hx of deep venous thrombosis    Rhinitis, chronic    Anticoagulant adverse reaction    Epistaxis    Lung nodule    Protein C deficiency    Protein S deficiency    Atrial fibrillation, chronic    Symptomatic anemia    S/P CABG (coronary artery bypass graft)     Past Medical History:   Diagnosis Date    AAA (abdominal aortic aneurysm)     Aneurysm     Arthritis     Atrial fibrillation     CAD (coronary artery disease)     CAD (coronary artery disease)     Clotting disorder     Deep vein thrombosis [id number]    Depression     Endoleak post endovascular aneurysm repair     History of DVT (deep vein thrombosis)     History of foot fracture     BILATERAL FOOT FRACTURES    History of fracture of left ankle     History of pulmonary embolus (PE)     History of transfusion  and     Hyperlipidemia     Hypertension     Myocardial infarction     Peripheral vascular disease     Pneumonia 4 times    Pulmonary embolism     Tobacco abuse      Past Surgical History:   Procedure Laterality Date    ABDOMINAL AORTIC ANEURYSM REPAIR      APPENDECTOMY      BACK SURGERY      CARDIAC CATHETERIZATION      CORONARY ANGIOPLASTY      CORONARY ARTERY BYPASS GRAFT      CORONARY STENT PLACEMENT      ENDOSCOPY N/A 10/25/2024    Procedure: ESOPHAGOGASTRODUODENOSCOPY WITH ANESTHESIA;  Surgeon: Fareed Velasco MD;  Location: [facility] ENDOSCOPY;  Service: Gastroenterology;  Laterality: N/A;  Pre: Symptomatic anemia;  Post: Bleeding  gastric AVM;  Nausea and vomiting    HEMORRHOIDECTOMY      INGUINAL HERNIA REPAIR      KNEE SURGERY Right     OTHER SURGICAL HISTORY      TYPE IA ENDOLEAK REPAIR    OTHER SURGICAL HISTORY      CRANIAL CYST     PT Assessment (Last 12 Hours)       PT Evaluation and Treatment       Row Name 10/28/24 1400 10/28/24 1041       Physical Therapy Time and Intention    Subjective Information complains of;dyspnea (P)   -AC complains of;dyspnea  -LH (r) AC (t) LH (c)    Document Type therapy note (daily note) (P)   -AC therapy note (daily note)  -LH (r) AC (t) LH (c)    Mode of Treatment physical therapy (P)   -AC physical therapy  -LH (r) AC (t) LH (c)      [facility] 10/28/24 1400 10/28/24 1041       General Information    Existing Precautions/Restrictions fall (P)   -AC fall  -LH (r) AC (t) LH (c)      [facility] 10/28/24 1400 10/28/24 1041       Pain    Pretreatment Pain Rating 0/10 - no pain (P)   -AC 0/10 - no pain  -LH (r) AC (t) LH (c)    Posttreatment Pain Rating 0/10 - no pain (P)   -AC 0/10 - no pain  -LH (r) AC (t) LH (c)      [facility] 10/28/24 1400 10/28/24 1041       Bed Mobility    Bed Mobility rolling right;supine-sit (P)   -AC --    Rolling Right [place] (Bed Mobility) contact guard;verbal cues (P)   -AC --    Supine-Sit [place] (Bed Mobility) contact guard;verbal cues (P)   -AC --    Assistive Device (Bed Mobility) head of bed elevated (P)   -AC --    Comment, (Bed Mobility) -- --  in bathroom upon arrival, in chair for tx  -LH (r) AC (t) LH (c)      [facility] 10/28/24 1400 10/28/24 1041       Transfers    Transfers sit-stand transfer;stand-sit transfer (P)   -AC sit-stand transfer;stand-sit transfer  -LH (r) AC (t) LH (c)      [facility] 10/28/24 1400 10/28/24 1041       Sit-Stand Transfer    Sit-Stand [place] (Transfers) contact guard (P)   -AC contact guard  -LH (r) AC (t) LH (c)      [facility] 10/28/24 1400 10/28/24 1041       Stand-Sit Transfer    Stand-Sit [place] (Transfers) contact guard  (P)   -AC contact guard  -LH (r) AC (t) LH (c)      Row Name 10/28/24 1400 10/28/24 1041       Gait/Stairs (Locomotion)    [place] Level (Gait) contact guard;verbal cues (P)   -AC contact guard;verbal cues  -LH (r) AC (t) LH (c)    Distance in Feet (Gait) 100 (P)   x2  -AC 75  x2  -LH (r) AC (t) LH (c)    Deviations/Abnormal Patterns (Gait) gait speed decreased;right sided deviations (P)   -AC gait speed decreased  -LH (r) AC (t) LH (c)      Row Name 10/28/24 1400 10/28/24 1041       Balance    Balance Assessment sitting static balance;sitting dynamic balance (P)   -AC sitting static balance;sitting dynamic balance  -LH (r) AC (t) LH (c)    Static Sitting Balance independent (P)   -AC independent  -LH (r) AC (t) LH (c)    Dynamic Sitting Balance independent (P)   -AC independent  -LH (r) AC (t) LH (c)    Position, Sitting Balance unsupported;sitting edge of bed (P)   -AC unsupported;sitting in chair  -LH (r) AC (t) LH (c)      Row Name 10/28/24 1041          Plan of Care Review    Plan of Care Reviewed With patient  -LH (r) AC (t) LH (c)     Progress improving  -LH (r) AC (t) LH (c)     Outcome Evaluation PT tx completed. Pt in the bathroom upon arrival to tx, I with toileting. Pt no c/o pain, but does have SOB after exertion during recovery/rest breaks. Pt STS SBA with no cues needed. Pt ambulated 75' x 2 with CGA, observed SOB only during post ambulation. Educated on BOA for continued improvement in overall function.  -LH (r) AC (t) LH (c)       Row Name 10/28/24 1400 10/28/24 1041       Positioning and Restraints    Pre-Treatment Position in bed (P)   -AC bathroom  -LH (r) AC (t) LH (c)    Post Treatment Position bed (P)   -AC chair  -LH (r) AC (t) LH (c)    In Bed sitting EOB;call light within reach;encouraged to call for assist;with family/caregiver (P)   -AC --    In Chair -- sitting;call light within reach;encouraged to call for assist  -LH (r) AC (t) LH (c)              User Key  (r) = Recorded By, (t)  = Taken By, (c) = Cosigned By      Initials Name Provider Type    Ger Singh, PT Physical Therapist    AC Yohannes Murillo PTA Student PTA Student                    Physical Therapy Education       Title: PT OT SLP Therapies (In Progress)       Topic: Physical Therapy (Done)       Point: Mobility training (Done)       Learning Progress Summary            Patient Acceptance, E, VU,DU by  at 10/28/2024 1447    Comment: benefits of activity and extended rest breaks    Acceptance, E, VU,DU by  at 10/28/2024 1257    Comment: Benefits of activity    Acceptance, E, VU by  at 10/26/2024 1159    Comment: role of PT, d//c planning, fall risk/call for assist, goal setting for home sdafety   Family Acceptance, E, VU,DU by  at 10/28/2024 1447    Comment: benefits of activity and extended rest breaks                      Point: Home exercise program (Done)       Learning Progress Summary            Patient Acceptance, E, VU by  at 10/26/2024 1159    Comment: role of PT, d//c planning, fall risk/call for assist, goal setting for home sdafety                      Point: Body mechanics (Done)       Learning Progress Summary            Patient Acceptance, E, VU by  at 10/26/2024 1159    Comment: role of PT, d//c planning, fall risk/call for assist, goal setting for home sdafety                      Point: Precautions (Done)       Learning Progress Summary            Patient Acceptance, E, VU,DU by  at 10/28/2024 1447    Comment: benefits of activity and extended rest breaks    Acceptance, E, VU,DU by  at 10/28/2024 1257    Comment: Benefits of activity    Acceptance, E, VU by AJ at 10/26/2024 1159    Comment: role of PT, d//c planning, fall risk/call for assist, goal setting for home sdafety   Family Acceptance, E, VU,DU by  at 10/28/2024 1447    Comment: benefits of activity and extended rest breaks                                      User Key       Initials Effective Dates Name Provider Type Discipline     AJ 08/15/24 -  Kailash Jin, PT DPT Physical Therapist PT     09/30/24 -  Yohannes Murillo, JUSTO Student PTA Student PT                  PT Recommendation and Plan     Plan of Care Reviewed With: patient  Progress: improving  Outcome Evaluation: PT tx completed. Pt in the bathroom upon arrival to tx, I with toileting. Pt no c/o pain, but does have SOB after exertion during recovery/rest breaks. Pt STS SBA with no cues needed. Pt ambulated 75' x 2 with CGA, observed SOB only during post ambulation. Educated on BOA for continued improvement in overall function.       Time Calculation:    PT Charges       Row Name 10/28/24 1447 10/28/24 1320 10/28/24 1257       Time Calculation    Start Time 1400 (P)   -AC -- 1041  -LH (r) AC (t) LH (c)    Stop Time 1438 (P)   -AC -- 1126  -LH (r) AC (t) LH (c)    Time Calculation (min) 38 min (P)   -AC -- 45 min  -LH (r) AC (t)    PT Received On -- 10/28/24  -TB --       Timed Charges    16771 - Gait Training Minutes  28 (P)   -AC -- 30  -LH (r) AC (t) LH (c)    24696 - PT Therapeutic Activity Minutes 10 (P)   -AC -- 15  -LH (r) AC (t) LH (c)       Total Minutes    Timed Charges Total Minutes 38 (P)   -AC -- 45  -LH (r) AC (t)     Total Minutes 38 (P)   -AC -- 45  -LH (r) AC (t)              User Key  (r) = Recorded By, (t) = Taken By, (c) = Cosigned By      Initials Name Provider Type     Ger Stewart, PT Physical Therapist    TB Horacio Vizcaino, PTA Physical Therapist Assistant     Yohannes Murillo, JUSTO Student PTA Student                      PT G-Codes  Outcome Measure Options: (P) AM-PAC 6 Clicks Basic Mobility (PT)  AM-PAC 6 Clicks Score (PT): (P) 22    Yohannes Murillo PTA Student  10/28/2024

## 2024-10-28 NOTE — TELEPHONE ENCOUNTER
FYI:  Patient's sister wanted you to be aware that he is in the hospital and she thinks they are going to consult our office to discuss his chest xray.

## 2024-10-28 NOTE — PROGRESS NOTES
[facility] Medicine Services  INPATIENT PROGRESS NOTE    Patient Name: Michael Paz  Date of Admission: 10/24/2024  Today's Date: 10/28/24  Length of Stay: 2  Primary Care Physician: Henrietta Jin APRN    Subjective   Chief Complaint: Dark stool symptomatic anemia  HPI   Patient was transferred from [facility] ER today to our facility for evaluation.  He was seen in room 463 after arrival with nursing at bedside.  This is a 79-year-old male with a history of CAD status post prior CABG on full dose aspirin.  He is also on warfarin for history of A-fib as well as prior DVT/PE with protein S deficiency.  Other history includes infrarenal AAA status post endograft, hypertension, hyperlipidemia, tobacco use, right upper lobe pulmonary nodule being followed by pulmonary.  Patient had a cyst removed from his back surgically in August.  He says ever since then things have gone downhill for him.  For the last 2 months he has had some progressive weakness, fatigue, shortness of breath with exertion, dizziness especially upon standing.  Patient denies any syncopal events.  Patient does state that symptoms seem to have slowly increased over the last month and he also has noted some dark tarry stools over the last month.  Patient went to see Dr. Vicente of cardiology a few weeks ago and reportedly had an echocardiogram done but I cannot find the echo test or the office visit in our system.  He was referred to Dr. Lyons reportedly for slow afib, his metoprolol xL was reduced from 50 to 25mg.  I do see that patient has an upcoming appointment with Dr. Lyons in February 2025.  Patient went to see pulmonary yesterday for follow-up and monitoring of his known right upper lobe pulmonary nodule.  Labs were drawn during evaluation and patient was found to have a hemoglobin of 7.7.  Due to continued not feeling well he went to the ER today and there his hemoglobin was found to be 6.8.  He  "was transfused a unit of blood which was completed prior to arrival here.  Patient tells me his INR was 2.3 but I cannot find it in transfer records.  Currently on arrival patient still feels tired but denies any current chest pain, shortness of breath, dizziness, nausea, abdominal pain, new focal numbness or weakness.  No fevers or chills   10/25  As before admitted for symptomatic anemia started on PPI hold anticoagulation GI on board n.p.o. EGD was given fresh frozen plasma per GI  10/26  78 y/o male presented as transfer from outside hospital with melena and iron deficiency anemia. Hgb 7.7 on admission here after receiving 1 units of PRBC at outside hospital. 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 Received a 2nd unit PRBC here on 10/25. Received total of 3 units of FFP 10/25. On coumadin with history of atrial fibrillation as well as DVT/PE with Protein S deficiency.  For this patient Eliquis might be a safer choice than Coumadin he was he told me before that his insurance would not pay for Eliquis for now we will not anticoagulate this man with the fear of recurrent bleeding as I suspect the risks of bleeding outweighs the risk of thrombosis  10/27  As before EGD was showing AVM in the stomach fundus treated with APC and epinephrine injections hemoglobin remained stable remains on Protonix  10/28  Before remains anxious frustrated multiple complaints was having weakness shortness of breath, chest x-ray showing chronic disease right upper lobe opacity recommending CT head and neck unremarkable will order CT appreciate GI.,  GI had signed off the patient's abdominal/pelvic CT scan on October 24, 2024 showed \"stable incompletely visualized mass overlying the right atrium\".  Repeat echo  Review of Systems   All pertinent negatives and positives are as above. All other systems have been reviewed and are negative unless otherwise stated. " "    Objective    Temp:  [97.6 °F (36.4 °C)-98.7 °F (37.1 °C)] 97.6 °F (36.4 °C)  Heart Rate:  [55-83] 83  Resp:  [16-18] 16  BP: (137-154)/(55-66) 144/56  Physical Exam  Constitutional:       Appearance: Normal appearance.   HENT:      Head: Normocephalic.      Nose: Nose normal.   Eyes:      Pupils: Pupils are equal, round, and reactive to light.   Cardiovascular:      Rate and Rhythm: Normal rate and regular rhythm.   Pulmonary:      Breath sounds: Stridor present.   Abdominal:      General: Abdomen is flat.      Palpations: Abdomen is soft.   Musculoskeletal:         General: Normal range of motion.      Cervical back: Normal range of motion.   Skin:     Capillary Refill: Capillary refill takes less than 2 seconds.   Neurological:      Mental Status: He is alert.             Results Review:  I have reviewed the labs, radiology results, and diagnostic studies.    Laboratory Data:   Results from last 7 days   Lab Units 10/27/24  2352 10/27/24  1553 10/27/24  0929 10/26/24  1634 10/26/24  0651 10/25/24  1246 10/25/24  0656 10/24/24  2023 10/23/24  1300   WBC 10*3/mm3  --   --   --   --  4.60  --  4.47  --  7.7   HEMOGLOBIN g/dL 8.7* 8.6* 8.5*   < > 7.6*   < > 7.4*   < > 7.7*   HEMATOCRIT % 29.7* 29.1* 28.3*   < > 26.0*   < > 25.5*  --  27.1*   PLATELETS 10*3/mm3  --   --   --   --  143  --  169  --  262    < > = values in this interval not displayed.        Results from last 7 days   Lab Units 10/25/24  0656 10/23/24  1300   SODIUM mmol/L 139 139   POTASSIUM mmol/L 4.2 4.9   CHLORIDE mmol/L 106 105   CO2 mmol/L 21.0* 20   BUN mg/dL 20 21   CREATININE mg/dL 0.88 1.12   CALCIUM mg/dL 8.9 9.3   BILIRUBIN mg/dL 0.7 0.6   ALK PHOS U/L 63 73   ALT (SGPT) U/L 13 18   AST (SGOT) U/L 19 27   GLUCOSE mg/dL 99 84       Culture Data:   No results found for: \"BLOODCX\", \"URINECX\", \"WOUNDCX\", \"MRSACX\", \"RESPCX\", \"STOOLCX\"    Radiology Data:   Imaging Results (Last 24 Hours)       Procedure Component Value Units Date/Time    XR " Chest 1 View [[id number]] Collected: 10/27/24 1354     Updated: 10/27/24 1359    Narrative:      EXAMINATION: XR CHEST 1 VW-     10/27/2024 12:36 PM     HISTORY: Shortness of breath; Z74.09-Other reduced mobility     1 view chest x-ray.     COMPARISON:  Outside chest x-ray from 10/24/2024.  Chest x-ray from 7/8/2020.     Cardiomegaly.  CABG changes.  Aortic arch calcification.     Hyper expanded lungs with chronic interstitial disease.  Bullous disease at the LEFT apex.     Similar to the outside chest x-ray from 3 days ago there is an irregular  spiculated 4 x 3 cm opacity at the RIGHT upper lobe.     No pneumothorax or heart failure.       Impression:      1. Chronic lung changes.  2. Indeterminate 4 x 3 cm spiculated opacity in the RIGHT upper lobe.  Chest CT correlation is recommended.           This report was signed and finalized on 10/27/2024 1:56 PM by Dr. Ricardo Jernigan MD.               I have reviewed the patient's current medications.     Assessment/Plan   Assessment  Active Hospital Problems    Diagnosis     **Symptomatic anemia     S/P CABG (coronary artery bypass graft)     Protein S deficiency     Atrial fibrillation, chronic     Lung nodule     Tobacco abuse     Hypertension     Hyperlipidemia     CAD (coronary artery disease)        Treatment Plan  . #1 symptomatic anemia -patient has been feeling weak, tired, fatigued, dizzy with standing.  Will check orthostatics.  He has a history of CAD and CABG.  He is on Coumadin and full dose aspirin.  His hemoglobin yesterday was 7.7 and at outside facility today was reported 6.8.  He was given 1 unit of PRBC.  Rectal exam was not performed.  Patient reports dark stools for the last month.  Will check a fecal occult.  Check a blood count now posttransfusion and trend.  PPI IV twice daily.  Will ask GI to evaluate for possible EGD if warranted.      #2 CAD -with a history of CABG.  No active chest pain currently.  Continue beta-blocker and statin.  Hold  aspirin.     #3 A-fib -rate controlled.  Continue beta-blocker.  Hold Coumadin.  Check INR.  Cannot find INR result from outside hospital although patient tells me was 2.3.     #4 history protein S deficiency/DVT/PE -again given above will have to hold Coumadin for tonight.  Monitor INR.       #5 hypertension -blood pressure is 158 on arrival.  Continue home blood pressure meds with holding parameters while monitoring for any bleeding.     #6 hyperlipidemia -statin     #7 right upper lobe pulmonary nodule -known history and follows with pulmonary for surveillance.     Medical Decision Making  Number and Complexity of problems: 1-2 acute, multiple chronic  Differential Diagnosis: As above     Conditions and Status        New to me.  Clinically looks stable.  Stable vitals.  Nontoxic.  Currently mild fatigue is main symptom post initial 1 unit PRBC.     MDM Data  External documents reviewed: Transfer documents reviewed  Cardiac tracing (EKG, telemetry) interpretation: Currently rate controlled fib on the bedside monitor  Radiology interpretation: Outside imaging reports reviewed  Labs reviewed: Outside transfer labs reviewed  Any tests that were considered but not ordered: None     Decision rules/scores evaluated (example [id number], [place], etc): None     Discussed with: Patient, nursing     Care Planning  Shared decision making: Patient apprised of current labs, vitals, imaging and treatment plan.  They are agreeable with proceeding with plans as discussed.     Code status and discussions: DNR/DNI     Disposition  Social Determinants of Health that impact treatment or disposition: none  Estimated length of stay is less than 2 midnights.      I confirmed that the patient's advanced care plan is present, code status is documented, and a surrogate decision maker is listed in the patient's medical record.      The patient's surrogate decision maker is his sister Kira.    Electronically signed by Sebastian Mckeon MD,  10/28/24, 10:00 CDT.

## 2024-10-28 NOTE — TELEPHONE ENCOUNTER
I called and spoke with patients sister Birdie and she stated that she was told that  is to busy to read the recent echo and I have scanned it into the chart  printed it out.

## 2024-10-28 NOTE — THERAPY TREATMENT NOTE
Acute Care - Physical Therapy Treatment Note  [facility]     Patient Name: Michael Paz  : 1945  MRN: [id number]  Today's Date: 10/28/2024      Visit Dx:     ICD-10-CM ICD-9-CM   1. Impaired mobility [Z74.09]  Z74.09 799.89     Patient Active Problem List   Diagnosis    Tobacco abuse    Hypertension    Hyperlipidemia    Endoleak post endovascular aneurysm repair    AAA (abdominal aortic aneurysm)    Aneurysm    CAD (coronary artery disease)    Right-sided epistaxis    Hx pulmonary embolism    Hx of deep venous thrombosis    Rhinitis, chronic    Anticoagulant adverse reaction    Epistaxis    Lung nodule    Protein C deficiency    Protein S deficiency    Atrial fibrillation, chronic    Symptomatic anemia    S/P CABG (coronary artery bypass graft)     Past Medical History:   Diagnosis Date    AAA (abdominal aortic aneurysm)     Aneurysm     Arthritis     Atrial fibrillation     CAD (coronary artery disease)     CAD (coronary artery disease)     Clotting disorder     Deep vein thrombosis [id number]    Depression     Endoleak post endovascular aneurysm repair     History of DVT (deep vein thrombosis)     History of foot fracture     BILATERAL FOOT FRACTURES    History of fracture of left ankle     History of pulmonary embolus (PE)     History of transfusion  and     Hyperlipidemia     Hypertension     Myocardial infarction     Peripheral vascular disease     Pneumonia 4 times    Pulmonary embolism     Tobacco abuse      Past Surgical History:   Procedure Laterality Date    ABDOMINAL AORTIC ANEURYSM REPAIR      APPENDECTOMY      BACK SURGERY      CARDIAC CATHETERIZATION      CORONARY ANGIOPLASTY      CORONARY ARTERY BYPASS GRAFT      CORONARY STENT PLACEMENT      ENDOSCOPY N/A 10/25/2024    Procedure: ESOPHAGOGASTRODUODENOSCOPY WITH ANESTHESIA;  Surgeon: Fareed Velasco MD;  Location: [facility] ENDOSCOPY;  Service: Gastroenterology;  Laterality: N/A;  Pre: Symptomatic anemia;  Post: Bleeding  gastric AVM;  Nausea and vomiting    HEMORRHOIDECTOMY      INGUINAL HERNIA REPAIR      KNEE SURGERY Right     OTHER SURGICAL HISTORY      TYPE IA ENDOLEAK REPAIR    OTHER SURGICAL HISTORY      CRANIAL CYST     PT Assessment (Last 12 Hours)       PT Evaluation and Treatment       [facility] Name 10/28/24 1041          Physical Therapy Time and Intention    Subjective Information complains of;dyspnea (P)   -     Document Type therapy note (daily note) (P)   -AC     Mode of Treatment physical therapy (P)   -[facility] Name 10/28/24 1041          General Information    Existing Precautions/Restrictions fall (P)   -AC       Row Name 10/28/24 1041          Pain    Pretreatment Pain Rating 0/10 - no pain (P)   -AC     Posttreatment Pain Rating 0/10 - no pain (P)   -AC       Row Name 10/28/24 1041          Bed Mobility    Comment, (Bed Mobility) -- (P)   in bathroom upon arrival, in chair for tx  -[facility] Name 10/28/24 1041          Transfers    Transfers sit-stand transfer;stand-sit transfer (P)   -AC       Row Name 10/28/24 1041          Sit-Stand Transfer    Sit-Stand [place] (Transfers) contact guard (P)   -AC       Row Name 10/28/24 1041          Stand-Sit Transfer    Stand-Sit [place] (Transfers) contact guard (P)   -AC       Row Name 10/28/24 1041          Gait/Stairs (Locomotion)    [place] Level (Gait) contact guard;verbal cues (P)   -AC     Distance in Feet (Gait) 75 (P)   x2  -AC     Deviations/Abnormal Patterns (Gait) gait speed decreased (P)   -[facility] Name 10/28/24 1041          Balance    Balance Assessment sitting static balance;sitting dynamic balance (P)   -AC     Static Sitting Balance independent (P)   -AC     Dynamic Sitting Balance independent (P)   -     Position, Sitting Balance unsupported;sitting in chair (P)   -       Row Name 10/28/24 1041          Plan of Care Review    Plan of Care Reviewed With patient (P)   -AC     Progress improving (P)   -AC     Outcome Evaluation  PT tx completed. Pt in the bathroom upon arrival to tx, I with toileting. Pt no c/o pain, but does have SOB after exertion during recovery/rest breaks. Pt STS SBA with no cues needed. Pt ambulated 75' x 2 with CGA, observed SOB only during post ambulation. Educated on BOA for continued improvement in overall function. (P)   -AC       Row Name 10/28/24 1041          Positioning and Restraints    Pre-Treatment Position bathroom (P)   -AC     Post Treatment Position chair (P)   -AC     In Chair sitting;call light within reach;encouraged to call for assist (P)   -AC               User Key  (r) = Recorded By, (t) = Taken By, (c) = Cosigned By      Initials Name Provider Type    Yohannes Peña PTA Student PTA Student                    Physical Therapy Education       Title: PT OT SLP Therapies (In Progress)       Topic: Physical Therapy (Done)       Point: Mobility training (Done)       Learning Progress Summary            Patient Acceptance, E, VU,DU by [place] at 10/28/2024 1257    Comment: Benefits of activity    Acceptance, E, VU by MICHELLE at 10/26/2024 1159    Comment: role of PT, d//c planning, fall risk/call for assist, goal setting for home sdafety                      Point: Home exercise program (Done)       Learning Progress Summary            Patient Acceptance, E, VU by MICHELLE at 10/26/2024 1159    Comment: role of PT, d//c planning, fall risk/call for assist, goal setting for home sdafety                      Point: Body mechanics (Done)       Learning Progress Summary            Patient Acceptance, E, VU by MICHELLE at 10/26/2024 1159    Comment: role of PT, d//c planning, fall risk/call for assist, goal setting for home sdafety                      Point: Precautions (Done)       Learning Progress Summary            Patient Acceptance, E, VU,DU by [place] at 10/28/2024 1257    Comment: Benefits of activity    Acceptance, E, VU by AJ at 10/26/2024 1159    Comment: role of PT, d//c planning, fall risk/call for assist,  goal setting for home sdafety                                      User Key       Initials Effective Dates Name Provider Type Discipline    AJ 08/15/24 -  Kailash Jin, PT DPT Physical Therapist PT    AC 09/30/24 -  Yohannes Murillo PTA Student PTA Student PT                  PT Recommendation and Plan     Plan of Care Reviewed With: (P) patient  Progress: (P) improving  Outcome Evaluation: (P) PT tx completed. Pt in the bathroom upon arrival to tx, I with toileting. Pt no c/o pain, but does have SOB after exertion during recovery/rest breaks. Pt STS SBA with no cues needed. Pt ambulated 75' x 2 with CGA, observed SOB only during post ambulation. Educated on BOA for continued improvement in overall function.       Time Calculation:    PT Charges       Row Name 10/28/24 1257             Time Calculation    Start Time 1041 (P)   -AC      Stop Time 1126 (P)   -AC      Time Calculation (min) 45 min (P)   -AC         Timed Charges    57133 - Gait Training Minutes  30 (P)   -AC      23512 - PT Therapeutic Activity Minutes 15 (P)   -AC         Total Minutes    Timed Charges Total Minutes 45 (P)   -AC       Total Minutes 45 (P)   -AC                User Key  (r) = Recorded By, (t) = Taken By, (c) = Cosigned By      Initials Name Provider Type    AC Yohannes Murillo PTA Student PTA Student                      PT G-Codes  Outcome Measure Options: (P) AM-PAC 6 Clicks Basic Mobility (PT)  AM-PAC 6 Clicks Score (PT): (P) 22    Yohannes Murillo PTA Student  10/28/2024

## 2024-10-28 NOTE — PROGRESS NOTES
"S: Fearful of being discharged as he feels weak.    O: /56, pulse 83         Hematocrit 29.7% (stable to improved)    A: Overt occult GI bleed manifested as melena and symptomatic anemia.  EGD demonstrated a gastric fundic AVM which was successfully treated with the APC.  Recommend outpatient colonoscopy in 1-2 months.  Of note, the patient's abdominal/pelvic CT scan on October 24, 2024 showed \"stable incompletely visualized mass overlying the right atrium\".  He has had a recent echocardiogram.    P 1.  Monitor hematocrit      2.  Review recent echocardiogram to correlate with CT scan      3.  Signing off; please recall if we may be of further service.  "

## 2024-10-28 NOTE — NURSING NOTE
[facility] They are going to powershare the Echo results from 10/10/24 and fax them to our radiology dept.

## 2024-10-28 NOTE — PAYOR COMM NOTE
"10/28/24. [facility] [phone]  [phone]    ER ADMIT TO OBSERVATION ON 10/24/24. NO PRE-CERT OB    10/27/24 INPATIENT ORDER. MD CHANGED TO INPATIENT.            Es Paz (79 y.o. Male)       Date of Birth   1945    Social Security Number       Address   [address] [address]    Home Phone   [phone]    MRN   [id number]       Mandaeism   Other    Marital Status                               Admission Date   10/24/24    Admission Type   Urgent    Admitting Provider   Sebastian Mckeon MD    Attending Provider   Sebastian Mckeon MD    Department, Room/Bed   [facility] 4B, 463/1       Discharge Date       Discharge Disposition       Discharge Destination                                 Attending Provider: Sebastian Mckeon MD    Allergies: No Known Allergies    Isolation: Contact   Infection: Candida Auris (rule out) (10/24/24)   Code Status: No CPR    Ht: 190.5 cm (75\")   Wt: 118 kg (260 lb 6.4 oz)    Admission Cmt: None   Principal Problem: Symptomatic anemia [D64.9]                   Active Insurance as of 10/24/2024       Primary Coverage       Payor Plan Insurance Group Employer/Plan Group    MEDICARE MEDICARE A & B        Payor Plan Address Payor Plan Phone Number Payor Plan Fax Number Effective Dates    [address] [phone]  1/1/2012 - None Entered    [facility] [id number]         Subscriber Name Subscriber Birth Date Member ID       ES PAZ 1945 [id number]               Secondary Coverage       Payor Plan Insurance Group Employer/Plan Group    AETNA COMMERCIAL AETNA [id number]       Payor Plan Address Payor Plan Phone Number Payor Plan Fax Number Effective Dates    [address] [phone]  6/6/2024 - None Entered    [facility] [address]         Subscriber Name Subscriber Birth Date Member ID       ES PAZ 1945 [id number]                     Emergency Contacts        (Rel.) Home " Phone Work Phone Mobile Phone    Kira Sheriff (Sister) [phone] -- [phone]             [facility] Encounter Date/Time: 10/24/2024 1902   Hospital Account: [id number]    MRN: [id number]   Patient:  Michael Paz   Contact Serial #: [id number]   SSN:          ENCOUNTER             Patient Class: Inpatient   Unit: [address] Service: Medicine     Bed: 463/1   Admitting Provider: Sebastian Mckeon MD   Referring Physician: Provider, No Known   Attending Provider: Sebastian Mckeon MD   Adm Diagnosis: Symptomatic anemia [D64.*               PATIENT             Name: Michael Paz : 1945 (79 yrs)   Address: [address] [place] [address] Sex: Male   City: [place] [address]   County: [place]   Marital Status:  Ethnicity: NOT                                                                         Race: WHITE   Primary Care Provider: Henrietta Jin, * Patients Phone: Home Phone: [phone]     Mobile Phone: [phone]     EMERGENCY CONTACT   Contact Name Legal Guardian? Relationship to Patient Home Phone Work Phone Mobile Phone   1. Kira Sheriff  2. *No Contact Specified* No    Sister    [phone]      GUARANTOR             Guarantor: Michael Paz     : 1945   Address: [address] [place] [address] Sex: [place] [address]     Relation to Patient: Self       Home Phone: [phone]   Guarantor ID: [id number]       Work Phone:     GUARANTOR EMPLOYER   Employer:           Status: RETIRED   COVERAGE          PRIMARY INSURANCE   Payor: MEDICARE Plan: MEDICARE A & B   Group Number:   Insurance Type: INDEMNITY   Subscriber Name: MICHAEL PAZ Subscriber : 1945   Subscriber ID: [id number] Coverage Address: [place] [address]   Pat. Rel. to Subscriber: Self Coverage Phone: [phone]   SECONDARY INSURANCE   Payor: AETNA COMMERCIAL Plan: AETNA   Group Number: [id number] Insurance Type: INDEMNITY   Subscriber Name:  MYRIAMES EDILBERTO Subscriber : 1945   Subscriber ID: [id number] Coverage Address: [facility] [address]  [place] [address]   Pat. Rel. to Subscriber: SELF Coverage Phone: [phone]      Contact Serial # ([id number])         2024    Chart ID ([id number])           Rick Vance DO   Physician  Hospitalist     H&P     Signed     Date of Service: 10/24/24 2006  Creation Time: 10/24/24 2006     Signed       Expand All [facility] Medicine Services  HISTORY AND PHYSICAL     Date of Admission: 10/24/2024  Primary Care Physician: Henrietta Jin APRN     Subjective   Primary Historian: patient     Chief Complaint: weakness, fatigue, anemia, dark stool     History of Present Illness  Patient was transferred from [facility] today to our facility for evaluation.  He was seen in room 463 after arrival with nursing at bedside.  This is a 79-year-old male with a history of CAD status post prior CABG on full dose aspirin.  He is also on warfarin for history of A-fib as well as prior DVT/PE with protein S deficiency.  Other history includes infrarenal AAA status post endograft, hypertension, hyperlipidemia, tobacco use, right upper lobe pulmonary nodule being followed by pulmonary.  Patient had a cyst removed from his back surgically in August.  He says ever since then things have gone downhill for him.  For the last 2 months he has had some progressive weakness, fatigue, shortness of breath with exertion, dizziness especially upon standing.  Patient denies any syncopal events.  Patient does state that symptoms seem to have slowly increased over the last month and he also has noted some dark tarry stools over the last month.  Patient went to see Dr. Vicente of cardiology a few weeks ago and reportedly had an echocardiogram done but I cannot find the echo test or the office visit in our system.  He was referred to Dr. Lyons  reportedly for slow afib, his metoprolol xL was reduced from 50 to 25mg.  I do see that patient has an upcoming appointment with Dr. Lyons in February 2025.  Patient went to see pulmonary yesterday for follow-up and monitoring of his known right upper lobe pulmonary nodule.  Labs were drawn during evaluation and patient was found to have a hemoglobin of 7.7.  Due to continued not feeling well he went to the ER today and there his hemoglobin was found to be 6.8.  He was transfused a unit of blood which was completed prior to arrival here.  Patient tells me his INR was 2.3 but I cannot find it in transfer records.  Currently on arrival patient still feels tired but denies any current chest pain, shortness of breath, dizziness, nausea, abdominal pain, new focal numbness or weakness.  No fevers or chills.           Review of Systems   Otherwise complete ROS reviewed and negative except as mentioned in the HPI.     Past Medical History:   Medical History[]Expand by Default        Past Medical History:   Diagnosis Date    AAA (abdominal aortic aneurysm)      Aneurysm      Arthritis      Atrial fibrillation 1990    CAD (coronary artery disease)      CAD (coronary artery disease)      Clotting disorder 2012    Deep vein thrombosis [id number]    Depression      Endoleak post endovascular aneurysm repair      History of DVT (deep vein thrombosis)      History of foot fracture       BILATERAL FOOT FRACTURES    History of fracture of left ankle      History of pulmonary embolus (PE)      History of transfusion 2012 and 2020    Hyperlipidemia      Hypertension      Myocardial infarction 1990    Peripheral vascular disease      Pneumonia 4 times    Pulmonary embolism 1986    Tobacco abuse           Past Surgical History:  Surgical History         Past Surgical History:   Procedure Laterality Date    ABDOMINAL AORTIC ANEURYSM REPAIR        APPENDECTOMY        BACK SURGERY        CARDIAC CATHETERIZATION        CORONARY ANGIOPLASTY         CORONARY ARTERY BYPASS GRAFT        CORONARY STENT PLACEMENT        HEMORRHOIDECTOMY        INGUINAL HERNIA REPAIR        KNEE SURGERY Right      OTHER SURGICAL HISTORY         TYPE IA ENDOLEAK REPAIR    OTHER SURGICAL HISTORY         CRANIAL CYST         Social History:  reports that he has been smoking cigarettes. He started smoking about 67 years ago. He has a 67.4 pack-year smoking history. He has been exposed to tobacco smoke. He has never used smokeless tobacco. He reports current alcohol use of about 3.0 - 4.0 standard drinks of alcohol per week. He reports that he does not use drugs.     Family History: family history includes Diabetes in an other family member; Heart disease in his father, mother, and another family member; Hypertension in an other family member.        Allergies:  Allergies   No Known Allergies        Medications:          Prior to Admission medications    Medication Sig Start Date End Date Taking? Authorizing Provider   Ascorbic Acid (VITAMIN C PO) Take 2,000 mg by mouth Daily.       Pedro Martin MD   aspirin 325 MG tablet Take 1 tablet by mouth.       Pedro Martin MD   candesartan (ATACAND) 16 MG tablet Take 1 tablet by mouth Daily.       Pedro Martin MD   Fluticasone-Umeclidin-Vilant (Trelegy Ellipta) 100-62.5-25 MCG/ACT inhaler Inhale 1 puff Daily for 14 days. 10/23/24 11/6/24   Melisa López APRN   metoprolol succinate XL (TOPROL-XL) 50 MG 24 hr tablet Take 0.5 tablets by mouth Daily. Patient takes 25mg one time a day       ProviderPedro MD   Multiple Vitamins-Minerals (ONE A DAY MEN 50 PLUS PO) Take  by mouth.       Pedro Martin MD   simvastatin (ZOCOR) 80 MG tablet Take 1 tablet by mouth.       Pedro Martin MD   warfarin (COUMADIN) 4 MG tablet 4 mg PO Daily, then 5 mg PO the next day.  Continue to alternate as you have been doing 7/13/20     Ermias Ta MD      I have utilized all available immediate resources  "to obtain, update, or review the patient's current medications (including all prescriptions, over-the-counter products, herbals, cannabis/cannabidiol products, and vitamin/mineral/dietary (nutritional) supplements).     Objective      Vital Signs: /80 (BP Location: Left arm, Patient Position: Sitting)   Pulse 96   Temp 98.1 °F (36.7 °C) (Oral)   Resp 20   Ht 190.5 cm (75\")   Wt 123 kg (271 lb 3.2 oz)   SpO2 98%   BMI 33.90 kg/m²   Physical Exam   GEN: Awake, alert, interactive, in NAD  HEENT:  PERRLA, EOMI, Anicteric, Trachea midline  Lungs:  no wheezing/rales/rhonchi  Heart: irreg/irreg, +S1/s2, no rub  ABD: obese, soft, nt +BS, no guarding/rebound  Extremities: atraumatic, no cyanosis, no significant pitting edema, some varicose veins  Skin: no rashes or petechiae  Neuro: AAOx3, no focal deficits  Psych: normal mood & affect           Results Reviewed:  Labs and imaging reports reviewed from [facility] ER transfer paperwork     CT abd/pelv - no acute process, hepatomegaly, prior infrarenal AAA s/p endograft     CXR - mild cardiomegaly, spiculated RUL nodule     CBC - wbc 5.2, hgb, 6.8, hct 24.1, plt 210     BMP - na 143, c02 24, k 4.7, cl 107, bun 23, creat 1.07, gfr 86, ca 8.9, glucose 104, alb 3.5, t bili 0.8, ast 26, alt 24     I have personally reviewed and interpreted the radiology studies and ECG obtained at time of admission.      Assessment / Plan   Assessment:        Active Hospital Problems     Diagnosis      **Symptomatic anemia      S/P CABG (coronary artery bypass graft)      Protein S deficiency      Atrial fibrillation, chronic      Lung nodule      Tobacco abuse      Hypertension      Hyperlipidemia      CAD (coronary artery disease)           Treatment Plan  The patient will be admitted to my service here at [facility].      #1 symptomatic anemia -patient has been feeling weak, tired, fatigued, dizzy with standing.  Will check orthostatics.  He has a history of CAD " and CABG.  He is on Coumadin and full dose aspirin.  His hemoglobin yesterday was 7.7 and at outside facility today was reported 6.8.  He was given 1 unit of PRBC.  Rectal exam was not performed.  Patient reports dark stools for the last month.  Will check a fecal occult.  Check a blood count now posttransfusion and trend.  PPI IV twice daily.  Will ask GI to evaluate for possible EGD if warranted.      #2 CAD -with a history of CABG.  No active chest pain currently.  Continue beta-blocker and statin.  Hold aspirin.     #3 A-fib -rate controlled.  Continue beta-blocker.  Hold Coumadin.  Check INR.  Cannot find INR result from outside hospital although patient tells me was 2.3.     #4 history protein S deficiency/DVT/PE -again given above will have to hold Coumadin for tonight.  Monitor INR.       #5 hypertension -blood pressure is 158 on arrival.  Continue home blood pressure meds with holding parameters while monitoring for any bleeding.     #6 hyperlipidemia -statin     #7 right upper lobe pulmonary nodule -known history and follows with pulmonary for surveillance.     Medical Decision Making  Number and Complexity of problems: 1-2 acute, multiple chronic  Differential Diagnosis: As above     Conditions and Status        New to me.  Clinically looks stable.  Stable vitals.  Nontoxic.  Currently mild fatigue is main symptom post initial 1 unit PRBC.     [facility] Data  External documents reviewed: Transfer documents reviewed  Cardiac tracing (EKG, telemetry) interpretation: Currently rate controlled fib on the bedside monitor  Radiology interpretation: Outside imaging reports reviewed  Labs reviewed: Outside transfer labs reviewed  Any tests that were considered but not ordered: None     Decision rules/scores evaluated (example [id number], Wells, etc): None     Discussed with: Patient, nursing     Care Planning  Shared decision making: Patient apprised of current labs, vitals, imaging and treatment plan.  They are  agreeable with proceeding with plans as discussed.     Code status and discussions: DNR/DNI     Disposition  Social Determinants of Health that impact treatment or disposition: none  Estimated length of stay is less than 2 midnights.      I confirmed that the patient's advanced care plan is present, code status is documented, and a surrogate decision maker is listed in the patient's medical record.      The patient's surrogate decision maker is his sister Kira.      The patient was seen and examined by me on 10/24/24 at 7:35 PM.     Electronically signed by Rick Vance DO, 10/24/24, 20:06 CDT.                      Sebastian Mckeon MD   Physician  Hospitalist     Progress Notes     Signed     Date of Service: 10/25/24 1016  Creation Time: 10/25/24 1016     Signed              [facility] Medicine Services  INPATIENT PROGRESS NOTE     Patient Name: Michael Paz  Date of Admission: 10/24/2024  Today's Date: 10/25/24  Length of Stay: 1  Primary Care Physician: Henrietta Jin APRN     Subjective   Chief Complaint: Dark stool symptomatic anemia  HPI   Patient was transferred from [facility] ER today to our facility for evaluation.  He was seen in room 463 after arrival with nursing at bedside.  This is a 79-year-old male with a history of CAD status post prior CABG on full dose aspirin.  He is also on warfarin for history of A-fib as well as prior DVT/PE with protein S deficiency.  Other history includes infrarenal AAA status post endograft, hypertension, hyperlipidemia, tobacco use, right upper lobe pulmonary nodule being followed by pulmonary.  Patient had a cyst removed from his back surgically in August.  He says ever since then things have gone downhill for him.  For the last 2 months he has had some progressive weakness, fatigue, shortness of breath with exertion, dizziness especially upon standing.  Patient denies any syncopal events.  Patient does state that  symptoms seem to have slowly increased over the last month and he also has noted some dark tarry stools over the last month.  Patient went to see Dr. Vicente of cardiology a few weeks ago and reportedly had an echocardiogram done but I cannot find the echo test or the office visit in our system.  He was referred to Dr. Lyons reportedly for slow afib, his metoprolol xL was reduced from 50 to 25mg.  I do see that patient has an upcoming appointment with Dr. Lyons in February 2025.  Patient went to see pulmonary yesterday for follow-up and monitoring of his known right upper lobe pulmonary nodule.  Labs were drawn during evaluation and patient was found to have a hemoglobin of 7.7.  Due to continued not feeling well he went to the ER today and there his hemoglobin was found to be 6.8.  He was transfused a unit of blood which was completed prior to arrival here.  Patient tells me his INR was 2.3 but I cannot find it in transfer records.  Currently on arrival patient still feels tired but denies any current chest pain, shortness of breath, dizziness, nausea, abdominal pain, new focal numbness or weakness.  No fevers or chills   10/25  As before admitted for symptomatic anemia started on PPI hold anticoagulation GI on board n.p.o. EGD was given fresh frozen plasma per GI        Review of Systems   All pertinent negatives and positives are as above. All other systems have been reviewed and are negative unless otherwise stated.      Objective    Temp:  [97.6 °F (36.4 °C)-98.6 °F (37 °C)] 98.6 °F (37 °C)  Heart Rate:  [78-96] 78  Resp:  [18-20] 18  BP: (141-160)/(53-82) 160/80  Physical Exam  Constitutional:       Appearance: Normal appearance.   HENT:      Head: Normocephalic.      Nose: Nose normal.   Eyes:      Pupils: Pupils are equal, round, and reactive to light.   Cardiovascular:      Rate and Rhythm: Normal rate and regular rhythm.   Pulmonary:      Breath sounds: Stridor present.   Abdominal:      General: Abdomen is  "flat.      Palpations: Abdomen is soft.   Musculoskeletal:         General: Normal range of motion.      Cervical back: Normal range of motion.   Skin:     Capillary Refill: Capillary refill takes less than 2 seconds.   Neurological:      Mental Status: He is alert.                  Results Review:  I have reviewed the labs, radiology results, and diagnostic studies.     Laboratory Data:          Results from last 7 days   Lab Units 10/25/24  0656 10/24/24  2023 10/23/24  1300   WBC 10*3/mm3 4.47  --  7.7   HEMOGLOBIN g/dL 7.4* 8.5* 7.7*   HEMATOCRIT % 25.5*  --  27.1*   PLATELETS 10*3/mm3 169  --  262               Results from last 7 days   Lab Units 10/25/24  0656 10/23/24  1300   SODIUM mmol/L 139 139   POTASSIUM mmol/L 4.2 4.9   CHLORIDE mmol/L 106 105   CO2 mmol/L 21.0* 20   BUN mg/dL 20 21   CREATININE mg/dL 0.88 1.12   CALCIUM mg/dL 8.9 9.3   BILIRUBIN mg/dL 0.7 0.6   ALK PHOS U/L 63 73   ALT (SGPT) U/L 13 18   AST (SGOT) U/L 19 27   GLUCOSE mg/dL 99 84         Culture Data:   No results found for: \"BLOODCX\", \"URINECX\", \"WOUNDCX\", \"MRSACX\", \"RESPCX\", \"STOOLCX\"     Radiology Data:   Imaging Results (Last 24 Hours)         Procedure Component Value Units Date/Time     XR Outside Films [[id number]] Resulted: 10/25/24 0946       Updated: 10/25/24 0946     Narrative:       This procedure was auto-finalized with no dictation required.     CT Outside Films [[id number]] Resulted: 10/25/24 0946       Updated: 10/25/24 0946     Narrative:       This procedure was auto-finalized with no dictation required.                I have reviewed the patient's current medications.      Assessment/Plan   Assessment       Active Hospital Problems     Diagnosis      **Symptomatic anemia      S/P CABG (coronary artery bypass graft)      Protein S deficiency      Atrial fibrillation, chronic      Lung nodule      Tobacco abuse      Hypertension      Hyperlipidemia      CAD (coronary artery disease)           Treatment Plan  . #1 " symptomatic anemia -patient has been feeling weak, tired, fatigued, dizzy with standing.  Will check orthostatics.  He has a history of CAD and CABG.  He is on Coumadin and full dose aspirin.  His hemoglobin yesterday was 7.7 and at outside facility today was reported 6.8.  He was given 1 unit of PRBC.  Rectal exam was not performed.  Patient reports dark stools for the last month.  Will check a fecal occult.  Check a blood count now posttransfusion and trend.  PPI IV twice daily.  Will ask GI to evaluate for possible EGD if warranted.      #2 CAD -with a history of CABG.  No active chest pain currently.  Continue beta-blocker and statin.  Hold aspirin.     #3 A-fib -rate controlled.  Continue beta-blocker.  Hold Coumadin.  Check INR.  Cannot find INR result from outside hospital although patient tells me was 2.3.     #4 history protein S deficiency/DVT/PE -again given above will have to hold Coumadin for tonight.  Monitor INR.       #5 hypertension -blood pressure is 158 on arrival.  Continue home blood pressure meds with holding parameters while monitoring for any bleeding.     #6 hyperlipidemia -statin     #7 right upper lobe pulmonary nodule -known history and follows with pulmonary for surveillance.     Medical Decision Making  Number and Complexity of problems: 1-2 acute, multiple chronic  Differential Diagnosis: As above     Conditions and Status        New to me.  Clinically looks stable.  Stable vitals.  Nontoxic.  Currently mild fatigue is main symptom post initial 1 unit PRBC.     [facility] Data  External documents reviewed: Transfer documents reviewed  Cardiac tracing (EKG, telemetry) interpretation: Currently rate controlled fib on the bedside monitor  Radiology interpretation: Outside imaging reports reviewed  Labs reviewed: Outside transfer labs reviewed  Any tests that were considered but not ordered: None     Decision rules/scores evaluated (example [id number], Wells, etc): None     Discussed with:  "Patient, nursing     Care Planning  Shared decision making: Patient apprised of current labs, vitals, imaging and treatment plan.  They are agreeable with proceeding with plans as discussed.     Code status and discussions: DNR/DNI     Disposition  Social Determinants of Health that impact treatment or disposition: none  Estimated length of stay is less than 2 midnights.      I confirmed that the patient's advanced care plan is present, code status is documented, and a surrogate decision maker is listed in the patient's medical record.      The patient's surrogate decision maker is his sister Kira.     Electronically signed by Sebastian Mckeon MD, 10/25/24, 10:16 CDT.                    Richard Gilliam MD   Physician  Gastroenterology     Progress Notes     Signed     Date of Service: 10/27/24 1519  Creation Time: 10/27/24 1519     Signed         S: The patient is aggravated with the poor communication between OSH and here.  He reports a bowel movement last night which the nurse describes as brown and not melenic.  The patient had reported a 1 month history of melena to the OSH and was noted to be anemic which led to his transfer.  His abdominal/pelvic CT scan on October 24, 2024 showed \"stable incompletely visualized mass overlying the right atrium\"     O: Blood pressure 154/66, pulse 64          Laboratory:       A: Overt occult GI bleed manifested as melena and symptomatic anemia.  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  The patient reports that his most recent colonoscopy was over 10 years ago.  However, the timing of any further GI evaluation depends upon whether the patient demonstrates ongoing blood loss and after correlating his recent echocardiogram and CT scan.     P: 1.  Follow hematocrit       2.  Review recent echocardiogram       3.  Anticipate an eventual IP or OP colonoscopy                   Richard Gilliam MD " "  Physician  Gastroenterology     Progress Notes     Signed     Date of Service: 10/28/24 0918  Creation Time: 10/28/24 0918     Signed         S: Fearful of being discharged as he feels weak.     O: /56, pulse 83          Hematocrit 29.7% (stable to improved)     A: Overt occult GI bleed manifested as melena and symptomatic anemia.  EGD demonstrated a gastric fundic AVM which was successfully treated with the APC.  Recommend outpatient colonoscopy in 1-2 months.  Of note, the patient's abdominal/pelvic CT scan on October 24, 2024 showed \"stable incompletely visualized mass overlying the right atrium\".  He has had a recent echocardiogram.     P 1.  Monitor hematocrit      2.  Review recent echocardiogram to correlate with CT scan      3.  Signing off; please recall if we may be of further service.                Current Facility-Administered Medications   Medication Dose Route Frequency Provider Last Rate Last Admin    acetaminophen (TYLENOL) tablet 650 mg  650 mg Oral Q4H PRN Rick Vance DO        Or    acetaminophen (TYLENOL) 160 MG/5ML oral solution 650 mg  650 mg Oral Q4H PRN Rick Vance DO        Or    acetaminophen (TYLENOL) suppository 650 mg  650 mg Rectal Q4H PRN Rick Vance DO        atorvastatin (LIPITOR) tablet 40 mg  40 mg Oral Nightly Rick Vance DO   40 mg at 10/27/24 2050    sennosides-docusate (PERICOLACE) 8.6-50 MG per tablet 2 tablet  2 tablet Oral BID PRN Rick Vance DO        And    polyethylene glycol (MIRALAX) packet 17 g  17 g Oral Daily PRN Rick Vance DO        And    bisacodyl (DULCOLAX) EC tablet 5 mg  5 mg Oral Daily PRN Rick Vance DO        And    bisacodyl (DULCOLAX) suppository 10 mg  10 mg Rectal Daily PRN Rick Vance DO        furosemide (LASIX) tablet 40 mg  40 mg Oral Daily Sebastian Mckeon MD   40 mg at 10/28/24 0851    lactulose solution 20 g  20 g Oral TID Sebastian Mckeon MD   20 g at 10/27/24 0850    melatonin tablet 5 mg  " 5 mg Oral Nightly PRN Sebastian Mckeon MD   5 mg at 10/27/24 2050    metoprolol succinate XL (TOPROL-XL) 24 hr tablet 25 mg  25 mg Oral Daily Rick Vance DO   25 mg at 10/28/24 0852    ondansetron (ZOFRAN) injection 4 mg  4 mg Intravenous Q6H PRN Rick Vance DO        pantoprazole (PROTONIX) EC tablet 40 mg  40 mg Oral Q12H Emily Heck,    40 mg at 10/28/24 0852    sodium chloride 0.9 % flush 10 mL  10 mL Intravenous Q12H Rick Vance DO   10 mL at 10/26/24 0834    sodium chloride 0.9 % flush 10 mL  10 mL Intravenous PRN Rick Vance DO        valsartan (DIOVAN) tablet 80 mg  80 mg Oral Q24H Rick Vance DO   80 mg at 10/28/24 0852    warfarin (COUMADIN) tablet 5 mg  5 mg Oral Daily Sebastian Mckeon MD

## 2024-10-28 NOTE — TELEPHONE ENCOUNTER
Caller: Kira Sheriff    Relationship to patient: Emergency Contact    Best call back number: [phone]    Patient is needing: PT'S SISTER CALLED WANTING TO LEAVE A MESSAGE FOR JAQUELINE RIVAS TO CALL HER BACK. PT IS IN THE HOSPITAL AND SHE WANTS TO LET HER KNOW EVERYTHING THAT'S GOING ON. PLEASE CALL BACK.

## 2024-10-29 ENCOUNTER — APPOINTMENT (OUTPATIENT)
Dept: CT IMAGING | Facility: HOSPITAL | Age: 79
DRG: 378 | End: 2024-10-29
Payer: MEDICARE

## 2024-10-29 ENCOUNTER — APPOINTMENT (OUTPATIENT)
Dept: CARDIOLOGY | Facility: HOSPITAL | Age: 79
DRG: 378 | End: 2024-10-29
Payer: MEDICARE

## 2024-10-29 LAB
BACTERIA ISLT: NORMAL
BH CV ECHO MEAS - AO MAX PG: 18 MMHG
BH CV ECHO MEAS - AO MEAN PG: 9.1 MMHG
BH CV ECHO MEAS - AO ROOT DIAM: 4.6 CM
BH CV ECHO MEAS - AO V2 MAX: 210 CM/SEC
BH CV ECHO MEAS - AO V2 VTI: 38.6 CM
BH CV ECHO MEAS - AVA(I,D): 2.17 CM2
BH CV ECHO MEAS - EDV(CUBED): 125.1 ML
BH CV ECHO MEAS - EDV(MOD-SP2): 132.9 ML
BH CV ECHO MEAS - EDV(MOD-SP4): 134.9 ML
BH CV ECHO MEAS - EF(MOD-SP2): 65.1 %
BH CV ECHO MEAS - EF(MOD-SP4): 64.8 %
BH CV ECHO MEAS - ESV(CUBED): 53.8 ML
BH CV ECHO MEAS - ESV(MOD-SP2): 46.4 ML
BH CV ECHO MEAS - ESV(MOD-SP4): 47.5 ML
BH CV ECHO MEAS - FS: 24.5 %
BH CV ECHO MEAS - IVS/LVPW: 1.01 CM
BH CV ECHO MEAS - IVSD: 1.66 CM
BH CV ECHO MEAS - LA DIMENSION: 5.1 CM
BH CV ECHO MEAS - LAT PEAK E' VEL: 11 CM/SEC
BH CV ECHO MEAS - LV DIASTOLIC VOL/BSA (35-75): 54.9 CM2
BH CV ECHO MEAS - LV MASS(C)D: 374.7 GRAMS
BH CV ECHO MEAS - LV MAX PG: 4.9 MMHG
BH CV ECHO MEAS - LV MEAN PG: 2.6 MMHG
BH CV ECHO MEAS - LV SYSTOLIC VOL/BSA (12-30): 19.3 CM2
BH CV ECHO MEAS - LV V1 MAX: 110.2 CM/SEC
BH CV ECHO MEAS - LV V1 VTI: 19.2 CM
BH CV ECHO MEAS - LVIDD: 5 CM
BH CV ECHO MEAS - LVIDS: 3.8 CM
BH CV ECHO MEAS - LVOT AREA: 4.4 CM2
BH CV ECHO MEAS - LVOT DIAM: 2.36 CM
BH CV ECHO MEAS - LVPWD: 1.65 CM
BH CV ECHO MEAS - MED PEAK E' VEL: 6 CM/SEC
BH CV ECHO MEAS - MV DEC TIME: 0.24 SEC
BH CV ECHO MEAS - MV E MAX VEL: 94.8 CM/SEC
BH CV ECHO MEAS - MV MAX PG: 3.9 MMHG
BH CV ECHO MEAS - MV MEAN PG: 1.79 MMHG
BH CV ECHO MEAS - MV V2 VTI: 26.8 CM
BH CV ECHO MEAS - MVA(VTI): 3.1 CM2
BH CV ECHO MEAS - PA V2 MAX: 97.4 CM/SEC
BH CV ECHO MEAS - RAP SYSTOLE: 10 MMHG
BH CV ECHO MEAS - RV MAX PG: 1.94 MMHG
BH CV ECHO MEAS - RV V1 MAX: 69.6 CM/SEC
BH CV ECHO MEAS - RV V1 VTI: 11.6 CM
BH CV ECHO MEAS - RVDD: 3.8 CM
BH CV ECHO MEAS - RVSP: 34.8 MMHG
BH CV ECHO MEAS - SV(LVOT): 83.7 ML
BH CV ECHO MEAS - SV(MOD-SP2): 86.5 ML
BH CV ECHO MEAS - SV(MOD-SP4): 87.4 ML
BH CV ECHO MEAS - SVI(LVOT): 34 ML/M2
BH CV ECHO MEAS - SVI(MOD-SP2): 35.2 ML/M2
BH CV ECHO MEAS - SVI(MOD-SP4): 35.5 ML/M2
BH CV ECHO MEAS - TAPSE (>1.6): 1 CM
BH CV ECHO MEAS - TR MAX PG: 24.8 MMHG
BH CV ECHO MEAS - TR MAX VEL: 249.2 CM/SEC
BH CV ECHO MEASUREMENTS AVERAGE E/E' RATIO: 11.15
BH CV ECHO SHUNT ASSESSMENT PERFORMED (HIDDEN SCRIPTING): 1
BH CV XLRA - RV BASE: 4.9 CM
BH CV XLRA - RV LENGTH: 9.9 CM
BH CV XLRA - RV MID: 4.3 CM
BH CV XLRA - TDI S': 6 CM/SEC
HCT VFR BLD AUTO: 28.5 % (ref 37.5–51)
HCT VFR BLD AUTO: 30.1 % (ref 37.5–51)
HCT VFR BLD AUTO: 30.8 % (ref 37.5–51)
HCT VFR BLD AUTO: 33.5 % (ref 37.5–51)
HGB BLD-MCNC: 10.1 G/DL (ref 13–17.7)
HGB BLD-MCNC: 8.7 G/DL (ref 13–17.7)
HGB BLD-MCNC: 9 G/DL (ref 13–17.7)
HGB BLD-MCNC: 9 G/DL (ref 13–17.7)
INR PPP: 1.47 (ref 0.91–1.09)
LEFT ATRIUM VOLUME INDEX: 50 ML/M2
LEFT ATRIUM VOLUME: 123 ML
PROTHROMBIN TIME: 18.5 SECONDS (ref 11.8–14.8)

## 2024-10-29 PROCEDURE — 25510000001 IOPAMIDOL 61 % SOLUTION: Performed by: HOSPITALIST

## 2024-10-29 PROCEDURE — 85018 HEMOGLOBIN: CPT | Performed by: INTERNAL MEDICINE

## 2024-10-29 PROCEDURE — 85014 HEMATOCRIT: CPT | Performed by: INTERNAL MEDICINE

## 2024-10-29 PROCEDURE — 93306 TTE W/DOPPLER COMPLETE: CPT

## 2024-10-29 PROCEDURE — 93306 TTE W/DOPPLER COMPLETE: CPT | Performed by: INTERNAL MEDICINE

## 2024-10-29 PROCEDURE — 85610 PROTHROMBIN TIME: CPT | Performed by: HOSPITALIST

## 2024-10-29 PROCEDURE — 71260 CT THORAX DX C+: CPT

## 2024-10-29 PROCEDURE — 25510000001 PERFLUTREN 6.52 MG/ML SUSPENSION 2 ML VIAL: Performed by: HOSPITALIST

## 2024-10-29 RX ORDER — IOPAMIDOL 612 MG/ML
100 INJECTION, SOLUTION INTRAVASCULAR
Status: COMPLETED | OUTPATIENT
Start: 2024-10-29 | End: 2024-10-29

## 2024-10-29 RX ADMIN — Medication 10 ML: at 08:40

## 2024-10-29 RX ADMIN — Medication 5 MG: at 21:09

## 2024-10-29 RX ADMIN — PANTOPRAZOLE SODIUM 40 MG: 40 TABLET, DELAYED RELEASE ORAL at 21:09

## 2024-10-29 RX ADMIN — LACTULOSE 20 G: 20 SOLUTION ORAL at 08:38

## 2024-10-29 RX ADMIN — PERFLUTREN 10 ML: 6.52 INJECTION, SUSPENSION INTRAVENOUS at 15:19

## 2024-10-29 RX ADMIN — WARFARIN 5 MG: 5 TABLET ORAL at 17:14

## 2024-10-29 RX ADMIN — ATORVASTATIN CALCIUM 40 MG: 40 TABLET, FILM COATED ORAL at 21:09

## 2024-10-29 RX ADMIN — PANTOPRAZOLE SODIUM 40 MG: 40 TABLET, DELAYED RELEASE ORAL at 08:37

## 2024-10-29 RX ADMIN — VALSARTAN 80 MG: 80 TABLET, FILM COATED ORAL at 08:37

## 2024-10-29 RX ADMIN — METOPROLOL SUCCINATE 25 MG: 25 TABLET, EXTENDED RELEASE ORAL at 08:38

## 2024-10-29 RX ADMIN — IOPAMIDOL 89 ML: 612 INJECTION, SOLUTION INTRAVENOUS at 10:35

## 2024-10-29 RX ADMIN — FUROSEMIDE 40 MG: 40 TABLET ORAL at 08:37

## 2024-10-29 RX ADMIN — Medication 10 ML: at 21:14

## 2024-10-29 NOTE — PROGRESS NOTES
[facility]  INPATIENT PROGRESS NOTE    Patient Name: Michael Paz  Date of Admission: 10/24/2024  Today's Date: 10/29/24  Length of Stay: 3  Primary Care Physician: Henrietta Jin APRN    Subjective   Chief Complaint: Dark stool symptomatic anemia  HPI   Patient was transferred from [facility] ER today to our facility for evaluation.  He was seen in room 463 after arrival with nursing at bedside.  This is a 79-year-old male with a history of CAD status post prior CABG on full dose aspirin.  He is also on warfarin for history of A-fib as well as prior DVT/PE with protein S deficiency.  Other history includes infrarenal AAA status post endograft, hypertension, hyperlipidemia, tobacco use, right upper lobe pulmonary nodule being followed by pulmonary.  Patient had a cyst removed from his back surgically in August.  He says ever since then things have gone downhill for him.  For the last 2 months he has had some progressive weakness, fatigue, shortness of breath with exertion, dizziness especially upon standing.  Patient denies any syncopal events.  Patient does state that symptoms seem to have slowly increased over the last month and he also has noted some dark tarry stools over the last month.  Patient went to see Dr. Vicente of cardiology a few weeks ago and reportedly had an echocardiogram done but I cannot find the echo test or the office visit in our system.  He was referred to Dr. Lyons reportedly for slow afib, his metoprolol xL was reduced from 50 to 25mg.  I do see that patient has an upcoming appointment with Dr. Lyons in February 2025.  Patient went to see pulmonary yesterday for follow-up and monitoring of his known right upper lobe pulmonary nodule.  Labs were drawn during evaluation and patient was found to have a hemoglobin of 7.7.  Due to continued not feeling well he went to the ER today and there his hemoglobin was found to be 6.8.  He  "was transfused a unit of blood which was completed prior to arrival here.  Patient tells me his INR was 2.3 but I cannot find it in transfer records.  Currently on arrival patient still feels tired but denies any current chest pain, shortness of breath, dizziness, nausea, abdominal pain, new focal numbness or weakness.  No fevers or chills   10/25  As before admitted for symptomatic anemia started on PPI hold anticoagulation GI on board n.p.o. EGD was given fresh frozen plasma per GI  10/26  78 y/o male presented as transfer from outside hospital with melena and iron deficiency anemia. Hgb 7.7 on admission here after receiving 1 units of PRBC at outside hospital. 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 Received a 2nd unit PRBC here on 10/25. Received total of 3 units of FFP 10/25. On coumadin with history of atrial fibrillation as well as DVT/PE with Protein S deficiency.  For this patient Eliquis might be a safer choice than Coumadin he was he told me before that his insurance would not pay for Eliquis for now we will not anticoagulate this man with the fear of recurrent bleeding as I suspect the risks of bleeding outweighs the risk of thrombosis  10/27  As before EGD was showing AVM in the stomach fundus treated with APC and epinephrine injections hemoglobin remained stable remains on Protonix  10/28  Before remains anxious frustrated multiple complaints was having weakness shortness of breath, chest x-ray showing chronic disease right upper lobe opacity recommending CT head and neck unremarkable will order CT appreciate GI.,  GI had signed off the patient's abdominal/pelvic CT scan on October 24, 2024 showed \"stable incompletely visualized mass overlying the right atrium\".  Repeat echo  10/29  As before hemoglobin up to 9 today awaiting echo and CT Coumadin has been started  Review of Systems   All pertinent negatives and positives " "are as above. All other systems have been reviewed and are negative unless otherwise stated.     Objective    Temp:  [98.3 °F (36.8 °C)-98.6 °F (37 °C)] 98.3 °F (36.8 °C)  Heart Rate:  [] 67  Resp:  [18-20] 20  BP: (103-140)/(55-84) 136/84  Physical Exam  Constitutional:       Appearance: Normal appearance.   HENT:      Head: Normocephalic.      Nose: Nose normal.   Eyes:      Pupils: Pupils are equal, round, and reactive to light.   Cardiovascular:      Rate and Rhythm: Normal rate and regular rhythm.   Pulmonary:      Breath sounds: Stridor present.   Abdominal:      General: Abdomen is flat.      Palpations: Abdomen is soft.   Musculoskeletal:         General: Normal range of motion.      Cervical back: Normal range of motion.   Skin:     Capillary Refill: Capillary refill takes less than 2 seconds.   Neurological:      Mental Status: He is alert.             Results Review:  I have reviewed the labs, radiology results, and diagnostic studies.    Laboratory Data:   Results from last 7 days   Lab Units 10/29/24  0724 10/29/24  0048 10/28/24  1458 10/26/24  1634 10/26/24  0651 10/25/24  1246 10/25/24  0656 10/24/24  2023 10/23/24  1300   WBC 10*3/mm3  --   --   --   --  4.60  --  4.47  --  7.7   HEMOGLOBIN g/dL 9.0* 9.0* 9.5*   < > 7.6*   < > 7.4*   < > 7.7*   HEMATOCRIT % 30.8* 30.1* 31.6*   < > 26.0*   < > 25.5*  --  27.1*   PLATELETS 10*3/mm3  --   --   --   --  143  --  169  --  262    < > = values in this interval not displayed.        Results from last 7 days   Lab Units 10/25/24  0656 10/23/24  1300   SODIUM mmol/L 139 139   POTASSIUM mmol/L 4.2 4.9   CHLORIDE mmol/L 106 105   CO2 mmol/L 21.0* 20   BUN mg/dL 20 21   CREATININE mg/dL 0.88 1.12   CALCIUM mg/dL 8.9 9.3   BILIRUBIN mg/dL 0.7 0.6   ALK PHOS U/L 63 73   ALT (SGPT) U/L 13 18   AST (SGOT) U/L 19 27   GLUCOSE mg/dL 99 84       Culture Data:   No results found for: \"BLOODCX\", \"URINECX\", \"WOUNDCX\", \"MRSACX\", \"RESPCX\", \"STOOLCX\"    Radiology Data: "   Imaging Results (Last 24 Hours)       ** No results found for the last 24 hours. **            I have reviewed the patient's current medications.     Assessment/Plan   Assessment  Active Hospital Problems    Diagnosis     **Symptomatic anemia     S/P CABG (coronary artery bypass graft)     Protein S deficiency     Atrial fibrillation, chronic     Lung nodule     Tobacco abuse     Hypertension     Hyperlipidemia     CAD (coronary artery disease)        Treatment Plan  . #1 symptomatic anemia -patient has been feeling weak, tired, fatigued, dizzy with standing.  Will check orthostatics.  He has a history of CAD and CABG.  He is on Coumadin and full dose aspirin.  His hemoglobin yesterday was 7.7 and at outside facility today was reported 6.8.  He was given 1 unit of PRBC.  Rectal exam was not performed.  Patient reports dark stools for the last month.  Will check a fecal occult.  Check a blood count now posttransfusion and trend.  PPI IV twice daily.  Will ask GI to evaluate for possible EGD if warranted.      #2 CAD -with a history of CABG.  No active chest pain currently.  Continue beta-blocker and statin.  Hold aspirin.     #3 A-fib -rate controlled.  Continue beta-blocker.  Hold Coumadin.  Check INR.  Cannot find INR result from outside hospital although patient tells me was 2.3.     #4 history protein S deficiency/DVT/PE -again given above will have to hold Coumadin for tonight.  Monitor INR.       #5 hypertension -blood pressure is 158 on arrival.  Continue home blood pressure meds with holding parameters while monitoring for any bleeding.     #6 hyperlipidemia -statin     #7 right upper lobe pulmonary nodule -known history and follows with pulmonary for surveillance.     Medical Decision Making  Number and Complexity of problems: 1-2 acute, multiple chronic  Differential Diagnosis: As above     Conditions and Status        New to me.  Clinically looks stable.  Stable vitals.  Nontoxic.  Currently mild fatigue  is main symptom post initial 1 unit PRBC.     [facility]  External documents reviewed: Transfer documents reviewed  Cardiac tracing (EKG, telemetry) interpretation: Currently rate controlled fib on the bedside monitor  Radiology interpretation: Outside imaging reports reviewed  Labs reviewed: Outside transfer labs reviewed  Any tests that were considered but not ordered: None     Decision rules/scores evaluated (example [id number], [place], etc): None     Discussed with: Patient, nursing     Care Planning  Shared decision making: Patient apprised of current labs, vitals, imaging and treatment plan.  They are agreeable with proceeding with plans as discussed.     Code status and discussions: DNR/DNI     Disposition  Social Determinants of Health that impact treatment or disposition: none  Estimated length of stay is less than 2 midnights.      I confirmed that the patient's advanced care plan is present, code status is documented, and a surrogate decision maker is listed in the patient's medical record.      The patient's surrogate decision maker is his sister Kira.    Electronically signed by Sebastian Mckeon MD, 10/29/24, 10:13 CDT.

## 2024-10-30 LAB
HCT VFR BLD AUTO: 28.9 % (ref 37.5–51)
HCT VFR BLD AUTO: 30.2 % (ref 37.5–51)
HCT VFR BLD AUTO: 30.9 % (ref 37.5–51)
HGB BLD-MCNC: 8.4 G/DL (ref 13–17.7)
HGB BLD-MCNC: 8.9 G/DL (ref 13–17.7)
HGB BLD-MCNC: 9.1 G/DL (ref 13–17.7)
INR PPP: 1.41 (ref 0.91–1.09)
PROTHROMBIN TIME: 17.8 SECONDS (ref 11.8–14.8)

## 2024-10-30 PROCEDURE — 85014 HEMATOCRIT: CPT | Performed by: INTERNAL MEDICINE

## 2024-10-30 PROCEDURE — 97116 GAIT TRAINING THERAPY: CPT

## 2024-10-30 PROCEDURE — 99223 1ST HOSP IP/OBS HIGH 75: CPT | Performed by: INTERNAL MEDICINE

## 2024-10-30 PROCEDURE — 85018 HEMOGLOBIN: CPT | Performed by: INTERNAL MEDICINE

## 2024-10-30 PROCEDURE — 85610 PROTHROMBIN TIME: CPT | Performed by: HOSPITALIST

## 2024-10-30 RX ORDER — ARFORMOTEROL TARTRATE 15 UG/2ML
15 SOLUTION RESPIRATORY (INHALATION)
Status: DISCONTINUED | OUTPATIENT
Start: 2024-10-30 | End: 2024-11-01 | Stop reason: HOSPADM

## 2024-10-30 RX ADMIN — PANTOPRAZOLE SODIUM 40 MG: 40 TABLET, DELAYED RELEASE ORAL at 20:24

## 2024-10-30 RX ADMIN — Medication 5 MG: at 20:24

## 2024-10-30 RX ADMIN — WARFARIN 5 MG: 5 TABLET ORAL at 18:27

## 2024-10-30 RX ADMIN — METOPROLOL SUCCINATE 25 MG: 25 TABLET, EXTENDED RELEASE ORAL at 08:36

## 2024-10-30 RX ADMIN — FUROSEMIDE 40 MG: 40 TABLET ORAL at 08:36

## 2024-10-30 RX ADMIN — VALSARTAN 80 MG: 80 TABLET, FILM COATED ORAL at 08:36

## 2024-10-30 RX ADMIN — LACTULOSE 20 G: 20 SOLUTION ORAL at 20:24

## 2024-10-30 RX ADMIN — Medication 10 ML: at 20:25

## 2024-10-30 RX ADMIN — PANTOPRAZOLE SODIUM 40 MG: 40 TABLET, DELAYED RELEASE ORAL at 08:36

## 2024-10-30 RX ADMIN — Medication 10 ML: at 08:39

## 2024-10-30 RX ADMIN — ATORVASTATIN CALCIUM 40 MG: 40 TABLET, FILM COATED ORAL at 20:24

## 2024-10-30 NOTE — CASE MANAGEMENT/SOCIAL WORK
Continued Stay Note   [facility]     Patient Name: Michael Paz  MRN: [id number]  Today's Date: 10/30/2024    Admit Date: 10/24/2024    Plan: Home   Discharge Plan       Row Name 10/30/24 1215       Plan    Plan Home    Patient/Family in Agreement with Plan yes    Plan Comments Chart reviewed; events noted.  Pt ambulating 200 ft.  Pt plans home and denies any dc needs.                   Discharge Codes    No documentation.                       ELMO RoseW

## 2024-10-30 NOTE — THERAPY TREATMENT NOTE
Acute Care - Physical Therapy Treatment Note  [facility]     Patient Name: Michael Paz  : 1945  MRN: [id number]  Today's Date: 10/30/2024      Visit Dx:     ICD-10-CM ICD-9-CM   1. Impaired mobility [Z74.09]  Z74.09 799.89     Patient Active Problem List   Diagnosis    Tobacco abuse    Hypertension    Hyperlipidemia    Endoleak post endovascular aneurysm repair    AAA (abdominal aortic aneurysm)    Aneurysm    CAD (coronary artery disease)    Right-sided epistaxis    Hx pulmonary embolism    Hx of deep venous thrombosis    Rhinitis, chronic    Anticoagulant adverse reaction    Epistaxis    Lung nodule    Protein C deficiency    Protein S deficiency    Atrial fibrillation, chronic    Symptomatic anemia    S/P CABG (coronary artery bypass graft)     Past Medical History:   Diagnosis Date    AAA (abdominal aortic aneurysm)     Aneurysm     Arthritis     Atrial fibrillation     CAD (coronary artery disease)     CAD (coronary artery disease)     Clotting disorder     Deep vein thrombosis [id number]    Depression     Endoleak post endovascular aneurysm repair     History of DVT (deep vein thrombosis)     History of foot fracture     BILATERAL FOOT FRACTURES    History of fracture of left ankle     History of pulmonary embolus (PE)     History of transfusion  and     Hyperlipidemia     Hypertension     Myocardial infarction     Peripheral vascular disease     Pneumonia 4 times    Pulmonary embolism     Tobacco abuse      Past Surgical History:   Procedure Laterality Date    ABDOMINAL AORTIC ANEURYSM REPAIR      APPENDECTOMY      BACK SURGERY      CARDIAC CATHETERIZATION      CORONARY ANGIOPLASTY      CORONARY ARTERY BYPASS GRAFT      CORONARY STENT PLACEMENT      ENDOSCOPY N/A 10/25/2024    Procedure: ESOPHAGOGASTRODUODENOSCOPY WITH ANESTHESIA;  Surgeon: Fareed Velasco MD;  Location[facility] ENDOSCOPY;  Service: Gastroenterology;  Laterality: N/A;  Pre: Symptomatic anemia;  Post: Bleeding  gastric AVM;  Nausea and vomiting    HEMORRHOIDECTOMY      INGUINAL HERNIA REPAIR      KNEE SURGERY Right     OTHER SURGICAL HISTORY      TYPE IA ENDOLEAK REPAIR    OTHER SURGICAL HISTORY      CRANIAL CYST     PT Assessment (Last 12 Hours)       PT Evaluation and Treatment       Row Name 10/30/24 [facility]          Physical Therapy Time and Intention    Subjective Information no complaints (P)   -     Document Type therapy note (daily note) (P)   -     Mode of Treatment physical therapy (P)   -       Row Name 10/30/24 1110          General Information    Existing Precautions/Restrictions fall (P)   -       Row Name 10/30/24 1110          Pain    Pretreatment Pain Rating 0/10 - no pain (P)   -AC     Posttreatment Pain Rating 0/10 - no pain (P)   -       Row Name 10/30/24 1110          Bed Mobility    Bed Mobility rolling right;supine-sit (P)   -     Rolling Right [place] (Bed Mobility) standby assist (P)   -     Supine-Sit [place] (Bed Mobility) contact guard (P)   HHA  -     Assistive Device (Bed Mobility) head of bed elevated (P)   -AC       Row Name 10/30/24 1110          Transfers    Transfers sit-stand transfer;stand-sit transfer (P)   -AC       Row Name 10/30/24 1110          Sit-Stand Transfer    Sit-Stand [place] (Transfers) standby assist (P)   -       Row Name 10/30/24 1110          Stand-Sit Transfer    Stand-Sit [place] (Transfers) standby assist (P)   -AC       Row Name 10/30/24 1110          Gait/Stairs (Locomotion)    [place] Level (Gait) contact guard;standby assist;verbal cues (P)   -AC     Distance in Feet (Gait) 100 (P)   x2  -AC     Deviations/Abnormal Patterns (Gait) gait speed decreased;base of support, narrow (P)   -       Row Name 10/30/24 1110          Balance    Balance Assessment sitting static balance;sitting dynamic balance (P)   -AC     Static Sitting Balance independent (P)   -AC     Dynamic Sitting Balance independent (P)   -AC     Position,  Sitting Balance sitting edge of bed;sitting in chair (P)   -       Row Name 10/30/24 1110          Plan of Care Review    Plan of Care Reviewed With patient (P)   -     Progress no change (P)   -AC     Outcome Evaluation Pt tx completed. Pt no c/o pain. Peformed bed mobility with SBA, required HHA with sidelying to sit with cues for utilizing bed rails. STS SBA with no cues needed. Ambulated 100' x 2 with 1 rest break, CGA/SBA with cues for purse lip breathing. Observed decreased SOB during rest breaks after ambulation. (P)   -AC       Row Name 10/30/24 1110          Positioning and Restraints    Pre-Treatment Position in bed (P)   -AC     Post Treatment Position chair (P)   -AC     In Chair sitting;call light within reach;encouraged to call for assist (P)   -               User Key  (r) = Recorded By, (t) = Taken By, (c) = Cosigned By      Initials Name Provider Type     Yohannes Murillo PTA Student PTA Student                    Physical Therapy Education       Title: PT OT SLP Therapies (In Progress)       Topic: Physical Therapy (Done)       Point: Mobility training (Done)       Learning Progress Summary            Patient Acceptance, E, VU,DU by  at 10/30/2024 1157    Comment: benefits of activity    Acceptance, E, VU,DU by  at 10/28/2024 1447    Comment: benefits of activity and extended rest breaks    Acceptance, E, VU,DU by  at 10/28/2024 1257    Comment: Benefits of activity    Acceptance, E, VU by  at 10/26/2024 1159    Comment: role of PT, d//c planning, fall risk/call for assist, goal setting for home sdafety   Family Acceptance, E, VU,DU by  at 10/28/2024 1447    Comment: benefits of activity and extended rest breaks                      Point: Home exercise program (Done)       Learning Progress Summary            Patient Acceptance, E, VU by  at 10/26/2024 1159    Comment: role of PT, d//c planning, fall risk/call for assist, goal setting for home sdafety                       Point: Body mechanics (Done)       Learning Progress Summary            Patient Acceptance, E, VU by  at 10/26/2024 1159    Comment: role of PT, d//c planning, fall risk/call for assist, goal setting for home sdafety                      Point: Precautions (Done)       Learning Progress Summary            Patient Acceptance, E, VU,DU by  at 10/30/2024 1157    Comment: benefits of activity    Acceptance, E, VU,DU by  at 10/28/2024 1447    Comment: benefits of activity and extended rest breaks    Acceptance, E, VU,DU by  at 10/28/2024 1257    Comment: Benefits of activity    Acceptance, E, VU by  at 10/26/2024 1159    Comment: role of PT, d//c planning, fall risk/call for assist, goal setting for home sdafety   Family Acceptance, E, VU,DU by  at 10/28/2024 1447    Comment: benefits of activity and extended rest breaks                                      User Key       Initials Effective Dates Name Provider Type Discipline     08/15/24 -  Kailash Jin, PT DPT Physical Therapist PT     09/30/24 -  Yohannes Murillo PTA Student PTA Student PT                  PT Recommendation and Plan     Plan of Care Reviewed With: (P) patient  Progress: (P) no change  Outcome Evaluation: (P) Pt tx completed. Pt no c/o pain. Peformed bed mobility with SBA, required HHA with sidelying to sit with cues for utilizing bed rails. STS SBA with no cues needed. Ambulated 100' x 2 with 1 rest break, CGA/SBA with cues for purse lip breathing. Observed decreased SOB during rest breaks after ambulation.       Time Calculation:    PT Charges       Row Name 10/30/24 1158 10/30/24 1157          Time Calculation    Start Time -- 1110 (P)   -AC     Stop Time -- 1138 (P)   -AC     Time Calculation (min) -- 28 min (P)   -AC     PT Received On 10/30/24  -TB 10/30/24 (P)   -AC        Timed Charges    30672 - Gait Training Minutes  -- 28 (P)   -AC        Total Minutes    Timed Charges Total Minutes -- 28 (P)   -AC      Total Minutes --  28 (P)   -AC               User Key  (r) = Recorded By, (t) = Taken By, (c) = Cosigned By      Initials Name Provider Type    TB Horacio Vizcaino, PTA Physical Therapist Assistant    Yohannes Peña PTA Student PTA Student                      PT G-Codes  Outcome Measure Options: (P) AM-PAC 6 Clicks Basic Mobility (PT)  AM-PAC 6 Clicks Score (PT): (P) 23    Yohannes Murillo PTA Student  10/30/2024

## 2024-10-30 NOTE — PROGRESS NOTES
[facility] Medicine [facility]  INPATIENT PROGRESS NOTE    Patient Name: Michael Paz  Date of Admission: 10/24/2024  Today's Date: 10/30/24  Length of Stay: 4  Primary Care Physician: Henrietta Jin APRN    Subjective   Chief Complaint: Dark stool symptomatic anemia  HPI   Patient was transferred from [facility] ER today to our facility for evaluation.  He was seen in room 463 after arrival with nursing at bedside.  This is a 79-year-old male with a history of CAD status post prior CABG on full dose aspirin.  He is also on warfarin for history of A-fib as well as prior DVT/PE with protein S deficiency.  Other history includes infrarenal AAA status post endograft, hypertension, hyperlipidemia, tobacco use, right upper lobe pulmonary nodule being followed by pulmonary.  Patient had a cyst removed from his back surgically in August.  He says ever since then things have gone downhill for him.  For the last 2 months he has had some progressive weakness, fatigue, shortness of breath with exertion, dizziness especially upon standing.  Patient denies any syncopal events.  Patient does state that symptoms seem to have slowly increased over the last month and he also has noted some dark tarry stools over the last month.  Patient went to see Dr. Vicente of cardiology a few weeks ago and reportedly had an echocardiogram done but I cannot find the echo test or the office visit in our system.  He was referred to Dr. Lyons reportedly for slow afib, his metoprolol xL was reduced from 50 to 25mg.  I do see that patient has an upcoming appointment with Dr. Lyons in February 2025.  Patient went to see pulmonary yesterday for follow-up and monitoring of his known right upper lobe pulmonary nodule.  Labs were drawn during evaluation and patient was found to have a hemoglobin of 7.7.  Due to continued not feeling well he went to the ER today and there his hemoglobin was found to be 6.8.  He  "was transfused a unit of blood which was completed prior to arrival here.  Patient tells me his INR was 2.3 but I cannot find it in transfer records.  Currently on arrival patient still feels tired but denies any current chest pain, shortness of breath, dizziness, nausea, abdominal pain, new focal numbness or weakness.  No fevers or chills   10/25  As before admitted for symptomatic anemia started on PPI hold anticoagulation GI on board n.p.o. EGD was given fresh frozen plasma per GI  10/26  80 y/o male presented as transfer from outside hospital with melena and iron deficiency anemia. Hgb 7.7 on admission here after receiving 1 units of PRBC at outside hospital. 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 Received a 2nd unit PRBC here on 10/25. Received total of 3 units of FFP 10/25. On coumadin with history of atrial fibrillation as well as DVT/PE with Protein S deficiency.  For this patient Eliquis might be a safer choice than Coumadin he was he told me before that his insurance would not pay for Eliquis for now we will not anticoagulate this man with the fear of recurrent bleeding as I suspect the risks of bleeding outweighs the risk of thrombosis  10/27  As before EGD was showing AVM in the stomach fundus treated with APC and epinephrine injections hemoglobin remained stable remains on Protonix  10/28  Before remains anxious frustrated multiple complaints was having weakness shortness of breath, chest x-ray showing chronic disease right upper lobe opacity recommending CT head and neck unremarkable will order CT appreciate GI.,  GI had signed off the patient's abdominal/pelvic CT scan on October 24, 2024 showed \"stable incompletely visualized mass overlying the right atrium\".  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, he states he was well aware of that mass back in March it was only 2 cm now 6 cm he wants it to be worked up we will consult with pulmonary patient will be complicated since the patient will probably be placed on heparin will need to hold his Coumadin for his protein C S deficiency to do FOB with biopsy  Review of Systems   All pertinent negatives and positives are as above. All other systems have been reviewed and are negative unless otherwise stated.     Objective    Temp:  [97.8 °F (36.6 °C)-98 °F (36.7 °C)] 97.8 °F (36.6 °C)  Heart Rate:  [55-91] 91  Resp:  [20] 20  BP: (110-150)/(47-92) 123/63  Physical Exam  Constitutional:       Appearance: Normal appearance.   HENT:      Head: Normocephalic.      Nose: Nose normal.   Eyes:      Pupils: Pupils are equal, round, and reactive to light.   Cardiovascular:      Rate and Rhythm: Normal rate and regular rhythm.   Pulmonary:      Breath sounds: Stridor present.   Abdominal:      General: Abdomen is flat.      Palpations: Abdomen is soft.   Musculoskeletal:         General: Normal range of motion.      Cervical back: Normal range of motion.   Skin:     Capillary Refill: Capillary refill takes less than 2 seconds.   Neurological:      Mental Status: He is alert.             Results Review:  I have reviewed the labs, radiology results, and diagnostic studies.    Laboratory Data:   Results from last 7 days   Lab Units 10/30/24  0842 10/29/24  2324 10/29/24  1621 10/26/24  1634 10/26/24  0651 10/25/24  1246 10/25/24  0656 10/24/24  2023 10/23/24  1300   WBC 10*3/mm3  --   --   --   --  4.60  --  4.47  --  7.7   HEMOGLOBIN g/dL 9.1* 8.7* 10.1*   < > 7.6*   < > 7.4*   < > 7.7*   HEMATOCRIT % 30.9* 28.5* 33.5*   < > 26.0*   < > 25.5*  --  27.1*   PLATELETS  "10*3/mm3  --   --   --   --  143  --  169  --  262    < > = values in this interval not displayed.        Results from last 7 days   Lab Units 10/25/24  0656 10/23/24  1300   SODIUM mmol/L 139 139   POTASSIUM mmol/L 4.2 4.9   CHLORIDE mmol/L 106 105   CO2 mmol/L 21.0* 20   BUN mg/dL 20 21   CREATININE mg/dL 0.88 1.12   CALCIUM mg/dL 8.9 9.3   BILIRUBIN mg/dL 0.7 0.6   ALK PHOS U/L 63 73   ALT (SGPT) U/L 13 18   AST (SGOT) U/L 19 27   GLUCOSE mg/dL 99 84       Culture Data:   No results found for: \"BLOODCX\", \"URINECX\", \"WOUNDCX\", \"MRSACX\", \"RESPCX\", \"STOOLCX\"    Radiology Data:   Imaging Results (Last 24 Hours)       Procedure Component Value Units Date/Time    CT Chest With Contrast Diagnostic [[id number]] Collected: 10/29/24 1501     Updated: 10/29/24 1608    Narrative:      EXAM: CT CHEST W CONTRAST DIAGNOSTIC-      DATE: 10/29/2024 9:24 AM     HISTORY: mass on chest xray; Z74.09-Other reduced mobility       COMPARISON: 10/24/2024 from [facility] with outside report  reviewed in PACS at time of dictation     DOSE LENGTH PRODUCT: 350.98 mGy.cm mGy cm. Automatic exposure control  was utilized to make radiation dose as low as reasonably achievable.     TECHNIQUE: Enhanced CT images of the chest obtained with multiplanar  reformats.     FINDINGS:     AIRWAYS/PULMONARY PARENCHYMA: The central airways are midline and  patent. No pleural effusion or pneumothorax.     Severe emphysematous changes.     RIGHT upper lobe 6.0 x 3.4 cm spiculated opacity on axial series 3,  image 45.     LEFT lower lobe 2.2 cm pulmonary nodule on axial series 3, image 110,  previously 1.9 cm on 3/12/2024.     VASCULATURE: Thoracic aorta is normal in course and caliber with heavily  calcified atherosclerosis. Enlarged main pulmonary artery measuring 3.7  cm in transverse dimension, which can be seen with pulmonary artery  hypertension.     CARDIAC: Borderline heart size. Heavily calcified native coronary  arteries versus stent material. "      Lobular mass at the RIGHT coronary artery distribution with upper  component measuring 4.7 cm on axial series 2, image 10, previously 4.7  cm on 12/24/2016. Mid component measuring 4.9 x 3.8 cm on image 119,  previously 4.5 x 3.3 cm on 4/25/2019 although incompletely visualized on  that exam. Lower component measures 2.6 cm on image 129, previously 2.6  cm on 3/12/2024. Favor thrombosed aneurysm or other post operative  changes. Single-phase imaging limits evaluation.     No pericardial effusion.       MEDIASTINUM: Esophagus has normal course, caliber and wall thickness.  There is no mediastinal or hilar lymphadenopathy by CT size criteria.      EXTRATHORACIC: The visualized portions of the thyroid gland are  unremarkable. No thoracic inlet or axillary adenopathy. The  extrathoracic soft tissues are within normal limits.      INCLUDED UPPER ABDOMEN: Visualized portion of the liver, pancreas,  spleen, adrenal glands appear within normal limits. Gallstones.  Bilateral upper kidneys appear within normal limits. Partially  visualized upper abdominal aorta measuring 5.2 x 3.5 cm with partially  visualized changes of stent graft repair.     BONES: Sternotomy wires. No acute or suspicious bony finding.          Impression:      1. Spiculated opacity at the RIGHT upper lobe measuring 6.0 x 3.4 cm.  Appearance concerning for malignancy. There are several small airways  leading to this opacity and endobronchial sampling could be considered.      2. Increased size LEFT lower lobe pulmonary nodule now measuring 2.2 cm,  previously 1.9 cm on 3/12/2024. PET/CT may be useful if not previously  performed.     3. Enlarged main pulmonary artery, which can be seen with pulmonary  artery hypertension.     4. Increased size of lobular mass in the RIGHT coronary artery  distribution, favor thrombosed aneurysm or other post operative changes.  Difficult to determine change due to available prior exams although  appears increased  compared to 4/25/2019 with measurements as above.     This report was signed and finalized on 10/29/2024 4:05 PM by Dr Celine Barrios MD.               I have reviewed the patient's current medications.     Assessment/Plan   Assessment  Active Hospital Problems    Diagnosis     **Symptomatic anemia     S/P CABG (coronary artery bypass graft)     Protein S deficiency     Atrial fibrillation, chronic     Lung nodule     Tobacco abuse     Hypertension     Hyperlipidemia     CAD (coronary artery disease)        Treatment Plan  . #1 symptomatic anemia -patient has been feeling weak, tired, fatigued, dizzy with standing.  Will check orthostatics.  He has a history of CAD and CABG.  He is on Coumadin and full dose aspirin.  His hemoglobin yesterday was 7.7 and at outside facility today was reported 6.8.  He was given 1 unit of PRBC.  Rectal exam was not performed.  Patient reports dark stools for the last month.  Will check a fecal occult.  Check a blood count now posttransfusion and trend.  PPI IV twice daily.  Will ask GI to evaluate for possible EGD if warranted.      #2 CAD -with a history of CABG.  No active chest pain currently.  Continue beta-blocker and statin.  Hold aspirin.     #3 A-fib -rate controlled.  Continue beta-blocker.  Hold Coumadin.  Check INR.  Cannot find INR result from outside hospital although patient tells me was 2.3.     #4 history protein S deficiency/DVT/PE -again given above will have to hold Coumadin for tonight.  Monitor INR.       #5 hypertension -blood pressure is 158 on arrival.  Continue home blood pressure meds with holding parameters while monitoring for any bleeding.     #6 hyperlipidemia -statin     #7 right upper lobe pulmonary nodule -known history and follows with pulmonary for surveillance.     Medical Decision Making  Number and Complexity of problems: 1-2 acute, multiple chronic  Differential Diagnosis: As above     Conditions and Status        New to me.  Clinically looks  stable.  Stable vitals.  Nontoxic.  Currently mild fatigue is main symptom post initial 1 unit PRBC.     MDM Data  External documents reviewed: Transfer documents reviewed  Cardiac tracing (EKG, telemetry) interpretation: Currently rate controlled fib on the bedside monitor  Radiology interpretation: Outside imaging reports reviewed  Labs reviewed: Outside transfer labs reviewed  Any tests that were considered but not ordered: None     Decision rules/scores evaluated (example [id number], [place], etc): None     Discussed with: Patient, nursing     Care Planning  Shared decision making: Patient apprised of current labs, vitals, imaging and treatment plan.  They are agreeable with proceeding with plans as discussed.     Code status and discussions: DNR/DNI     Disposition  Social Determinants of Health that impact treatment or disposition: none  Estimated length of stay is less than 2 midnights.      I confirmed that the patient's advanced care plan is present, code status is documented, and a surrogate decision maker is listed in the patient's medical record.      The patient's surrogate decision maker is his sister Kira.    Electronically signed by Sebastian Mckeon MD, 10/30/24, 09:56 CDT.

## 2024-10-30 NOTE — THERAPY TREATMENT NOTE
Acute Care - Physical Therapy Treatment Note  [facility]     Patient Name: Michael Paz  : 1945  MRN: [id number]  Today's Date: 10/30/2024      Visit Dx:     ICD-10-CM ICD-9-CM   1. Impaired mobility [Z74.09]  Z74.09 799.89     Patient Active Problem List   Diagnosis    Tobacco abuse    Hypertension    Hyperlipidemia    Endoleak post endovascular aneurysm repair    AAA (abdominal aortic aneurysm)    Aneurysm    CAD (coronary artery disease)    Right-sided epistaxis    Hx pulmonary embolism    Hx of deep venous thrombosis    Rhinitis, chronic    Anticoagulant adverse reaction    Epistaxis    Lung nodule    Protein C deficiency    Protein S deficiency    Atrial fibrillation, chronic    Symptomatic anemia    S/P CABG (coronary artery bypass graft)     Past Medical History:   Diagnosis Date    AAA (abdominal aortic aneurysm)     Aneurysm     Arthritis     Atrial fibrillation     CAD (coronary artery disease)     CAD (coronary artery disease)     Clotting disorder     Deep vein thrombosis [id number]    Depression     Endoleak post endovascular aneurysm repair     History of DVT (deep vein thrombosis)     History of foot fracture     BILATERAL FOOT FRACTURES    History of fracture of left ankle     History of pulmonary embolus (PE)     History of transfusion  and     Hyperlipidemia     Hypertension     Myocardial infarction     Peripheral vascular disease     Pneumonia 4 times    Pulmonary embolism     Tobacco abuse      Past Surgical History:   Procedure Laterality Date    ABDOMINAL AORTIC ANEURYSM REPAIR      APPENDECTOMY      BACK SURGERY      CARDIAC CATHETERIZATION      CORONARY ANGIOPLASTY      CORONARY ARTERY BYPASS GRAFT      CORONARY STENT PLACEMENT      ENDOSCOPY N/A 10/25/2024    Procedure: ESOPHAGOGASTRODUODENOSCOPY WITH ANESTHESIA;  Surgeon: Fareed Velasco MD;  Location: [facility] ENDOSCOPY;  Service: Gastroenterology;  Laterality: N/A;  Pre: Symptomatic anemia;  Post: Bleeding  gastric AVM;  Nausea and vomiting    HEMORRHOIDECTOMY      INGUINAL HERNIA REPAIR      KNEE SURGERY Right     OTHER SURGICAL HISTORY      TYPE IA ENDOLEAK REPAIR    OTHER SURGICAL HISTORY      CRANIAL CYST     PT Assessment (Last 12 Hours)       PT Evaluation and Treatment       Row Name 10/30/24 1440 10/30/24 1110       Physical Therapy Time and Intention    Subjective Information no complaints (P)   -AC no complaints  -LH (r) AC (t) LH (c)    Document Type therapy note (daily note) (P)   -AC therapy note (daily note)  -LH (r) AC (t) LH (c)    Mode of Treatment physical therapy (P)   -AC physical therapy  -LH (r) AC (t) LH (c)      Row Name 10/30/24 1440 10/30/24 1110       General Information    Existing Precautions/Restrictions fall (P)   -AC fall  -LH (r) AC (t) LH (c)      Row Name 10/30/24 1440 10/30/24 1110       Pain    Pretreatment Pain Rating 0/10 - no pain (P)   -AC 0/10 - no pain  -LH (r) AC (t) LH (c)    Posttreatment Pain Rating 0/10 - no pain (P)   -AC 0/10 - no pain  -LH (r) AC (t) LH (c)      Row Name 10/30/24 1440 10/30/24 1110       Bed Mobility    Bed Mobility rolling right;supine-sit (P)   -AC rolling right;supine-sit  -LH (r) AC (t) LH (c)    Rolling Right [place] (Bed Mobility) standby assist (P)   -AC standby assist  -LH (r) AC (t) LH (c)    Supine-Sit [place] (Bed Mobility) standby assist (P)   -AC contact guard  HHA  -LH (r) AC (t) LH (c)    Assistive Device (Bed Mobility) head of bed elevated;bed rails (P)   -AC head of bed elevated  -LH (r) AC (t) LH (c)      Row Name 10/30/24 1440 10/30/24 1110       Transfers    Transfers sit-stand transfer;stand-sit transfer (P)   -AC sit-stand transfer;stand-sit transfer  -LH (r) AC (t) LH (c)      Row Name 10/30/24 1440 10/30/24 1110       Sit-Stand Transfer    Sit-Stand [place] (Transfers) supervision (P)   -AC standby assist  - (r) AC (t)  (c)      Row Name 10/30/24 1440 10/30/24 1110       Stand-Sit Transfer    Stand-Sit  [place] (Transfers) supervision (P)   -AC standby assist  -LH (r) AC (t) LH (c)      Row Name 10/30/24 1440 10/30/24 1110       Gait/Stairs (Locomotion)    [place] Level (Gait) contact guard;standby assist (P)   -AC contact guard;standby assist;verbal cues  -LH (r) AC (t) LH (c)    Distance in Feet (Gait) 125 (P)   x2  -  x2  -LH (r) AC (t) LH (c)    Deviations/Abnormal Patterns (Gait) left sided deviations;base of support, narrow;gait speed decreased (P)   -AC gait speed decreased;base of support, narrow  -LH (r) AC (t) LH (c)      Row Name 10/30/24 1440 10/30/24 1110       Balance    Balance Assessment sitting static balance;sitting dynamic balance (P)   -AC sitting static balance;sitting dynamic balance  -LH (r) AC (t) LH (c)    Static Sitting Balance independent (P)   -AC independent  -LH (r) AC (t) LH (c)    Dynamic Sitting Balance independent (P)   -AC independent  -LH (r) AC (t) LH (c)    Position, Sitting Balance sitting edge of bed (P)   -AC sitting edge of bed;sitting in chair  -LH (r) AC (t) LH (c)      Row Name 10/30/24 1110          Plan of Care Review    Plan of Care Reviewed With patient  -LH (r) AC (t) LH (c)     Progress no change  -LH (r) AC (t) LH (c)     Outcome Evaluation Pt tx completed. Pt no c/o pain. Peformed bed mobility with SBA, required HHA with sidelying to sit with cues for utilizing bed rails. STS SBA with no cues needed. Ambulated 100' x 2 with 1 rest break, CGA/SBA with cues for purse lip breathing. Observed decreased SOB during rest breaks after ambulation.  -LH (r) AC (t) LH (c)       Row Name 10/30/24 1440 10/30/24 1110       Positioning and Restraints    Pre-Treatment Position in bed (P)   -AC in bed  -LH (r) AC (t) LH (c)    Post Treatment Position bed (P)   -AC chair  -LH (r) AC (t) LH (c)    In Bed sitting EOB;call light within reach;encouraged to call for assist;with family/caregiver (P)   -AC --    In Chair -- sitting;call light within reach;encouraged to  call for assist  - (r)  (t)  (c)              User Key  (r) = Recorded By, (t) = Taken By, (c) = Cosigned By      Initials Name Provider Type    LH Ger Stewart, PT Physical Therapist    Yohannes Peña, PTA Student PTA Student                    Physical Therapy Education       Title: PT OT SLP Therapies (In Progress)       Topic: Physical Therapy (Done)       Point: Mobility training (Done)       Learning Progress Summary            Patient Acceptance, E, VU,DU by  at 10/30/2024 1157    Comment: benefits of activity    Acceptance, E, VU,DU by  at 10/28/2024 1447    Comment: benefits of activity and extended rest breaks    Acceptance, E, VU,DU by  at 10/28/2024 1257    Comment: Benefits of activity    Acceptance, E, VU by  at 10/26/2024 1159    Comment: role of PT, d//c planning, fall risk/call for assist, goal setting for home sdafety   Family Acceptance, E, VU,DU by  at 10/28/2024 1447    Comment: benefits of activity and extended rest breaks                      Point: Home exercise program (Done)       Learning Progress Summary            Patient Acceptance, E, VU by AJ at 10/26/2024 1159    Comment: role of PT, d//c planning, fall risk/call for assist, goal setting for home sdafety                      Point: Body mechanics (Done)       Learning Progress Summary            Patient Acceptance, E, VU by  at 10/26/2024 1159    Comment: role of PT, d//c planning, fall risk/call for assist, goal setting for home sdafety                      Point: Precautions (Done)       Learning Progress Summary            Patient Acceptance, E, VU,DU by  at 10/30/2024 1157    Comment: benefits of activity    Acceptance, E, VU,DU by  at 10/28/2024 1447    Comment: benefits of activity and extended rest breaks    Acceptance, E, VU,DU by  at 10/28/2024 1257    Comment: Benefits of activity    Acceptance, E, VU by AJ at 10/26/2024 1159    Comment: role of PT, d//c planning, fall risk/call for assist,  goal setting for home sdafety   Family Acceptance, EDERICKISAAC by  at 10/28/2024 1447    Comment: benefits of activity and extended rest breaks                                      User Key       Initials Effective Dates Name Provider Type Discipline     08/15/24 -  Kailash Jin, PT DPT Physical Therapist PT     09/30/24 -  Yohannes Murillo PTA Student PTA Student PT                  PT Recommendation and Plan     Plan of Care Reviewed With: patient  Progress: no change  Outcome Evaluation: Pt tx completed. Pt no c/o pain. Peformed bed mobility with SBA, required HHA with sidelying to sit with cues for utilizing bed rails. STS SBA with no cues needed. Ambulated 100' x 2 with 1 rest break, CGA/SBA with cues for purse lip breathing. Observed decreased SOB during rest breaks after ambulation.       Time Calculation:    PT Charges       Row Name 10/30/24 1528 10/30/24 1158 10/30/24 1157       Time Calculation    Start Time 1440 (P)   - -- 1110  -LH (r) AC (t) LH (c)    Stop Time 1458 (P)   - -- 1138  -LH (r) AC (t) LH (c)    Time Calculation (min) 18 min (P)   -AC -- 28 min  -LH (r) AC (t)    PT Received On -- 10/30/24  -TB 10/30/24  - (r) AC (t)  (c)       Timed Charges    85129 - Gait Training Minutes  18 (P)   -AC -- 28  -LH (r) AC (t) LH (c)       Total Minutes    Timed Charges Total Minutes 18 (P)   - -- 28  -LH (r) AC (t)     Total Minutes 18 (P)   - -- 28  -LH (r) AC (t)              User Key  (r) = Recorded By, (t) = Taken By, (c) = Cosigned By      Initials Name Provider Type     Ger Stewart, PT Physical Therapist    Horacio Cardenas, PTA Physical Therapist Assistant     Yohannes Murillo PTA Student PTA Student                      PT G-Codes  Outcome Measure Options: AM-PAC 6 Clicks Basic Mobility (PT)  AM-PAC 6 Clicks Score (PT): 23    Yohannes Murillo PTA Student  10/30/2024

## 2024-10-31 LAB
BASOPHILS # BLD AUTO: 0.03 10*3/MM3 (ref 0–0.2)
BASOPHILS NFR BLD AUTO: 0.5 % (ref 0–1.5)
DEPRECATED RDW RBC AUTO: 49.8 FL (ref 37–54)
EOSINOPHIL # BLD AUTO: 0.24 10*3/MM3 (ref 0–0.4)
EOSINOPHIL NFR BLD AUTO: 3.9 % (ref 0.3–6.2)
ERYTHROCYTE [DISTWIDTH] IN BLOOD BY AUTOMATED COUNT: 16.7 % (ref 12.3–15.4)
HCT VFR BLD AUTO: 27.6 % (ref 37.5–51)
HCT VFR BLD AUTO: 29.2 % (ref 37.5–51)
HGB BLD-MCNC: 8.2 G/DL (ref 13–17.7)
HGB BLD-MCNC: 8.8 G/DL (ref 13–17.7)
IMM GRANULOCYTES # BLD AUTO: 0.01 10*3/MM3 (ref 0–0.05)
IMM GRANULOCYTES NFR BLD AUTO: 0.2 % (ref 0–0.5)
INR PPP: 1.6 (ref 0.91–1.09)
LYMPHOCYTES # BLD AUTO: 0.96 10*3/MM3 (ref 0.7–3.1)
LYMPHOCYTES NFR BLD AUTO: 15.5 % (ref 19.6–45.3)
MCH RBC QN AUTO: 24.9 PG (ref 26.6–33)
MCHC RBC AUTO-ENTMCNC: 30.1 G/DL (ref 31.5–35.7)
MCV RBC AUTO: 82.5 FL (ref 79–97)
MONOCYTES # BLD AUTO: 0.94 10*3/MM3 (ref 0.1–0.9)
MONOCYTES NFR BLD AUTO: 15.2 % (ref 5–12)
NEUTROPHILS NFR BLD AUTO: 4.01 10*3/MM3 (ref 1.7–7)
NEUTROPHILS NFR BLD AUTO: 64.7 % (ref 42.7–76)
NRBC BLD AUTO-RTO: 0 /100 WBC (ref 0–0.2)
PLATELET # BLD AUTO: 172 10*3/MM3 (ref 140–450)
PMV BLD AUTO: 10.2 FL (ref 6–12)
PROTHROMBIN TIME: 19.6 SECONDS (ref 11.8–14.8)
RBC # BLD AUTO: 3.54 10*6/MM3 (ref 4.14–5.8)
WBC NRBC COR # BLD AUTO: 6.19 10*3/MM3 (ref 3.4–10.8)

## 2024-10-31 PROCEDURE — 85014 HEMATOCRIT: CPT | Performed by: INTERNAL MEDICINE

## 2024-10-31 PROCEDURE — 94799 UNLISTED PULMONARY SVC/PX: CPT

## 2024-10-31 PROCEDURE — 25010000002 NA FERRIC GLUC CPLX PER 12.5 MG: Performed by: INTERNAL MEDICINE

## 2024-10-31 PROCEDURE — 85025 COMPLETE CBC W/AUTO DIFF WBC: CPT | Performed by: HOSPITALIST

## 2024-10-31 PROCEDURE — 99232 SBSQ HOSP IP/OBS MODERATE 35: CPT | Performed by: INTERNAL MEDICINE

## 2024-10-31 PROCEDURE — 94664 DEMO&/EVAL PT USE INHALER: CPT

## 2024-10-31 PROCEDURE — 97530 THERAPEUTIC ACTIVITIES: CPT

## 2024-10-31 PROCEDURE — 85018 HEMOGLOBIN: CPT | Performed by: INTERNAL MEDICINE

## 2024-10-31 PROCEDURE — 97116 GAIT TRAINING THERAPY: CPT

## 2024-10-31 PROCEDURE — 85306 CLOT INHIBIT PROT S FREE: CPT | Performed by: INTERNAL MEDICINE

## 2024-10-31 PROCEDURE — 25810000003 SODIUM CHLORIDE 0.9 % SOLUTION: Performed by: INTERNAL MEDICINE

## 2024-10-31 PROCEDURE — 99222 1ST HOSP IP/OBS MODERATE 55: CPT | Performed by: INTERNAL MEDICINE

## 2024-10-31 PROCEDURE — 85610 PROTHROMBIN TIME: CPT | Performed by: HOSPITALIST

## 2024-10-31 PROCEDURE — 85305 CLOT INHIBIT PROT S TOTAL: CPT | Performed by: INTERNAL MEDICINE

## 2024-10-31 PROCEDURE — 94760 N-INVAS EAR/PLS OXIMETRY 1: CPT

## 2024-10-31 RX ADMIN — SODIUM CHLORIDE 250 MG: 9 INJECTION, SOLUTION INTRAVENOUS at 15:12

## 2024-10-31 RX ADMIN — ARFORMOTEROL TARTRATE 15 MCG: 15 SOLUTION RESPIRATORY (INHALATION) at 20:46

## 2024-10-31 RX ADMIN — WARFARIN 5 MG: 5 TABLET ORAL at 17:10

## 2024-10-31 RX ADMIN — FUROSEMIDE 40 MG: 40 TABLET ORAL at 09:08

## 2024-10-31 RX ADMIN — PANTOPRAZOLE SODIUM 40 MG: 40 TABLET, DELAYED RELEASE ORAL at 09:08

## 2024-10-31 RX ADMIN — ATORVASTATIN CALCIUM 40 MG: 40 TABLET, FILM COATED ORAL at 20:30

## 2024-10-31 RX ADMIN — ARFORMOTEROL TARTRATE 15 MCG: 15 SOLUTION RESPIRATORY (INHALATION) at 09:44

## 2024-10-31 RX ADMIN — VALSARTAN 80 MG: 80 TABLET, FILM COATED ORAL at 09:08

## 2024-10-31 RX ADMIN — PANTOPRAZOLE SODIUM 40 MG: 40 TABLET, DELAYED RELEASE ORAL at 20:30

## 2024-10-31 RX ADMIN — METOPROLOL SUCCINATE 25 MG: 25 TABLET, EXTENDED RELEASE ORAL at 09:08

## 2024-10-31 RX ADMIN — Medication 5 MG: at 20:32

## 2024-10-31 NOTE — CONSULTS
[facility] Oncology/Hematology Services  CONSULT NOTE    PATIENT NAME:  Michael Paz  YOB: 1945  PATIENT MRN:  [id number]    Date of Admission:  10/24/2024  Consultation Date:  10/31/2024  Referring Provider: Provider, No Known      Subjective     Reason for Consultation: Mass right lung.  Anemia.    History of present illness: Michael Paz 79 y.o. male whose medical history includes AAA, AAA repair, arthritis, atrial fibrillation (1990), CAD, CABG, coronary stent placement, coronary angioplasty, DVT x 3 ([id number])/bilateral PE (1986), protein S deficiency on maintenance anticoagulation (was on Coumadin), PVD, hyperlipidemia, hypertension, myocardial infarction (1990s), pneumonia (4 times), anemia, EGD (10/25/2024), EtOH use (3-4 standard drinks of alcohol per week) and tobacco abuse (67-pack-year smoker.    -10/24/2024-presented to [facility] ER with weakness, dyspnea, and dark stools.  There he was found to be anemic and was transferred to [facility] for further eval.  Over the past few months patient has had worsening fatigue weakness and dark stools.  On admission hemoglobin 7.7, iron saturation 6%.  Received 1 unit of RBC transfusion with improvement of the hemoglobin to 8.5.  Repeat on 10/25/2024 however showed hemoglobin 7.4.  INR prolonged at 2.94 (patient on Coumadin).    -10/25/2024-seen for GI by   Recommended transfusion of RBC and 2 units FFP prior to EGD.    -10/25/2024-EGD showed normal proximal esophagus and mid esophagus. - Z-line irregular, at the gastroesophageal junction. - Red blood in the stomach. - A single bleeding angioectasia in the stomach. Treated with argon plasma coagulation (APC). Injected-recommendation: Return patient to [facility] for ongoing care. - NPO. - Continue present medications. - Will give additonal unit of FFP and 1 unit of PRBC now. - Monitor for any further bleeding. Monitor hgb/hct. - Normal first portion of the duodenum and  second portion of the duodenum. - No specimens collected.    -10/26/2024- Hgb 7.6; MCV 84.1, platelets 143,000, WBC 4.6. CMP normal.  Iron 26 (L), iron saturation 6% (L), TIBC 429, ferritin 40.6 (L), subsequently 3 u FFP, 1 u RBC (per bloodbank)    -10/29/24- CT chest- 1. Spiculated opacity at the RIGHT upper lobe measuring 6.0 x 3.4 cm. Appearance concerning for malignancy. There are several small airways leading to this opacity and endobronchial sampling could be considered. 2. Increased size LEFT lower lobe pulmonary nodule now measuring 2.2 cm, previously 1.9 cm on 3/12/2024. PET/CT may be useful if not previously performed. 3. Enlarged main pulmonary artery, which can be seen with pulmonary artery hypertension. 4. Increased size of lobular mass in the RIGHT coronary artery distribution, favor thrombosed aneurysm or other post operative changes. Difficult to determine change due to available prior exams although appears increased compared to 4/25/2019 with measurements as above.     -10/30/24- Consult pulmonary, Dr. Pagan. A/P: Newly identified mass RUL apex worrisome for malignancy.  Increasing LLL nodule.  >  60-pack-year smoker.  Acute blood loss anemia.  A-fib.  Anticoagulated, meds held due to bleeding.  History of hypercoagulable state.  Recommendations/plan:  Ct is reviewed. The rul lesion is approachable by ion bronchoscopy.  Unfortunately there is a blood vessel running the entire course along the portion of the airway from which we would try to approach the LLL lesion, so this one might not be accessible by ion bronchoscopy.  There is high risk of morbidity from additional diagnostic testing or treatment   PET scanning would be helpful in outpatient setting and would help direct biopsy efforts.  We can order this on discharge with outpatient office follow up for consideration for bronchoscopy or other biopsy technique.  This would require holding anticoagulation again.  Add aerosol bronchodilators to  see if this helps him  Outpatient PFT.    -10/30/24- Hgb 8.4, Hct 28.9, PT 17.8, INR 1.41    -Today the patient is seen regarding the blood loss/iron deficiency anemia and history of protein S deficiency.        Past Medical History:  Past Medical History:   Diagnosis Date    AAA (abdominal aortic aneurysm)     Aneurysm     Arthritis     Atrial fibrillation 1990    CAD (coronary artery disease)     CAD (coronary artery disease)     Clotting disorder 2012    Deep vein thrombosis [id number]    Depression     Endoleak post endovascular aneurysm repair     History of DVT (deep vein thrombosis)     History of foot fracture     BILATERAL FOOT FRACTURES    History of fracture of left ankle     History of pulmonary embolus (PE)     History of transfusion 2012 and 2020    Hyperlipidemia     Hypertension     Myocardial infarction 1990    Peripheral vascular disease     Pneumonia 4 times    Pulmonary embolism 1986    Tobacco abuse      Prior Surgeries:  Past Surgical History:   Procedure Laterality Date    ABDOMINAL AORTIC ANEURYSM REPAIR      APPENDECTOMY      BACK SURGERY      CARDIAC CATHETERIZATION      CORONARY ANGIOPLASTY      CORONARY ARTERY BYPASS GRAFT      CORONARY STENT PLACEMENT      ENDOSCOPY N/A 10/25/2024    Procedure: ESOPHAGOGASTRODUODENOSCOPY WITH ANESTHESIA;  Surgeon: Fareed Velasco MD;  Location: [facility] ENDOSCOPY;  Service: Gastroenterology;  Laterality: N/A;  Pre: Symptomatic anemia;  Post: Bleeding gastric AVM;  Nausea and vomiting    HEMORRHOIDECTOMY      INGUINAL HERNIA REPAIR      KNEE SURGERY Right     OTHER SURGICAL HISTORY      TYPE IA ENDOLEAK REPAIR    OTHER SURGICAL HISTORY      CRANIAL CYST        Allergies:Patient has no known allergies.  PTA Meds:  Medications Prior to Admission   Medication Sig Dispense Refill Last Dose/Taking    Ascorbic Acid (VITAMIN C PO) Take 2,000 mg by mouth Daily.   10/24/2024    aspirin 325 MG tablet Take 1 tablet by mouth Daily.   10/24/2024    candesartan (ATACAND)  16 MG tablet Take 1 tablet by mouth Daily.   10/24/2024    metoprolol succinate XL (TOPROL-XL) 50 MG 24 hr tablet Take 1 tablet by mouth Daily.   10/24/2024    Multiple Vitamins-Minerals (ONE A DAY MEN 50 PLUS PO) Take 1 tablet by mouth Daily.   10/23/2024    simvastatin (ZOCOR) 80 MG tablet Take 1 tablet by mouth Every Night.   10/23/2024    warfarin (COUMADIN) 4 MG tablet Take 1 tablet by mouth Every Other Day. Alternates with 5mg   Taking    warfarin (COUMADIN) 5 MG tablet Take 1 tablet by mouth Every Other Day. Alternates with 4mg   Taking      Current Meds:   Current Facility-Administered Medications   Medication Dose Route Frequency Provider Last Rate Last Admin    acetaminophen (TYLENOL) tablet 650 mg  650 mg Oral Q4H PRN Rick Vance DO        Or    acetaminophen (TYLENOL) 160 MG/5ML oral solution 650 mg  650 mg Oral Q4H PRN Rick Vance DO        Or    acetaminophen (TYLENOL) suppository 650 mg  650 mg Rectal Q4H PRN Rick Vance DO        arformoterol (BROVANA) nebulizer solution 15 mcg  15 mcg Nebulization BID - RT Lizandro Pagan MD   15 mcg at 10/31/24 0944    atorvastatin (LIPITOR) tablet 40 mg  40 mg Oral Nightly Rick Vance DO   40 mg at 10/30/24 2024    sennosides-docusate (PERICOLACE) 8.6-50 MG per tablet 2 tablet  2 tablet Oral BID PRN Rick Vance DO        And    polyethylene glycol (MIRALAX) packet 17 g  17 g Oral Daily PRN Rick Vance DO        And    bisacodyl (DULCOLAX) EC tablet 5 mg  5 mg Oral Daily PRN Rick Vance DO        And    bisacodyl (DULCOLAX) suppository 10 mg  10 mg Rectal Daily PRN Rick Vance DO        furosemide (LASIX) tablet 40 mg  40 mg Oral Daily Sebastian Mckeon MD   40 mg at 10/31/24 0908    lactulose solution 20 g  20 g Oral TID Sebastian Mckeon MD   20 g at 10/30/24 2024    melatonin tablet 5 mg  5 mg Oral Nightly PRN Sebastian Mckeon MD   5 mg at 10/30/24 2024    metoprolol succinate XL (TOPROL-XL) 24 hr tablet 25 mg  25  "mg Oral Daily Rick Vance DO   25 mg at 10/31/24 0908    ondansetron (ZOFRAN) injection 4 mg  4 mg Intravenous Q6H PRN Rick Vance DO        pantoprazole (PROTONIX) EC tablet 40 mg  40 mg Oral Q12H Emily Heck, DO   40 mg at 10/31/24 0908    sodium chloride 0.9 % flush 10 mL  10 mL Intravenous Q12H Rick Vance DO   10 mL at 10/30/24 2025    sodium chloride 0.9 % flush 10 mL  10 mL Intravenous PRN Rick Vance DO        valsartan (DIOVAN) tablet 80 mg  80 mg Oral Q24H Rick Vance DO   80 mg at 10/31/24 0908    warfarin (COUMADIN) tablet 5 mg  5 mg Oral Daily Sebastian Mckeon MD   5 mg at 10/30/24 1827       Family History:family history includes Diabetes in an other family member; Heart disease in his father, mother, and another family member; Hypertension in an other family member.   Social History: reports that he has been smoking cigarettes. He started smoking about 67 years ago. He has a 67.4 pack-year smoking history. He has been exposed to tobacco smoke. He has never used smokeless tobacco. He reports current alcohol use of about 3.0 - 4.0 standard drinks of alcohol per week. He reports that he does not use drugs.    Review of Systems  Review of Systems:   Constitutional: Lingering fatigue, improved weakness.  Good appetite.  No fever / No chills / No sweats  HEENT:  No sore throat / No hoarseness / No vision changes  CV:  No chest pain / No palpitations / No orthopnea  Respiratory:  No cough / No shortness of breath at rest/ No sputum / No hemoptysis  GI:  No nausea / No vomiting / No abdominal pain / No diarrhea / No constipation/no melena since admission.  States his most recent bowel movement this morning was, \"normal brown color\".  No hematochezia.  :  No dysuria / No hesitancy / No urgency / No hematuria  Neuro:  + Generalized muscle weakness / No dysphagia / No headache / No paresthesias  Musculoskeletal:  + Chronic edema / No cyanosis / No pain      Objective  " "    Vitals: /56 (BP Location: Right arm, Patient Position: Lying)   Pulse (!) 49   Temp 98.6 °F (37 °C) (Oral)   Resp 16   Ht 190.5 cm (75\")   Wt 120 kg (263 lb 12.8 oz)   SpO2 96%   BMI 32.97 kg/m²     Physical Exam  Physical Exam:  General appearance: Pleasant, obese, modestly kept elderly while lying in bed.  Alert, appears stated age and cooperative  Skin:  Skin color is pale. No rashes or lesions  HEENT:  Head: Normal, normocephalic, atraumatic.  Neck:  no adenopathy, no tenderness/mass/nodules  Lungs:  clear to auscultation bilaterally  Heart:  regular rate and rhythm  Abdomen:  soft, obese, non-tender  Extremities: Symmetric with 1+ bipedal edema.  Neurologic:  Mental status: Alert, oriented, thought content appropriate    Results from last 7 days   Lab Units 10/30/24  2317 10/30/24  1646 10/30/24  0842 10/26/24  1634 10/26/24  0651 10/25/24  1246 10/25/24  0656   WBC 10*3/mm3  --   --   --   --  4.60  --  4.47   HEMOGLOBIN g/dL 8.4* 8.9* 9.1*   < > 7.6*   < > 7.4*   HEMATOCRIT % 28.9* 30.2* 30.9*   < > 26.0*   < > 25.5*   PLATELETS 10*3/mm3  --   --   --   --  143  --  169    < > = values in this interval not displayed.        Results from last 7 days   Lab Units 10/25/24  0656   SODIUM mmol/L 139   POTASSIUM mmol/L 4.2   CHLORIDE mmol/L 106   CO2 mmol/L 21.0*   BUN mg/dL 20   CREATININE mg/dL 0.88   CALCIUM mg/dL 8.9   BILIRUBIN mg/dL 0.7   ALK PHOS U/L 63   ALT (SGPT) U/L 13   AST (SGOT) U/L 19   GLUCOSE mg/dL 99       ABG:  No results found for: \"PHART\", \"PO2ART\", \"FTD8IXM\"    Culture Data:   No results found for: \"BLOODCX\", \"URINECX\", \"WOUNDCX\", \"MRSACX\", \"RESPCX\", \"STOOLCX\"    No results found for: \"PATHINTERP\"    Radiology:   Imaging Results (Last 7 Days)       Procedure Component Value Units Date/Time    CT Chest With Contrast Diagnostic [[id number]] Collected: 10/29/24 1501     Updated: 10/29/24 1608    Narrative:      EXAM: CT CHEST W CONTRAST DIAGNOSTIC-      DATE: 10/29/2024 9:24 AM   "   HISTORY: mass on chest xray; Z74.09-Other reduced mobility       COMPARISON: 10/24/2024 from [facility] with outside report  reviewed in PACS at time of dictation     DOSE LENGTH PRODUCT: 350.98 mGy.cm mGy cm. Automatic exposure control  was utilized to make radiation dose as low as reasonably achievable.     TECHNIQUE: Enhanced CT images of the chest obtained with multiplanar  reformats.     FINDINGS:     AIRWAYS/PULMONARY PARENCHYMA: The central airways are midline and  patent. No pleural effusion or pneumothorax.     Severe emphysematous changes.     RIGHT upper lobe 6.0 x 3.4 cm spiculated opacity on axial series 3,  image 45.     LEFT lower lobe 2.2 cm pulmonary nodule on axial series 3, image 110,  previously 1.9 cm on 3/12/2024.     VASCULATURE: Thoracic aorta is normal in course and caliber with heavily  calcified atherosclerosis. Enlarged main pulmonary artery measuring 3.7  cm in transverse dimension, which can be seen with pulmonary artery  hypertension.     CARDIAC: Borderline heart size. Heavily calcified native coronary  arteries versus stent material.      Lobular mass at the RIGHT coronary artery distribution with upper  component measuring 4.7 cm on axial series 2, image 10, previously 4.7  cm on 12/24/2016. Mid component measuring 4.9 x 3.8 cm on image 119,  previously 4.5 x 3.3 cm on 4/25/2019 although incompletely visualized on  that exam. Lower component measures 2.6 cm on image 129, previously 2.6  cm on 3/12/2024. Favor thrombosed aneurysm or other post operative  changes. Single-phase imaging limits evaluation.     No pericardial effusion.       MEDIASTINUM: Esophagus has normal course, caliber and wall thickness.  There is no mediastinal or hilar lymphadenopathy by CT size criteria.      EXTRATHORACIC: The visualized portions of the thyroid gland are  unremarkable. No thoracic inlet or axillary adenopathy. The  extrathoracic soft tissues are within normal limits.      INCLUDED UPPER  ABDOMEN: Visualized portion of the liver, pancreas,  spleen, adrenal glands appear within normal limits. Gallstones.  Bilateral upper kidneys appear within normal limits. Partially  visualized upper abdominal aorta measuring 5.2 x 3.5 cm with partially  visualized changes of stent graft repair.     BONES: Sternotomy wires. No acute or suspicious bony finding.          Impression:      1. Spiculated opacity at the RIGHT upper lobe measuring 6.0 x 3.4 cm.  Appearance concerning for malignancy. There are several small airways  leading to this opacity and endobronchial sampling could be considered.      2. Increased size LEFT lower lobe pulmonary nodule now measuring 2.2 cm,  previously 1.9 cm on 3/12/2024. PET/CT may be useful if not previously  performed.     3. Enlarged main pulmonary artery, which can be seen with pulmonary  artery hypertension.     4. Increased size of lobular mass in the RIGHT coronary artery  distribution, favor thrombosed aneurysm or other post operative changes.  Difficult to determine change due to available prior exams although  appears increased compared to 4/25/2019 with measurements as above.     This report was signed and finalized on 10/29/2024 4:05 PM by Dr Celine Barrios MD.       XR Chest 1 View [[id number]] Collected: 10/27/24 1354     Updated: 10/27/24 1359    Narrative:      EXAMINATION: XR CHEST 1 VW-     10/27/2024 12:36 PM     HISTORY: Shortness of breath; Z74.09-Other reduced mobility     1 view chest x-ray.     COMPARISON:  Outside chest x-ray from 10/24/2024.  Chest x-ray from 7/8/2020.     Cardiomegaly.  CABG changes.  Aortic arch calcification.     Hyper expanded lungs with chronic interstitial disease.  Bullous disease at the LEFT apex.     Similar to the outside chest x-ray from 3 days ago there is an irregular  spiculated 4 x 3 cm opacity at the RIGHT upper lobe.     No pneumothorax or heart failure.       Impression:      1. Chronic lung changes.  2. Indeterminate 4 x  3 cm spiculated opacity in the RIGHT upper lobe.  Chest CT correlation is recommended.           This report was signed and finalized on 10/27/2024 1:56 PM by Dr. Ricardo Jernigan MD.       XR Abdomen KUB [[id number]] Collected: 10/26/24 1413     Updated: 10/26/24 1417    Narrative:      EXAMINATION: XR ABDOMEN KUB-     10/26/2024 1:09 PM     HISTORY: abdominal distention; Z74.09-Other reduced mobility     1 view abdomen/KUB.     Mild degenerative lumbar spine changes.  Aortic endograft present.     Diffuse bowel gas pattern with no sign of bowel obstruction or free air.       Impression:      1. No acute abnormality is seen.  2. No bowel distention is seen.           This report was signed and finalized on 10/26/2024 2:14 PM by Dr. Ricardo Jernigan MD.       XR Outside Films [[id number]] Resulted: 10/25/24 0946     Updated: 10/25/24 0946    Narrative:      This procedure was auto-finalized with no dictation required.    CT Outside Films [[id number]] Resulted: 10/25/24 0946     Updated: 10/25/24 0946    Narrative:      This procedure was auto-finalized with no dictation required.                 Assessment/Plan        ASSESSMENT:   Iron deficiency anemia from upper GI bleed.  -Has received at least 1 unit RBC (received 1 unit RBC prior to transfer to this facility) and 3 units FFP transfusions this admission.  -10/25/2024- EGD normal proximal esophagus and mid esophagus. - Z-line irregular, at the gastroesophageal junction. - Red blood in the stomach. - A single bleeding angioectasia in the stomach. Treated with argon plasma coagulation (APC). Injected  2.   RUL apical lung mass and left lower lobe lung nodule.  Both worrisome for malignancy.  -10/30/2024-seen for pulmonary Dr. Pagan (above).  Plans for outpatient PET scan to help direct biopsy efforts via bronchoscopy or other biopsy technique.  3.   History of protein S deficiency with prior DVT x 3 of the left leg ([id number]) and prior bilateral PE (1986).  Patient  "states that 3 of his sisters and \"13 maternal cousins\" carry the genetic deficiency.  4.   History of atrial fibrillation   5.   Maintenance anticoagulation (since resumed).  States that he maintains INR = 2-2.4  6.   COPD  7.   >  60-pack-year cigarette smoking history.  8.   CAD with prior coronary stent, CABG, and MI.      PLAN:  Review of available diagnostic information as outlined above.  Administer IV iron (Ferrlecit 250 mg IV daily x 4)  Transfuse RBCs if Hgb <7, < 8 if symptomatic, or for active bleeding  Draw protein S   Patient insists on anticoagulation fully aware of the risk of rebleeding given the background of acute GI bleed.  Thus, since he has not had any recent thrombotic episodes, I suggested new INR goal of ~ 1.5-2.2 range with consideration of an IVC filter if he develops PE.  Outpatient PET scan and lung biopsy plans as outlined by Dr. Pagan.    I discussed the patients findings and my recommendations with the patient    Miguel Snyder MD  10/31/24  11:53 CDT    Time: I spent ~65 minutes caring for Michael on this date of service. This time includes time spent by me in the following activities: preparing for the visit, reviewing tests, performing a medically appropriate examination and/or evaluation, counseling and educating the patient/family/caregiver, ordering medications, tests, or procedures and documenting information in the medical record    Thank you for allowing me to participate in the care of Mr. Michael Paz  "

## 2024-10-31 NOTE — THERAPY TREATMENT NOTE
Acute Care - Physical Therapy Treatment Note  [facility]     Patient Name: Michael Paz  : 1945  MRN: [id number]  Today's Date: 10/31/2024      Visit Dx:     ICD-10-CM ICD-9-CM   1. Impaired mobility [Z74.09]  Z74.09 799.89     Patient Active Problem List   Diagnosis    Tobacco abuse    Hypertension    Hyperlipidemia    Endoleak post endovascular aneurysm repair    AAA (abdominal aortic aneurysm)    Aneurysm    CAD (coronary artery disease)    Right-sided epistaxis    Hx pulmonary embolism    Hx of deep venous thrombosis    Rhinitis, chronic    Anticoagulant adverse reaction    Epistaxis    Lung nodule    Protein C deficiency    Protein S deficiency    Atrial fibrillation, chronic    Symptomatic anemia    S/P CABG (coronary artery bypass graft)     Past Medical History:   Diagnosis Date    AAA (abdominal aortic aneurysm)     Aneurysm     Arthritis     Atrial fibrillation     CAD (coronary artery disease)     CAD (coronary artery disease)     Clotting disorder     Deep vein thrombosis [id number]    Depression     Endoleak post endovascular aneurysm repair     History of DVT (deep vein thrombosis)     History of foot fracture     BILATERAL FOOT FRACTURES    History of fracture of left ankle     History of pulmonary embolus (PE)     History of transfusion  and     Hyperlipidemia     Hypertension     Myocardial infarction     Peripheral vascular disease     Pneumonia 4 times    Pulmonary embolism     Tobacco abuse      Past Surgical History:   Procedure Laterality Date    ABDOMINAL AORTIC ANEURYSM REPAIR      APPENDECTOMY      BACK SURGERY      CARDIAC CATHETERIZATION      CORONARY ANGIOPLASTY      CORONARY ARTERY BYPASS GRAFT      CORONARY STENT PLACEMENT      ENDOSCOPY N/A 10/25/2024    Procedure: ESOPHAGOGASTRODUODENOSCOPY WITH ANESTHESIA;  Surgeon: Fareed Velasco MD;  Location: [facility] ENDOSCOPY;  Service: Gastroenterology;  Laterality: N/A;  Pre: Symptomatic anemia;  Post: Bleeding  gastric AVM;  Nausea and vomiting    HEMORRHOIDECTOMY      INGUINAL HERNIA REPAIR      KNEE SURGERY Right     OTHER SURGICAL HISTORY      TYPE IA ENDOLEAK REPAIR    OTHER SURGICAL HISTORY      CRANIAL CYST     PT Assessment (Last 12 Hours)       PT Evaluation and Treatment       Row Name 10/31/24 0947          Physical Therapy Time and Intention    Subjective Information no complaints (P)   -     Document Type therapy note (daily note) (P)   -     Mode of Treatment physical therapy (P)   -       Row Name 10/31/24 09          General Information    Existing Precautions/Restrictions fall (P)   -[facility] Name 10/31/24 09          Pain    Pretreatment Pain Rating 0/10 - no pain (P)   -AC     Posttreatment Pain Rating 0/10 - no pain (P)   -       Row Name 10/31/24 09          Bed Mobility    Bed Mobility rolling right;supine-sit (P)   -     Rolling Right [place] (Bed Mobility) independent (P)   -     Supine-Sit [place] (Bed Mobility) modified independence (P)   -     Assistive Device (Bed Mobility) head of bed elevated (P)   -[facility] Name 10/31/24 0947          Transfers    Transfers sit-stand transfer;stand-sit transfer;toilet transfer;bed-chair transfer (P)   -       Row Name 10/31/24 0947          Bed-Chair Transfer    Bed-Chair [place] (Transfers) supervision (P)   -       Row Name 10/31/24 0947          Sit-Stand Transfer    Sit-Stand [place] (Transfers) supervision (P)   -       Row Name 10/31/24 0947          Stand-Sit Transfer    Stand-Sit [place] (Transfers) supervision (P)   -AC       Row Name 10/31/24 09          Toilet Transfer    Type (Toilet Transfer) stand pivot/stand step (P)   -     [place] Level (Toilet Transfer) supervision (P)   -     Assistive Device (Toilet Transfer) commode (P)   -       Row Name 10/31/24 0947          Gait/Stairs (Locomotion)    [place] Level (Gait) standby assist;verbal cues (P)   -     Distance in  Feet (Gait) 150 (P)   x2  -AC     Deviations/Abnormal Patterns (Gait) left sided deviations;gait speed decreased (P)   -     [place] Level (Stairs) contact guard;stand by assist (P)   -     Handrail Location (Stairs) left side (ascending);right side (descending) (P)   -     Number of Steps (Stairs) 9 (P)   -     Ascending Technique (Stairs) step-over-step (P)   -     Descending Technique (Stairs) step-over-step (P)   -       Row Name 10/31/24 4263          Balance    Balance Assessment sitting static balance;sitting dynamic balance (P)   -     Static Sitting Balance independent (P)   -     Dynamic Sitting Balance independent (P)   -     Position, Sitting Balance sitting edge of bed;sitting in chair (P)   -       Row Name 10/31/24 8414          Plan of Care Review    Plan of Care Reviewed With patient (P)   -     Progress improving (P)   -     Outcome Evaluation Pt tx completed. Pt no c/o pain. Pt Mod I with bed mobility and SPA with STS, no cues needed. Bed>chair and toilet t/f with SPA, cues for upright posture. Pt I with all toileting. Pt ambulated 150 x2 with SBA, cues for upright posture. Performed 9 steps with unilateral handrail, CGA/SBA with cues for foot placement. Educated on importance of extended rest breaks to decrease SOB and pt safety. (P)   -               User Key  (r) = Recorded By, (t) = Taken By, (c) = Cosigned By      Initials Name Provider Type     Yohannes Murillo PTA Student PTA Student                    Physical Therapy Education       Title: PT OT SLP Therapies (In Progress)       Topic: Physical Therapy (Done)       Point: Mobility training (Done)       Learning Progress Summary            Patient Acceptance, E, VU,DU by  at 10/31/2024 1156    Comment: Benefits of activity, extended rest breaks to decrease SOB    Acceptance, E, VU,DU by  at 10/30/2024 1157    Comment: benefits of activity    Acceptance, E, VU,DU by  at 10/28/2024 1447    Comment:  benefits of activity and extended rest breaks    Acceptance, E, VU,DU by AC at 10/28/2024 1257    Comment: Benefits of activity    Acceptance, E, VU by AJ at 10/26/2024 1159    Comment: role of PT, d//c planning, fall risk/call for assist, goal setting for home sdafety   Family Acceptance, E, VU,DU by AC at 10/28/2024 1447    Comment: benefits of activity and extended rest breaks                      Point: Home exercise program (Done)       Learning Progress Summary            Patient Acceptance, E, VU by AJ at 10/26/2024 1159    Comment: role of PT, d//c planning, fall risk/call for assist, goal setting for home sdafety                      Point: Body mechanics (Done)       Learning Progress Summary            Patient Acceptance, E, VU,DU by AC at 10/31/2024 1156    Comment: Benefits of activity, extended rest breaks to decrease SOB    Acceptance, E, VU by AJ at 10/26/2024 1159    Comment: role of PT, d//c planning, fall risk/call for assist, goal setting for home sdafety                      Point: Precautions (Done)       Learning Progress Summary            Patient Acceptance, E, VU,DU by AC at 10/31/2024 1156    Comment: Benefits of activity, extended rest breaks to decrease SOB    Acceptance, E, VU,DU by AC at 10/30/2024 1157    Comment: benefits of activity    Acceptance, E, VU,DU by  at 10/28/2024 1447    Comment: benefits of activity and extended rest breaks    Acceptance, E, VU,DU by  at 10/28/2024 1257    Comment: Benefits of activity    Acceptance, E, VU by AJ at 10/26/2024 1159    Comment: role of PT, d//c planning, fall risk/call for assist, goal setting for home sdafety   Family Acceptance, E, VU,DU by  at 10/28/2024 1447    Comment: benefits of activity and extended rest breaks                                      User Key       Initials Effective Dates Name Provider Type Discipline     08/15/24 -  Kailash Jin, PT DPT Physical Therapist PT     09/30/24 -  Yohannes Murillo, PTA  Student PTA Student PT                  PT Recommendation and Plan     Plan of Care Reviewed With: (P) patient  Progress: (P) improving  Outcome Evaluation: (P) Pt tx completed. Pt no c/o pain. Pt Mod I with bed mobility and SPA with STS, no cues needed. Bed>chair and toilet t/f with SPA, cues for upright posture. Pt I with all toileting. Pt ambulated 150 x2 with SBA, cues for upright posture. Performed 9 steps with unilateral handrail, CGA/SBA with cues for foot placement. Educated on importance of extended rest breaks to decrease SOB and pt safety.       Time Calculation:    PT Charges       Row Name 10/31/24 1158             Time Calculation    Start Time 0947 (P)   -AC      Stop Time 1025 (P)   -AC      Time Calculation (min) 38 min (P)   -AC      PT Received On 10/31/24 (P)   -AC         Timed Charges    21084 - Gait Training Minutes  23 (P)   -AC      10935 - PT Therapeutic Activity Minutes 15 (P)   -AC         Total Minutes    Timed Charges Total Minutes 38 (P)   -AC       Total Minutes 38 (P)   -AC                User Key  (r) = Recorded By, (t) = Taken By, (c) = Cosigned By      Initials Name Provider Type    AC Yohannes Murillo PTA Student PTA Student                      PT G-Codes  Outcome Measure Options: (P) AM-PAC 6 Clicks Basic Mobility (PT)  AM-PAC 6 Clicks Score (PT): (P) 23    Yohannes Murillo PTA Student  10/31/2024

## 2024-10-31 NOTE — PROGRESS NOTES
[facility] PULMONARY AND CRITICAL CARE PROGRESS NOTE - [facility]    Patient: Michael CASANOVA [facility]  1945   MR# [id number]   Acct# [id number]  10/31/24   11:25 CDT  Referring Provider: Sebastian Mckeon MD    Chief Complaint: Shortness of breath  Interval history: He thinks he is about the same may be a little bit worse with regard to the shortness of breath.  No fevers or other acute complaints.  He refused the final treatment last night but hopefully we can get that today for him  Meds:  arformoterol, 15 mcg, Nebulization, BID - RT  atorvastatin, 40 mg, Oral, Nightly  furosemide, 40 mg, Oral, Daily  lactulose, 20 g, Oral, TID  metoprolol succinate XL, 25 mg, Oral, Daily  pantoprazole, 40 mg, Oral, Q12H  sodium chloride, 10 mL, Intravenous, Q12H  valsartan, 80 mg, Oral, Q24H  warfarin, 5 mg, Oral, Daily         Physical Exam:  SpO2 Percentage    10/31/24 0901 10/31/24 0944 10/31/24 0952   SpO2: 92% 96% 96%     Body mass index is 32.97 kg/m².   Temp:  [97.7 °F (36.5 °C)-98.6 °F (37 °C)] 98.6 °F (37 °C)  Heart Rate:  [49-87] 49  Resp:  [16-20] 16  BP: (117-152)/(49-64) 129/56  Intake/Output Summary (Last 24 hours) at 10/31/2024 1125  Last data filed at 10/31/2024 0901  Gross per 24 hour   Intake 480 ml   Output 1800 ml   Net -1320 ml     Physical Exam  Pulmonary:      Effort: No accessory muscle usage or respiratory distress.      Breath sounds: Stridor present. Decreased breath sounds present.       Laboratory Data:  Results from last 7 days   Lab Units 10/30/24  2317 10/30/24  1646 10/30/24  0842 10/26/24  1634 10/26/24  0651 10/25/24  1246 10/25/24  0656   WBC 10*3/mm3  --   --   --   --  4.60  --  4.47   HEMOGLOBIN g/dL 8.4* 8.9* 9.1*   < > 7.6*   < > 7.4*   PLATELETS 10*3/mm3  --   --   --   --  143  --  169    < > = values in this interval not displayed.     Results from last 7 days   Lab Units 10/30/24  0842 10/29/24  0724 10/28/24  1458 10/25/24  1303 10/25/24  0656   SODIUM mmol/L  --   --   --   --   139   POTASSIUM mmol/L  --   --   --   --  4.2   BUN mg/dL  --   --   --   --  20   CREATININE mg/dL  --   --   --   --  0.88   INR  1.41* 1.47* 1.42*   < > 2.94*    < > = values in this interval not displayed.         Microbiology Results (last 10 days)       Procedure Component Value - Date/Time    CANDIDA AURIS SCREEN - Swab, Axilla Right, Axilla Left and Groin [[id number]]  (Normal) Collected: 10/24/24 2222    Lab Status: Final result Specimen: Swab from Axilla Right, Axilla Left and Groin Updated: 10/29/24 2231     Candida Auris Screen Culture No Candida auris isolated at 5 days          Recent films:  Adult Transthoracic Echo Complete w/ Color, Spectral and Contrast if Necessary Per Protocol    Addendum Date: 10/29/2024      Left ventricular systolic function is normal. Left ventricular ejection fraction appears to be 61 - 65%.   Left ventricular wall thickness is consistent with moderate concentric hypertrophy.   Mild to moderate aortic valve stenosis is present.   Estimated right ventricular systolic pressure from tricuspid regurgitation is normal (<35 mmHg).   The right ventricular cavity is moderately dilated, with moderately reduced systolic function.   Severe biatrial enlargement   Moderate dilation of the aortic root is present (4.6 cm).   Saline test results are negative.   Rhythm is atrial fibrillation.   No previous studies available for comparison.      Pulmonary Assessment:  Lung nodule left lower lobe  Lung mass right upper lobe  Shortness of breath  Severe symptomatic anemia with GI bleed, under treatment  Aortic stenosis  Biatrial enlargement  History of PE and hypercoagulable state  History of cigarette smoking, with 67-pack-year smoking history  Suspected COPD    Recommend:   Bronchodilators; Brovana ordered last evening  Patient PET scan and PFT for consideration for further workup of the lung lesions after he is recovered from the current illness  Oxygen for saturation 90%  Mobilize as  tolerated  Follow up cxr    Electronically signed by Lizandro Pagan MD, 10/31/24, 11:25 CDT

## 2024-10-31 NOTE — PROGRESS NOTES
[facility] Medicine Services  INPATIENT PROGRESS NOTE    Patient Name: Michael Paz  Date of Admission: 10/24/2024  Today's Date: 10/31/24  Length of Stay: 5  Primary Care Physician: Henrietta Jin APRN    Subjective   Chief Complaint: Dark stool symptomatic anemia  HPI   Patient was transferred from [facility] ER today to our facility for evaluation.  He was seen in room 463 after arrival with nursing at bedside.  This is a 79-year-old male with a history of CAD status post prior CABG on full dose aspirin.  He is also on warfarin for history of A-fib as well as prior DVT/PE with protein S deficiency.  Other history includes infrarenal AAA status post endograft, hypertension, hyperlipidemia, tobacco use, right upper lobe pulmonary nodule being followed by pulmonary.  Patient had a cyst removed from his back surgically in August.  He says ever since then things have gone downhill for him.  For the last 2 months he has had some progressive weakness, fatigue, shortness of breath with exertion, dizziness especially upon standing.  Patient denies any syncopal events.  Patient does state that symptoms seem to have slowly increased over the last month and he also has noted some dark tarry stools over the last month.  Patient went to see Dr. Vicente of cardiology a few weeks ago and reportedly had an echocardiogram done but I cannot find the echo test or the office visit in our system.  He was referred to Dr. Lyons reportedly for slow afib, his metoprolol xL was reduced from 50 to 25mg.  I do see that patient has an upcoming appointment with Dr. Lyons in February 2025.  Patient went to see pulmonary yesterday for follow-up and monitoring of his known right upper lobe pulmonary nodule.  Labs were drawn during evaluation and patient was found to have a hemoglobin of 7.7.  Due to continued not feeling well he went to the ER today and there his hemoglobin was found to be 6.8.  He  "was transfused a unit of blood which was completed prior to arrival here.  Patient tells me his INR was 2.3 but I cannot find it in transfer records.  Currently on arrival patient still feels tired but denies any current chest pain, shortness of breath, dizziness, nausea, abdominal pain, new focal numbness or weakness.  No fevers or chills   10/25  As before admitted for symptomatic anemia started on PPI hold anticoagulation GI on board n.p.o. EGD was given fresh frozen plasma per GI  10/26  80 y/o male presented as transfer from outside hospital with melena and iron deficiency anemia. Hgb 7.7 on admission here after receiving 1 units of PRBC at outside hospital. 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 Received a 2nd unit PRBC here on 10/25. Received total of 3 units of FFP 10/25. On coumadin with history of atrial fibrillation as well as DVT/PE with Protein S deficiency.  For this patient Eliquis might be a safer choice than Coumadin he was he told me before that his insurance would not pay for Eliquis for now we will not anticoagulate this man with the fear of recurrent bleeding as I suspect the risks of bleeding outweighs the risk of thrombosis  10/27  As before EGD was showing AVM in the stomach fundus treated with APC and epinephrine injections hemoglobin remained stable remains on Protonix  10/28  Before remains anxious frustrated multiple complaints was having weakness shortness of breath, chest x-ray showing chronic disease right upper lobe opacity recommending CT head and neck unremarkable will order CT appreciate GI.,  GI had signed off the patient's abdominal/pelvic CT scan on October 24, 2024 showed \"stable incompletely visualized mass overlying the right atrium\".  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, he states he was well aware of that mass back in March it was only 2 cm now 6 cm he wants it to be worked up we will consult with pulmonary patient will be complicated since the patient will probably be placed on heparin will need to hold his Coumadin for his protein C S deficiency to do FOB with biopsy  10/31  Appreciate pulmonary consulted for right upper lobe mass worrisome for malignancy, Per pulmonary there is a blood vessel running through the lesion makes it high morbidity for bronchoscopy with biopsy PET scanning would be helpful in the outpatient setting to direct biopsy efforts this would require holding anticoagulation also 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 All other systems have been reviewed and are negative unless otherwise stated.     Objective    Temp:  [97.7 °F (36.5 °C)-98.6 °F (37 °C)] 98.6 °F (37 °C)  Heart Rate:  [49-87] 49  Resp:  [16-20] 16  BP: (117-152)/(49-64) 129/56  Physical Exam  Constitutional:       Appearance: Normal appearance.   HENT:      Head: Normocephalic.      Nose: Nose normal.   Eyes:      Pupils: Pupils are equal, round, and reactive to light.   Cardiovascular:      Rate and Rhythm: Normal rate and regular rhythm.   Pulmonary:      Breath sounds: Stridor present.   Abdominal:      General: Abdomen is flat.      Palpations: Abdomen is soft.   Musculoskeletal:         General: Normal range of motion.      Cervical back: Normal range of motion.   Skin:     Capillary Refill: Capillary refill takes less than 2 seconds.   Neurological:      Mental Status: He is alert.             Results Review:  I have reviewed the labs, radiology results, and diagnostic  "studies.    Laboratory Data:   Results from last 7 days   Lab Units 10/30/24  2317 10/30/24  1646 10/30/24  0842 10/26/24  1634 10/26/24  0651 10/25/24  1246 10/25/24  0656   WBC 10*3/mm3  --   --   --   --  4.60  --  4.47   HEMOGLOBIN g/dL 8.4* 8.9* 9.1*   < > 7.6*   < > 7.4*   HEMATOCRIT % 28.9* 30.2* 30.9*   < > 26.0*   < > 25.5*   PLATELETS 10*3/mm3  --   --   --   --  143  --  169    < > = values in this interval not displayed.        Results from last 7 days   Lab Units 10/25/24  0656   SODIUM mmol/L 139   POTASSIUM mmol/L 4.2   CHLORIDE mmol/L 106   CO2 mmol/L 21.0*   BUN mg/dL 20   CREATININE mg/dL 0.88   CALCIUM mg/dL 8.9   BILIRUBIN mg/dL 0.7   ALK PHOS U/L 63   ALT (SGPT) U/L 13   AST (SGOT) U/L 19   GLUCOSE mg/dL 99       Culture Data:   No results found for: \"BLOODCX\", \"URINECX\", \"WOUNDCX\", \"MRSACX\", \"RESPCX\", \"STOOLCX\"    Radiology Data:   Imaging Results (Last 24 Hours)       ** No results found for the last 24 hours. **            I have reviewed the patient's current medications.     Assessment/Plan   Assessment  Active Hospital Problems    Diagnosis     **Symptomatic anemia     S/P CABG (coronary artery bypass graft)     Protein S deficiency     Atrial fibrillation, chronic     Lung nodule     Tobacco abuse     Hypertension     Hyperlipidemia     CAD (coronary artery disease)        Treatment Plan  . #1 symptomatic anemia -patient has been feeling weak, tired, fatigued, dizzy with standing.  Will check orthostatics.  He has a history of CAD and CABG.  He is on Coumadin and full dose aspirin.  His hemoglobin yesterday was 7.7 and at outside facility today was reported 6.8.  He was given 1 unit of PRBC.  Rectal exam was not performed.  Patient reports dark stools for the last month.  Will check a fecal occult.  Check a blood count now posttransfusion and trend.  PPI IV twice daily.  Will ask GI to evaluate for possible EGD if warranted.      #2 CAD -with a history of CABG.  No active chest pain " currently.  Continue beta-blocker and statin.  Hold aspirin.     #3 A-fib -rate controlled.  Continue beta-blocker.  Hold Coumadin.  Check INR.  Cannot find INR result from outside hospital although patient tells me was 2.3.     #4 history protein S deficiency/DVT/PE -again given above will have to hold Coumadin for tonight.  Monitor INR.       #5 hypertension -blood pressure is 158 on arrival.  Continue home blood pressure meds with holding parameters while monitoring for any bleeding.     #6 hyperlipidemia -statin     #7 right upper lobe pulmonary nodule -known history and follows with pulmonary for surveillance.     Medical Decision Making  Number and Complexity of problems: 1-2 acute, multiple chronic  Differential Diagnosis: As above     Conditions and Status        New to me.  Clinically looks stable.  Stable vitals.  Nontoxic.  Currently mild fatigue is main symptom post initial 1 unit PRBC.     MDM Data  External documents reviewed: Transfer documents reviewed  Cardiac tracing (EKG, telemetry) interpretation: Currently rate controlled fib on the bedside monitor  Radiology interpretation: Outside imaging reports reviewed  Labs reviewed: Outside transfer labs reviewed  Any tests that were considered but not ordered: None     Decision rules/scores evaluated (example [id number], [place], etc): None     Discussed with: Patient, nursing     Care Planning  Shared decision making: Patient apprised of current labs, vitals, imaging and treatment plan.  They are agreeable with proceeding with plans as discussed.     Code status and discussions: DNR/DNI     Disposition  Social Determinants of Health that impact treatment or disposition: none  Estimated length of stay is less than 2 midnights.      I confirmed that the patient's advanced care plan is present, code status is documented, and a surrogate decision maker is listed in the patient's medical record.      The patient's surrogate decision maker is his sister  Kira.    Electronically signed by Sebastian Mckeon MD, 10/31/24, 10:17 CDT.

## 2024-10-31 NOTE — CONSULTS
[facility] [place] [facility]    10/30/24, 19:54 CDT  Patient Care Team:  Henrietta Jin APRN as PCP - General (Family Medicine)  Greg Vicente MD as Cardiologist (Cardiology)  Melisa López APRN as Nurse Practitioner (Pulmonary Disease)  Name: Michael Paz  : 1945  MRN: [id number]  Contact Serial Number [id number]    Chief complaint: lung lesion in upper lobe  HPI:  We have been consulted by Sebastian Mckeon MD to see this 79 y.o. male.  We are asked to assess a lung lesion. He has been seen in our office for different lung lesion identified on an abd ct.  Pt presented with severe anemia and persistent dyspnea, prompting gi workup.  A ct of chest planned by our office was performed identifying a masslike density in the right upper lobe.  Past Medical History:   has a past medical history of AAA (abdominal aortic aneurysm), Aneurysm, Arthritis, Atrial fibrillation (), CAD (coronary artery disease), CAD (coronary artery disease), Clotting disorder (), Deep vein thrombosis ([id number]), Depression, Endoleak post endovascular aneurysm repair, History of DVT (deep vein thrombosis), History of foot fracture, History of fracture of left ankle, History of pulmonary embolus (PE), History of transfusion ( and ), Hyperlipidemia, Hypertension, Myocardial infarction (), Peripheral vascular disease, Pneumonia (4 times), Pulmonary embolism (), and Tobacco abuse.   has a past surgical history that includes Coronary artery bypass graft; AAA repair, open; Other surgical history; Inguinal hernia repair; Appendectomy; Hemorrhoid surgery; Back surgery; Knee surgery (Right); Other surgical history; Cardiac catheterization; Coronary stent placement; Coronary angioplasty; and Esophagogastroduodenoscopy (N/A, 10/25/2024).  No Known Allergies  Medications:  atorvastatin, 40 mg, Oral, Nightly  furosemide, 40 mg, Oral, Daily  lactulose, 20 g, Oral, TID  metoprolol succinate  XL, 25 mg, Oral, Daily  pantoprazole, 40 mg, Oral, Q12H  sodium chloride, 10 mL, Intravenous, Q12H  valsartan, 80 mg, Oral, Q24H  warfarin, 5 mg, Oral, Daily         Family History:  Family History   Problem Relation Age of Onset    Heart disease Mother     Heart disease Father     Heart disease Other     Hypertension Other     Diabetes Other      Social History:   reports that he has been smoking cigarettes. He started smoking about 67 years ago. He has a 67.4 pack-year smoking history. He has been exposed to tobacco smoke. He has never used smokeless tobacco. He reports current alcohol use of about 3.0 - 4.0 standard drinks of alcohol per week. He reports that he does not use drugs.  Review of Systems:  Review of Systems   Constitutional:  Positive for fatigue.   Respiratory:  Positive for shortness of breath.       Physical Exam:  Temp:  [97.7 °F (36.5 °C)-98 °F (36.7 °C)] 97.7 °F (36.5 °C)  Heart Rate:  [49-91] 49  Resp:  [20] 20  BP: (110-124)/(47-63) 124/52  Intake/Output Summary (Last 24 hours) at 10/30/2024 1954  Last data filed at 10/30/2024 1218  Gross per 24 hour   Intake 360 ml   Output 1400 ml   Net -1040 ml         10/28/24  0527 10/29/24  0431 10/30/24  0320   Weight: 118 kg (260 lb 6.4 oz) 118 kg (259 lb 9.6 oz) 118 kg (261 lb 3.2 oz)     SpO2 Percentage    10/30/24 0320 10/30/24 0852 10/30/24 1218   SpO2: 92% 93% 94%     Body mass index is 32.65 kg/m².   Physical Exam  Constitutional:       Appearance: Normal appearance. He is ill-appearing. He is not diaphoretic.   Eyes:      Extraocular Movements: Extraocular movements intact.   Pulmonary:      Effort: No respiratory distress.      Breath sounds: No rales.   Skin:     Coloration: Skin is pale.   Neurological:      Mental Status: He is alert.       Result Review  Results from last 7 days   Lab Units 10/30/24  1646 10/30/24  0842 10/29/24  2324 10/26/24  1634 10/26/24  0651 10/25/24  1246 10/25/24  0656   WBC 10*3/mm3  --   --   --   --  4.60  --   4.47   HEMOGLOBIN g/dL 8.9* 9.1* 8.7*   < > 7.6*   < > 7.4*   PLATELETS 10*3/mm3  --   --   --   --  143  --  169    < > = values in this interval not displayed.     Results from last 7 days   Lab Units 10/25/24  0656   SODIUM mmol/L 139   POTASSIUM mmol/L 4.2   CO2 mmol/L 21.0*   BUN mg/dL 20   CREATININE mg/dL 0.88   MAGNESIUM mg/dL 2.2   GLUCOSE mg/dL 99         Microbiology Results (last 10 days)       Procedure Component Value - Date/Time    CANDIDA AURIS SCREEN - Swab, Axilla Right, Axilla Left and Groin [[id number]]  (Normal) Collected: 10/24/24 2222    Lab Status: Final result Specimen: Swab from Axilla Right, Axilla Left and Groin Updated: 10/29/24 2231     Candida Auris Screen Culture No Candida auris isolated at 5 days          Recent radiology:   Imaging Results (Last 72 Hours)       Procedure Component Value Units Date/Time    CT Chest With Contrast Diagnostic [[id number]] Collected: 10/29/24 1501     Updated: 10/29/24 1608    Narrative:      EXAM: CT CHEST W CONTRAST DIAGNOSTIC-      DATE: 10/29/2024 9:24 AM     HISTORY: mass on chest xray; Z74.09-Other reduced mobility       COMPARISON: 10/24/2024 from [facility] with outside report  reviewed in PACS at time of dictation     DOSE LENGTH PRODUCT: 350.98 mGy.cm mGy cm. Automatic exposure control  was utilized to make radiation dose as low as reasonably achievable.     TECHNIQUE: Enhanced CT images of the chest obtained with multiplanar  reformats.     FINDINGS:     AIRWAYS/PULMONARY PARENCHYMA: The central airways are midline and  patent. No pleural effusion or pneumothorax.     Severe emphysematous changes.     RIGHT upper lobe 6.0 x 3.4 cm spiculated opacity on axial series 3,  image 45.     LEFT lower lobe 2.2 cm pulmonary nodule on axial series 3, image 110,  previously 1.9 cm on 3/12/2024.     VASCULATURE: Thoracic aorta is normal in course and caliber with heavily  calcified atherosclerosis. Enlarged main pulmonary artery measuring 3.7  cm in  transverse dimension, which can be seen with pulmonary artery  hypertension.     CARDIAC: Borderline heart size. Heavily calcified native coronary  arteries versus stent material.      Lobular mass at the RIGHT coronary artery distribution with upper  component measuring 4.7 cm on axial series 2, image 10, previously 4.7  cm on 12/24/2016. Mid component measuring 4.9 x 3.8 cm on image 119,  previously 4.5 x 3.3 cm on 4/25/2019 although incompletely visualized on  that exam. Lower component measures 2.6 cm on image 129, previously 2.6  cm on 3/12/2024. Favor thrombosed aneurysm or other post operative  changes. Single-phase imaging limits evaluation.     No pericardial effusion.       MEDIASTINUM: Esophagus has normal course, caliber and wall thickness.  There is no mediastinal or hilar lymphadenopathy by CT size criteria.      EXTRATHORACIC: The visualized portions of the thyroid gland are  unremarkable. No thoracic inlet or axillary adenopathy. The  extrathoracic soft tissues are within normal limits.      INCLUDED UPPER ABDOMEN: Visualized portion of the liver, pancreas,  spleen, adrenal glands appear within normal limits. Gallstones.  Bilateral upper kidneys appear within normal limits. Partially  visualized upper abdominal aorta measuring 5.2 x 3.5 cm with partially  visualized changes of stent graft repair.     BONES: Sternotomy wires. No acute or suspicious bony finding.          Impression:      1. Spiculated opacity at the RIGHT upper lobe measuring 6.0 x 3.4 cm.  Appearance concerning for malignancy. There are several small airways  leading to this opacity and endobronchial sampling could be considered.      2. Increased size LEFT lower lobe pulmonary nodule now measuring 2.2 cm,  previously 1.9 cm on 3/12/2024. PET/CT may be useful if not previously  performed.     3. Enlarged main pulmonary artery, which can be seen with pulmonary  artery hypertension.     4. Increased size of lobular mass in the RIGHT  coronary artery  distribution, favor thrombosed aneurysm or other post operative changes.  Difficult to determine change due to available prior exams although  appears increased compared to 4/25/2019 with measurements as above.     This report was signed and finalized on 10/29/2024 4:05 PM by Dr Celine Barrios MD.             Personal review of imaging : CT scan shows irregular masslike lesion in rul, more regularly bordered lesion in LLL with vascular structure sandwiched between the adjacent airway and the lesion.  Other test results (not lab or imaging): Results for orders placed during the hospital encounter of 10/24/24    Adult Transthoracic Echo Complete w/ Color, Spectral and Contrast if Necessary Per Protocol    Interpretation Summary    Left ventricular systolic function is normal. Left ventricular ejection fraction appears to be 61 - 65%.    Left ventricular wall thickness is consistent with moderate concentric hypertrophy.    Mild to moderate aortic valve stenosis is present.    Estimated right ventricular systolic pressure from tricuspid regurgitation is normal (<35 mmHg).    The right ventricular cavity is moderately dilated, with moderately reduced systolic function.    Severe biatrial enlargement    Moderate dilation of the aortic root is present (4.6 cm).    Saline test results are negative.    Rhythm is atrial fibrillation.    No previous studies available for comparison.    Independent review of ekg: no recent EKG available.  Telemetry shows irregular narrow complex rhythm  Problem List as identified by Epic (may contain historical, inactive problems)    Symptomatic anemia    Tobacco abuse    Hypertension    Hyperlipidemia    CAD (coronary artery disease)    Lung nodule    Protein S deficiency    Atrial fibrillation, chronic    S/P CABG (coronary artery bypass graft)    Pulmonary Assessment:    Newly identified mass lesion in RUL apex, worrisome for malignancy, a threat to life and bodily  function   Increasing nodule in LLL  Hx smoking, over 60 pk yrs, a threat to health and risk for lung cancer  Acute blood loss anemia  Afib  Anticoagulated, med held due to bleeding  Hx hypercoagulable state    Recommend/plan:   Discussed findings with patient  For now focus on recovery from anemia  Ct is reviewed. The rul lesion is approachable by ion bronchoscopy.  Unfortunately there is a blood vessel running the entire course along the portion of the airway from which we would try to approach the LLL lesion, so this one might not be accessible by ion bronchoscopy.  There is high risk of morbidity from additional diagnostic testing or treatment   PET scanning would be helpful in outpatient setting and would help direct biopsy efforts.  We can order this on discharge with outpatient office follow up for consideration for bronchoscopy or other biopsy technique.  This would require holding anticoagulation again.  Add aerosol bronchodilators to see if this helps him  Outpatient PFT.    Thank you for this consult.  We will follow along.  Electronically signed by Lizandro Pagan MD, 10/30/24, 7:54 PM CDT.

## 2024-11-01 ENCOUNTER — APPOINTMENT (OUTPATIENT)
Dept: GENERAL RADIOLOGY | Facility: HOSPITAL | Age: 79
DRG: 378 | End: 2024-11-01
Payer: MEDICARE

## 2024-11-01 ENCOUNTER — READMISSION MANAGEMENT (OUTPATIENT)
Dept: CALL CENTER | Facility: HOSPITAL | Age: 79
End: 2024-11-01
Payer: MEDICARE

## 2024-11-01 VITALS
HEIGHT: 75 IN | WEIGHT: 265.2 LBS | OXYGEN SATURATION: 93 % | DIASTOLIC BLOOD PRESSURE: 55 MMHG | TEMPERATURE: 98.1 F | SYSTOLIC BLOOD PRESSURE: 133 MMHG | HEART RATE: 86 BPM | BODY MASS INDEX: 32.97 KG/M2 | RESPIRATION RATE: 18 BRPM

## 2024-11-01 LAB
HCT VFR BLD AUTO: 28.3 % (ref 37.5–51)
HCT VFR BLD AUTO: 28.8 % (ref 37.5–51)
HGB BLD-MCNC: 8.4 G/DL (ref 13–17.7)
HGB BLD-MCNC: 8.7 G/DL (ref 13–17.7)
INR PPP: 1.81 (ref 0.91–1.09)
PROTHROMBIN TIME: 21.7 SECONDS (ref 11.8–14.8)

## 2024-11-01 PROCEDURE — 97116 GAIT TRAINING THERAPY: CPT

## 2024-11-01 PROCEDURE — 99232 SBSQ HOSP IP/OBS MODERATE 35: CPT | Performed by: INTERNAL MEDICINE

## 2024-11-01 PROCEDURE — 85018 HEMOGLOBIN: CPT | Performed by: INTERNAL MEDICINE

## 2024-11-01 PROCEDURE — 25810000003 SODIUM CHLORIDE 0.9 % SOLUTION: Performed by: INTERNAL MEDICINE

## 2024-11-01 PROCEDURE — 94760 N-INVAS EAR/PLS OXIMETRY 1: CPT

## 2024-11-01 PROCEDURE — 85610 PROTHROMBIN TIME: CPT | Performed by: HOSPITALIST

## 2024-11-01 PROCEDURE — 25010000002 NA FERRIC GLUC CPLX PER 12.5 MG: Performed by: INTERNAL MEDICINE

## 2024-11-01 PROCEDURE — 94799 UNLISTED PULMONARY SVC/PX: CPT

## 2024-11-01 PROCEDURE — 97110 THERAPEUTIC EXERCISES: CPT

## 2024-11-01 PROCEDURE — 71046 X-RAY EXAM CHEST 2 VIEWS: CPT

## 2024-11-01 PROCEDURE — 85014 HEMATOCRIT: CPT | Performed by: INTERNAL MEDICINE

## 2024-11-01 PROCEDURE — 94664 DEMO&/EVAL PT USE INHALER: CPT

## 2024-11-01 PROCEDURE — 97530 THERAPEUTIC ACTIVITIES: CPT

## 2024-11-01 RX ORDER — PANTOPRAZOLE SODIUM 40 MG/1
40 TABLET, DELAYED RELEASE ORAL EVERY 12 HOURS
Qty: 60 TABLET | Refills: 0 | Status: SHIPPED | OUTPATIENT
Start: 2024-11-01

## 2024-11-01 RX ORDER — ALBUTEROL SULFATE 90 UG/1
2 INHALANT RESPIRATORY (INHALATION) EVERY 4 HOURS PRN
Qty: 8 G | Refills: 0 | Status: SHIPPED | OUTPATIENT
Start: 2024-11-01

## 2024-11-01 RX ORDER — FUROSEMIDE 40 MG/1
40 TABLET ORAL DAILY
Qty: 30 TABLET | Refills: 0 | Status: SHIPPED | OUTPATIENT
Start: 2024-11-02

## 2024-11-01 RX ORDER — FERROUS SULFATE 325(65) MG
325 TABLET ORAL
Qty: 30 TABLET | Refills: 0 | Status: SHIPPED | OUTPATIENT
Start: 2024-11-01

## 2024-11-01 RX ADMIN — Medication 10 ML: at 08:31

## 2024-11-01 RX ADMIN — VALSARTAN 80 MG: 80 TABLET, FILM COATED ORAL at 08:30

## 2024-11-01 RX ADMIN — FUROSEMIDE 40 MG: 40 TABLET ORAL at 08:30

## 2024-11-01 RX ADMIN — METOPROLOL SUCCINATE 25 MG: 25 TABLET, EXTENDED RELEASE ORAL at 08:30

## 2024-11-01 RX ADMIN — PANTOPRAZOLE SODIUM 40 MG: 40 TABLET, DELAYED RELEASE ORAL at 08:31

## 2024-11-01 RX ADMIN — SODIUM CHLORIDE 250 MG: 9 INJECTION, SOLUTION INTRAVENOUS at 08:30

## 2024-11-01 RX ADMIN — ARFORMOTEROL TARTRATE 15 MCG: 15 SOLUTION RESPIRATORY (INHALATION) at 06:51

## 2024-11-01 NOTE — THERAPY TREATMENT NOTE
Acute Care - Physical Therapy Treatment Note  [facility]     Patient Name: Michael Paz  : 1945  MRN: [id number]  Today's Date: 2024      Visit Dx:     ICD-10-CM ICD-9-CM   1. Impaired mobility [Z74.09]  Z74.09 799.89   2. Left lower lobe pulmonary nodule  R91.1 793.11     Patient Active Problem List   Diagnosis    Tobacco abuse    Hypertension    Hyperlipidemia    Endoleak post endovascular aneurysm repair    AAA (abdominal aortic aneurysm)    Aneurysm    CAD (coronary artery disease)    Right-sided epistaxis    Hx pulmonary embolism    Hx of deep venous thrombosis    Rhinitis, chronic    Anticoagulant adverse reaction    Epistaxis    Lung nodule    Protein C deficiency    Protein S deficiency    Atrial fibrillation, chronic    Symptomatic anemia    S/P CABG (coronary artery bypass graft)     Past Medical History:   Diagnosis Date    AAA (abdominal aortic aneurysm)     Aneurysm     Arthritis     Atrial fibrillation     CAD (coronary artery disease)     CAD (coronary artery disease)     Clotting disorder     Deep vein thrombosis [id number]    Depression     Endoleak post endovascular aneurysm repair     History of DVT (deep vein thrombosis)     History of foot fracture     BILATERAL FOOT FRACTURES    History of fracture of left ankle     History of pulmonary embolus (PE)     History of transfusion  and     Hyperlipidemia     Hypertension     Myocardial infarction     Peripheral vascular disease     Pneumonia 4 times    Pulmonary embolism     Tobacco abuse      Past Surgical History:   Procedure Laterality Date    ABDOMINAL AORTIC ANEURYSM REPAIR      APPENDECTOMY      BACK SURGERY      CARDIAC CATHETERIZATION      CORONARY ANGIOPLASTY      CORONARY ARTERY BYPASS GRAFT      CORONARY STENT PLACEMENT      ENDOSCOPY N/A 10/25/2024    Procedure: ESOPHAGOGASTRODUODENOSCOPY WITH ANESTHESIA;  Surgeon: Fareed Velasco MD;  Location: [facility] ENDOSCOPY;  Service: Gastroenterology;   Laterality: N/A;  Pre: Symptomatic anemia;  Post: Bleeding gastric AVM;  Nausea and vomiting    HEMORRHOIDECTOMY      INGUINAL HERNIA REPAIR      KNEE SURGERY Right     OTHER SURGICAL HISTORY      TYPE IA ENDOLEAK REPAIR    OTHER SURGICAL HISTORY      CRANIAL CYST     PT Assessment (Last 12 Hours)       PT Evaluation and Treatment       [facility] Name 11/01/24 0930          Physical Therapy Time and Intention    Subjective Information no complaints (P)   -     Document Type therapy note (daily note) (P)   -     Mode of Treatment physical therapy (P)   -[facility] Name 11/01/24 0930          General Information    Existing Precautions/Restrictions fall (P)   -[facility] Name 11/01/24 0930          Pain    Pretreatment Pain Rating 0/10 - no pain (P)   -     Posttreatment Pain Rating 0/10 - no pain (P)   -[facility] Name 11/01/24 0930          Bed Mobility    Bed Mobility rolling right;supine-sit (P)   -     Rolling Right [place] (Bed Mobility) independent (P)   -     Supine-Sit [place] (Bed Mobility) modified independence (P)   -     Assistive Device (Bed Mobility) head of bed elevated (P)   -AC       Row Name 11/01/24 0930          Transfers    Transfers sit-stand transfer;stand-sit transfer;toilet transfer (P)   -[facility] Name 11/01/24 0930          Sit-Stand Transfer    Sit-Stand [place] (Transfers) independent (P)   -[facility] Name 11/01/24 0930          Stand-Sit Transfer    Stand-Sit [place] (Transfers) independent (P)   -AC       Row Name 11/01/24 0930          Toilet Transfer    Type (Toilet Transfer) stand pivot/stand step (P)   -     [place] Level (Toilet Transfer) independent (P)   -     Assistive Device (Toilet Transfer) commode (P)   -[facility] Name 11/01/24 0930          Gait/Stairs (Locomotion)    [place] Level (Gait) standby assist;verbal cues (P)   -     Distance in Feet (Gait) 200 (P)   x2  -AC     Deviations/Abnormal Patterns (Gait) left  sided deviations;gait speed decreased (P)   -AC       Row Name 11/01/24 0930          Balance    Balance Assessment sitting static balance;sitting dynamic balance (P)   -AC     Static Sitting Balance independent (P)   -AC     Dynamic Sitting Balance independent (P)   -AC     Position, Sitting Balance sitting edge of bed (P)   -       Row Name 11/01/24 0930          Motor Skills    Therapeutic Exercise -- (P)   AROM standing/sitting BLE x10. Educated and provided pt with HEP for BLE upon d/c  -       Row Name 11/01/24 0930          Plan of Care Review    Plan of Care Reviewed With patient (P)   -AC     Progress improving (P)   -     Outcome Evaluation PT tx completed. Pt no c/o pain. Pt I with all bed mobility and t/f with no cues needed. I with all toileting. Ambulated 200' x 2 with SBA, cues for upright posture. Performed standing/sitting BLE x10. Pt is educated on correct technique with exercises and provided HEP for BLE upon d/c. Pt also educated on safety with functional mobility, community participation, and benefits of home health upon d/c. (P)   -       Row Name 11/01/24 0930          Positioning and Restraints    Pre-Treatment Position in bed (P)   -AC     Post Treatment Position chair (P)   -AC     In Chair sitting;call light within reach;encouraged to call for assist (P)   -AC               User Key  (r) = Recorded By, (t) = Taken By, (c) = Cosigned By      Initials Name Provider Type     Yohannes Murillo PTA Student PTA Student                    Physical Therapy Education       Title: PT OT SLP Therapies (In Progress)       Topic: Physical Therapy (Done)       Point: Mobility training (Done)       Learning Progress Summary            Patient Acceptance, EDERICK DU by  at 11/1/2024 1203    Comment: Educated on HEP for BLE, safety with fuctional mobility and community participation, benefits of home health, importance of rest breaks in between activities    Acceptance, E, DERICK,ISAAC by  at  10/31/2024 1156    Comment: Benefits of activity, extended rest breaks to decrease SOB    Acceptance, E, VU,DU by AC at 10/30/2024 1157    Comment: benefits of activity    Acceptance, E, VU,DU by AC at 10/28/2024 1447    Comment: benefits of activity and extended rest breaks    Acceptance, E, VU,DU by AC at 10/28/2024 1257    Comment: Benefits of activity    Acceptance, E, VU by AJ at 10/26/2024 1159    Comment: role of PT, d//c planning, fall risk/call for assist, goal setting for home sdafety   Family Acceptance, E, VU,DU by AC at 10/28/2024 1447    Comment: benefits of activity and extended rest breaks                      Point: Home exercise program (Done)       Learning Progress Summary            Patient Acceptance, E, VU,DU by AC at 11/1/2024 1203    Comment: Educated on HEP for BLE, safety with fuctional mobility and community participation, benefits of home health, importance of rest breaks in between activities    Acceptance, E, VU by AJ at 10/26/2024 1159    Comment: role of PT, d//c planning, fall risk/call for assist, goal setting for home sdafety                      Point: Body mechanics (Done)       Learning Progress Summary            Patient Acceptance, E, VU,DU by AC at 11/1/2024 1203    Comment: Educated on HEP for BLE, safety with fuctional mobility and community participation, benefits of home health, importance of rest breaks in between activities    Acceptance, E, VU,DU by AC at 10/31/2024 1156    Comment: Benefits of activity, extended rest breaks to decrease SOB    Acceptance, E, VU by AJ at 10/26/2024 1159    Comment: role of PT, d//c planning, fall risk/call for assist, goal setting for home sdafety                      Point: Precautions (Done)       Learning Progress Summary            Patient Acceptance, E, VU,DU by AC at 11/1/2024 1203    Comment: Educated on HEP for BLE, safety with fuctional mobility and community participation, benefits of home health, importance of rest breaks in  between activities    Acceptance, E, VU,DU by  at 10/31/2024 1156    Comment: Benefits of activity, extended rest breaks to decrease SOB    Acceptance, E, VU,DU by  at 10/30/2024 1157    Comment: benefits of activity    Acceptance, E, VU,DU by  at 10/28/2024 1447    Comment: benefits of activity and extended rest breaks    Acceptance, E, VU,DU by  at 10/28/2024 1257    Comment: Benefits of activity    Acceptance, E, VU by  at 10/26/2024 1159    Comment: role of PT, d//c planning, fall risk/call for assist, goal setting for home sdafety   Family Acceptance, E, VU,DU by  at 10/28/2024 1447    Comment: benefits of activity and extended rest breaks                                      User Key       Initials Effective Dates Name Provider Type Discipline     08/15/24 -  Kailash Jin, PT DPT Physical Therapist PT     09/30/24 -  Yohannes Murillo PTA Student PTA Student PT                  PT Recommendation and Plan     Plan of Care Reviewed With: (P) patient  Progress: (P) improving  Outcome Evaluation: (P) PT tx completed. Pt no c/o pain. Pt I with all bed mobility and t/f with no cues needed. I with all toileting. Ambulated 200' x 2 with SBA, cues for upright posture. Performed standing/sitting BLE x10. Pt is educated on correct technique with exercises and provided HEP for BLE upon d/c. Pt also educated on safety with functional mobility, community participation, and benefits of home health upon d/c.       Time Calculation:    PT Charges       Row Name 11/01/24 1204             Time Calculation    Start Time 0930 (P)   -      Stop Time 1054 (P)   -      Time Calculation (min) 84 min (P)   -      PT Received On 11/01/24 (P)   -         Timed Charges    64008 - PT Therapeutic Exercise Minutes 40 (P)   -AC      26407 - Gait Training Minutes  30 (P)   -      72969 - PT Therapeutic Activity Minutes 14 (P)   -         Total Minutes    Timed Charges Total Minutes 84 (P)   -       Total  Minutes 84 (P)   -AC                User Key  (r) = Recorded By, (t) = Taken By, (c) = Cosigned By      Initials Name Provider Type    Yohannes Peña PTA Student PTA Student                      PT G-Codes  Outcome Measure Options: (P) AM-PAC 6 Clicks Basic Mobility (PT)  AM-PAC 6 Clicks Score (PT): (P) 23    Yohannes Murillo PTA Student  11/1/2024

## 2024-11-01 NOTE — PROGRESS NOTES
"[facility] Progress Note    Progress Note    Patient:  Michael Paz  YOB: 1945  Date of Service: 11/1/2024  MRN: [id number]   Acct: [id number]   Primary Care Physician: Henrietta Jin APRN  Advance Directive:   Code Status and Medical Interventions: No CPR (Do Not Attempt to Resuscitate); Limited Support; No intubation (DNI)   Ordered at: 10/24/24 2005     Medical Intervention Limits:    No intubation (DNI)     Level Of Support Discussed With:    Patient     Code Status (Patient has no pulse and is not breathing):    No CPR (Do Not Attempt to Resuscitate)     Medical Interventions (Patient has pulse or is breathing):    Limited Support     Admit Date: 10/24/2024       Hospital Day: 6      Subjective:     Chief Compliant:  \"Still denzel weak\"    Subjective: Tolerated initial dose of Ferrlecit 250 mg IV on 10/31/2024.  Second dose is now being administered.  States no bowel movement since yesterday, however the bowel movement he did have was normal color and consistency (nursing at bedside confirms).  States he is still somewhat weak and tired.  No pain.  No no dyspnea nor cough.    Interval history: Michael Paz 79 y.o. male whose medical history includes AAA, AAA repair, arthritis, atrial fibrillation (1990), CAD, CABG, coronary stent placement, coronary angioplasty, DVT x 3 (2505-7025)/bilateral PE (1986), protein S deficiency on maintenance anticoagulation (was on Coumadin), PVD, hyperlipidemia, hypertension, myocardial infarction (1990s), pneumonia (4 times), anemia, EGD (10/25/2024), EtOH use (3-4 standard drinks of alcohol per week) and tobacco abuse (67-pack-year smoker.     -10/24/2024-presented to [facility] ER with weakness, dyspnea, and dark stools.  There he was found to be anemic and was transferred to [facility] for further eval.  Over the past few months patient has had worsening fatigue weakness and dark stools.  On admission hemoglobin 7.7, iron saturation 6%.  " Received 1 unit of RBC transfusion with improvement of the hemoglobin to 8.5.  Repeat on 10/25/2024 however showed hemoglobin 7.4.  INR prolonged at 2.94 (patient on Coumadin).     -10/25/2024-seen for GI by   Recommended transfusion of RBC and 2 units FFP prior to EGD.     -10/25/2024-EGD showed normal proximal esophagus and mid esophagus. - Z-line irregular, at the gastroesophageal junction. - Red blood in the stomach. - A single bleeding angioectasia in the stomach. Treated with argon plasma coagulation (APC). Injected-recommendation: Return patient to [facility] for ongoing care. - NPO. - Continue present medications. - Will give additonal unit of FFP and 1 unit of PRBC now. - Monitor for any further bleeding. Monitor hgb/hct. - Normal first portion of the duodenum and second portion of the duodenum. - No specimens collected.     -10/26/2024- Hgb 7.6; MCV 84.1, platelets 143,000, WBC 4.6. CMP normal.  Iron 26 (L), iron saturation 6% (L), TIBC 429, ferritin 40.6 (L), subsequently 3 u FFP, 1 u RBC (per bloodbank)     -10/29/24- CT chest- 1. Spiculated opacity at the RIGHT upper lobe measuring 6.0 x 3.4 cm. Appearance concerning for malignancy. There are several small airways leading to this opacity and endobronchial sampling could be considered. 2. Increased size LEFT lower lobe pulmonary nodule now measuring 2.2 cm, previously 1.9 cm on 3/12/2024. PET/CT may be useful if not previously performed. 3. Enlarged main pulmonary artery, which can be seen with pulmonary artery hypertension. 4. Increased size of lobular mass in the RIGHT coronary artery distribution, favor thrombosed aneurysm or other post operative changes. Difficult to determine change due to available prior exams although appears increased compared to 4/25/2019 with measurements as above.      -10/30/24- Consult pulmonary, Dr. Pagan. A/P: Newly identified mass RUL apex worrisome for malignancy.  Increasing LLL nodule.  >  60-pack-year smoker.   "Acute blood loss anemia.  A-fib.  Anticoagulated, meds held due to bleeding.  History of hypercoagulable state.  Recommendations/plan:  Ct is reviewed. The rul lesion is approachable by ion bronchoscopy.  Unfortunately there is a blood vessel running the entire course along the portion of the airway from which we would try to approach the LLL lesion, so this one might not be accessible by ion bronchoscopy.  There is high risk of morbidity from additional diagnostic testing or treatment   PET scanning would be helpful in outpatient setting and would help direct biopsy efforts.  We can order this on discharge with outpatient office follow up for consideration for bronchoscopy or other biopsy technique.  This would require holding anticoagulation again.  Add aerosol bronchodilators to see if this helps him  Outpatient PFT.     -10/30/24- Hgb 8.4, Hct 28.9, PT 17.8, INR 1.41     -10/31/24 - the patient is seen regarding the blood loss/iron deficiency anemia and history of protein S deficiency.  Start Ferrlecit 250 mg IV daily x 4       Review of Systems  Review of Systems:   Constitutional: Fatigue and weakness.  Good appetite.  No fever / No chills / No sweats  HEENT:  No sore throat / No hoarseness / No vision changes  CV:  No chest pain / No palpitations / No orthopnea  Respiratory:  No cough / No shortness of breath at rest/ No sputum / No hemoptysis  GI:  No nausea / No vomiting / No abdominal pain / No diarrhea / No constipation/no melena/no hematochezia.  Last bowel movement yesterday morning was described as, \"normal brown color\".  :  No dysuria / No hesitancy / No urgency / No hematuria  Neuro:  + Generalized muscle weakness / No dysphagia / No headache / No paresthesias  Musculoskeletal:  + Chronic edema / No cyanosis / No pain    Vitals: /54 (BP Location: Right arm, Patient Position: Lying)   Pulse 70   Temp 98.1 °F (36.7 °C) (Oral)   Resp 18   Ht 190.5 cm (75\")   Wt 120 kg (265 lb 3.2 oz)   " SpO2 93%   BMI 33.15 kg/m²     Physical Exam  Physical Exam:  General appearance: Pleasant, obese, modestly kept elderly male who appears comfortable while lying in bed.  Alert, appears stated age and cooperative  Skin:  Skin color pale. No rashes or lesions  HEENT:  Head: Normal, normocephalic, atraumatic.  Neck:  no adenopathy, no tenderness/mass/nodules  Lungs:  clear to auscultation bilaterally  Heart:  regular rate and rhythm  Abdomen:  soft, globose, nontender.  Extremities: Symmetric with 1+ bipedal edema.  Neurologic:  Mental status: Alert, oriented, thought content appropriate    24HR INTAKE/OUTPUT:    Intake/Output Summary (Last 24 hours) at 11/1/2024 1017  Last data filed at 11/1/2024 0743  Gross per 24 hour   Intake 480 ml   Output 1500 ml   Net -1020 ml        Medina Catheter: None    Diet: Diet: Cardiac; Healthy Heart (2-3 Na+); Fluid Consistency: Thin (IDDSI 0)      Medications:   Scheduled Meds:arformoterol, 15 mcg, Nebulization, BID - RT  atorvastatin, 40 mg, Oral, Nightly  ferric gluconate, 250 mg, Intravenous, Daily  furosemide, 40 mg, Oral, Daily  lactulose, 20 g, Oral, TID  metoprolol succinate XL, 25 mg, Oral, Daily  pantoprazole, 40 mg, Oral, Q12H  sodium chloride, 10 mL, Intravenous, Q12H  valsartan, 80 mg, Oral, Q24H  warfarin, 5 mg, Oral, Daily        Continuous Infusions:     Labs:     Results from last 7 days   Lab Units 11/01/24  0804 10/31/24  2347 10/31/24  1559 10/31/24  1251 10/26/24  1634 10/26/24  0651   WBC 10*3/mm3  --   --   --  6.19  --  4.60   HEMOGLOBIN g/dL 8.7* 8.4* 8.2* 8.8*   < > 7.6*   HEMATOCRIT % 28.8* 28.3* 27.6* 29.2*   < > 26.0*   PLATELETS 10*3/mm3  --   --   --  172  --  143    < > = values in this interval not displayed.        Radiology:     Imaging Results (Last 7 Days)       Procedure Component Value Units Date/Time    XR Chest 2 View [[id number]] Collected: 11/01/24 0707     Updated: 11/01/24 0712    Narrative:      EXAM/TECHNIQUE: XR CHEST 2 VW-      INDICATION: shortness of breath; Z74.09-Other reduced mobility     COMPARISON: 10/27/2024     FINDINGS:     Cardiac silhouette is within normal limits. Prior median sternotomy.     Mild atelectasis at the right lung base. No large pleural effusion or  visible pneumothorax. No definite focal consolidation. Redemonstrated  right upper lobe spiculated mass. Emphysematous changes are present.     No acute osseous finding.       Impression:      No acute finding or significant interval change. Persistent mild right  basilar atelectasis. Redemonstrated spiculated mass in the right upper  lobe.     This report was signed and finalized on 11/1/2024 7:08 AM by Dr. Zafar Infante MD.       CT Chest With Contrast Diagnostic [[id number]] Collected: 10/29/24 1501     Updated: 10/29/24 1608    Narrative:      EXAM: CT CHEST W CONTRAST DIAGNOSTIC-      DATE: 10/29/2024 9:24 AM     HISTORY: mass on chest xray; Z74.09-Other reduced mobility       COMPARISON: 10/24/2024 from [facility] with outside report  reviewed in PACS at time of dictation     DOSE LENGTH PRODUCT: 350.98 mGy.cm mGy cm. Automatic exposure control  was utilized to make radiation dose as low as reasonably achievable.     TECHNIQUE: Enhanced CT images of the chest obtained with multiplanar  reformats.     FINDINGS:     AIRWAYS/PULMONARY PARENCHYMA: The central airways are midline and  patent. No pleural effusion or pneumothorax.     Severe emphysematous changes.     RIGHT upper lobe 6.0 x 3.4 cm spiculated opacity on axial series 3,  image 45.     LEFT lower lobe 2.2 cm pulmonary nodule on axial series 3, image 110,  previously 1.9 cm on 3/12/2024.     VASCULATURE: Thoracic aorta is normal in course and caliber with heavily  calcified atherosclerosis. Enlarged main pulmonary artery measuring 3.7  cm in transverse dimension, which can be seen with pulmonary artery  hypertension.     CARDIAC: Borderline heart size. Heavily calcified native coronary  arteries  versus stent material.      Lobular mass at the RIGHT coronary artery distribution with upper  component measuring 4.7 cm on axial series 2, image 10, previously 4.7  cm on 12/24/2016. Mid component measuring 4.9 x 3.8 cm on image 119,  previously 4.5 x 3.3 cm on 4/25/2019 although incompletely visualized on  that exam. Lower component measures 2.6 cm on image 129, previously 2.6  cm on 3/12/2024. Favor thrombosed aneurysm or other post operative  changes. Single-phase imaging limits evaluation.     No pericardial effusion.       MEDIASTINUM: Esophagus has normal course, caliber and wall thickness.  There is no mediastinal or hilar lymphadenopathy by CT size criteria.      EXTRATHORACIC: The visualized portions of the thyroid gland are  unremarkable. No thoracic inlet or axillary adenopathy. The  extrathoracic soft tissues are within normal limits.      INCLUDED UPPER ABDOMEN: Visualized portion of the liver, pancreas,  spleen, adrenal glands appear within normal limits. Gallstones.  Bilateral upper kidneys appear within normal limits. Partially  visualized upper abdominal aorta measuring 5.2 x 3.5 cm with partially  visualized changes of stent graft repair.     BONES: Sternotomy wires. No acute or suspicious bony finding.          Impression:      1. Spiculated opacity at the RIGHT upper lobe measuring 6.0 x 3.4 cm.  Appearance concerning for malignancy. There are several small airways  leading to this opacity and endobronchial sampling could be considered.      2. Increased size LEFT lower lobe pulmonary nodule now measuring 2.2 cm,  previously 1.9 cm on 3/12/2024. PET/CT may be useful if not previously  performed.     3. Enlarged main pulmonary artery, which can be seen with pulmonary  artery hypertension.     4. Increased size of lobular mass in the RIGHT coronary artery  distribution, favor thrombosed aneurysm or other post operative changes.  Difficult to determine change due to available prior exams  although  appears increased compared to 4/25/2019 with measurements as above.     This report was signed and finalized on 10/29/2024 4:05 PM by Dr Celine Barrios MD.       XR Chest 1 View [[id number]] Collected: 10/27/24 1354     Updated: 10/27/24 1359    Narrative:      EXAMINATION: XR CHEST 1 VW-     10/27/2024 12:36 PM     HISTORY: Shortness of breath; Z74.09-Other reduced mobility     1 view chest x-ray.     COMPARISON:  Outside chest x-ray from 10/24/2024.  Chest x-ray from 7/8/2020.     Cardiomegaly.  CABG changes.  Aortic arch calcification.     Hyper expanded lungs with chronic interstitial disease.  Bullous disease at the LEFT apex.     Similar to the outside chest x-ray from 3 days ago there is an irregular  spiculated 4 x 3 cm opacity at the RIGHT upper lobe.     No pneumothorax or heart failure.       Impression:      1. Chronic lung changes.  2. Indeterminate 4 x 3 cm spiculated opacity in the RIGHT upper lobe.  Chest CT correlation is recommended.           This report was signed and finalized on 10/27/2024 1:56 PM by Dr. Ricardo Jernigan MD.       XR Abdomen KUB [[id number]] Collected: 10/26/24 1413     Updated: 10/26/24 1417    Narrative:      EXAMINATION: XR ABDOMEN KUB-     10/26/2024 1:09 PM     HISTORY: abdominal distention; Z74.09-Other reduced mobility     1 view abdomen/KUB.     Mild degenerative lumbar spine changes.  Aortic endograft present.     Diffuse bowel gas pattern with no sign of bowel obstruction or free air.       Impression:      1. No acute abnormality is seen.  2. No bowel distention is seen.           This report was signed and finalized on 10/26/2024 2:14 PM by Dr. Ricardo Jernigan MD.                 Objective:       Problem list:     Symptomatic anemia    Tobacco abuse    Hypertension    Hyperlipidemia    CAD (coronary artery disease)    Lung nodule    Protein S deficiency    Atrial fibrillation, chronic    S/P CABG (coronary artery bypass graft)        Assessment/Plan:  "      ASSESSMENT:   Iron deficiency anemia from recent/acute upper GI bleed.  -Has received at least 1 unit RBC (received 1 unit RBC prior to transfer to this facility) and 3 units FFP transfusions this admission.  -10/25/2024- EGD normal proximal esophagus and mid esophagus. - Z-line irregular, at the gastroesophageal junction. - Red blood in the stomach. - A single bleeding angioectasia in the stomach. Treated with argon plasma coagulation (APC). Injected  - Hgb 8.7, 11/1/2024 (prior manohar: Hgb 7.7, 10/23/2024)  2.   RUL apical lung mass and left lower lobe lung nodule.  Both worrisome for malignancy.  -10/30/2024-seen for pulmonary Dr. Pagan (above).  Plans for outpatient PET scan to help direct biopsy efforts via bronchoscopy or other biopsy technique.  3.   History of protein S deficiency with prior DVT x 3 of the left leg (7260-5099) and prior bilateral PE (1986).  Patient states that 3 of his sisters and \"13 maternal cousins\" carry the genetic deficiency.  4.   History of atrial fibrillation   5.   Maintenance anticoagulation (since resumed).  States that he maintains INR = 2-2.4  6.   COPD  7.   >  60-pack-year cigarette smoking history.  8.   CAD with prior coronary stent, CABG, and MI.        PLAN:  Review of available diagnostic information as outlined above.  Note stable anemia since last receipt of RBC transfusion.  Continue IV iron (Ferrlecit 250 mg IV daily x 4)-day 2 today  Transfuse RBCs if Hgb <7, < 8 if symptomatic, or for active bleeding  Protein S level pending  Patient insists on anticoagulation fully aware of the risk of rebleeding given the background of acute GI bleed.  Thus, since he has not had any recent thrombotic episodes, I suggested new INR goal of ~ 1.5-2.2 range (1.81 today).    Consider IVC filter if he develops PE.  Outpatient PET scan and lung biopsy plans as outlined by Dr. Pagan.     I discussed the patients findings and my recommendations with the patient        Time: I spent " ~27 minutes caring for Michael on this date of service. This time includes time spent by me in the following activities: preparing for the visit, reviewing tests, performing a medically appropriate examination and/or evaluation, counseling and educating the patient/family/caregiver, ordering medications, tests, or procedures and documenting information in the medical record

## 2024-11-01 NOTE — PROGRESS NOTES
[facility] [place] [facility] PROGRESS NOTE - [facility]    Patient: Michael CASANOVA [facility]  1945   MR# [id number]   Acct# [id number]  11/01/24   10:43 CDT  Referring Provider: Sebastian Mckeon MD    Chief Complaint: Shortness of breath  Interval history: He thinks he still feel very ill, short of breath and fatigued.  He does not think he is better.  He got IV iron yesterday.  Meds:  arformoterol, 15 mcg, Nebulization, BID - RT  atorvastatin, 40 mg, Oral, Nightly  ferric gluconate, 250 mg, Intravenous, Daily  furosemide, 40 mg, Oral, Daily  lactulose, 20 g, Oral, TID  metoprolol succinate XL, 25 mg, Oral, Daily  pantoprazole, 40 mg, Oral, Q12H  sodium chloride, 10 mL, Intravenous, Q12H  valsartan, 80 mg, Oral, Q24H  warfarin, 5 mg, Oral, Daily         Physical Exam:  SpO2 Percentage    11/01/24 0518 11/01/24 0651 11/01/24 0743   SpO2: 93% 94% 93%     Body mass index is 33.15 kg/m².   Temp:  [97.4 °F (36.3 °C)-98.3 °F (36.8 °C)] 98.1 °F (36.7 °C)  Heart Rate:  [50-72] 70  Resp:  [16-18] 18  BP: (123-152)/(35-59) 147/54  Intake/Output Summary (Last 24 hours) at 11/1/2024 1043  Last data filed at 11/1/2024 0743  Gross per 24 hour   Intake 480 ml   Output 1500 ml   Net -1020 ml     Physical Exam  Constitutional:       Appearance: He is not toxic-appearing.   Pulmonary:      Effort: No accessory muscle usage, prolonged expiration or respiratory distress.      Breath sounds: No stridor. Decreased breath sounds present. No wheezing.   Neurological:      Mental Status: He is alert.       Laboratory Data:  Results from last 7 days   Lab Units 11/01/24  0804 10/31/24  2347 10/31/24  1559 10/31/24  1251 10/26/24  1634 10/26/24  0651   WBC 10*3/mm3  --   --   --  6.19  --  4.60   HEMOGLOBIN g/dL 8.7* 8.4* 8.2* 8.8*   < > 7.6*   PLATELETS 10*3/mm3  --   --   --  172  --  143    < > = values in this interval not displayed.     Results from last 7 days   Lab Units 11/01/24  0804 10/31/24  1251 10/30/24  0842   INR   1.81* 1.60* 1.41*         Microbiology Results (last 10 days)       Procedure Component Value - Date/Time    CANDIDA AURIS SCREEN - Swab, Axilla Right, Axilla Left and Groin [[id number]]  (Normal) Collected: 10/24/24 2222    Lab Status: Final result Specimen: Swab from Axilla Right, Axilla Left and Groin Updated: 10/29/24 2231     Candida Auris Screen Culture No Candida auris isolated at 5 days          Recent films:  XR Chest 2 View    Result Date: 11/1/2024  No acute finding or significant interval change. Persistent mild right basilar atelectasis. Redemonstrated spiculated mass in the right upper lobe.  This report was signed and finalized on 11/1/2024 7:08 AM by Dr. Zafar Infante MD.     My interpretation of chest x-ray: Known lesion in right upper lobe.  No acute infiltrates.  Pulmonary Assessment:  Abnormal CT imaging with left lower lobe nodule and right upper lobe mass  Shortness of breath with multiple factors, probably relating to anemia, cardiac disease pulmonary disease, and deconditioning  Status post GI bleeding  Aortic stenosis  Biatrial enlargement  History of PE and hypercoagulable state  History of cigarette smoking, with 67-pack-year smoking history  Suspected COPD    Recommend:   Continue bronchodilators.  I asked him how he feels these have helped and he has not noticed much benefit from that.  May reflect absence of COPD or prominence of other problems listed above contributing to his shortness of breath  We will get an outpatient PET scan instead of CT scan which had been contemplated before, but no longer necessary since CT was done during this hospital stay  Oxygen for saturation 90%  Mobilize as tolerated  Chest x-ray from this morning is reviewed showing no significant new findings    Electronically signed by Lizandro Pagan MD, 11/01/24, 10:43 CDT

## 2024-11-01 NOTE — PLAN OF CARE
"Goal Outcome Evaluation: Pt is alert and oriented, vitals are stable, no s/s of distress, IV is clean dry intact, room air, pt has some generalized weakness today, c/o being tired, no c/o pain, pt c/o not getting a bath I asked him if he wanted to get in the shower and he said he did not think he could shower without getting \"winded\" and I told him I could get a tub with soap and water and he said \"not right now\", pt had a CT and echo this shift, pt sister is visiting and Sebas was kind enough to bring her up in a wheelchair because the walk was a lot for her                                            "
Goal Outcome Evaluation:              Outcome Evaluation: Nutrition assessment for weight loss. Pt is in isolation, room phone was not answered. Per provider notes, pt reports losing around 20-30 lb in the last couple of months and presents with weakness and fatigue since August. Past weights indicate that pt was 295 lb on 4/20/24. Pt is currently NPO. Dietitian will try visiting during follow-up to gather more information.                             
Goal Outcome Evaluation:            Pt. A&Ox4. VSS. Full liquid diet. He has been irritable/agitated this shift and states we come in and out of his room every 10 minutes. He lost IV access and refused a new IV placement. Refused standing weight this AM. H&H drawn and monitored every 8hrs.  Safety maintained. Call light within reach.                                
Goal Outcome Evaluation:           Progress: improving    Outcome Evaluation: Pt was AF 65-77 coup on tele. Pt slept well through the night. Pt complained of no abdominal pain. Pt had one unit of blood finish up at the beginning of this shift. Sats within normal limits on room air.                           
Goal Outcome Evaluation:           Progress: improving  Outcome Evaluation: vss, AF 55-87, ra. No c/o pain. Echo & chest CT ordered this shift. Advanced diet and tolerating. Call light in reach                             
Goal Outcome Evaluation:           Progress: no change  Outcome Evaluation: Patient oriented x4 with VSS on room air. No c/o pain. Expresses frustration with previous shifts and lack of consolidating care. Attempted to consolidate care during the shift. Rest has been decent. Voiding per urinal.                             
Goal Outcome Evaluation:           Progress: no change  Outcome Evaluation: Patient oriented x4 with VSS on room air. No c/o pain. Mepilex to blanchable redness on right buttock. Noted patient sleeps on his sides during the night. Patient encouraged to also be turning every 2 hours during the day as well.                             
Goal Outcome Evaluation:           Progress: no change  Outcome Evaluation: Patient oriented x4 with VSS on room air. No c/o pain. Rest has been decent. Safety maintained.                             
Goal Outcome Evaluation:      Hemoglobin 8.5 at 2023, additional labs pending. On clear liquid diet until midnight, NPO since midnight. Occult blood not collected due to no bowel movements this shift. Patient unsteady when standing, bed alarm set despite patient's repeated requests to turn it off. In afib. Candida auris swab collected, results pending.                                      
Goal Outcome Evaluation:      Patient A/O x4 this shift. Patient had no complaints of pain. Patient able to use urinal independently. Declined additional breathing treatment per order/RT. Patient had labile mood overnight: irritable to flat and calm. Patient looking to get more uninterrupted sleep. No unaddressed needs in the room overnight. Will update oncoming dayshift nurse at bedside shift report in the a.m. as appropriate. IPASS updated.                                       
Goal Outcome Evaluation:      Pt is A&Ox4 and afebrile. Lasix started today. He still refuses IV. He is still on a full liquid diet. No complaints of pain. VSS.                                       
Goal Outcome Evaluation:      Pt is A&Ox4 and afebrile. VSS. He is up with assist x1. No complaints of pain. Lactulose started for diarrhea. Family at bedside.                                       
Goal Outcome Evaluation:  Plan of Care Reviewed With: (P) patient        Progress: (P) improving  Outcome Evaluation: (P) PT tx completed. Pt in the bathroom upon arrival to tx, I with toileting. Pt no c/o pain, but does have SOB after exertion during recovery/rest breaks. Pt STS SBA with no cues needed. Pt ambulated 75' x 2 with CGA, observed SOB only during post ambulation. Educated on BOA for continued improvement in overall function.                             
Goal Outcome Evaluation:  Plan of Care Reviewed With: (P) patient        Progress: (P) improving  Outcome Evaluation: (P) PT tx completed. Pt no c/o pain. Pt I with all bed mobility and t/f with no cues needed. I with all toileting. Ambulated 200' x 2 with SBA, cues for upright posture. Performed standing/sitting BLE x10. Pt is educated on correct technique with exercises and provided HEP for BLE upon d/c. Pt also educated on safety with functional mobility, community participation, and benefits of home health upon d/c.                             
Goal Outcome Evaluation:  Plan of Care Reviewed With: (P) patient        Progress: (P) improving  Outcome Evaluation: (P) Pt tx completed. Pt no c/o pain. Pt Mod I with bed mobility and SPA with STS, no cues needed. Bed>chair and toilet t/f with SPA, cues for upright posture. Pt I with all toileting. Pt ambulated 150 x2 with SBA, cues for upright posture. Performed 9 steps with unilateral handrail, CGA/SBA with cues for foot placement. Educated on importance of extended rest breaks to decrease SOB and pt safety.                             
Goal Outcome Evaluation:  Plan of Care Reviewed With: (P) patient        Progress: (P) no change  Outcome Evaluation: (P) Pt tx completed. Pt no c/o pain. Peformed bed mobility with SBA, required HHA with sidelying to sit with cues for utilizing bed rails. STS SBA with no cues needed. Ambulated 100' x 2 with 1 rest break, CGA/SBA with cues for purse lip breathing. Observed decreased SOB during rest breaks after ambulation.                             
Goal Outcome Evaluation:  Plan of Care Reviewed With: patient           Outcome Evaluation: Intake in the last 72 hr: 92%. Pt's diet was advanced from full liquids to regular yesterday, and he is tolerating it well with no complications noted by pt. Pt was upset/agitated as he was out of the room when  ambassadors called to gather his lunch preferences. 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 Pt was advised on available snacks, the menu was explained, and he was encouraged to follow up with the dietitian with any questions or concerns. Will monitor.                             
Goal Outcome Evaluation:  Plan of Care Reviewed With: patient           Outcome Evaluation: PT eval complete. Pt in fowlers position, AOx4 and agreeable to session. Mr. Paz reports of being indep at baseline but has noticed his activity tolerance has signfiicantly decreased over the past 2 months, due to feeling generally weak and SOA with all mobility. Today the pt brought self to EOB with CGA and verbal cues, both UE and LE AROM and strength remain WFL and 5/5 strength. He reports numbness in B feet but is baseline. Pt performed sit<>stand and ambulated short distance of 10' to recliner and demo shuffling type gait, which he reports is normal lately due to feeling weakness. During 2nd trial, Pt ambulated again around room 25'x2 reps with HHA and responded well to verbal cues to take more normal steps as he would at home and gait cycle improved. Pt had no complaints and was motivated as he wants to return home and maintain high level of independence. Pt left in recliner with RN in room. Pt will benefit from skilled PT services to improve gait, stair safety, decrease fall risk and return to PLOF. Recommend home with assist and OPPT when medically stable pending progress.    Anticipated Discharge Disposition (PT): home with assist, home with outpatient therapy services                        
Goal Outcome Evaluation:  Plan of Care Reviewed With: patient           Outcome Evaluation: Pt agitated and upset this am, GI consulted and md in to talk with pt, INR elevated, recieved 2 units FFP, pt then down for EGD, had active bleed, md treated, still NPO x ice chips, pt has c/o multiple times about not getting to eat in several hours, did eat some ice chips and had mild nausea, pt upset but understanding of need to remain NPO, after procedure pt recieved one more unit FFP and will recieve one unit of PRBC, HR AF/AFL 48-74 with HR down once to 37 when pt was dozing off to sleep, no c/o of pain, has not had any bm's this shift, safety maintained                             
Goal Outcome Evaluation:  Plan of Care Reviewed With: patient           Outcome Evaluation: VSS, on RA, no c/o of pain, HR AFL 48-88 with HR down to 40 once when sleeping, oncology consulted, IV iron ordered, pt had moderate brown stool today, H and H drawn every 8 hrs, Dr. Mckeon aware no vascular coverage until 11/04, continues on coumadin, INR monitored, pt up in chair only couple of hours today, can self turn, c/o soreness in gluteal cleft, foam dressing changed, safety maintained                             
Goal Outcome Evaluation: Pt is alert and oriented, no c/o pain, vitals are stable, room air, indep in room, good appitite, no s/s of distress, attending has spoken to pt about his CT results and echo results, Dr Pagan with pulmonology has been consulted, family is at bedside            Progress: no change                                
MONITORED W/ CONTINUOUS PULSE OX

## 2024-11-01 NOTE — DISCHARGE SUMMARY
[facility] Medicine Services  DISCHARGE SUMMARY       Date of Admission: 10/24/2024  Date of Discharge:  11/1/2024  Primary Care Physician: Henrietta Jin APRN    Presenting Problem/History of Present Illness:  Patient was transferred from [facility] ER today to our facility for evaluation.  He was seen in room 463 after arrival with nursing at bedside.  This is a 79-year-old male with a history of CAD status post prior CABG on full dose aspirin.  He is also on warfarin for history of A-fib as well as prior DVT/PE with protein S deficiency.  Other history includes infrarenal AAA status post endograft, hypertension, hyperlipidemia, tobacco use, right upper lobe pulmonary nodule being followed by pulmonary.  Patient had a cyst removed from his back surgically in August.  He says ever since then things have gone downhill for him.  For the last 2 months he has had some progressive weakness, fatigue, shortness of breath with exertion, dizziness especially upon standing.  Patient denies any syncopal events.  Patient does state that symptoms seem to have slowly increased over the last month and he also has noted some dark tarry stools over the last month.  Patient went to see Dr. Vicente of cardiology a few weeks ago and reportedly had an echocardiogram done but I cannot find the echo test or the office visit in our system.  He was referred to Dr. Lyons reportedly for slow afib, his metoprolol xL was reduced from 50 to 25mg.  I do see that patient has an upcoming appointment with Dr. Lyons in February 2025.  Patient went to see pulmonary yesterday for follow-up and monitoring of his known right upper lobe pulmonary nodule.  Labs were drawn during evaluation and patient was found to have a hemoglobin of 7.7.  Due to continued not feeling well he went to the ER today and there his hemoglobin was found to be 6.8.  He was transfused a unit of blood which was completed prior to  "arrival here.  Patient tells me his INR was 2.3 but I cannot find it in transfer records.  Currently on arrival patient still feels tired but denies any current chest pain, shortness of breath, dizziness, nausea, abdominal pain, new focal numbness or weakness.  No fevers or chills   10/25  As before admitted for symptomatic anemia started on PPI hold anticoagulation GI on board n.p.o. EGD was given fresh frozen plasma per GI  10/26  80 y/o male presented as transfer from outside hospital with melena and iron deficiency anemia. Hgb 7.7 on admission here after receiving 1 units of PRBC at outside hospital. 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 Received a 2nd unit PRBC here on 10/25. Received total of 3 units of FFP 10/25. On coumadin with history of atrial fibrillation as well as DVT/PE with Protein S deficiency.  For this patient Eliquis might be a safer choice than Coumadin he was he told me before that his insurance would not pay for Eliquis for now we will not anticoagulate this man with the fear of recurrent bleeding as I suspect the risks of bleeding outweighs the risk of thrombosis  10/27  As before EGD was showing AVM in the stomach fundus treated with APC and epinephrine injections hemoglobin remained stable remains on Protonix  10/28  Before remains anxious frustrated multiple complaints was having weakness shortness of breath, chest x-ray showing chronic disease right upper lobe opacity recommending CT head and neck unremarkable will order CT appreciate GI.,  GI had signed off the patient's abdominal/pelvic CT scan on October 24, 2024 showed \"stable incompletely visualized mass overlying the right atrium\".  Repeat echo  10/29  As before hemoglobin up to 9 today awaiting echo and CT Coumadin has been started  10/30  Echo of the heart is showing good EF mild LVH mild to moderate aortic stenosis no PFO, CT of the chest is showing " spiculated mass in the right upper lobe concerning for malignancy also is showing thrombosed entity in the right coronary artery favoring thrombosed aneurysm, he states he was well aware of that mass back in March it was only 2 cm now 6 cm he wants it to be worked up we will consult with pulmonary patient will be complicated since the patient will probably be placed on heparin will need to hold his Coumadin for his protein C S deficiency to do FOB with biopsy  10/31  Appreciate pulmonary consulted for right upper lobe mass worrisome for malignancy, Per pulmonary there is a blood vessel running through the lesion makes it high morbidity for bronchoscopy with biopsy PET scanning would be helpful in the outpatient setting to direct biopsy efforts this would require holding anticoagulation also 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, consult with vascular    Final Discharge Diagnoses:  Active Hospital Problems    Diagnosis     **Symptomatic anemia     S/P CABG (coronary artery bypass graft)     Protein S deficiency     Atrial fibrillation, chronic     Lung nodule     Tobacco abuse     Hypertension     Hyperlipidemia     CAD (coronary artery disease)        Consults: Pulmonary oncology GI    Procedures Performed: EGD with AVM cauterization    Pertinent Test Results:   Results for orders placed during the hospital encounter of 10/24/24    Adult Transthoracic Echo Complete w/ Color, Spectral and Contrast if Necessary Per Protocol    Interpretation Summary    Left ventricular systolic function is normal. Left ventricular ejection fraction appears to be 61 - 65%.    Left ventricular wall thickness is consistent with moderate concentric hypertrophy.    Mild to moderate aortic valve stenosis is present.    Estimated right ventricular systolic pressure from tricuspid regurgitation is normal (<35 mmHg).    The right ventricular cavity is moderately dilated, with moderately reduced systolic function.    Severe biatrial  enlargement    Moderate dilation of the aortic root is present (4.6 cm).    Saline test results are negative.    Rhythm is atrial fibrillation.    No previous studies available for comparison.      Imaging Results (All)       Procedure Component Value Units Date/Time    XR Chest 2 View [[id number]] Collected: 11/01/24 0707     Updated: 11/01/24 0712    Narrative:      EXAM/TECHNIQUE: XR CHEST 2 VW-     INDICATION: shortness of breath; Z74.09-Other reduced mobility     COMPARISON: 10/27/2024     FINDINGS:     Cardiac silhouette is within normal limits. Prior median sternotomy.     Mild atelectasis at the right lung base. No large pleural effusion or  visible pneumothorax. No definite focal consolidation. Redemonstrated  right upper lobe spiculated mass. Emphysematous changes are present.     No acute osseous finding.       Impression:      No acute finding or significant interval change. Persistent mild right  basilar atelectasis. Redemonstrated spiculated mass in the right upper  lobe.     This report was signed and finalized on 11/1/2024 7:08 AM by Dr. Zafar Infante MD.       CT Chest With Contrast Diagnostic [[id number]] Collected: 10/29/24 1501     Updated: 10/29/24 1608    Narrative:      EXAM: CT CHEST W CONTRAST DIAGNOSTIC-      DATE: 10/29/2024 9:24 AM     HISTORY: mass on chest xray; Z74.09-Other reduced mobility       COMPARISON: 10/24/2024 from [facility] with outside report  reviewed in PACS at time of dictation     DOSE LENGTH PRODUCT: 350.98 mGy.cm mGy cm. Automatic exposure control  was utilized to make radiation dose as low as reasonably achievable.     TECHNIQUE: Enhanced CT images of the chest obtained with multiplanar  reformats.     FINDINGS:     AIRWAYS/PULMONARY PARENCHYMA: The central airways are midline and  patent. No pleural effusion or pneumothorax.     Severe emphysematous changes.     RIGHT upper lobe 6.0 x 3.4 cm spiculated opacity on axial series 3,  image 45.     LEFT lower  lobe 2.2 cm pulmonary nodule on axial series 3, image 110,  previously 1.9 cm on 3/12/2024.     VASCULATURE: Thoracic aorta is normal in course and caliber with heavily  calcified atherosclerosis. Enlarged main pulmonary artery measuring 3.7  cm in transverse dimension, which can be seen with pulmonary artery  hypertension.     CARDIAC: Borderline heart size. Heavily calcified native coronary  arteries versus stent material.      Lobular mass at the RIGHT coronary artery distribution with upper  component measuring 4.7 cm on axial series 2, image 10, previously 4.7  cm on 12/24/2016. Mid component measuring 4.9 x 3.8 cm on image 119,  previously 4.5 x 3.3 cm on 4/25/2019 although incompletely visualized on  that exam. Lower component measures 2.6 cm on image 129, previously 2.6  cm on 3/12/2024. Favor thrombosed aneurysm or other post operative  changes. Single-phase imaging limits evaluation.     No pericardial effusion.       MEDIASTINUM: Esophagus has normal course, caliber and wall thickness.  There is no mediastinal or hilar lymphadenopathy by CT size criteria.      EXTRATHORACIC: The visualized portions of the thyroid gland are  unremarkable. No thoracic inlet or axillary adenopathy. The  extrathoracic soft tissues are within normal limits.      INCLUDED UPPER ABDOMEN: Visualized portion of the liver, pancreas,  spleen, adrenal glands appear within normal limits. Gallstones.  Bilateral upper kidneys appear within normal limits. Partially  visualized upper abdominal aorta measuring 5.2 x 3.5 cm with partially  visualized changes of stent graft repair.     BONES: Sternotomy wires. No acute or suspicious bony finding.          Impression:      1. Spiculated opacity at the RIGHT upper lobe measuring 6.0 x 3.4 cm.  Appearance concerning for malignancy. There are several small airways  leading to this opacity and endobronchial sampling could be considered.      2. Increased size LEFT lower lobe pulmonary nodule now  measuring 2.2 cm,  previously 1.9 cm on 3/12/2024. PET/CT may be useful if not previously  performed.     3. Enlarged main pulmonary artery, which can be seen with pulmonary  artery hypertension.     4. Increased size of lobular mass in the RIGHT coronary artery  distribution, favor thrombosed aneurysm or other post operative changes.  Difficult to determine change due to available prior exams although  appears increased compared to 4/25/2019 with measurements as above.     This report was signed and finalized on 10/29/2024 4:05 PM by Dr Celine Barrios MD.       XR Chest 1 View [[id number]] Collected: 10/27/24 1354     Updated: 10/27/24 1359    Narrative:      EXAMINATION: XR CHEST 1 VW-     10/27/2024 12:36 PM     HISTORY: Shortness of breath; Z74.09-Other reduced mobility     1 view chest x-ray.     COMPARISON:  Outside chest x-ray from 10/24/2024.  Chest x-ray from 7/8/2020.     Cardiomegaly.  CABG changes.  Aortic arch calcification.     Hyper expanded lungs with chronic interstitial disease.  Bullous disease at the LEFT apex.     Similar to the outside chest x-ray from 3 days ago there is an irregular  spiculated 4 x 3 cm opacity at the RIGHT upper lobe.     No pneumothorax or heart failure.       Impression:      1. Chronic lung changes.  2. Indeterminate 4 x 3 cm spiculated opacity in the RIGHT upper lobe.  Chest CT correlation is recommended.           This report was signed and finalized on 10/27/2024 1:56 PM by Dr. Ricardo Jernigan MD.       XR Abdomen KUB [[id number]] Collected: 10/26/24 1413     Updated: 10/26/24 1417    Narrative:      EXAMINATION: XR ABDOMEN KUB-     10/26/2024 1:09 PM     HISTORY: abdominal distention; Z74.09-Other reduced mobility     1 view abdomen/KUB.     Mild degenerative lumbar spine changes.  Aortic endograft present.     Diffuse bowel gas pattern with no sign of bowel obstruction or free air.       Impression:      1. No acute abnormality is seen.  2. No bowel distention is  seen.           This report was signed and finalized on 10/26/2024 2:14 PM by Dr. Ricardo Jernigan MD.       XR Outside Films [[id number]] Resulted: 10/25/24 0946     Updated: 10/25/24 0946    Narrative:      This procedure was auto-finalized with no dictation required.    CT Outside Films [[id number]] Resulted: 10/25/24 0946     Updated: 10/25/24 0946    Narrative:      This procedure was auto-finalized with no dictation required.          LAB RESULTS:      Lab 11/01/24  0804 10/31/24  2347 10/31/24  1559 10/31/24  1251 10/30/24  2317 10/30/24  1646 10/30/24  0842 10/29/24  1621 10/29/24  0724 10/29/24  0048 10/28/24  1458 10/26/24  1634 10/26/24  0651   WBC  --   --   --  6.19  --   --   --   --   --   --   --   --  4.60   HEMOGLOBIN 8.7* 8.4* 8.2* 8.8* 8.4*   < > 9.1*   < > 9.0*   < > 9.5*   < > 7.6*   HEMATOCRIT 28.8* 28.3* 27.6* 29.2* 28.9*   < > 30.9*   < > 30.8*   < > 31.6*   < > 26.0*   PLATELETS  --   --   --  172  --   --   --   --   --   --   --   --  143   NEUTROS ABS  --   --   --  4.01  --   --   --   --   --   --   --   --  3.10   IMMATURE GRANS (ABS)  --   --   --  0.01  --   --   --   --   --   --   --   --  0.02   LYMPHS ABS  --   --   --  0.96  --   --   --   --   --   --   --   --  0.71   MONOS ABS  --   --   --  0.94*  --   --   --   --   --   --   --   --  0.55   EOS ABS  --   --   --  0.24  --   --   --   --   --   --   --   --  0.20   MCV  --   --   --  82.5  --   --   --   --   --   --   --   --  84.1   PROTIME 21.7*  --   --  19.6*  --   --  17.8*  --  18.5*  --  17.9*  --  20.6*    < > = values in this interval not displayed.                 Lab 11/01/24  0804 10/31/24  1251 10/30/24  0842 10/29/24  0724 10/28/24  1458   PROTIME 21.7* 19.6* 17.8* 18.5* 17.9*   INR 1.81* 1.60* 1.41* 1.47* 1.42*                 Brief Urine Lab Results       None          Microbiology Results (last 10 days)       Procedure Component Value - Date/Time    CANDIDA AURIS SCREEN - Swab, Axilla Right, Axilla Left and  Groin [[id number]]  (Normal) Collected: 10/24/24 2222    Lab Status: Final result Specimen: Swab from Axilla Right, Axilla Left and Groin Updated: 10/29/24 2231     Candida Auris Screen Culture No Candida auris isolated at 5 days            Hospital Course:   Patient was transferred from [facility] today to our facility for evaluation.  He was seen in room 463 after arrival with nursing at bedside.  This is a 79-year-old male with a history of CAD status post prior CABG on full dose aspirin.  He is also on warfarin for history of A-fib as well as prior DVT/PE with protein S deficiency.  Other history includes infrarenal AAA status post endograft, hypertension, hyperlipidemia, tobacco use, right upper lobe pulmonary nodule being followed by pulmonary.  Patient had a cyst removed from his back surgically in August.  He says ever since then things have gone downhill for him.  For the last 2 months he has had some progressive weakness, fatigue, shortness of breath with exertion, dizziness especially upon standing.  Patient denies any syncopal events.  Patient does state that symptoms seem to have slowly increased over the last month and he also has noted some dark tarry stools over the last month.  Patient went to see Dr. Vicente of cardiology a few weeks ago and reportedly had an echocardiogram done but I cannot find the echo test or the office visit in our system.  He was referred to Dr. Lyons reportedly for slow afib, his metoprolol xL was reduced from 50 to 25mg.  I do see that patient has an upcoming appointment with Dr. Lyons in February 2025.  Patient went to see pulmonary yesterday for follow-up and monitoring of his known right upper lobe pulmonary nodule.  Labs were drawn during evaluation and patient was found to have a hemoglobin of 7.7.  Due to continued not feeling well he went to the ER today and there his hemoglobin was found to be 6.8.  He was transfused a unit of blood which was completed prior to  "arrival here.  Patient tells me his INR was 2.3 but I cannot find it in transfer records.  Currently on arrival patient still feels tired but denies any current chest pain, shortness of breath, dizziness, nausea, abdominal pain, new focal numbness or weakness.  No fevers or chills   10/25  As before admitted for symptomatic anemia started on PPI hold anticoagulation GI on board n.p.o. EGD was given fresh frozen plasma per GI  10/26  78 y/o male presented as transfer from outside hospital with melena and iron deficiency anemia. Hgb 7.7 on admission here after receiving 1 units of PRBC at outside hospital. 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 Received a 2nd unit PRBC here on 10/25. Received total of 3 units of FFP 10/25. On coumadin with history of atrial fibrillation as well as DVT/PE with Protein S deficiency.  For this patient Eliquis might be a safer choice than Coumadin he was he told me before that his insurance would not pay for Eliquis for now we will not anticoagulate this man with the fear of recurrent bleeding as I suspect the risks of bleeding outweighs the risk of thrombosis  10/27  As before EGD was showing AVM in the stomach fundus treated with APC and epinephrine injections hemoglobin remained stable remains on Protonix  10/28  Before remains anxious frustrated multiple complaints was having weakness shortness of breath, chest x-ray showing chronic disease right upper lobe opacity recommending CT head and neck unremarkable will order CT appreciate GI.,  GI had signed off the patient's abdominal/pelvic CT scan on October 24, 2024 showed \"stable incompletely visualized mass overlying the right atrium\".  Repeat echo  10/29  As before hemoglobin up to 9 today awaiting echo and CT Coumadin has been started  10/30  Echo of the heart is showing good EF mild LVH mild to moderate aortic stenosis no PFO, CT of the chest is showing " "spiculated mass in the right upper lobe concerning for malignancy also is showing thrombosed entity in the right coronary artery favoring thrombosed aneurysm, he states he was well aware of that mass back in March it was only 2 cm now 6 cm he wants it to be worked up we will consult with pulmonary patient will be complicated since the patient will probably be placed on heparin will need to hold his Coumadin for his protein C S deficiency to do FOB with biopsy  10/31  Appreciate pulmonary consulted for right upper lobe mass worrisome for malignancy, Per pulmonary there is a blood vessel running through the lesion makes it high morbidity for bronchoscopy with biopsy PET scanning would be helpful in the outpatient setting to direct biopsy efforts this would require holding anticoagulation also 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, consult with vascular  11/1  Patient wants to go home hemoglobin remained stable declined home health even though it was recommended by me and by the physical therapy the patient has depression I offered him trazodone and he refused we will go ahead and get him to see pulmonary as outpatient after PET scan and hematology oncology increase Protonix to 40 twice daily and started him on ferrous sulfate we did decrease his INR target considering his GI bleed to 1.8-2.2    Physical Exam on Discharge:  /55 (BP Location: Right arm, Patient Position: Lying)   Pulse 86   Temp 98.1 °F (36.7 °C) (Oral)   Resp 18   Ht 190.5 cm (75\")   Wt 120 kg (265 lb 3.2 oz)   SpO2 93%   BMI 33.15 kg/m²   Physical Exam  Constitutional:       Appearance: Normal appearance.   HENT:      Head: Normocephalic.      Right Ear: Tympanic membrane normal.      Nose: Nose normal.   Eyes:      Pupils: Pupils are equal, round, and reactive to light.   Cardiovascular:      Rate and Rhythm: Normal rate.   Pulmonary:      Effort: Pulmonary effort is normal.   Abdominal:      General: Abdomen is flat. "   Musculoskeletal:      Cervical back: Normal range of motion.   Neurological:      Mental Status: He is alert.           Condition on Discharge: stable     Discharge Disposition:  Home or Self Care    Discharge Medications:     Discharge Medications        New Medications        Instructions Start Date   albuterol sulfate  (90 Base) MCG/ACT inhaler  Commonly known as: PROVENTIL HFA;VENTOLIN HFA;PROAIR HFA   2 puffs, Inhalation, Every 4 Hours PRN      ferrous sulfate 325 (65 FE) MG tablet   325 mg, Oral, Daily With Breakfast      furosemide 40 MG tablet  Commonly known as: LASIX   40 mg, Oral, Daily   Start Date: November 2, 2024     pantoprazole 40 MG EC tablet  Commonly known as: PROTONIX   40 mg, Oral, Every 12 Hours             Continue These Medications        Instructions Start Date   candesartan 16 MG tablet  Commonly known as: ATACAND   16 mg, Daily      metoprolol succinate XL 50 MG 24 hr tablet  Commonly known as: TOPROL-XL   50 mg, Oral, Daily      ONE A DAY MEN 50 PLUS PO   1 tablet, Oral, Daily      simvastatin 80 MG tablet  Commonly known as: ZOCOR   80 mg, Oral, Nightly      VITAMIN C PO   2,000 mg, Daily      warfarin 4 MG tablet  Commonly known as: COUMADIN   4 mg, Every Other Day      warfarin 5 MG tablet  Commonly known as: COUMADIN   5 mg, Every Other Day             Stop These Medications      aspirin 325 MG tablet            Discharge Diet:     Activity at Discharge:     Follow-up Appointments:   Future Appointments   Date Time Provider Department Center   11/7/2024  1:30 PM THERAPIST RESPIRATORY DI PAD [facility]   11/7/2024  1:45 PM Melisa López APRN [facility]   2/25/2025  1:00 PM Sangeetha Lyons MD [facility]   3/11/2025  8:30 AM PAD CT 2 [facility] PAD CAT PAD   3/11/2025  9:00 AM PAD US NIVAS CART 2 BH PAD US PAD   3/11/2025 10:00 AM PAD US NIVAS CART 2 BH PAD US PAD   3/11/2025 11:00 AM Jah Frey DO [facility] PAD PAD       Test Results Pending at Discharge:  no    Electronically signed by Sebastian Mckeon MD, 11/01/24, 11:27 CDT.    Time: 35 minutes.

## 2024-11-01 NOTE — CASE MANAGEMENT/SOCIAL WORK
Continued Stay Note   [place]     Patient Name: Michael Paz  MRN: [id number]  Today's Date: 11/1/2024    Admit Date: 10/24/2024    Plan: Home   Discharge Plan       Row Name 11/01/24 1031       Plan    Plan Home    Patient/Family in Agreement with Plan yes    Plan Comments Katheryn Yeung RN asked SW to speak with pt about dc needs.  Pt denied any needs and plans home.    Final Discharge Disposition Code 01 - home or self-care                   Discharge Codes    No documentation.                 Expected Discharge Date and Time       Expected Discharge Date Expected Discharge Time    Nov 1, 2024               CLAU Rose

## 2024-11-01 NOTE — OUTREACH NOTE
Prep Survey      Flowsheet Row Responses   Religion facility patient discharged from? [place]   Is LACE score < 7 ? No   Eligibility Readm Mgmt   Discharge diagnosis Symptomatic anemia   Does the patient have one of the following disease processes/diagnoses(primary or secondary)? Other   Does the patient have Home health ordered? No   Is there a DME ordered? No   Prep survey completed? Yes            Ashlee HEMPHILL - Registered Nurse

## 2024-11-02 LAB
PROT S AG ACT/NOR PPP IA: 28 % (ref 60–150)
PROT S FREE AG ACT/NOR PPP IA: 14 % (ref 61–136)

## 2024-11-02 NOTE — PROGRESS NOTES
MG[facility] ONC [facility] GROUP HEMATOLOGY & ONCOLOGY  [address]  [place] [facility] [address] [phone]    Patient Name: Michael Paz  Encounter Date: 11/08/2024  YOB: 1945  Patient Number: [id number]    REASON FOR VISIT: Michael Paz 79 y.o. male who returns in follow-up of microcytic anemia from acute UGIB.  He has received IV Ferrlecit 250 mg on 10/31/24 and 11/1/24.  He is currently on oral ferrous sulfate 325 p.o. daily.  He has a history of congenital protein S deficiency with prior DVT of the lower extremities and bilateral PE for which he is on long term Coumadin.  He is also being evaluated for abnormal CT chest, 10/29/24 showing right upper lobe opacity 6.0 x 3.4 cm and left lower lobe pulmonary nodule now measuring 2.2 cm, previously 1.9 cm on 3/12/2024.  He is here alone    I have reviewed the HPI and verified with the patient the accuracy of it. No changes to interval history since the information was documented. Miguel Snyder MD 11/08/24      Diagnostic abnormalities:  - Medical history includes AAA, AAA repair, arthritis, atrial fibrillation (1990), CAD, CABG, coronary stent placement, coronary angioplasty, DVT x 3 ([id number])/bilateral PE (1986), protein S deficiency on maintenance anticoagulation (was on Coumadin), PVD, hyperlipidemia, hypertension, myocardial infarction (1990s), pneumonia (4 times), anemia, EGD (10/25/2024), EtOH use (3-4 standard drinks of alcohol per week) and tobacco abuse (67-pack-year smoker).  -10/24/2024-presented to [facility] ER with weakness, dyspnea, and dark stools.  There he was found to be anemic and was transferred to [facility] for further eval.  Over the past few months patient has had worsening fatigue weakness and dark stools.  On admission hemoglobin 7.7, iron saturation 6%.  Received 1 unit of RBC transfusion with improvement of the hemoglobin to 8.5.  Repeat on 10/25/2024 however showed  hemoglobin 7.4.  INR prolonged at 2.94 (patient on Coumadin).  -10/25/2024-seen for GI by DrAnge  Recommended transfusion of RBC and 2 units FFP prior to EGD.  -10/25/2024-EGD showed normal proximal esophagus and mid esophagus. - Z-line irregular, at the gastroesophageal junction. - Red blood in the stomach. - A single bleeding angioectasia in the stomach. Treated with argon plasma coagulation (APC). Injected-recommendation: Return patient to hospital [facility] for ongoing care. - NPO. - Continue present medications. - Will give additonal unit of FFP and 1 unit of PRBC now. - Monitor for any further bleeding. Monitor hgb/hct. - Normal first portion of the duodenum and second portion of the duodenum. - No specimens collected.  -10/26/2024- Hgb 7.6; MCV 84.1, platelets 143,000, WBC 4.6. CMP normal.  Iron 26 (L), iron saturation 6% (L), TIBC 429, ferritin 40.6 (L), subsequently 3 u FFP, 1 u RBC (per bloodbank)  -10/29/24- CT chest- 1. Spiculated opacity at the RIGHT upper lobe measuring 6.0 x 3.4 cm. Appearance concerning for malignancy. There are several small airways leading to this opacity and endobronchial sampling could be considered. 2. Increased size LEFT lower lobe pulmonary nodule now measuring 2.2 cm, previously 1.9 cm on 3/12/2024. PET/CT may be useful if not previously performed. 3. Enlarged main pulmonary artery, which can be seen with pulmonary artery hypertension. 4. Increased size of lobular mass in the RIGHT coronary artery distribution, favor thrombosed aneurysm or other post operative changes. Difficult to determine change due to available prior exams although appears increased compared to 4/25/2019 with measurements as above.   -10/30/24- Consult pulmonary, Dr. Pagan. A/P: Newly identified mass RUL apex worrisome for malignancy.  Increasing LLL nodule.  >  60-pack-year smoker.  Acute blood loss anemia.  A-fib.  Anticoagulated, meds held due to bleeding.  History of hypercoagulable state.   Recommendations/plan:  CT is reviewed. The rul lesion is approachable by ion bronchoscopy.  Unfortunately there is a blood vessel running the entire course along the portion of the airway from which we would try to approach the LLL lesion, so this one might not be accessible by ion bronchoscopy.  There is high risk of morbidity from additional diagnostic testing or treatment   PET scanning would be helpful in outpatient setting and would help direct biopsy efforts.  We can order this on discharge with outpatient office follow up for consideration for bronchoscopy or other biopsy technique.  This would require holding anticoagulation again.  Add aerosol bronchodilators to see if this helps him  Outpatient PFT.     -10/30/24- Hgb 8.4, Hct 28.9, PT 17.8, INR 1.41  -10/31/24- Protein S Ag total 28 (ref: %); Protein S Ag Free 14 (ref: %)  -11/7/24- PET scan-1.  6 cm hypermetabolic spiculated right upper lobe lung mass, most likely representing a primary lung neoplasm. 2.  No evidence of hypermetabolic metastatic disease in the neck, chest, abdomen, or pelvis. No hypermetabolic lymphadenopathy in the chest. 3.  2 cm solid round circumscribed nodule in the left lower lobe with  max SUV 2.9, below liver background level. This nodule has slowly increased in size, measuring 1.3 cm on an exam from 2022. I suspect this represents a noncalcified granuloma or hamartoma but a slow-growing neoplasm is not entirely excluded and continued imaging surveillance is recommended.     Previous interventions:  -Received 1 unit RBC (received 1 unit RBC prior to transfer to this facility, 10/24/24) and 3 units FFP transfusions, 10/25/24  -Ferrlecit 250 mg IV on 10/31/24 and 11/1/24.    -Ferrous sulfate 325 p.o. daily, 11/2/2024-present      Problem List Items Addressed This Visit    None    Oncology/Hematology History    No history exists.       PAST MEDICAL HISTORY:  ALLERGIES:  No Known Allergies  CURRENT  MEDICATIONS:  Outpatient Encounter Medications as of 11/8/2024   Medication Sig Dispense Refill    albuterol sulfate  (90 Base) MCG/ACT inhaler Inhale 2 puffs Every 4 (Four) Hours As Needed for Wheezing. 8 g 0    Ascorbic Acid (VITAMIN C PO) Take 2,000 mg by mouth Daily.      candesartan (ATACAND) 16 MG tablet Take 1 tablet by mouth Daily.      ferrous sulfate 325 (65 FE) MG tablet Take 1 tablet by mouth Daily With Breakfast. 30 tablet 0    Fluticasone-Umeclidin-Vilant (Trelegy Ellipta) 100-62.5-25 MCG/ACT inhaler Inhale 1 puff Daily for 30 days. 1 each 11    furosemide (LASIX) 40 MG tablet Take 1 tablet by mouth Daily. 30 tablet 0    metoprolol succinate XL (TOPROL-XL) 50 MG 24 hr tablet Take 0.5 tablets by mouth Daily.      Multiple Vitamins-Minerals (ONE A DAY MEN 50 PLUS PO) Take 1 tablet by mouth Daily.      pantoprazole (PROTONIX) 40 MG EC tablet Take 1 tablet by mouth Every 12 (Twelve) Hours. 60 tablet 0    simvastatin (ZOCOR) 80 MG tablet Take 1 tablet by mouth Every Night.      warfarin (COUMADIN) 4 MG tablet Take 1 tablet by mouth Every Other Day. Alternates with 5mg      warfarin (COUMADIN) 5 MG tablet Take 1 tablet by mouth Every Other Day. 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    AAA (abdominal aortic aneurysm) I71.40    Aneurysm I72.9    CAD (coronary artery disease) I25.10    Right-sided epistaxis R04.0    Hx pulmonary embolism Z86.711    Hx of deep venous thrombosis Z86.718    Rhinitis, chronic J31.0    Anticoagulant adverse reaction T45.515A    Epistaxis R04.0     Lung nodule R91.1    Protein C deficiency D68.59    Protein S deficiency D68.59    Atrial fibrillation, chronic I48.20    Symptomatic anemia D64.9    S/P CABG (coronary artery bypass graft) Z95.1    Stage 2 moderate COPD by GOLD classification J44.9     SURGERIES:  Past Surgical History:   Procedure Laterality Date    ABDOMINAL AORTIC ANEURYSM REPAIR      APPENDECTOMY      BACK SURGERY      CARDIAC CATHETERIZATION      CORONARY ANGIOPLASTY      CORONARY ARTERY BYPASS GRAFT      CORONARY STENT PLACEMENT      ENDOSCOPY N/A 10/25/2024    Procedure: ESOPHAGOGASTRODUODENOSCOPY WITH ANESTHESIA;  Surgeon: Fareed Velasco MD;  Location: [facility] ENDOSCOPY;  Service: Gastroenterology;  Laterality: N/A;  Pre: Symptomatic anemia;  Post: Bleeding gastric AVM;  Nausea and vomiting    HEMORRHOIDECTOMY      INGUINAL HERNIA REPAIR      KNEE SURGERY Right     OTHER SURGICAL HISTORY      TYPE IA ENDOLEAK REPAIR    OTHER SURGICAL HISTORY      CRANIAL CYST     HEALTH MAINTENANCE ITEMS:  Health Maintenance Due   Topic Date Due    BMI FOLLOWUP  Never done    COLORECTAL CANCER SCREENING  Never done    ZOSTER VACCINE (1 of 2) Never done    HEPATITIS C SCREENING  Never done    ANNUAL WELLNESS VISIT  Never done    LIPID PANEL  07/09/2021    RSV Vaccine - Adults (1 - 1-dose 75+ series) Never done    COVID-19 Vaccine (5 - 2024-25 season) 09/01/2024       <no information>  Last Completed Colonoscopy       This patient has no relevant Health Maintenance data.          Immunization History   Administered Date(s) Administered    COVID-19 (MODERNA) 1st,2nd,3rd Dose Monovalent 03/24/2021, 04/21/2021    COVID-19 (MODERNA) BIVALENT 12+YRS 12/13/2022    COVID-19 (MODERNA) Monovalent Original Booster 01/18/2022    Fluzone (or Fluarix & Flulaval for VFC) >6mos 12/13/2022    Fluzone High-Dose 65+YRS 10/27/2021    Fluzone High-Dose 65+yrs 11/17/2020, 12/13/2023    Influenza Seasonal Injectable 11/15/2017, 10/30/2018    Pneumococcal Polysaccharide (PPSV23)  "10/27/2021    Tdap 07/08/2020, 12/13/2023     Last Completed Mammogram       This patient has no relevant Health Maintenance data.              FAMILY HISTORY:  Family History   Problem Relation Age of Onset    Heart disease Mother     Heart disease Father     Heart disease Other     Hypertension Other     Diabetes Other      SOCIAL HISTORY:  Social History     Socioeconomic History    Marital status:    Tobacco Use    Smoking status: Every Day     Current packs/day: 1.00     Average packs/day: 1 pack/day for 67.4 years (67.4 ttl pk-yrs)     Types: Cigarettes     Start date: 6/1/1957     Passive exposure: Current    Smokeless tobacco: Never    Tobacco comments:     Don't inhale.   Vaping Use    Vaping status: Never Used   Substance and Sexual Activity    Alcohol use: Yes     Alcohol/week: 3.0 - 4.0 standard drinks of alcohol     Types: 3 - 4 Cans of beer per week     Comment: SOCIAL    Drug use: No    Sexual activity: Not Currently     Partners: Female         Review of Systems:   Constitutional: Fatigue and weakness improved, \"been feeling stronger.\"  Good appetite.  No fever / No chills / No sweats.  Is up and about >50% of the time.  Manages his ADLs including chores but not yet driving.  Temporarily living with his sister.   HEENT:  No sore throat / No hoarseness / No vision changes  CV:  No chest pain / No palpitations / No orthopnea  Respiratory:  No cough /+ exertional shortness of breath but no sob with routine activities nor at rest/ No sputum / No hemoptysis  GI:  No nausea / No vomiting / No abdominal pain / No diarrhea / No constipation/no melena/no hematochezia.  Last bowel movement this morning was described as, \"normal brown color\".  :  No dysuria / No hesitancy / No urgency / No hematuria  Neuro:  + Generalized muscle weakness much improved / No dysphagia / No headache / No paresthesias  Musculoskeletal:  + Chronic edema / No cyanosis / No pain        VITAL SIGNS: /70   Pulse (!) 44 " "Comment: Dr. Snyder was notified  Temp 97.7 °F (36.5 °C) (Temporal)   Resp 18   Ht 190.5 cm (75\")   Wt 121 kg (267 lb 11.2 oz)   SpO2 98%   BMI 33.46 kg/m² Body surface area is 2.48 meters squared.  Pain Score    11/08/24 1017   PainSc: 0-No pain          Physical Exam:  General appearance: Pleasant, obese, modestly kept elderly male who appears comfortable seated.  Alert, appears stated age and cooperative. ECOG 1-2  Has regained 2 lb since last seen in hospital  Skin:  Skin color is less pale. No rashes or lesions  HEENT:  Head: Normal, normocephalic, atraumatic.  Neck:  no adenopathy, no tenderness/mass/nodules  Lungs:  clear to auscultation bilaterally  Heart:  regular rate and rhythm  Abdomen:  soft, globose, nontender.  Extremities: Symmetric with 1+ bipedal edema.  Neurologic:  Mental status: Alert, oriented, thought content appropriate    LABS    Lab Results - Last 18 Months   Lab Units 11/01/24  0804 10/31/24  2347 10/31/24  1559 10/31/24  1251 10/30/24  2317 10/30/24  1646 10/26/24  1634 10/26/24  0651 10/25/24  1246 10/25/24  0656 10/24/24  2023 10/23/24  1300   HEMOGLOBIN g/dL 8.7* 8.4* 8.2* 8.8* 8.4* 8.9*   < > 7.6*   < > 7.4*   < > 7.7*   HEMATOCRIT % 28.8* 28.3* 27.6* 29.2* 28.9* 30.2*   < > 26.0*   < > 25.5*  --  27.1*   MCV fL  --   --   --  82.5  --   --   --  84.1  --  81.7  --  83   WBC 10*3/mm3  --   --   --  6.19  --   --   --  4.60  --  4.47  --  7.7   RDW %  --   --   --  16.7*  --   --   --  16.4*  --  16.4*  --  15.6*   MPV fL  --   --   --  10.2  --   --   --  8.9  --  8.9  --   --    PLATELETS 10*3/mm3  --   --   --  172  --   --   --  143  --  169  --  262   IMM GRAN % %  --   --   --  0.2  --   --   --  0.4  --  0.4  --   --    NEUTROS ABS 10*3/mm3  --   --   --  4.01  --   --   --  3.10  --  2.81  --  5.3   LYMPHS ABS 10*3/mm3  --   --   --  0.96  --   --   --  0.71  --  0.85  --  1.3   MONOS ABS 10*3/mm3  --   --   --  0.94*  --   --   --  0.55  --  0.58  --  0.9   EOS ABS " "10*3/mm3  --   --   --  0.24  --   --   --  0.20  --  0.19  --  0.1   BASOS ABS 10*3/mm3  --   --   --  0.03  --   --   --  0.02  --  0.02  --  0.0   IMMATURE GRANS (ABS) 10*3/mm3  --   --   --  0.01  --   --   --  0.02  --  0.02  --   --    NRBC /100 WBC  --   --   --  0.0  --   --   --  0.0  --  0.0  --   --     < > = values in this interval not displayed.       Lab Results - Last 18 Months   Lab Units 10/25/24  0656 10/23/24  1300 03/12/24  1059   GLUCOSE mg/dL 99 84  --    SODIUM mmol/L 139 139  --    POTASSIUM mmol/L 4.2 4.9  --    CO2 mmol/L 21.0* 20  --    CHLORIDE mmol/L 106 105  --    ANION GAP mmol/L 12.0  --   --    CREATININE mg/dL 0.88 1.12 1.10   BUN mg/dL 20 21  --    BUN / CREAT RATIO  22.7 19  --    CALCIUM mg/dL 8.9 9.3  --    ALK PHOS U/L 63 73  --    TOTAL PROTEIN g/dL 6.1  --   --    ALT (SGPT) U/L 13 18  --    AST (SGOT) U/L 19 27  --    BILIRUBIN mg/dL 0.7 0.6  --    ALBUMIN g/dL 3.5 4.3  --    GLOBULIN gm/dL 2.6  --   --        No results for input(s): \"MSPIKE\", \"KAPPALAMB\", \"IGLFLC\", \"URICACID\", \"FREEKAPPAL\", \"CEA\", \"LDH\", \"REFLABREPO\" in the last 32285 hours.    Lab Results - Last 18 Months   Lab Units 10/25/24  0656   IRON mcg/dL 26*   TIBC mcg/dL 429   IRON SATURATION (TSAT) % 6*   FERRITIN ng/mL 40.63         ASSESSMENT:   Iron deficiency anemia from recent/acute upper GI bleed.  -Has received at least 1 unit RBC (received 1 unit RBC prior to transfer to this facility) and 3 units FFP transfusions this admission.  -10/25/2024- EGD normal proximal esophagus and mid esophagus. - Z-line irregular, at the gastroesophageal junction. - Red blood in the stomach. - A single bleeding angioectasia in the stomach. Treated with argon plasma coagulation (APC). Injected  - Hgb 8.7, 11/1/2024 (prior manohar: Hgb 7.7, 10/23/2024)  2.   RUL apical lung mass and left lower lobe lung nodule.  Both worrisome for malignancy.  -10/30/2024-seen for pulmonary Dr. Pagan (above).  Plans for outpatient PET scan to " "help direct biopsy efforts via bronchoscopy or other biopsy technique.  -11/7/24- PET scan-1.  6 cm hypermetabolic spiculated right upper lobe lung mass, most likely representing a primary lung neoplasm. 2.  No evidence of hypermetabolic metastatic disease in the neck, chest, abdomen, or pelvis. No hypermetabolic lymphadenopathy in the chest. 3.  2 cm solid round circumscribed nodule in the left lower lobe with  max SUV 2.9, below liver background level. This nodule has slowly increased in size, measuring 1.3 cm on an exam from 2022. I suspect this represents a noncalcified granuloma or hamartoma but a slow-growing neoplasm is not entirely excluded and continued imaging surveillance is recommended.  3.   History of protein S deficiency with prior DVT x 3 of the left leg (5939-0420) and prior bilateral PE (1986).  Patient states that 3 of his sisters and \"13 maternal cousins\" carry the genetic deficiency.  -10/31/24- Protein S Ag total 28 (ref: %); Protein S Ag Free 14 (ref: %)  4.   Paroxysmal atrial fibrillation   5.   Maintenance anticoagulation (since resumed).  States that he maintains INR = 2-2.4  6.   COPD  7.   >  60-pack-year cigarette smoking history.  8.   CAD with prior coronary stent, CABG, and MI.        PLAN:  Draw CBC w diff, CMP, CEA, iron, fe sat, ferritin today  Review of available diagnostic information as outlined above.  Note stable anemia since last receipt of RBC transfusion.  Continue ferrous sulfate 325 po daily  Confirm depressed Protein S levels:  Review PET scan, 11/7/2024 (above).  Solitary 6 cm RUL mass and 2 cm LLL mass  Follow-up IVY De Souza with pulmonary, 11/7/2024-A/P: PET scan reviewed.  Referred to CT surgery  Patient insists on anticoagulation fully aware of the risk of rebleeding given the background of acute GI bleed.  Thus, since he has not had any recent thrombotic episodes, I suggested new INR goal of ~ 1.5-2.2 range (1.81 today).    Schedule MRI brain "   Follow-up pcp to monitor PT/INR weekly  Return to office in 6 weeks with CBC w diff, CMP, CEA, PT/INR, iron, fe sat, ferritin.           Time: I spent ~66 minutes caring for Michael on this date of service. This time includes time spent by me in the following activities: preparing for the visit, reviewing tests, performing a medically appropriate examination and/or evaluation, counseling and educating the patient/family/caregiver, ordering medications, tests, or procedures and documenting information in the medical record

## 2024-11-03 NOTE — THERAPY DISCHARGE NOTE
Acute Care - Physical Therapy Discharge Summary  [facility]       Patient Name: Michael Paz  : 1945  MRN: [id number]    Today's Date: 11/3/2024                 Admit Date: 10/24/2024      PT Recommendation and Plan    Visit Dx:    ICD-10-CM ICD-9-CM   1. Impaired mobility [Z74.09]  Z74.09 799.89   2. Left lower lobe pulmonary nodule  R91.1 793.11                PT Rehab Goals       Row Name 24 0816             Transfer Goal 1 (PT)    Activity/Assistive Device (Transfer Goal 1, PT) sit-to-stand/stand-to-sit;bed-to-chair/chair-to-bed;toilet;car transfer;walk-in shower  -JENNIFER      Ashley Level/Cues Needed (Transfer Goal 1, PT) independent  -JENNIFER      Time Frame (Transfer Goal 1, PT) long term goal (LTG);10 days  -JENNIFER      Progress/Outcome (Transfer Goal 1, PT) goal not met  -JENNIFER         Gait Training Goal 1 (PT)    Activity/Assistive Device (Gait Training Goal 1, PT) gait (walking locomotion);decrease asymmetrical patterns;decrease fall risk;diminish gait deviation;forward stepping;improve balance and speed;increase endurance/gait distance  -JENNIFER      Ashley Level (Gait Training Goal 1, PT) independent  -JENNIFER      Distance (Gait Training Goal 1, PT) 250' while carrying weighted basin to simulate household tasks as pt needs to return home safely  -JENNIFER      Time Frame (Gait Training Goal 1, PT) long term goal (LTG);10 days  -JENNIFER      Progress/Outcome (Gait Training Goal 1, PT) goal not met  -JENNIFER         Stairs Goal 1 (PT)    Activity/Assistive Device (Stairs Goal 1, PT) ascending stairs;descending stairs;using handrail, left;using handrail, right;step-to-step;decrease fall risk;improve balance and speed  -JENNIFER      Ashley Level/Cues Needed (Stairs Goal 1, PT) independent  -JENNIFER      Number of Stairs (Stairs Goal 1, PT) 5 as needed for home safety  -JENNIFER      Time Frame (Stairs Goal 1, PT) long term goal (LTG);10 days  -JENNIFER      Progress/Outcome (Stairs Goal 1, PT) goal not met  -JENNIFER                User Key   (r) = Recorded By, (t) = Taken By, (c) = Cosigned By      Initials Name Provider Type Discipline    Aren Logan, PTA Physical Therapist Assistant PT                        PT Discharge Summary  Anticipated Discharge Disposition (PT): home  Reason for Discharge: Discharge from facility  Outcomes Achieved: Refer to plan of care for updates on goals achieved  Discharge Destination: Home      Aren Tillman PTA   11/3/2024

## 2024-11-05 ENCOUNTER — READMISSION MANAGEMENT (OUTPATIENT)
Dept: CALL CENTER | Facility: HOSPITAL | Age: 79
End: 2024-11-05
Payer: MEDICARE

## 2024-11-05 NOTE — OUTREACH NOTE
Medical Week 1 Survey      Flowsheet Row Responses   [facility] facility patient discharged from? [place]   Does the patient have one of the following disease processes/diagnoses(primary or secondary)? Other   Week 1 attempt successful? No   Unsuccessful attempts Attempt 1            Jak GARCIA - Registered Nurse

## 2024-11-07 ENCOUNTER — TELEPHONE (OUTPATIENT)
Dept: VASCULAR SURGERY | Facility: CLINIC | Age: 79
End: 2024-11-07
Payer: MEDICARE

## 2024-11-07 ENCOUNTER — PROCEDURE VISIT (OUTPATIENT)
Dept: PULMONOLOGY | Facility: CLINIC | Age: 79
End: 2024-11-07
Payer: MEDICARE

## 2024-11-07 ENCOUNTER — OFFICE VISIT (OUTPATIENT)
Dept: PULMONOLOGY | Facility: CLINIC | Age: 79
End: 2024-11-07
Payer: MEDICARE

## 2024-11-07 ENCOUNTER — HOSPITAL ENCOUNTER (OUTPATIENT)
Dept: CT IMAGING | Facility: HOSPITAL | Age: 79
Discharge: HOME | End: 2024-11-07
Payer: MEDICARE

## 2024-11-07 VITALS
SYSTOLIC BLOOD PRESSURE: 128 MMHG | DIASTOLIC BLOOD PRESSURE: 82 MMHG | HEART RATE: 76 BPM | HEIGHT: 73 IN | BODY MASS INDEX: 34.46 KG/M2 | WEIGHT: 260 LBS | OXYGEN SATURATION: 95 %

## 2024-11-07 DIAGNOSIS — R06.02 SHORTNESS OF BREATH: Primary | ICD-10-CM

## 2024-11-07 DIAGNOSIS — R53.83 OTHER FATIGUE: ICD-10-CM

## 2024-11-07 DIAGNOSIS — J44.9 STAGE 2 MODERATE COPD BY GOLD CLASSIFICATION: Chronic | ICD-10-CM

## 2024-11-07 DIAGNOSIS — D68.59 PROTEIN S DEFICIENCY: Chronic | ICD-10-CM

## 2024-11-07 DIAGNOSIS — I71.40 ABDOMINAL AORTIC ANEURYSM (AAA) WITHOUT RUPTURE, UNSPECIFIED PART: Chronic | ICD-10-CM

## 2024-11-07 DIAGNOSIS — Z72.0 TOBACCO ABUSE: Chronic | ICD-10-CM

## 2024-11-07 DIAGNOSIS — I48.0 PAROXYSMAL ATRIAL FIBRILLATION: Chronic | ICD-10-CM

## 2024-11-07 DIAGNOSIS — R91.8 MASS OF UPPER LOBE OF RIGHT LUNG: ICD-10-CM

## 2024-11-07 DIAGNOSIS — Z86.718 HX OF DEEP VENOUS THROMBOSIS: Chronic | ICD-10-CM

## 2024-11-07 DIAGNOSIS — Z86.711 HX PULMONARY EMBOLISM: Chronic | ICD-10-CM

## 2024-11-07 DIAGNOSIS — R91.1 LEFT LOWER LOBE PULMONARY NODULE: ICD-10-CM

## 2024-11-07 DIAGNOSIS — D68.59 PROTEIN C DEFICIENCY: Chronic | ICD-10-CM

## 2024-11-07 DIAGNOSIS — R91.1 LUNG NODULE: Primary | Chronic | ICD-10-CM

## 2024-11-07 PROCEDURE — A9552 F18 FDG: HCPCS | Performed by: INTERNAL MEDICINE

## 2024-11-07 PROCEDURE — 0 FLUDEOXYGLUCOSE F18 SOLUTION: Performed by: INTERNAL MEDICINE

## 2024-11-07 PROCEDURE — 78815 PET IMAGE W/CT SKULL-THIGH: CPT

## 2024-11-07 RX ORDER — FLUTICASONE FUROATE, UMECLIDINIUM BROMIDE AND VILANTEROL TRIFENATATE 100; 62.5; 25 UG/1; UG/1; UG/1
1 POWDER RESPIRATORY (INHALATION)
Qty: 1 EACH | Refills: 11 | Status: SHIPPED | OUTPATIENT
Start: 2024-11-07 | End: 2024-12-07

## 2024-11-07 RX ORDER — FLUTICASONE FUROATE, UMECLIDINIUM BROMIDE AND VILANTEROL TRIFENATATE 100; 62.5; 25 UG/1; UG/1; UG/1
1 POWDER RESPIRATORY (INHALATION)
COMMUNITY
End: 2024-11-07

## 2024-11-07 RX ADMIN — FLUDEOXYGLUCOSE F18 1 DOSE: 300 INJECTION INTRAVENOUS at 11:30

## 2024-11-07 NOTE — PROCEDURES
Spirometry with Diffusion Capacity    Performed by: James Hadley CMA  Authorized by: Melisa López APRN     Pre Drug % Predicted    FVC: 90%   FEV1: 71%   FEF 25-75%: 33%   FEV1/FVC: 61.06%   DLCO: 116%   D/VAsb: 120%    Interpretation   Spirometry   Spirometry shows moderate obstruction. There is reduced midflow suggesting small airway/airflow obstruction.   Diffusion Capacity  The patient's diffusion capacity is normal.  Diffusion capacity is normal when corrected for alveolar volume.

## 2024-11-08 ENCOUNTER — READMISSION MANAGEMENT (OUTPATIENT)
Dept: CALL CENTER | Facility: HOSPITAL | Age: 79
End: 2024-11-08
Payer: MEDICARE

## 2024-11-08 ENCOUNTER — OFFICE VISIT (OUTPATIENT)
Dept: ONCOLOGY | Facility: CLINIC | Age: 79
End: 2024-11-08
Payer: MEDICARE

## 2024-11-08 ENCOUNTER — OFFICE VISIT (OUTPATIENT)
Dept: VASCULAR SURGERY | Facility: CLINIC | Age: 79
End: 2024-11-08
Payer: MEDICARE

## 2024-11-08 VITALS
HEART RATE: 44 BPM | RESPIRATION RATE: 18 BRPM | WEIGHT: 267.7 LBS | SYSTOLIC BLOOD PRESSURE: 120 MMHG | HEIGHT: 75 IN | DIASTOLIC BLOOD PRESSURE: 70 MMHG | TEMPERATURE: 97.7 F | OXYGEN SATURATION: 98 % | BODY MASS INDEX: 33.29 KG/M2

## 2024-11-08 VITALS
HEART RATE: 50 BPM | DIASTOLIC BLOOD PRESSURE: 74 MMHG | OXYGEN SATURATION: 97 % | SYSTOLIC BLOOD PRESSURE: 128 MMHG | BODY MASS INDEX: 32.45 KG/M2 | WEIGHT: 261 LBS | HEIGHT: 75 IN

## 2024-11-08 DIAGNOSIS — I10 PRIMARY HYPERTENSION: ICD-10-CM

## 2024-11-08 DIAGNOSIS — E78.5 HYPERLIPIDEMIA, UNSPECIFIED HYPERLIPIDEMIA TYPE: ICD-10-CM

## 2024-11-08 DIAGNOSIS — I71.40 ABDOMINAL AORTIC ANEURYSM (AAA) WITHOUT RUPTURE, UNSPECIFIED PART: Primary | Chronic | ICD-10-CM

## 2024-11-08 DIAGNOSIS — R91.1 LUNG NODULE: Chronic | ICD-10-CM

## 2024-11-08 DIAGNOSIS — R91.8 LUNG MASS: ICD-10-CM

## 2024-11-08 DIAGNOSIS — D68.59 PROTEIN S DEFICIENCY: Primary | Chronic | ICD-10-CM

## 2024-11-08 DIAGNOSIS — Z72.0 TOBACCO ABUSE: Chronic | ICD-10-CM

## 2024-11-08 DIAGNOSIS — R97.8 OTHER ABNORMAL TUMOR MARKERS: ICD-10-CM

## 2024-11-08 DIAGNOSIS — D64.9 SYMPTOMATIC ANEMIA: ICD-10-CM

## 2024-11-08 PROCEDURE — 1160F RVW MEDS BY RX/DR IN RCRD: CPT | Performed by: NURSE PRACTITIONER

## 2024-11-08 PROCEDURE — 99214 OFFICE O/P EST MOD 30 MIN: CPT | Performed by: NURSE PRACTITIONER

## 2024-11-08 PROCEDURE — 3078F DIAST BP <80 MM HG: CPT | Performed by: NURSE PRACTITIONER

## 2024-11-08 PROCEDURE — 1159F MED LIST DOCD IN RCRD: CPT | Performed by: NURSE PRACTITIONER

## 2024-11-08 PROCEDURE — 3074F SYST BP LT 130 MM HG: CPT | Performed by: NURSE PRACTITIONER

## 2024-11-08 NOTE — PROGRESS NOTES
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 Was admitted for 9 days for low hemoglobin. Just has questions.        Dear Henrietta Jin APRN:    HPI     I had the pleasure of seeing you patient in the office today for follow up.  As you recall, the patient is a 79 y.o. male who we are currently following for abdominal aortic aneurysm disease.  The patient has had a previous endovascular repair and had a type Ia endoleak which was being managed conservatively by Dr. Boudreaux.  His aneurysm has gotten larger up to 7.1 cm in greatest dimensions and he underwent repair with an extension cuff.  He is maintained on aspirin, Zocor, and Coumadin.  He denies any other abdominal pain or back pain.  He did have a recent PET CT scan showing lung mass as well as an ascending aorta measuring 4.2 cm.  He also had testing noting possible right coronary artery thrombosed aneurysm or postoperative change.  He did have noninvasive testing performed which I did review.      Review of Systems   Constitutional: Negative.    HENT: Negative.     Eyes: Negative.    Respiratory: Negative.     Cardiovascular: Negative.    Gastrointestinal: Negative.    Endocrine: Negative.    Genitourinary: Negative.    Musculoskeletal: Negative.    Skin: Negative.    Allergic/Immunologic: Negative.    Neurological: Negative.    Hematological: Negative.    Psychiatric/Behavioral: Negative.     All other systems reviewed and are negative.       /74   Pulse 50   Ht 190.5 cm (75\")   Wt 118 kg (261 lb)   SpO2 97%   BMI 32.62 kg/m²       Physical Exam  Vitals and nursing note reviewed.   Constitutional:       General: He is not in acute distress.     Appearance: Normal appearance. He is well-groomed. He is obese. He is not diaphoretic. "   HENT:      Head: Normocephalic. No right periorbital erythema or left periorbital erythema.      Nose: Nose normal.   Eyes:      General: No scleral icterus.     Pupils: Pupils are equal.   Cardiovascular:      Rate and Rhythm: Normal rate and regular rhythm.      Pulses: Normal pulses.      Heart sounds: Normal heart sounds. No murmur heard.  Pulmonary:      Effort: Pulmonary effort is normal. No respiratory distress.      Breath sounds: Normal breath sounds.   Abdominal:      General: Bowel sounds are normal. There is no distension.      Palpations: Abdomen is soft.      Tenderness: There is no abdominal tenderness. There is no guarding.   Musculoskeletal:         General: No swelling or tenderness. Normal range of motion.      Cervical back: Normal range of motion and neck supple.      Right lower leg: No edema.      Left lower leg: No edema.   Feet:      Right foot:      Skin integrity: Skin integrity normal.      Left foot:      Skin integrity: Skin integrity normal.   Skin:     General: Skin is warm and dry.      Findings: No erythema or rash.   Neurological:      General: No focal deficit present.      Mental Status: He is alert and oriented to person, place, and time. Mental status is at baseline.      Cranial Nerves: No cranial nerve deficit.      Gait: Gait normal.   Psychiatric:         Attention and Perception: Attention normal.         Mood and Affect: Mood normal.         Behavior: Behavior normal. Behavior is cooperative.         Thought Content: Thought content normal.         Judgment: Judgment normal.       DIAGNOSTIC DATA:  EXAM: CT CHEST W CONTRAST DIAGNOSTIC-      DATE: 10/29/2024 9:24 AM     HISTORY: mass on chest xray; Z74.09-Other reduced mobility       COMPARISON: 10/24/2024 from [facility] with outside report  reviewed in PACS at time of dictation     DOSE LENGTH PRODUCT: 350.98 mGy.cm mGy cm. Automatic exposure control  was utilized to make radiation dose as low as reasonably  achievable.     TECHNIQUE: Enhanced CT images of the chest obtained with multiplanar  reformats.     FINDINGS:     AIRWAYS/PULMONARY PARENCHYMA: The central airways are midline and  patent. No pleural effusion or pneumothorax.     Severe emphysematous changes.     RIGHT upper lobe 6.0 x 3.4 cm spiculated opacity on axial series 3,  image 45.     LEFT lower lobe 2.2 cm pulmonary nodule on axial series 3, image 110,  previously 1.9 cm on 3/12/2024.     VASCULATURE: Thoracic aorta is normal in course and caliber with heavily  calcified atherosclerosis. Enlarged main pulmonary artery measuring 3.7  cm in transverse dimension, which can be seen with pulmonary artery  hypertension.     CARDIAC: Borderline heart size. Heavily calcified native coronary  arteries versus stent material.      Lobular mass at the RIGHT coronary artery distribution with upper  component measuring 4.7 cm on axial series 2, image 10, previously 4.7  cm on 12/24/2016. Mid component measuring 4.9 x 3.8 cm on image 119,  previously 4.5 x 3.3 cm on 4/25/2019 although incompletely visualized on  that exam. Lower component measures 2.6 cm on image 129, previously 2.6  cm on 3/12/2024. Favor thrombosed aneurysm or other post operative  changes. Single-phase imaging limits evaluation.     No pericardial effusion.       MEDIASTINUM: Esophagus has normal course, caliber and wall thickness.  There is no mediastinal or hilar lymphadenopathy by CT size criteria.      EXTRATHORACIC: The visualized portions of the thyroid gland are  unremarkable. No thoracic inlet or axillary adenopathy. The  extrathoracic soft tissues are within normal limits.      INCLUDED UPPER ABDOMEN: Visualized portion of the liver, pancreas,  spleen, adrenal glands appear within normal limits. Gallstones.  Bilateral upper kidneys appear within normal limits. Partially  visualized upper abdominal aorta measuring 5.2 x 3.5 cm with partially  visualized changes of stent graft repair.      BONES: Sternotomy wires. No acute or suspicious bony finding.        IMPRESSION:  1. Spiculated opacity at the RIGHT upper lobe measuring 6.0 x 3.4 cm.  Appearance concerning for malignancy. There are several small airways  leading to this opacity and endobronchial sampling could be considered.      2. Increased size LEFT lower lobe pulmonary nodule now measuring 2.2 cm,  previously 1.9 cm on 3/12/2024. PET/CT may be useful if not previously  performed.     3. Enlarged main pulmonary artery, which can be seen with pulmonary  artery hypertension.     4. Increased size of lobular mass in the RIGHT coronary artery  distribution, favor thrombosed aneurysm or other post operative changes.  Difficult to determine change due to available prior exams although  appears increased compared to 4/25/2019 with measurements as above.     This report was signed and finalized on 10/29/2024 4:05 PM by Dr Celine Barrios MD.       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 Abdominal aortic aneurysm (AAA) without rupture, unspecified part  I71.40 441.4   2. Tobacco abuse  Z72.0 305.1   3. Lung mass  R91.8 786.6           PLAN: After thoroughly evaluating Michael Paz, I believe the best course of action is to remain conservative from vascular surgery standpoint.  He is following with oncology and will need to be referred to Dr. Hawkins for his lung nodule and possible thrombosed  aneurysm or previous post surgical changes.  We will see him back at his regular scheduled appointment in March with repeat noninvasive testing.  His previous testing showed aneurysm was stable without endoleak and small popliteal artery aneurysms bilaterally.   I did discuss vascular risk factors as they pertain to the progression of vascular disease including controlling hypertension and hyperlipidemia.  His blood pressure stable on his current medications.  Unfortunately, he is a current daily smoker and has no desire to quit smoking at this time.  He should continue on his medications as previously discussed. Body mass index is 32.62 kg/m². BMI is >= 30 and <35. (Class 1 Obesity). The following options were offered after discussion;: exercise counseling/recommendations  The patient is to continue taking their medications as previously discussed.  This was all discussed in full with complete understanding.  Thank you for allowing me to participate in the care of your patient.  Please do not hesitate to call with any questions or concerns.  We will keep you aware of any further encounters with Michael Paz.      Sincerely Yours,        IVY Mares

## 2024-11-08 NOTE — OUTREACH NOTE
Medical Week 1 Survey      Flowsheet Row Responses   [facility] facility patient discharged from? [place]   Does the patient have one of the following disease processes/diagnoses(primary or secondary)? Other   Week 1 attempt successful? No   Unsuccessful attempts Attempt 2            BILLY SNOW - Registered Nurse

## 2024-11-11 ENCOUNTER — OFFICE VISIT (OUTPATIENT)
Dept: CARDIAC SURGERY | Facility: CLINIC | Age: 79
End: 2024-11-11
Payer: MEDICARE

## 2024-11-11 VITALS
DIASTOLIC BLOOD PRESSURE: 60 MMHG | SYSTOLIC BLOOD PRESSURE: 110 MMHG | HEART RATE: 90 BPM | WEIGHT: 261.6 LBS | OXYGEN SATURATION: 94 % | BODY MASS INDEX: 32.53 KG/M2 | HEIGHT: 75 IN

## 2024-11-11 DIAGNOSIS — D68.59 PROTEIN S DEFICIENCY: Chronic | ICD-10-CM

## 2024-11-11 DIAGNOSIS — R91.1 LUNG NODULE: Primary | Chronic | ICD-10-CM

## 2024-11-11 DIAGNOSIS — I72.9 ANEURYSM: ICD-10-CM

## 2024-11-11 DIAGNOSIS — Z95.1 S/P CABG (CORONARY ARTERY BYPASS GRAFT): ICD-10-CM

## 2024-11-11 PROCEDURE — 3078F DIAST BP <80 MM HG: CPT | Performed by: SURGERY

## 2024-11-11 PROCEDURE — 1160F RVW MEDS BY RX/DR IN RCRD: CPT | Performed by: SURGERY

## 2024-11-11 PROCEDURE — 99204 OFFICE O/P NEW MOD 45 MIN: CPT | Performed by: SURGERY

## 2024-11-11 PROCEDURE — 3074F SYST BP LT 130 MM HG: CPT | Performed by: SURGERY

## 2024-11-11 PROCEDURE — 1159F MED LIST DOCD IN RCRD: CPT | Performed by: SURGERY

## 2024-11-11 NOTE — PROGRESS NOTES
Thoracic Surgery Consultation    Referring Physician: IVY De Souza    Primary Care Physician: IVY Sierra    Chief Complaint   Patient presents with    Lung Mass     New patient from Melisa HAYNES         Subjective     History of Present Illness  The patient presents for evaluation of a lung mass.    He reports experiencing shortness of breath, fatigue, and difficulty sleeping. His mobility is limited due to his breathlessness. He has been a smoker since , consuming about half a pack a day.    He sought medical attention from Dr. Pagan, who conducted a blood test revealing a hemoglobin level of 7.6, which further dropped to 6.7 the following day. This led to his hospitalization at [facility] for 11 days, during which he received a blood transfusion. A stomach ulcer was identified and cauterized by Dr. Stone. An echocardiogram was performed, and a PET scan was ordered by Dr. Sheikh.    He has a history of five open-heart surgeries, including a double bypass at age 45, and has had stents placed. He has experienced three heart attacks, the first occurring in . He also has a history of blood clots, with three in his leg and one in each lung. He was informed of a potential aneurysm and cancer. He was diagnosed with a hamartoma, which Dr. Pagan was unable to biopsy due to its proximity to a blood vessel.    He has a protein S deficiency and is on Coumadin, which was temporarily discontinued during his hospital stay. His hemoglobin level has been increasing, reaching 10.4 last Wednesday, but his prothrombin time increased to 3.2, necessitating an adjustment in his Coumadin dosage. He is scheduled for a brain MRI on Thursday.    FAMILY HISTORY  His mother, father, and grandmother all  of heart attack at the age of 57. His 2 brothers have cancer.      Review of Systems   Respiratory:  Positive for cough and shortness of breath.         A complete review of systems was performed, is negative  except stated above.    Past Medical History:   Diagnosis Date    AAA (abdominal aortic aneurysm)     Aneurysm     Arthritis     Atrial fibrillation 1990    CAD (coronary artery disease)     CAD (coronary artery disease)     Clotting disorder 2012    Deep vein thrombosis [id number]    Depression     Endoleak post endovascular aneurysm repair     History of DVT (deep vein thrombosis)     History of foot fracture     BILATERAL FOOT FRACTURES    History of fracture of left ankle     History of pulmonary embolus (PE)     History of transfusion 2012 and 2020    Hyperlipidemia     Hypertension     Myocardial infarction 1990    Peripheral vascular disease     Pneumonia 4 times    Pulmonary embolism 1986    Tobacco abuse      Past Surgical History:   Procedure Laterality Date    ABDOMINAL AORTIC ANEURYSM REPAIR      APPENDECTOMY      BACK SURGERY      CARDIAC CATHETERIZATION      CORONARY ANGIOPLASTY      CORONARY ARTERY BYPASS GRAFT      CORONARY STENT PLACEMENT      ENDOSCOPY N/A 10/25/2024    Procedure: ESOPHAGOGASTRODUODENOSCOPY WITH ANESTHESIA;  Surgeon: Fareed Velasco MD;  [facility] ENDOSCOPY;  Service: Gastroenterology;  Laterality: N/A;  Pre: Symptomatic anemia;  Post: Bleeding gastric AVM;  Nausea and vomiting    HEMORRHOIDECTOMY      INGUINAL HERNIA REPAIR      KNEE SURGERY Right     OTHER SURGICAL HISTORY      TYPE IA ENDOLEAK REPAIR    OTHER SURGICAL HISTORY      CRANIAL CYST     Family History   Problem Relation Age of Onset    Heart disease Mother     Heart disease Father     Heart disease Other     Hypertension Other     Diabetes Other      Social History     Tobacco Use    Smoking status: Every Day     Current packs/day: 1.00     Average packs/day: 1 pack/day for 67.4 years (67.4 ttl pk-yrs)     Types: Cigarettes     Start date: 6/1/1957     Passive exposure: Current    Smokeless tobacco: Never    Tobacco comments:     Don't inhale.   Vaping Use    Vaping status: Never Used   Substance Use Topics     "Alcohol use: Yes     Alcohol/week: 3.0 - 4.0 standard drinks of alcohol     Types: 3 - 4 Cans of beer per week     Comment: SOCIAL    Drug use: No     Current Outpatient Medications   Medication Sig Dispense Refill    albuterol sulfate  (90 Base) MCG/ACT inhaler Inhale 2 puffs Every 4 (Four) Hours As Needed for Wheezing. 8 g 0    Ascorbic Acid (VITAMIN C PO) Take 2,000 mg by mouth Daily.      candesartan (ATACAND) 16 MG tablet Take 1 tablet by mouth Daily.      ferrous sulfate 325 (65 FE) MG tablet Take 1 tablet by mouth Daily With Breakfast. 30 tablet 0    Fluticasone-Umeclidin-Vilant (Trelegy Ellipta) 100-62.5-25 MCG/ACT inhaler Inhale 1 puff Daily for 30 days. 1 each 11    furosemide (LASIX) 40 MG tablet Take 1 tablet by mouth Daily. 30 tablet 0    metoprolol succinate XL (TOPROL-XL) 50 MG 24 hr tablet Take 0.5 tablets by mouth Daily.      Multiple Vitamins-Minerals (ONE A DAY MEN 50 PLUS PO) Take 1 tablet by mouth Daily.      pantoprazole (PROTONIX) 40 MG EC tablet Take 1 tablet by mouth Every 12 (Twelve) Hours. 60 tablet 0    simvastatin (ZOCOR) 80 MG tablet Take 1 tablet by mouth Every Night.      warfarin (COUMADIN) 4 MG tablet Take 1 tablet by mouth Every Other Day. Alternates with 5mg      warfarin (COUMADIN) 5 MG tablet Take 1 tablet by mouth Every Other Day. Alternates with 4mg (Patient not taking: Reported on 11/8/2024)       No current facility-administered medications for this visit.     Allergies:  Patient has no known allergies.    Objective      Vital Signs  Visit Vitals  /60   Pulse 90   Ht 190.5 cm (75\")   Wt 119 kg (261 lb 9.6 oz)   SpO2 94%   BMI 32.70 kg/m²         Physical Exam  Constitutional:       General: He is not in acute distress.     Appearance: He is well-developed. He is obese. He is not diaphoretic.   HENT:      Head: Normocephalic and atraumatic.      Right Ear: External ear normal.      Left Ear: External ear normal.   Eyes:      General:         Right eye: No " discharge.         Left eye: No discharge.      Pupils: Pupils are equal, round, and reactive to light.   Neck:      Vascular: No JVD.      Trachea: No tracheal deviation.   Cardiovascular:      Rate and Rhythm: Normal rate and regular rhythm.      Heart sounds: Normal heart sounds. No murmur heard.     Comments: Well healed sternotomy with complex scar  Pulmonary:      Effort: Pulmonary effort is normal. No respiratory distress.      Breath sounds: Normal breath sounds. No stridor. No wheezing.   Abdominal:      General: There is no distension.      Palpations: Abdomen is soft.      Tenderness: There is no abdominal tenderness. There is no guarding.   Musculoskeletal:         General: No tenderness or deformity. Normal range of motion.      Cervical back: Normal range of motion and neck supple.   Skin:     General: Skin is warm and dry.      Capillary Refill: Capillary refill takes less than 2 seconds.      Coloration: Skin is not pale.      Findings: No erythema or rash.   Neurological:      Mental Status: He is alert and oriented to person, place, and time.      Motor: No abnormal muscle tone.      Coordination: Coordination normal.   Psychiatric:         Behavior: Behavior normal.         Thought Content: Thought content normal.         Judgment: Judgment normal.        Physical Exam      Results Review:     WBC   Date Value Ref Range Status   10/31/2024 6.19 3.40 - 10.80 10*3/mm3 Final   10/23/2024 7.7 3.4 - 10.8 x10E3/uL Final     Comment:     **Verified by repeat analysis**     RBC   Date Value Ref Range Status   10/31/2024 3.54 (L) 4.14 - 5.80 10*6/mm3 Final   10/23/2024 3.27 (L) 4.14 - 5.80 x10E6/uL Final     Hemoglobin   Date Value Ref Range Status   11/01/2024 8.7 (L) 13.0 - 17.7 g/dL Final     Hematocrit   Date Value Ref Range Status   11/01/2024 28.8 (L) 37.5 - 51.0 % Final     MCV   Date Value Ref Range Status   10/31/2024 82.5 79.0 - 97.0 fL Final     MCH   Date Value Ref Range Status   10/31/2024  24.9 (L) 26.6 - 33.0 pg Final     MCHC   Date Value Ref Range Status   10/31/2024 30.1 (L) 31.5 - 35.7 g/dL Final     RDW   Date Value Ref Range Status   10/31/2024 16.7 (H) 12.3 - 15.4 % Final     RDW-SD   Date Value Ref Range Status   10/31/2024 49.8 37.0 - 54.0 fl Final     MPV   Date Value Ref Range Status   10/31/2024 10.2 6.0 - 12.0 fL Final     Platelets   Date Value Ref Range Status   10/31/2024 172 140 - 450 10*3/mm3 Final     Neutrophil %   Date Value Ref Range Status   10/31/2024 64.7 42.7 - 76.0 % Final     Lymphocyte %   Date Value Ref Range Status   10/31/2024 15.5 (L) 19.6 - 45.3 % Final     Monocyte %   Date Value Ref Range Status   10/31/2024 15.2 (H) 5.0 - 12.0 % Final     Eosinophil %   Date Value Ref Range Status   10/31/2024 3.9 0.3 - 6.2 % Final     Basophil %   Date Value Ref Range Status   10/31/2024 0.5 0.0 - 1.5 % Final     Immature Grans %   Date Value Ref Range Status   10/31/2024 0.2 0.0 - 0.5 % Final     Neutrophils, Absolute   Date Value Ref Range Status   10/31/2024 4.01 1.70 - 7.00 10*3/mm3 Final     Lymphocytes, Absolute   Date Value Ref Range Status   10/31/2024 0.96 0.70 - 3.10 10*3/mm3 Final     Monocytes, Absolute   Date Value Ref Range Status   10/31/2024 0.94 (H) 0.10 - 0.90 10*3/mm3 Final     Eosinophils, Absolute   Date Value Ref Range Status   10/31/2024 0.24 0.00 - 0.40 10*3/mm3 Final     Basophils, Absolute   Date Value Ref Range Status   10/31/2024 0.03 0.00 - 0.20 10*3/mm3 Final     Immature Grans, Absolute   Date Value Ref Range Status   10/31/2024 0.01 0.00 - 0.05 10*3/mm3 Final     nRBC   Date Value Ref Range Status   10/31/2024 0.0 0.0 - 0.2 /100 WBC Final     Glucose   Date Value Ref Range Status   10/25/2024 99 65 - 99 mg/dL Final     Sodium   Date Value Ref Range Status   10/25/2024 139 136 - 145 mmol/L Final     Potassium   Date Value Ref Range Status   10/25/2024 4.2 3.5 - 5.2 mmol/L Final     CO2   Date Value Ref Range Status   10/25/2024 21.0 (L) 22.0 - 29.0  mmol/L Final     Chloride   Date Value Ref Range Status   10/25/2024 106 98 - 107 mmol/L Final     Anion Gap   Date Value Ref Range Status   10/25/2024 12.0 5.0 - 15.0 mmol/L Final     Creatinine   Date Value Ref Range Status   10/25/2024 0.88 0.76 - 1.27 mg/dL Final   03/12/2024 1.10 0.60 - 1.30 mg/dL Final     Comment:     Serial Number: [id number]:  [id number]     BUN   Date Value Ref Range Status   10/25/2024 20 8 - 23 mg/dL Final     BUN/Creatinine Ratio   Date Value Ref Range Status   10/25/2024 22.7 7.0 - 25.0 Final     Calcium   Date Value Ref Range Status   10/25/2024 8.9 8.6 - 10.5 mg/dL Final     eGFR Non  Amer   Date Value Ref Range Status   07/13/2020 71 >60 mL/min/1.73 Final     Alkaline Phosphatase   Date Value Ref Range Status   10/25/2024 63 39 - 117 U/L Final     Total Protein   Date Value Ref Range Status   10/25/2024 6.1 6.0 - 8.5 g/dL Final     ALT (SGPT)   Date Value Ref Range Status   10/25/2024 13 1 - 41 U/L Final     AST (SGOT)   Date Value Ref Range Status   10/25/2024 19 1 - 40 U/L Final     Total Bilirubin   Date Value Ref Range Status   10/25/2024 0.7 0.0 - 1.2 mg/dL Final     Albumin   Date Value Ref Range Status   10/25/2024 3.5 3.5 - 5.2 g/dL Final     Globulin   Date Value Ref Range Status   10/25/2024 2.6 gm/dL Final        I reviewed the patient's clinical results and discussed with patient.    Results  I personally viewed CT scan of the chest the following is my interpretation:  There is a large irregular spiculated mass in the right upper lobe that measures 6.2 cm in maximum dimension, there is some emphysematous changes throughout the lungs.  There is a well-rounded left lower lobe lung nodule that measures about 2.2 cm in maximum dimension is enlarged compared to CT scan compared to March 2024 no spiculations or visual retraction, previous EVAR noted    PET Scan:  IMPRESSION:     1.  6 cm hypermetabolic spiculated right upper lobe lung mass, most  likely representing a  primary lung neoplasm.     2.  No evidence of hypermetabolic metastatic disease in the neck, chest,  abdomen, or pelvis. No hypermetabolic lymphadenopathy in the chest.     3.  2 cm solid round circumscribed nodule in the left lower lobe with  max SUV 2.9, below liver background level. This nodule has slowly  increased in size, measuring 1.3 cm on an exam from 2022. I suspect this  represents a noncalcified granuloma or hamartoma but a slow-growing  neoplasm is not entirely excluded and continued imaging surveillance is  recommended.     This report was signed and finalized on 11/7/2024 1:08 PM by Dr. Zafar Infante MD.    PFTs:    Spirometry with Diffusion Capacity     Performed by: James Hadley CMA  Authorized by: Melisa López APRN     Pre Drug % Predicted    FVC: 90%   FEV1: 71%   FEF 25-75%: 33%   FEV1/FVC: 61.06%   DLCO: 116%   D/VAsb: 120%            Assessment & Plan     Assessment & Plan    Mr. Paz is a 79-year-old male who presents with complex medical history and complex lung problem.  He has a 6.2 cm right upper lobe mass that is likely malignancy.  He also has an enlarging left lower lobe nodule that is 2.2 cm, the right upper lobe mass was very hypermetabolic on PET left lower is not hypermetabolic.  He has a complicated past medical history including 2 coronary bypass grafting, the last complicated by wound problems and sternal reconstruction which was last in 2008.  He has also had abdominal aortic aneurysm repair with EVAR.  He has a protein S deficiency and is on chronic Coumadin for this with a history of multiple blood clots including 3 DVTs and 2 PEs in the past.    Also noted patient's ascending aorta measures 4.2 cm in maximum dimension.  This will may need continued surveillance but will have multiple CT scans for lung malignancy.  Given his previous 2 surgeries would not be a candidate for prophylactic aortic surgery unless enlarges significantly to close to 6 cm.    I have  reviewed his imaging and I am very concerned that the right upper lobe mass is a primary lung malignancy.  I am also concerned about the increasing left lower lobe nodule that is now greater than 2 cm.  I discussed the case extensively with Dr. Pagan and more recently with Dr. Noland.  There is a blood vessel between the airway and nodule in the left lower lobe making bronchoscopic biopsy very difficult and risky.  Given this I am hopeful that there can be an IR CT-guided biopsy.  Will also need to get biopsy of right upper lobe with Dr. Pagan.  If right upper lobe is primary lung malignancy and non-small cell but left lower lobe is nonmalignant then can consider neoadjuvant therapy with Checkmate protocol followed by surgical resection of the right upper lobe.  If both are malignant then not a surgical candidate and will need to undergo definitive chemo and radiation.  Discussed this with the patient he seems to understand albeit admits this is a complicated plan.    Dr. Noland to review CT scan to see about candidacy for left lower lobe biopsy.  If not candidate then we will likely refer to [facility] for possible bronchoscopic versus CT-guided biopsy.  Will let patient know results of this discussion afterwards.  He will need Lovenox bridging given his protein S deficiency in the past.    Thank you for trusting me with the care of Mr. Paz.  Please do not hesitate to call questions or concerns.        Kailash Hawkins MD   Cardiothoracic Surgeon      Patient or patient representative verbalized consent for the use of Ambient Listening during the visit with  Kailash Hawkins MD for chart documentation. 11/11/2024  14:30 CST

## 2024-11-11 NOTE — LETTER
November 11, 2024     IVY Duff  [id number] [facility] [address]    Patient: Michael Paz   YOB: 1945   Date of Visit: 11/11/2024       Dear IVY Duff,    Michael Paz was in my office today. Below are the relevant portions of my assessment and plan of care.    Mr. Paz is a 79-year-old male who presents with complex medical history and complex lung problem.  He has a 6.2 cm right upper lobe mass that is likely malignancy.  He also has an enlarging left lower lobe nodule that is 2.2 cm, the right upper lobe mass was very hypermetabolic on PET left lower is not hypermetabolic.  He has a complicated past medical history including 2 coronary bypass grafting, the last complicated by wound problems and sternal reconstruction which was last in 2008.  He has also had abdominal aortic aneurysm repair with EVAR.  He has a protein S deficiency and is on chronic Coumadin for this with a history of multiple blood clots including 3 DVTs and 2 PEs in the past.    I have reviewed his imaging and I am very concerned that the right upper lobe mass is a primary lung malignancy.  I am also concerned about the increasing left lower lobe nodule that is now greater than 2 cm.  I discussed the case extensively with Dr. Pagan and more recently with Dr. Noland.  There is a blood vessel between the airway and nodule in the left lower lobe making bronchoscopic biopsy very difficult and risky.  Given this I am hopeful that there can be an IR CT-guided biopsy.  Will also need to get biopsy of right upper lobe with Dr. Pagan.  If right upper lobe is primary lung malignancy and non-small cell but left lower lobe is nonmalignant then can consider neoadjuvant therapy with Checkmate protocol followed by surgical resection of the right upper lobe.  If both are malignant then not a surgical candidate and will need to undergo definitive chemo and radiation.  Discussed this with the patient he seems  to understand albeit admits this is a complicated plan.    Dr. Noland to review CT scan to see about candidacy for left lower lobe biopsy.  If not candidate then we will likely refer to [facility] for possible bronchoscopic versus CT-guided biopsy.  Will let patient know results of this discussion afterwards.  He will need Lovenox bridging given his protein S deficiency in the past.    Thank you for trusting me with the care of Mr. Paz.  Please do not hesitate to call questions or concerns.         Sincerely,        Kailash Hawkins MD        CC: IVY Gotti

## 2024-11-12 DIAGNOSIS — R91.1 LUNG NODULE: Primary | ICD-10-CM

## 2024-11-13 ENCOUNTER — TELEPHONE (OUTPATIENT)
Dept: CARDIAC SURGERY | Facility: CLINIC | Age: 79
End: 2024-11-13
Payer: MEDICARE

## 2024-11-13 RX ORDER — ENOXAPARIN SODIUM 150 MG/ML
1 INJECTION SUBCUTANEOUS EVERY 12 HOURS SCHEDULED
Qty: 6.4 ML | Refills: 0 | Status: SHIPPED | OUTPATIENT
Start: 2024-11-15 | End: 2024-11-19

## 2024-11-13 RX ORDER — ENOXAPARIN SODIUM 100 MG/ML
1 INJECTION SUBCUTANEOUS EVERY 12 HOURS SCHEDULED
Qty: 9.6 ML | Refills: 0 | Status: SHIPPED | OUTPATIENT
Start: 2024-11-15 | End: 2024-11-13

## 2024-11-13 NOTE — TELEPHONE ENCOUNTER
Pt called today re: upcoming biopsy. He was under the impression that he was supposed to have two biopsies. Informed pt that Dr. Hawkins has ordered the biopsy of the lung on the left side via a CT-guided Lung Biopsy and Dr. Pagan will order the biopsy of the lung on the right side bronchoscopically. Pt thought he would be having these done at the same time. Pt spoke with IVY Cruz and she explained to him that due to locations of the nodules/masses, it is unsafe to do both biopsies at the same time and the way these are being scheduled (separately) are the safest way to biopsy. Pt voiced understanding.  Pt was given instructions from schedulers. I reviewed instructions with him also. He is currently taking coumadin 4 mg daily. Per Sarah ATWOOD, Lung Navigator, pt is to discontinue this 5 days prior to procedure, so take last dose on 11/15. Dr. Hawkins would like to send in a Lovenox bridge. I have discussed this with Latasha. She will discuss with Dr. Hawkins and call pt later today. Pt confirmed his prescriptions can be sent in to [facility]. Pt requests call back on his cell phone. Confirmed pt can be contacted at [phone]

## 2024-11-14 ENCOUNTER — HOSPITAL ENCOUNTER (OUTPATIENT)
Dept: MRI IMAGING | Facility: HOSPITAL | Age: 79
Discharge: HOME | End: 2024-11-14
Admitting: INTERNAL MEDICINE
Payer: MEDICARE

## 2024-11-14 DIAGNOSIS — D68.59 PROTEIN S DEFICIENCY: Chronic | ICD-10-CM

## 2024-11-14 DIAGNOSIS — D64.9 SYMPTOMATIC ANEMIA: ICD-10-CM

## 2024-11-14 DIAGNOSIS — R91.1 LUNG NODULE: Chronic | ICD-10-CM

## 2024-11-14 LAB — CREAT BLDA-MCNC: 1.4 MG/DL (ref 0.6–1.3)

## 2024-11-14 PROCEDURE — 82565 ASSAY OF CREATININE: CPT

## 2024-11-14 PROCEDURE — A9579 GAD-BASE MR CONTRAST NOS,1ML: HCPCS | Performed by: INTERNAL MEDICINE

## 2024-11-14 PROCEDURE — 70553 MRI BRAIN STEM W/O & W/DYE: CPT

## 2024-11-14 PROCEDURE — 25510000001 GADOPICLENOL 0.5 MMOL/ML SOLUTION: Performed by: INTERNAL MEDICINE

## 2024-11-14 RX ADMIN — GADOPICLENOL 10 ML: 485.1 INJECTION INTRAVENOUS at 07:05

## 2024-11-15 ENCOUNTER — TELEPHONE (OUTPATIENT)
Dept: ONCOLOGY | Facility: CLINIC | Age: 79
End: 2024-11-15
Payer: MEDICARE

## 2024-11-15 DIAGNOSIS — R79.89 ELEVATED SERUM CREATININE: Primary | ICD-10-CM

## 2024-11-18 DIAGNOSIS — R06.02 SHORTNESS OF BREATH: ICD-10-CM

## 2024-11-18 DIAGNOSIS — R53.83 OTHER FATIGUE: ICD-10-CM

## 2024-11-18 DIAGNOSIS — J44.9 STAGE 2 MODERATE COPD BY GOLD CLASSIFICATION: Primary | ICD-10-CM

## 2024-11-18 NOTE — TELEPHONE ENCOUNTER
----- Message from Miguel Snyder sent at 11/14/2024  3:08 PM CST -----  Encourage oral hydration 1 L daily.  Repeat creatinine on Monday thank you  ----- Message -----  From: Lab, Background User  Sent: 11/14/2024   2:22 PM CST  To: Miguel Snyder MD  
----- Message from Miguel Snyder sent at 11/14/2024  3:08 PM CST -----  Encourage oral hydration 1 L daily.  Repeat creatinine on Monday thank you  ----- Message -----  From: Lab, Background User  Sent: 11/14/2024   2:22 PM CST  To: Miguel Snyder MD  
ES IS CALLING STATES THAT HIS PCP DID NOT RECEIVE THE LAB ORDERS    PLEASE CALL PATIENT WHEN THEY ARE SENT SO HE CAN MAKE THE APPOINTMENT TO GET THE LAB DRAW   
Notified Michael of creatinine level being elevated.  Needs to increase fluids to 1 liter a day.    Discussed with Dr. Kaur and order given to hold Lasix for now and to follow up with his PCP   to monitor the medication prescribed by the hospitalist.  Notified Michael to hold the Lasix for now.  Stated he had already taken it today but he would drink extra fluids.  He will call his PCP Monday about the medicines he was prescribed while in the hospital and will go for repeat lab.    
No

## 2024-11-19 ENCOUNTER — READMISSION MANAGEMENT (OUTPATIENT)
Dept: CALL CENTER | Facility: HOSPITAL | Age: 79
End: 2024-11-19
Payer: MEDICARE

## 2024-11-19 ENCOUNTER — TELEPHONE (OUTPATIENT)
Dept: INTERVENTIONAL RADIOLOGY/VASCULAR | Facility: HOSPITAL | Age: 79
End: 2024-11-19

## 2024-11-19 NOTE — OUTREACH NOTE
Medical Week 3 Survey      Flowsheet Row Responses   [facility] patient discharged from? [place]   Does the patient have one of the following disease processes/diagnoses(primary or secondary)? Other   Week 3 attempt successful? No   Unsuccessful attempts Attempt 1   Revoke Decline to participate            Chantal CANO - Licensed Nurse

## 2024-11-19 NOTE — TELEPHONE ENCOUNTER
Called patient to confirm appointment and went over instructions for the procedure.  Patient confirmed and voiced understanding.

## 2024-11-20 ENCOUNTER — HOSPITAL ENCOUNTER (OUTPATIENT)
Dept: CT IMAGING | Facility: HOSPITAL | Age: 79
Discharge: HOME | End: 2024-11-20
Payer: MEDICARE

## 2024-11-20 ENCOUNTER — HOSPITAL ENCOUNTER (OUTPATIENT)
Dept: GENERAL RADIOLOGY | Facility: HOSPITAL | Age: 79
Discharge: HOME | End: 2024-11-20
Payer: MEDICARE

## 2024-11-20 VITALS
HEART RATE: 65 BPM | RESPIRATION RATE: 18 BRPM | SYSTOLIC BLOOD PRESSURE: 153 MMHG | TEMPERATURE: 97.6 F | BODY MASS INDEX: 34.32 KG/M2 | WEIGHT: 267.42 LBS | HEIGHT: 74 IN | OXYGEN SATURATION: 94 % | DIASTOLIC BLOOD PRESSURE: 83 MMHG

## 2024-11-20 DIAGNOSIS — R91.1 LUNG NODULE: ICD-10-CM

## 2024-11-20 LAB
HGB BLD-MCNC: 9.9 G/DL (ref 13–17.7)
INR PPP: 1.26 (ref 0.91–1.09)
PLATELET # BLD AUTO: 174 10*3/MM3 (ref 140–450)
PROTHROMBIN TIME: 16.3 SECONDS (ref 11.8–14.8)

## 2024-11-20 PROCEDURE — 85049 AUTOMATED PLATELET COUNT: CPT | Performed by: RADIOLOGY

## 2024-11-20 PROCEDURE — 71045 X-RAY EXAM CHEST 1 VIEW: CPT

## 2024-11-20 PROCEDURE — 85018 HEMOGLOBIN: CPT | Performed by: RADIOLOGY

## 2024-11-20 PROCEDURE — 25010000002 LIDOCAINE 1 % SOLUTION: Performed by: RADIOLOGY

## 2024-11-20 PROCEDURE — 85610 PROTHROMBIN TIME: CPT | Performed by: RADIOLOGY

## 2024-11-20 RX ORDER — LIDOCAINE HYDROCHLORIDE 10 MG/ML
INJECTION, SOLUTION INFILTRATION; PERINEURAL AS NEEDED
Status: DISCONTINUED | OUTPATIENT
Start: 2024-11-20 | End: 2024-11-21 | Stop reason: HOSPADM

## 2024-11-20 RX ORDER — SODIUM CHLORIDE 9 MG/ML
40 INJECTION, SOLUTION INTRAVENOUS AS NEEDED
Status: DISCONTINUED | OUTPATIENT
Start: 2024-11-20 | End: 2024-11-21 | Stop reason: HOSPADM

## 2024-11-20 RX ORDER — SODIUM CHLORIDE 0.9 % (FLUSH) 0.9 %
10 SYRINGE (ML) INJECTION AS NEEDED
Status: DISCONTINUED | OUTPATIENT
Start: 2024-11-20 | End: 2024-11-21 | Stop reason: HOSPADM

## 2024-11-20 RX ORDER — SODIUM CHLORIDE 0.9 % (FLUSH) 0.9 %
3 SYRINGE (ML) INJECTION EVERY 12 HOURS SCHEDULED
Status: DISCONTINUED | OUTPATIENT
Start: 2024-11-20 | End: 2024-11-21 | Stop reason: HOSPADM

## 2024-11-20 RX ADMIN — LIDOCAINE HYDROCHLORIDE 7 ML: 10 INJECTION, SOLUTION INFILTRATION; PERINEURAL at 12:01

## 2024-11-20 RX ADMIN — LIDOCAINE HYDROCHLORIDE 18 ML: 10 INJECTION, SOLUTION INFILTRATION; PERINEURAL at 12:05

## 2024-11-25 LAB
CYTO UR: NORMAL
LAB AP CASE REPORT: NORMAL
LAB AP DIAGNOSIS COMMENT: NORMAL
Lab: NORMAL
PATH REPORT.FINAL DX SPEC: NORMAL
PATH REPORT.GROSS SPEC: NORMAL

## 2024-11-26 ENCOUNTER — TELEPHONE (OUTPATIENT)
Dept: ONCOLOGY | Facility: CLINIC | Age: 79
End: 2024-11-26

## 2024-11-26 NOTE — TELEPHONE ENCOUNTER
Caller: Michael Paz    Relationship: Self    Best call back number: [phone]      What was the call regarding: PATIENT WANTED TO KNOW IF HE STILL NEEDS TO TAKE THE IRON PILLS? AND IF SO HE NEEDS THEM CALLED IN   [facility] [address] [place] [address].[facility] [address] [facility] [phone] [facility] [phone] FX     PATIENT ALSO WANTED TO KNOW THE RESULTS OF HIS LABS

## 2024-11-27 RX ORDER — FERROUS SULFATE 325(65) MG
325 TABLET ORAL
Qty: 90 TABLET | Refills: 0 | Status: SHIPPED | OUTPATIENT
Start: 2024-11-27

## 2024-11-27 NOTE — TELEPHONE ENCOUNTER
Notified Michael that Dr. Snyder is going to continue the iron tablets (requested 90 day supply)and he wants him to follow up with Isis HAYNES she follows the hematology patients.  Verbalized understanding.

## 2024-12-03 ENCOUNTER — OFFICE VISIT (OUTPATIENT)
Dept: PULMONOLOGY | Facility: CLINIC | Age: 79
End: 2024-12-03
Payer: MEDICARE

## 2024-12-03 ENCOUNTER — PREP FOR SURGERY (OUTPATIENT)
Dept: OTHER | Facility: HOSPITAL | Age: 79
End: 2024-12-03
Payer: MEDICARE

## 2024-12-03 VITALS
SYSTOLIC BLOOD PRESSURE: 142 MMHG | HEIGHT: 74 IN | OXYGEN SATURATION: 97 % | BODY MASS INDEX: 34.52 KG/M2 | DIASTOLIC BLOOD PRESSURE: 80 MMHG | WEIGHT: 269 LBS | HEART RATE: 86 BPM

## 2024-12-03 DIAGNOSIS — D68.59 PROTEIN C DEFICIENCY: Chronic | ICD-10-CM

## 2024-12-03 DIAGNOSIS — I25.10 CORONARY ARTERY DISEASE INVOLVING NATIVE HEART, UNSPECIFIED VESSEL OR LESION TYPE, UNSPECIFIED WHETHER ANGINA PRESENT: ICD-10-CM

## 2024-12-03 DIAGNOSIS — R91.8 MASS OF UPPER LOBE OF RIGHT LUNG: Primary | ICD-10-CM

## 2024-12-03 DIAGNOSIS — Z86.711 HX PULMONARY EMBOLISM: Chronic | ICD-10-CM

## 2024-12-03 DIAGNOSIS — R91.1 LUNG NODULE: Primary | Chronic | ICD-10-CM

## 2024-12-03 PROCEDURE — 3077F SYST BP >= 140 MM HG: CPT | Performed by: INTERNAL MEDICINE

## 2024-12-03 PROCEDURE — 99215 OFFICE O/P EST HI 40 MIN: CPT | Performed by: INTERNAL MEDICINE

## 2024-12-03 PROCEDURE — 3079F DIAST BP 80-89 MM HG: CPT | Performed by: INTERNAL MEDICINE

## 2024-12-03 RX ORDER — SODIUM CHLORIDE 0.9 % (FLUSH) 0.9 %
10 SYRINGE (ML) INJECTION EVERY 12 HOURS SCHEDULED
OUTPATIENT
Start: 2024-12-03

## 2024-12-03 RX ORDER — SODIUM CHLORIDE 0.9 % (FLUSH) 0.9 %
10 SYRINGE (ML) INJECTION AS NEEDED
OUTPATIENT
Start: 2024-12-03

## 2024-12-03 RX ORDER — ENOXAPARIN SODIUM 150 MG/ML
120 INJECTION SUBCUTANEOUS EVERY 12 HOURS SCHEDULED
Qty: 6.4 ML | Refills: 0 | Status: SHIPPED | OUTPATIENT
Start: 2024-12-03 | End: 2024-12-07

## 2024-12-03 RX ORDER — SODIUM CHLORIDE 9 MG/ML
40 INJECTION, SOLUTION INTRAVENOUS AS NEEDED
OUTPATIENT
Start: 2024-12-03

## 2024-12-03 NOTE — PROGRESS NOTES
Background:  No My Sticky Note on file.   Chief Complaint  Lung Nodule    Subjective    History of Present Illness     Michael Paz is here for follow up with [facility] PULMONARY & CRIT[facility]ARE MEDICINE.  History of Present Illness  He follows up for evaluation of mass on the right side.  We had seen him in the hospital.  There was concern about this lesion on the right side and also a smaller lesion in the left lower lobe.  The right sided lesion is large and spiculated the left-sided lesion was not as large but fairly round, adjacent to a vascular structures such that that lesion would not be approachable by Ion bronchoscopy.  Given concern for both lesions we felt it important to workup the smaller lesion first.  A percutaneous biopsy was done, negative for malignancy.  He did have some hemoptysis after that.  He is chronically anticoagulated, uses Lovenox prior to invasive procedures.  Dr. Hawkins has seen him for consideration for right upper lobe lobectomy.  I am asked to evaluate him to try to do a biopsy of this lesion for tissue diagnosis prior to committing to surgery.  Patient has no new respiratory complaints.     Tobacco Use: High Risk (12/3/2024)    Patient History     Smoking Tobacco Use: Every Day     Smokeless Tobacco Use: Never     Passive Exposure: Current      Current Outpatient Medications   Medication Instructions    albuterol sulfate  (90 Base) MCG/ACT inhaler 2 puffs, Inhalation, Every 4 Hours PRN    Ascorbic Acid (VITAMIN C PO) 2,000 mg, Daily    candesartan (ATACAND) 16 mg, Daily    ferrous sulfate 325 mg, Oral, Daily With Breakfast    Fluticasone-Umeclidin-Vilant (Trelegy Ellipta) 100-62.5-25 MCG/ACT inhaler 1 puff, Inhalation, Daily - RT    furosemide (LASIX) 40 mg, Oral, Daily    metoprolol succinate XL (TOPROL-XL) 25 mg, Daily    Multiple Vitamins-Minerals (ONE A DAY MEN 50 PLUS PO) 1 tablet, Daily    pantoprazole (PROTONIX) 40 mg, Oral, Every 12 Hours     "simvastatin (ZOCOR) 80 mg, Nightly    warfarin (COUMADIN) 4 mg, Every Other Day      Objective     Vital Signs:   /80   Pulse 86   Ht 187 cm (73.62\")   Wt 122 kg (269 lb)   SpO2 97% Comment: R/A  BMI 34.90 kg/m²   Physical Exam  Constitutional:       General: He is not in acute distress.     Appearance: Normal appearance. He is not ill-appearing or diaphoretic.   Eyes:      Extraocular Movements: Extraocular movements intact.   Pulmonary:      Effort: Pulmonary effort is normal. No respiratory distress.      Breath sounds: No wheezing, rhonchi or rales.   Neurological:      Mental Status: He is alert.        Result Review  Data Reviewed:    XR Chest 1 View    Result Date: 11/20/2024  Impression: 1.. No evidence of complication status post percutaneous biopsy for a left lower lobe lung lesion.  This report was signed and finalized on 11/20/2024 3:30 PM by Dr. Dio Ulloa MD.      CT Needle Biopsy Lung    Result Date: 11/20/2024  Impression: 1. Status post CT-guided percutaneous core biopsy of a 2.1 cm left lower lobe nodule complicated by hemoptysis and development of a 5 cm perilesional consolidation/pulmonary hemorrhage which is seen on the postbiopsy CT. No pneumothorax. 2. Pathology is pending.  This report was signed and finalized on 11/20/2024 1:33 PM by Dr Joshua Noland.      XR Chest 1 View    Result Date: 11/20/2024  Impression:  1. Status post CT-guided biopsy of left lower lobe nodule without visualized pneumothorax. 2. Emphysema. 3. Irregular nodular opacity right upper lobe.  This report was signed and finalized on 11/20/2024 12:48 PM by Jose Francisco Velazquez.      MRI Brain With & Without Contrast    Result Date: 11/14/2024  Impression: 1. Age-related changes with atrophy and small vessel disease. 2. No intracranial metastases.    This report was signed and finalized on 11/14/2024 7:12 AM by Dr. Ricardo Jernigan MD.      NM PET/CT Skull Base to Mid Thigh    Result Date: 11/7/2024  Impression:  " 1.  6 cm hypermetabolic spiculated right upper lobe lung mass, most likely representing a primary lung neoplasm.  2.  No evidence of hypermetabolic metastatic disease in the neck, chest, abdomen, or pelvis. No hypermetabolic lymphadenopathy in the chest.  3.  2 cm solid round circumscribed nodule in the left lower lobe with max SUV 2.9, below liver background level. This nodule has slowly increased in size, measuring 1.3 cm on an exam from 2022. I suspect this represents a noncalcified granuloma or hamartoma but a slow-growing neoplasm is not entirely excluded and continued imaging surveillance is recommended.  This report was signed and finalized on 11/7/2024 1:08 PM by Dr. Zafar Infante MD.       PFT Values          11/7/2024    13:45   Pre Drug PFT Results   FVC 90   FEV1 71   FEF 25-75% 33   FEV1/FVC 61.06   Other Tests PFT Results   DLCO 116   D/VAsb 120                Assessment and Plan    Diagnoses and all orders for this visit:    1. Lung nodule (Primary)    2. Hx pulmonary embolism    3. Protein C deficiency    4. Coronary artery disease involving native heart, unspecified vessel or lesion type, unspecified whether angina present    Plan ion bronchoscopy to evaluate the lung mass which is a threat to life and bodily function with likelihood of malignancy  We discussed high risk of morbidity from additional diagnostic testing or treatment associated with procedure including general anesthesia related risks, bleeding, especially considering anticoagulation issues, clotting from clotting disorder while off anticoagulation although that risk will be mitigated by using the Lovenox, risk for pneumothorax.  Anticipate consideration for lobectomy if we confirm malignancy.  Spirometry is favorable enough for that, with prior spirometry showing moderate obstruction and excellent diffusion capacity.    Follow Up   Return in about 5 weeks (around 1/7/2025).  Patient was given instructions and counseling regarding  his condition or for health maintenance advice. Please see specific information pulled into the AVS if appropriate.    Electronically signed by Lizandro Pagan MD, 12/3/2024, 17:11 CST

## 2024-12-03 NOTE — H&P
Background:  No My Sticky Note on file.   Chief Complaint  Lung Nodule    Subjective    History of Present Illness     Michael aPz is here for follow up with [facility] PULMONARY & CRITICAL CARE MEDICINE.  History of Present Illness  He follows up for evaluation of mass on the right side.  We had seen him in the hospital.  There was concern about this lesion on the right side and also a smaller lesion in the left lower lobe.  The right sided lesion is large and spiculated the left-sided lesion was not as large but fairly round, adjacent to a vascular structures such that that lesion would not be approachable by Ion bronchoscopy.  Given concern for both lesions we felt it important to workup the smaller lesion first.  A percutaneous biopsy was done, negative for malignancy.  He did have some hemoptysis after that.  He is chronically anticoagulated, uses Lovenox prior to invasive procedures.  Dr. Hawkins has seen him for consideration for right upper lobe lobectomy.  I am asked to evaluate him to try to do a biopsy of this lesion for tissue diagnosis prior to committing to surgery.  Patient has no new respiratory complaints.     Tobacco Use: High Risk (12/3/2024)    Patient History     Smoking Tobacco Use: Every Day     Smokeless Tobacco Use: Never     Passive Exposure: Current      Current Outpatient Medications   Medication Instructions    albuterol sulfate  (90 Base) MCG/ACT inhaler 2 puffs, Inhalation, Every 4 Hours PRN    Ascorbic Acid (VITAMIN C PO) 2,000 mg, Daily    candesartan (ATACAND) 16 mg, Daily    ferrous sulfate 325 mg, Oral, Daily With Breakfast    Fluticasone-Umeclidin-Vilant (Trelegy Ellipta) 100-62.5-25 MCG/ACT inhaler 1 puff, Inhalation, Daily - RT    furosemide (LASIX) 40 mg, Oral, Daily    metoprolol succinate XL (TOPROL-XL) 25 mg, Daily    Multiple Vitamins-Minerals (ONE A DAY MEN 50 PLUS PO) 1 tablet, Daily    pantoprazole (PROTONIX) 40 mg, Oral, Every 12 Hours     "simvastatin (ZOCOR) 80 mg, Nightly    warfarin (COUMADIN) 4 mg, Every Other Day      Objective     Vital Signs:   /80   Pulse 86   Ht 187 cm (73.62\")   Wt 122 kg (269 lb)   SpO2 97% Comment: R/A  BMI 34.90 kg/m²   Physical Exam  Constitutional:       General: He is not in acute distress.     Appearance: Normal appearance. He is not ill-appearing or diaphoretic.   Eyes:      Extraocular Movements: Extraocular movements intact.   Pulmonary:      Effort: Pulmonary effort is normal. No respiratory distress.      Breath sounds: No wheezing, rhonchi or rales.   Neurological:      Mental Status: He is alert.        Result Review  Data Reviewed:    XR Chest 1 View    Result Date: 11/20/2024  Impression: 1.. No evidence of complication status post percutaneous biopsy for a left lower lobe lung lesion.  This report was signed and finalized on 11/20/2024 3:30 PM by Dr. Dio Ulloa MD.      CT Needle Biopsy Lung    Result Date: 11/20/2024  Impression: 1. Status post CT-guided percutaneous core biopsy of a 2.1 cm left lower lobe nodule complicated by hemoptysis and development of a 5 cm perilesional consolidation/pulmonary hemorrhage which is seen on the postbiopsy CT. No pneumothorax. 2. Pathology is pending.  This report was signed and finalized on 11/20/2024 1:33 PM by Dr Joshua Noland.      XR Chest 1 View    Result Date: 11/20/2024  Impression:  1. Status post CT-guided biopsy of left lower lobe nodule without visualized pneumothorax. 2. Emphysema. 3. Irregular nodular opacity right upper lobe.  This report was signed and finalized on 11/20/2024 12:48 PM by Jose Francisco Velazquez.      MRI Brain With & Without Contrast    Result Date: 11/14/2024  Impression: 1. Age-related changes with atrophy and small vessel disease. 2. No intracranial metastases.    This report was signed and finalized on 11/14/2024 7:12 AM by Dr. Ricardo Jernigan MD.      NM PET/CT Skull Base to Mid Thigh    Result Date: 11/7/2024  Impression:  " 1.  6 cm hypermetabolic spiculated right upper lobe lung mass, most likely representing a primary lung neoplasm.  2.  No evidence of hypermetabolic metastatic disease in the neck, chest, abdomen, or pelvis. No hypermetabolic lymphadenopathy in the chest.  3.  2 cm solid round circumscribed nodule in the left lower lobe with max SUV 2.9, below liver background level. This nodule has slowly increased in size, measuring 1.3 cm on an exam from 2022. I suspect this represents a noncalcified granuloma or hamartoma but a slow-growing neoplasm is not entirely excluded and continued imaging surveillance is recommended.  This report was signed and finalized on 11/7/2024 1:08 PM by Dr. Zafar Infante MD.       PFT Values          11/7/2024    13:45   Pre Drug PFT Results   FVC 90   FEV1 71   FEF 25-75% 33   FEV1/FVC 61.06   Other Tests PFT Results   DLCO 116   D/VAsb 120                Assessment and Plan    Diagnoses and all orders for this visit:    1. Lung nodule (Primary)    2. Hx pulmonary embolism    3. Protein C deficiency    4. Coronary artery disease involving native heart, unspecified vessel or lesion type, unspecified whether angina present    Plan ion bronchoscopy to evaluate the lung mass which is a threat to life and bodily function with likelihood of malignancy  We discussed high risk of morbidity from additional diagnostic testing or treatment associated with procedure including general anesthesia related risks, bleeding, especially considering anticoagulation issues, clotting from clotting disorder while off anticoagulation although that risk will be mitigated by using the Lovenox, risk for pneumothorax.  Anticipate consideration for lobectomy if we confirm malignancy.  Spirometry is favorable enough for that, with prior spirometry showing moderate obstruction and excellent diffusion capacity.    Follow Up   Return in about 5 weeks (around 1/7/2025).  Patient was given instructions and counseling regarding  his condition or for health maintenance advice. Please see specific information pulled into the AVS if appropriate.    Electronically signed by Lizandro Pagan MD, 12/3/2024, 17:11 CST

## 2024-12-03 NOTE — H&P (VIEW-ONLY)
Background:  No My Sticky Note on file.   Chief Complaint  Lung Nodule    Subjective    History of Present Illness     Michael Paz is here for follow up with [facility] PULMONARY & CRITICAL CARE MEDICINE.  History of Present Illness  He follows up for evaluation of mass on the right side.  We had seen him in the hospital.  There was concern about this lesion on the right side and also a smaller lesion in the left lower lobe.  The right sided lesion is large and spiculated the left-sided lesion was not as large but fairly round, adjacent to a vascular structures such that that lesion would not be approachable by Ion bronchoscopy.  Given concern for both lesions we felt it important to workup the smaller lesion first.  A percutaneous biopsy was done, negative for malignancy.  He did have some hemoptysis after that.  He is chronically anticoagulated, uses Lovenox prior to invasive procedures.  Dr. Hawkins has seen him for consideration for right upper lobe lobectomy.  I am asked to evaluate him to try to do a biopsy of this lesion for tissue diagnosis prior to committing to surgery.  Patient has no new respiratory complaints.     Tobacco Use: High Risk (12/3/2024)    Patient History     Smoking Tobacco Use: Every Day     Smokeless Tobacco Use: Never     Passive Exposure: Current      Current Outpatient Medications   Medication Instructions    albuterol sulfate  (90 Base) MCG/ACT inhaler 2 puffs, Inhalation, Every 4 Hours PRN    Ascorbic Acid (VITAMIN C PO) 2,000 mg, Daily    candesartan (ATACAND) 16 mg, Daily    ferrous sulfate 325 mg, Oral, Daily With Breakfast    Fluticasone-Umeclidin-Vilant (Trelegy Ellipta) 100-62.5-25 MCG/ACT inhaler 1 puff, Inhalation, Daily - RT    furosemide (LASIX) 40 mg, Oral, Daily    metoprolol succinate XL (TOPROL-XL) 25 mg, Daily    Multiple Vitamins-Minerals (ONE A DAY MEN 50 PLUS PO) 1 tablet, Daily    pantoprazole (PROTONIX) 40 mg, Oral, Every 12 Hours     "simvastatin (ZOCOR) 80 mg, Nightly    warfarin (COUMADIN) 4 mg, Every Other Day      Objective     Vital Signs:   /80   Pulse 86   Ht 187 cm (73.62\")   Wt 122 kg (269 lb)   SpO2 97% Comment: R/A  BMI 34.90 kg/m²   Physical Exam  Constitutional:       General: He is not in acute distress.     Appearance: Normal appearance. He is not ill-appearing or diaphoretic.   Eyes:      Extraocular Movements: Extraocular movements intact.   Pulmonary:      Effort: Pulmonary effort is normal. No respiratory distress.      Breath sounds: No wheezing, rhonchi or rales.   Neurological:      Mental Status: He is alert.        Result Review  Data Reviewed:    XR Chest 1 View    Result Date: 11/20/2024  Impression: 1.. No evidence of complication status post percutaneous biopsy for a left lower lobe lung lesion.  This report was signed and finalized on 11/20/2024 3:30 PM by Dr. Dio Ulloa MD.      CT Needle Biopsy Lung    Result Date: 11/20/2024  Impression: 1. Status post CT-guided percutaneous core biopsy of a 2.1 cm left lower lobe nodule complicated by hemoptysis and development of a 5 cm perilesional consolidation/pulmonary hemorrhage which is seen on the postbiopsy CT. No pneumothorax. 2. Pathology is pending.  This report was signed and finalized on 11/20/2024 1:33 PM by Dr Joshua Noland.      XR Chest 1 View    Result Date: 11/20/2024  Impression:  1. Status post CT-guided biopsy of left lower lobe nodule without visualized pneumothorax. 2. Emphysema. 3. Irregular nodular opacity right upper lobe.  This report was signed and finalized on 11/20/2024 12:48 PM by Jose Francisco Velazquez.      MRI Brain With & Without Contrast    Result Date: 11/14/2024  Impression: 1. Age-related changes with atrophy and small vessel disease. 2. No intracranial metastases.    This report was signed and finalized on 11/14/2024 7:12 AM by Dr. Ricardo Jernigan MD.      NM PET/CT Skull Base to Mid Thigh    Result Date: 11/7/2024  Impression:  " 1.  6 cm hypermetabolic spiculated right upper lobe lung mass, most likely representing a primary lung neoplasm.  2.  No evidence of hypermetabolic metastatic disease in the neck, chest, abdomen, or pelvis. No hypermetabolic lymphadenopathy in the chest.  3.  2 cm solid round circumscribed nodule in the left lower lobe with max SUV 2.9, below liver background level. This nodule has slowly increased in size, measuring 1.3 cm on an exam from 2022. I suspect this represents a noncalcified granuloma or hamartoma but a slow-growing neoplasm is not entirely excluded and continued imaging surveillance is recommended.  This report was signed and finalized on 11/7/2024 1:08 PM by Dr. Zafar Infante MD.       PFT Values          11/7/2024    13:45   Pre Drug PFT Results   FVC 90   FEV1 71   FEF 25-75% 33   FEV1/FVC 61.06   Other Tests PFT Results   DLCO 116   D/VAsb 120                Assessment and Plan    Diagnoses and all orders for this visit:    1. Lung nodule (Primary)    2. Hx pulmonary embolism    3. Protein C deficiency    4. Coronary artery disease involving native heart, unspecified vessel or lesion type, unspecified whether angina present    Plan ion bronchoscopy to evaluate the lung mass which is a threat to life and bodily function with likelihood of malignancy  We discussed high risk of morbidity from additional diagnostic testing or treatment associated with procedure including general anesthesia related risks, bleeding, especially considering anticoagulation issues, clotting from clotting disorder while off anticoagulation although that risk will be mitigated by using the Lovenox, risk for pneumothorax.  Anticipate consideration for lobectomy if we confirm malignancy.  Spirometry is favorable enough for that, with prior spirometry showing moderate obstruction and excellent diffusion capacity.    Follow Up   Return in about 5 weeks (around 1/7/2025).  Patient was given instructions and counseling regarding  his condition or for health maintenance advice. Please see specific information pulled into the AVS if appropriate.    Electronically signed by Lizandro Pagan MD, 12/3/2024, 17:11 CST

## 2024-12-09 ENCOUNTER — TELEPHONE (OUTPATIENT)
Dept: INTERVENTIONAL RADIOLOGY/VASCULAR | Facility: HOSPITAL | Age: 79
End: 2024-12-09
Payer: MEDICARE

## 2024-12-10 ENCOUNTER — HOSPITAL ENCOUNTER (OUTPATIENT)
Dept: CT IMAGING | Facility: HOSPITAL | Age: 79
Discharge: HOME | End: 2024-12-10
Payer: MEDICARE

## 2024-12-10 ENCOUNTER — ANESTHESIA (OUTPATIENT)
Dept: PERIOP | Facility: HOSPITAL | Age: 79
End: 2024-12-10
Payer: MEDICARE

## 2024-12-10 ENCOUNTER — APPOINTMENT (OUTPATIENT)
Dept: GENERAL RADIOLOGY | Facility: HOSPITAL | Age: 79
End: 2024-12-10
Payer: MEDICARE

## 2024-12-10 ENCOUNTER — ANESTHESIA EVENT (OUTPATIENT)
Dept: PERIOP | Facility: HOSPITAL | Age: 79
End: 2024-12-10
Payer: MEDICARE

## 2024-12-10 ENCOUNTER — HOSPITAL ENCOUNTER (OUTPATIENT)
Facility: HOSPITAL | Age: 79
Setting detail: HOSPITAL OUTPATIENT SURGERY
Discharge: HOME | End: 2024-12-10
Attending: INTERNAL MEDICINE | Admitting: INTERNAL MEDICINE
Payer: MEDICARE

## 2024-12-10 VITALS
DIASTOLIC BLOOD PRESSURE: 72 MMHG | SYSTOLIC BLOOD PRESSURE: 150 MMHG | HEART RATE: 66 BPM | TEMPERATURE: 97.2 F | WEIGHT: 262.35 LBS | HEIGHT: 72 IN | OXYGEN SATURATION: 92 % | RESPIRATION RATE: 16 BRPM | BODY MASS INDEX: 35.53 KG/M2

## 2024-12-10 DIAGNOSIS — R91.8 MASS OF UPPER LOBE OF RIGHT LUNG: ICD-10-CM

## 2024-12-10 LAB
ANION GAP SERPL CALCULATED.3IONS-SCNC: 11 MMOL/L (ref 5–15)
BUN SERPL-MCNC: 17 MG/DL (ref 8–23)
BUN/CREAT SERPL: 18.5 (ref 7–25)
CALCIUM SPEC-SCNC: 9.7 MG/DL (ref 8.6–10.5)
CHLORIDE SERPL-SCNC: 107 MMOL/L (ref 98–107)
CO2 SERPL-SCNC: 23 MMOL/L (ref 22–29)
CREAT SERPL-MCNC: 0.92 MG/DL (ref 0.76–1.27)
DEPRECATED RDW RBC AUTO: 69.8 FL (ref 37–54)
EGFRCR SERPLBLD CKD-EPI 2021: 85.7 ML/MIN/1.73
ERYTHROCYTE [DISTWIDTH] IN BLOOD BY AUTOMATED COUNT: 23 % (ref 12.3–15.4)
GLUCOSE SERPL-MCNC: 101 MG/DL (ref 65–99)
HCT VFR BLD AUTO: 35.6 % (ref 37.5–51)
HGB BLD-MCNC: 11.4 G/DL (ref 13–17.7)
INR PPP: 1.12 (ref 0.91–1.09)
MCH RBC QN AUTO: 27.1 PG (ref 26.6–33)
MCHC RBC AUTO-ENTMCNC: 32 G/DL (ref 31.5–35.7)
MCV RBC AUTO: 84.8 FL (ref 79–97)
PLATELET # BLD AUTO: 204 10*3/MM3 (ref 140–450)
PMV BLD AUTO: 9.5 FL (ref 6–12)
POTASSIUM SERPL-SCNC: 4.4 MMOL/L (ref 3.5–5.2)
PROTHROMBIN TIME: 14.9 SECONDS (ref 11.8–14.8)
RBC # BLD AUTO: 4.2 10*6/MM3 (ref 4.14–5.8)
SODIUM SERPL-SCNC: 141 MMOL/L (ref 136–145)
WBC NRBC COR # BLD AUTO: 5.83 10*3/MM3 (ref 3.4–10.8)

## 2024-12-10 PROCEDURE — 85610 PROTHROMBIN TIME: CPT | Performed by: INTERNAL MEDICINE

## 2024-12-10 PROCEDURE — 25010000002 PROPOFOL 10 MG/ML EMULSION

## 2024-12-10 PROCEDURE — 25010000002 SUGAMMADEX 200 MG/2ML SOLUTION

## 2024-12-10 PROCEDURE — 88112 CYTOPATH CELL ENHANCE TECH: CPT | Performed by: INTERNAL MEDICINE

## 2024-12-10 PROCEDURE — 88305 TISSUE EXAM BY PATHOLOGIST: CPT | Performed by: INTERNAL MEDICINE

## 2024-12-10 PROCEDURE — 71045 X-RAY EXAM CHEST 1 VIEW: CPT

## 2024-12-10 PROCEDURE — 25010000002 DEXAMETHASONE PER 1 MG

## 2024-12-10 PROCEDURE — 76000 FLUOROSCOPY <1 HR PHYS/QHP: CPT

## 2024-12-10 PROCEDURE — 71250 CT THORAX DX C-: CPT

## 2024-12-10 PROCEDURE — 25010000002 LIDOCAINE PF 2% 2 % SOLUTION

## 2024-12-10 PROCEDURE — 80048 BASIC METABOLIC PNL TOTAL CA: CPT | Performed by: INTERNAL MEDICINE

## 2024-12-10 PROCEDURE — 88172 CYTP DX EVAL FNA 1ST EA SITE: CPT | Performed by: INTERNAL MEDICINE

## 2024-12-10 PROCEDURE — 93005 ELECTROCARDIOGRAM TRACING: CPT | Performed by: INTERNAL MEDICINE

## 2024-12-10 PROCEDURE — 85027 COMPLETE CBC AUTOMATED: CPT | Performed by: INTERNAL MEDICINE

## 2024-12-10 PROCEDURE — C1726 CATH, BAL DIL, NON-VASCULAR: HCPCS | Performed by: INTERNAL MEDICINE

## 2024-12-10 PROCEDURE — 25010000002 ONDANSETRON PER 1 MG

## 2024-12-10 PROCEDURE — 25810000003 LACTATED RINGERS PER 1000 ML: Performed by: INTERNAL MEDICINE

## 2024-12-10 RX ORDER — SODIUM CHLORIDE 0.9 % (FLUSH) 0.9 %
3-10 SYRINGE (ML) INJECTION AS NEEDED
Status: DISCONTINUED | OUTPATIENT
Start: 2024-12-10 | End: 2024-12-10 | Stop reason: HOSPADM

## 2024-12-10 RX ORDER — PROPOFOL 10 MG/ML
VIAL (ML) INTRAVENOUS AS NEEDED
Status: DISCONTINUED | OUTPATIENT
Start: 2024-12-10 | End: 2024-12-10 | Stop reason: SURG

## 2024-12-10 RX ORDER — SODIUM CHLORIDE, SODIUM LACTATE, POTASSIUM CHLORIDE, CALCIUM CHLORIDE 600; 310; 30; 20 MG/100ML; MG/100ML; MG/100ML; MG/100ML
100 INJECTION, SOLUTION INTRAVENOUS CONTINUOUS
Status: DISCONTINUED | OUTPATIENT
Start: 2024-12-10 | End: 2024-12-10 | Stop reason: HOSPADM

## 2024-12-10 RX ORDER — LIDOCAINE HYDROCHLORIDE 20 MG/ML
INJECTION, SOLUTION EPIDURAL; INFILTRATION; INTRACAUDAL; PERINEURAL AS NEEDED
Status: DISCONTINUED | OUTPATIENT
Start: 2024-12-10 | End: 2024-12-10 | Stop reason: SURG

## 2024-12-10 RX ORDER — METOPROLOL SUCCINATE 25 MG/1
25 TABLET, EXTENDED RELEASE ORAL DAILY
COMMUNITY

## 2024-12-10 RX ORDER — ACETAMINOPHEN 500 MG
1000 TABLET ORAL ONCE
Status: COMPLETED | OUTPATIENT
Start: 2024-12-10 | End: 2024-12-10

## 2024-12-10 RX ORDER — BUPIVACAINE HCL/0.9 % NACL/PF 0.125 %
PLASTIC BAG, INJECTION (ML) EPIDURAL AS NEEDED
Status: DISCONTINUED | OUTPATIENT
Start: 2024-12-10 | End: 2024-12-10 | Stop reason: SURG

## 2024-12-10 RX ORDER — HYDROCODONE BITARTRATE AND ACETAMINOPHEN 10; 325 MG/1; MG/1
1 TABLET ORAL EVERY 4 HOURS PRN
Status: DISCONTINUED | OUTPATIENT
Start: 2024-12-10 | End: 2024-12-10 | Stop reason: HOSPADM

## 2024-12-10 RX ORDER — NALOXONE HCL 0.4 MG/ML
0.4 VIAL (ML) INJECTION AS NEEDED
Status: DISCONTINUED | OUTPATIENT
Start: 2024-12-10 | End: 2024-12-10 | Stop reason: HOSPADM

## 2024-12-10 RX ORDER — SODIUM CHLORIDE 0.9 % (FLUSH) 0.9 %
3 SYRINGE (ML) INJECTION EVERY 12 HOURS SCHEDULED
Status: DISCONTINUED | OUTPATIENT
Start: 2024-12-10 | End: 2024-12-10 | Stop reason: HOSPADM

## 2024-12-10 RX ORDER — FENTANYL CITRATE 50 UG/ML
50 INJECTION, SOLUTION INTRAMUSCULAR; INTRAVENOUS
Status: DISCONTINUED | OUTPATIENT
Start: 2024-12-10 | End: 2024-12-10 | Stop reason: HOSPADM

## 2024-12-10 RX ORDER — DEXAMETHASONE SODIUM PHOSPHATE 4 MG/ML
INJECTION, SOLUTION INTRA-ARTICULAR; INTRALESIONAL; INTRAMUSCULAR; INTRAVENOUS; SOFT TISSUE AS NEEDED
Status: DISCONTINUED | OUTPATIENT
Start: 2024-12-10 | End: 2024-12-10 | Stop reason: SURG

## 2024-12-10 RX ORDER — SODIUM CHLORIDE 0.9 % (FLUSH) 0.9 %
10 SYRINGE (ML) INJECTION AS NEEDED
Status: DISCONTINUED | OUTPATIENT
Start: 2024-12-10 | End: 2024-12-10 | Stop reason: HOSPADM

## 2024-12-10 RX ORDER — MIDAZOLAM HYDROCHLORIDE 2 MG/2ML
0.5 INJECTION, SOLUTION INTRAMUSCULAR; INTRAVENOUS
Status: DISCONTINUED | OUTPATIENT
Start: 2024-12-10 | End: 2024-12-10 | Stop reason: HOSPADM

## 2024-12-10 RX ORDER — FLUMAZENIL 0.1 MG/ML
0.2 INJECTION INTRAVENOUS AS NEEDED
Status: DISCONTINUED | OUTPATIENT
Start: 2024-12-10 | End: 2024-12-10 | Stop reason: HOSPADM

## 2024-12-10 RX ORDER — IBUPROFEN 600 MG/1
600 TABLET, FILM COATED ORAL EVERY 6 HOURS PRN
Status: DISCONTINUED | OUTPATIENT
Start: 2024-12-10 | End: 2024-12-10 | Stop reason: HOSPADM

## 2024-12-10 RX ORDER — HYDROCODONE BITARTRATE AND ACETAMINOPHEN 5; 325 MG/1; MG/1
1 TABLET ORAL EVERY 4 HOURS PRN
Status: DISCONTINUED | OUTPATIENT
Start: 2024-12-10 | End: 2024-12-10 | Stop reason: HOSPADM

## 2024-12-10 RX ORDER — ROCURONIUM BROMIDE 10 MG/ML
INJECTION, SOLUTION INTRAVENOUS AS NEEDED
Status: DISCONTINUED | OUTPATIENT
Start: 2024-12-10 | End: 2024-12-10 | Stop reason: SURG

## 2024-12-10 RX ORDER — ONDANSETRON 2 MG/ML
INJECTION INTRAMUSCULAR; INTRAVENOUS AS NEEDED
Status: DISCONTINUED | OUTPATIENT
Start: 2024-12-10 | End: 2024-12-10 | Stop reason: SURG

## 2024-12-10 RX ORDER — SODIUM CHLORIDE, SODIUM LACTATE, POTASSIUM CHLORIDE, CALCIUM CHLORIDE 600; 310; 30; 20 MG/100ML; MG/100ML; MG/100ML; MG/100ML
1000 INJECTION, SOLUTION INTRAVENOUS CONTINUOUS
Status: DISCONTINUED | OUTPATIENT
Start: 2024-12-10 | End: 2024-12-10 | Stop reason: HOSPADM

## 2024-12-10 RX ORDER — SODIUM CHLORIDE 9 MG/ML
40 INJECTION, SOLUTION INTRAVENOUS AS NEEDED
Status: DISCONTINUED | OUTPATIENT
Start: 2024-12-10 | End: 2024-12-10 | Stop reason: HOSPADM

## 2024-12-10 RX ORDER — LABETALOL HYDROCHLORIDE 5 MG/ML
5 INJECTION, SOLUTION INTRAVENOUS
Status: DISCONTINUED | OUTPATIENT
Start: 2024-12-10 | End: 2024-12-10 | Stop reason: HOSPADM

## 2024-12-10 RX ORDER — SODIUM CHLORIDE 0.9 % (FLUSH) 0.9 %
10 SYRINGE (ML) INJECTION EVERY 12 HOURS SCHEDULED
Status: DISCONTINUED | OUTPATIENT
Start: 2024-12-10 | End: 2024-12-10 | Stop reason: HOSPADM

## 2024-12-10 RX ORDER — SODIUM CHLORIDE 0.9 % (FLUSH) 0.9 %
3 SYRINGE (ML) INJECTION AS NEEDED
Status: DISCONTINUED | OUTPATIENT
Start: 2024-12-10 | End: 2024-12-10 | Stop reason: HOSPADM

## 2024-12-10 RX ORDER — ONDANSETRON 2 MG/ML
4 INJECTION INTRAMUSCULAR; INTRAVENOUS ONCE AS NEEDED
Status: DISCONTINUED | OUTPATIENT
Start: 2024-12-10 | End: 2024-12-10 | Stop reason: HOSPADM

## 2024-12-10 RX ORDER — LIDOCAINE HYDROCHLORIDE 10 MG/ML
0.5 INJECTION, SOLUTION EPIDURAL; INFILTRATION; INTRACAUDAL; PERINEURAL ONCE AS NEEDED
Status: DISCONTINUED | OUTPATIENT
Start: 2024-12-10 | End: 2024-12-10 | Stop reason: HOSPADM

## 2024-12-10 RX ADMIN — PROPOFOL 150 MG: 10 INJECTION, EMULSION INTRAVENOUS at 13:44

## 2024-12-10 RX ADMIN — SODIUM CHLORIDE, POTASSIUM CHLORIDE, SODIUM LACTATE AND CALCIUM CHLORIDE 1000 ML: 600; 310; 30; 20 INJECTION, SOLUTION INTRAVENOUS at 13:17

## 2024-12-10 RX ADMIN — Medication 100 MCG: at 14:05

## 2024-12-10 RX ADMIN — ROCURONIUM BROMIDE 100 MG: 10 INJECTION, SOLUTION INTRAVENOUS at 13:44

## 2024-12-10 RX ADMIN — DEXAMETHASONE SODIUM PHOSPHATE 8 MG: 4 INJECTION, SOLUTION INTRA-ARTICULAR; INTRALESIONAL; INTRAMUSCULAR; INTRAVENOUS; SOFT TISSUE at 14:36

## 2024-12-10 RX ADMIN — LIDOCAINE HYDROCHLORIDE 100 MG: 20 INJECTION, SOLUTION EPIDURAL; INFILTRATION; INTRACAUDAL; PERINEURAL at 13:44

## 2024-12-10 RX ADMIN — ACETAMINOPHEN 1000 MG: 500 TABLET, FILM COATED ORAL at 13:19

## 2024-12-10 RX ADMIN — SUGAMMADEX 400 MG: 100 INJECTION, SOLUTION INTRAVENOUS at 14:39

## 2024-12-10 RX ADMIN — ONDANSETRON 4 MG: 2 INJECTION INTRAMUSCULAR; INTRAVENOUS at 14:36

## 2024-12-10 NOTE — ANESTHESIA POSTPROCEDURE EVALUATION
Patient: Michael Paz    Procedure Summary       Date: 12/10/24 Room / Location:  [facility] OR  /  [facility]    Anesthesia Start: 1337 Anesthesia Stop: 1445    Procedures:       BRONCHOSCOPY WITH ION ROBOT and BRONCHOSCOPY WITH ENDOBRONCHIAL ULTRASOUND ([place])      BRONCHOSCOPY WITH ENDOBRONCHIAL ULTRASOUND ([place]) Diagnosis:       Mass of upper lobe of right lung      (Mass of upper lobe of right lung [R91.8])    Surgeons: Lizandro Pagan MD Provider: Can Mireles CRNA    Anesthesia Type: general ASA Status: 3            Anesthesia Type: general    Vitals  Vitals Value Taken Time   /78 12/10/24 1510   Temp 97.2 °F (36.2 °C) 12/10/24 1510   Pulse 62 12/10/24 1510   Resp 16 12/10/24 1510   SpO2 98 % 12/10/24 1510           Post Anesthesia Care and Evaluation    Patient location during evaluation: bedside  Patient participation: complete - patient participated  Level of consciousness: awake and alert  Pain management: adequate    Airway patency: patent  Anesthetic complications: No anesthetic complications  PONV Status: none  Cardiovascular status: acceptable  Respiratory status: acceptable  Hydration status: acceptable    Comments: Pt discharged from PACU based on dimitri score >8       No anesthesia care post op

## 2024-12-10 NOTE — ANESTHESIA PROCEDURE NOTES
Airway  Urgency: elective    Date/Time: 12/10/2024 1:46 PM  Airway not difficult    General Information and Staff    Patient location during procedure: OR  CRNA/CAA: Can Mireles CRNA    Indications and Patient Condition    Preoxygenated: yes  Mask difficulty assessment: 1 - vent by mask    Final Airway Details  Final airway type: endotracheal airway      Successful airway: ETT  Cuffed: yes   Successful intubation technique: video laryngoscopy  Endotracheal tube insertion site: oral  Blade: Levy  Blade size: 3  ETT size (mm): 8.0  Cormack-Lehane Classification: grade I - full view of glottis  Placement verified by: chest auscultation   Cuff volume (mL): 7  Measured from: lips  ETT/EBT  to lips (cm): 22  Number of attempts at approach: 1  Assessment: lips, teeth, and gum same as pre-op and atraumatic intubation

## 2024-12-10 NOTE — OP NOTE
Procedure Note (AdvancedBronchoscopy)    Date of Operation: 12/10/24  Pre-op Diagnosis: Mass of upper lobe of right lung  Post-op Diagnosis: Same  Surgeon: Lizandro Pagan MD  Anesthesia: General    Operation: Flexible fiberoptic bronchoscopy, Bronchoscopy, Diagnostic, Ion robotic shape sensing navigational bronchoscopy, Radial probe endobronchial ultrasound, Linear endobronchial ultrasound, Bronchial wash, Transbronchial biopsy, Transbronchial needle aspirate of mediastinum, and Transbronchial needle aspirate of lung with navigational and fluoroscopic guidance  Findings: normal endobronchial anatomy, concentric radial probe view. Enlarged subcarinal nodes  Specimen:   Specimens       ID Source Type Tests Collected By Collected At Frozen?    A Lung, Right Upper Lobe Fine Needle Aspirate FINE NEEDLE ASPIRATION   Lizandro Pagan MD 12/10/24 1403     Description: RUL - FNA    B Lung, Right Upper Lobe Tissue TISSUE PATHOLOGY EXAM   Lizandro Pagan MD 12/10/24 1416     Description: bx of RUL    C Bronchus Wash NON-GYNECOLOGIC CYTOLOGY   Lizandro Pagan MD 12/10/24 1426     Description: bronchus wash    D Mediastinum Lymph Node FINE NEEDLE ASPIRATION   Lizandro Pagan MD 12/10/24 1428     Description: station 7          Estimated Blood Loss: 10 ml  Complications: none    Indications and History:  The patient is a 79 y.o.  male with Mass of upper lobe of right lung.  The risks, benefits, complications, treatment options and expected outcomes were discussed with the patient.  The possibilities of reaction to medication, pulmonary aspiration, perforation of a viscus, bleeding, failure to diagnose a condition and creating a complication requiring transfusion or operation were discussed with the patient who freely signed the consent.  Time out was taken prior to the procedure.    Description of Procedure:    After the induction of general anesthesia, the patient was positioned supine and the Olympus BF  H190 bronchoscope was introduced through the endotracheal tube.  The scope was then passed into the trachea.     The trachea, mainstem bronchi, RUL, RML, Bronchus Intermedius, RLL, TAVO, TAVO upper division and lingula, and LLL, and all primary segmental airways were examined and were normal except as described below:    Endobronchial findings: scattered scant endobronchial mucus, cleared with suction  Trachea: Normal mucosa  Xavier: Sharp  Right main bronchus: Normal mucosa  Right upper lobe bronchus: Normal mucosa  Right middle lobe bronchus: Normal mucosa  Right lower lobe bronchus: Normal mucosa  Left main bronchus: Normal mucosa  Left upper lobe bronchus: Normal mucosa  Left lower lobe bronchus: Normal mucosa    The conventional bronchoscope was removed .    Using the planning laptop and Planpoint software, the lesion of interest was identified, and marked, a pathway was generated, and anatomic borders were identified.The ION system was magnetically docked to the ETT. The shape sensing catheter with vision probe was introduced via the  into the ETT.    Registration was started by advancing the bronchoscope into the right and left mainstem bronchi. The main xavier was confirmed by rotating the live-view image to match the navigation-view image of the main xavier. Registration was completed by advancing the catheter into the identified the lobar airway.  The entire RUL segmental airways were registered.  A partial registration was utilized.  There was no significant visual divergence. Navigation was complicated by : airway collapse. Using the , the shape sensing bronchoscope was advanced to the target lesion. The mobile C-Arm was brought in, and the vision probe was removed. A peripheral radial ultrasound probe was utilized to better localize the lesion in the right upper lobe posterior segment, giving a concentric view.     Transbronchial needle aspirations of the target lesion were  performed using an Intuitive Flexision 23 gauge needle and sent for routine cytology. The procedure was guided by fluoroscopy and radial ultrasound. Transbronchial needle aspiration technique was selected because the sampling site was not accessible using standard bronchoscopic techniques. 6 needle aspirations were obtained. Rapid On-Site Evaluation: positive for abnormal cells    Transbronchial biopsies of the target lesion were performed  using a Cook DBF-1.8-S Captura spikeless forceps and sent for histopathology examination. The procedure was guided by fluoroscopy and radial ultrasound. Transbronchial biopsy technique was selected because the sampling site was not visible endoscopically.  21  biopsy samples were obtained. 15ml of iced saline was instilled through the Ion robot catheter. The vision probe was reintruced to examine the biopsied area, and then the vision probe, and shape sensing catheter was extracted and catheter guide was disconnected.  The conventional bronchoscope was reintroduced.  Blood and clot in the central airways was evacuated by suction.  No additional I saline was instilled into the right upper lobe approximately 15 mL.  This was all aspirated again.  There was no ongoing bleeding noted.  Bronchoscope was extracted  The Olympus BF-[id number] EBUS bronchoscope was introduced and the mediastinum was examined via linear ultrasound.  Findings: cluster of enlarged subcarinal nodes.  Stations 4R4L 10R and 11R were all negative for pathologic adenopathy.. TBNA under ultrasound guidance was collected using an Olympus VisiShot 2 22g needle from station 7 x 3 passes, Rapid onsite evaluation: Cellular.  Iced saline was instilled and aspirated in small aliquots between needle passes for hemostasis. No ongoing bleeding was identified.  The bronchoscope was removed.    The patient was undocked from the ion robot arm.  The Patient was extubated and taken to the Recovery Room in satisfactory  condition.    Lizandro Pagan MD at 14:58 CST, 12/10/2024

## 2024-12-10 NOTE — ANESTHESIA PREPROCEDURE EVALUATION
Anesthesia Evaluation     no history of anesthetic complications:   NPO Solid Status: > 8 hours  NPO Liquid Status: > 8 hours           Airway   Mallampati: I  TM distance: >3 FB  No difficulty expected  Dental      Pulmonary    (+) pulmonary embolism, a smoker Current, COPD,  Cardiovascular   Exercise tolerance: good (4-7 METS)    (+) hypertension, past MI , CAD, CABG (2v 1990, repair of aneurysm 2008, further bypass 2008, post op infection) >6 Months, cardiac stents more than 12 months ago , dysrhythmias Atrial Fib, PVD, DVT resolved, hyperlipidemia      Neuro/Psych  (-) seizures, TIA, CVA  GI/Hepatic/Renal/Endo    (+) obesity, GI bleeding (recent gi bleed, anemia)   (-) liver disease, no renal disease, diabetes    Musculoskeletal     Abdominal    Substance History      OB/GYN          Other   arthritis, blood dyscrasia anemia,                   Anesthesia Plan    ASA 3     general     intravenous induction     Anesthetic plan, risks, benefits, and alternatives have been provided, discussed and informed consent has been obtained with: patient.    CODE STATUS:

## 2024-12-12 LAB
QT INTERVAL: 370 MS
QTC INTERVAL: 381 MS

## 2024-12-13 ENCOUNTER — TELEPHONE (OUTPATIENT)
Dept: ONCOLOGY | Facility: CLINIC | Age: 79
End: 2024-12-13
Payer: MEDICARE

## 2024-12-13 LAB
BEAKER LAB AP INTRAOPERATIVE CONSULTATION: NORMAL
LAB AP CASE REPORT: NORMAL
LAB AP CLINICAL INFORMATION: NORMAL
LAB AP INTRADEPARTMENTAL CONSULT: NORMAL
Lab: NORMAL
PATH REPORT.FINAL DX SPEC: NORMAL
PATH REPORT.GROSS SPEC: NORMAL

## 2024-12-13 NOTE — TELEPHONE ENCOUNTER
Caller: Michael Paz    Relationship: Self    Best call back number: [phone]    Caller requesting test results: PT    What test was performed: LABS    When was the test performed: 12-    Where was the test performed:      Additional notes: PT WANTED TO KNOW IF DR CARTER WILL BE SCHEDULING A IRON INFUSION DUE TO HIS LAB RESULTS.  PT IS SCHEDULED ON 12-20 AND WOULD LIKE TO HAVE THE INFUSION AT THAT TIME IF NEEDED

## 2024-12-13 NOTE — PROGRESS NOTES
Result discussed with patient.  Nonsmall cell ca.  Pet negative elsewhere, mediastinum negative on ebus.  PFT suitable for surgery.  Pt to keep follow up in January.  Has oncology follow up next week. Will coordinate care with Nacho Snyder and Ross.

## 2024-12-15 NOTE — PROGRESS NOTES
[facility] HEMATOLOGY & ONCOLOGY  [address]  [facility] [address] [phone]    Patient Name: Michael Paz  Encounter Date: 12/20/2024  YOB: 1945  Patient Number: [id number]      REASON FOR VISIT: Michael Paz 79 y.o. male who returns in follow-up of microcytic anemia from acute UGIB.  He has received IV Ferrlecit 250 mg on 10/31/24 and 11/1/24.  He is currently on oral ferrous sulfate 325 p.o. daily.  He has a history of congenital protein S deficiency with prior DVT of the lower extremities and bilateral PE for which he is on long term Coumadin.  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 Pagan who also noted mediastinum negative on EBUS, PET scan negative for abnormal uptake elsewhere and PFTs adequate for surgery.  The patient has been referred to Dr. Hawkins of CT surgery.  He is here alone    I have reviewed the HPI and verified with the patient the accuracy of it. No changes to interval history since the information was documented. Miguel Snyder MD 12/20/24      Diagnostic abnormalities:  - Medical history includes AAA, AAA repair, arthritis, atrial fibrillation (1990), CAD, CABG, coronary stent placement, coronary angioplasty, DVT x 3 ([id number])/bilateral PE (1986), protein S deficiency on maintenance anticoagulation (was on Coumadin), PVD, hyperlipidemia, hypertension, myocardial infarction (1990s), pneumonia (4 times), anemia, EGD (10/25/2024), EtOH use (3-4 standard drinks of alcohol per week) and tobacco abuse (67-pack-year smoker).  -10/24/2024-presented to [facility] ER with weakness, dyspnea, and dark stools.  There he was found to be anemic and was transferred to [facility] for further eval.  Over the past few months patient has had worsening fatigue weakness and dark stools.  On admission hemoglobin 7.7, iron saturation 6%.   Received 1 unit of RBC transfusion with improvement of the hemoglobin to 8.5.  Repeat on 10/25/2024 however showed hemoglobin 7.4.  INR prolonged at 2.94 (patient on Coumadin).  -10/25/2024-seen for GI by   Recommended transfusion of RBC and 2 units FFP prior to EGD.  -10/25/2024-EGD showed normal proximal esophagus and mid esophagus. - Z-line irregular, at the gastroesophageal junction. - Red blood in the stomach. - A single bleeding angioectasia in the stomach. Treated with argon plasma coagulation (APC). Injected-recommendation: Return patient to [facility] for ongoing care. - NPO. - Continue present medications. - Will give additonal unit of FFP and 1 unit of PRBC now. - Monitor for any further bleeding. Monitor hgb/hct. - Normal first portion of the duodenum and second portion of the duodenum. - No specimens collected.  -10/26/2024- Hgb 7.6; MCV 84.1, platelets 143,000, WBC 4.6. CMP normal.  Iron 26 (L), iron saturation 6% (L), TIBC 429, ferritin 40.6 (L), subsequently 3 u FFP, 1 u RBC (per bloodbank)  -10/29/24- CT chest- 1. Spiculated opacity at the RIGHT upper lobe measuring 6.0 x 3.4 cm. Appearance concerning for malignancy. There are several small airways leading to this opacity and endobronchial sampling could be considered. 2. Increased size LEFT lower lobe pulmonary nodule now measuring 2.2 cm, previously 1.9 cm on 3/12/2024. PET/CT may be useful if not previously performed. 3. Enlarged main pulmonary artery, which can be seen with pulmonary artery hypertension. 4. Increased size of lobular mass in the RIGHT coronary artery distribution, favor thrombosed aneurysm or other post operative changes. Difficult to determine change due to available prior exams although appears increased compared to 4/25/2019 with measurements as above.   -10/30/24- Consult pulmonary, Dr. Pagan. A/P: Newly identified mass RUL apex worrisome for malignancy.  Increasing LLL nodule.  >  60-pack-year smoker.  Acute blood loss  anemia.  A-fib.  Anticoagulated, meds held due to bleeding.  History of hypercoagulable state.  Recommendations/plan:  CT is reviewed. The rul lesion is approachable by ion bronchoscopy.  Unfortunately there is a blood vessel running the entire course along the portion of the airway from which we would try to approach the LLL lesion, so this one might not be accessible by ion bronchoscopy.  There is high risk of morbidity from additional diagnostic testing or treatment   PET scanning would be helpful in outpatient setting and would help direct biopsy efforts.  We can order this on discharge with outpatient office follow up for consideration for bronchoscopy or other biopsy technique.  This would require holding anticoagulation again.  Add aerosol bronchodilators to see if this helps him  Outpatient PFT.     -10/30/24- Hgb 8.4, Hct 28.9, PT 17.8, INR 1.41  -10/31/24- Protein S Ag total 28 (ref: %); Protein S Ag Free 14 (ref: %)  -11/7/24- PET scan-1.  6 cm hypermetabolic spiculated right upper lobe lung mass, most likely representing a primary lung neoplasm. 2.  No evidence of hypermetabolic metastatic disease in the neck, chest, abdomen, or pelvis. No hypermetabolic lymphadenopathy in the chest. 3.  2 cm solid round circumscribed nodule in the left lower lobe with  max SUV 2.9, below liver background level. This nodule has slowly increased in size, measuring 1.3 cm on an exam from 2022. I suspect this represents a noncalcified granuloma or hamartoma but a slow-growing neoplasm is not entirely excluded and continued imaging surveillance is recommended.  -11/14/24- MRI brain- Age-related changes with atrophy and small vessel disease.  No intracranial metastases.  - 12/10/24-  Flexible fiberoptic bronchoscopy, Bronchoscopy, Diagnostic, Ion robotic shape sensing navigational bronchoscopy, Radial probe endobronchial ultrasound, Linear endobronchial ultrasound, Bronchial wash, Transbronchial biopsy,  Transbronchial needle aspirate of mediastinum, and Transbronchial needle aspirate of lung with navigational and fluoroscopic guidance.  Findings: normal endobronchial anatomy, concentric radial probe view. Enlarged subcarinal nodes.    Final diagnosis:  1.  Lung, right upper lobe, Ion fine-needle aspiration: MALIGNANT-   Non-small cell carcinoma. 2.  Mediastinum, station 7 EBUS: BENIGN-   Bloody with small lymphocytes and benign-appearing respiratory epithelial cells.3.  Bronchus, bronchial washing with cellblock:  BENIGN-   Macrophages, benign-appearing respiratory epithelial cells (some reactive), and a few small lymphocytes,: In a bloody, proteinaceous, and mucoid background. 4.  Lung, right upper lobe, biopsy, three blocks:  BENIGN-   Variably disrupted respiratory mucosa, and fresh clot.     Previous interventions:  -Received 1 unit RBC (received 1 unit RBC prior to transfer to this facility, 10/24/24) and 3 units FFP transfusions, 10/25/24  -Ferrlecit 250 mg IV on 10/31/24 and 11/1/24.    -Ferrous sulfate 325 p.o. daily, 11/2/2024-present      Problem List Items Addressed This Visit    None    Oncology/Hematology History    No history exists.       PAST MEDICAL HISTORY:  ALLERGIES:  No Known Allergies  CURRENT MEDICATIONS:  Outpatient Encounter Medications as of 12/20/2024   Medication Sig Dispense Refill    Ascorbic Acid (VITAMIN C PO) Take 2,000 mg by mouth Daily.      candesartan (ATACAND) 16 MG tablet Take 1 tablet by mouth Daily.      Enoxaparin Sodium (LOVENOX IJ) Inject 1 dose as directed 2 (Two) Times a Day. Pt does not know dosage      ferrous sulfate 325 (65 FE) MG tablet Take 1 tablet by mouth Daily With Breakfast. 90 tablet 0    metoprolol succinate XL (TOPROL-XL) 25 MG 24 hr tablet Take 1 tablet by mouth Daily.      Multiple Vitamins-Minerals (ONE A DAY MEN 50 PLUS PO) Take 1 tablet by mouth Daily.      simvastatin (ZOCOR) 80 MG tablet Take 1 tablet by mouth Every Night.      Pete Cantrell  100-62.5-25 MCG/ACT inhaler Inhale 1 puff Daily.      warfarin (COUMADIN) 4 MG tablet Take 1 tablet by mouth Every Other Day. Alternates with 5mg, pt states been on hold and been doing lovonox injections      pantoprazole (PROTONIX) 40 MG EC tablet Take 1 tablet by mouth Every 12 (Twelve) Hours. (Patient not taking: Reported on 12/20/2024) 60 tablet 0    [DISCONTINUED] furosemide (LASIX) 40 MG tablet Take 1 tablet by mouth Daily. 30 tablet 0     No facility-administered encounter medications on file as of 12/20/2024.     ADULT ILLNESSES:  Patient Active Problem List   Diagnosis Code    Tobacco abuse Z72.0    Hypertension I10    Hyperlipidemia E78.5    Endoleak post endovascular aneurysm repair [id number]    AAA (abdominal aortic aneurysm) I71.40    Aneurysm I72.9    CAD (coronary artery disease) I25.10    Right-sided epistaxis R04.0    Hx pulmonary embolism Z86.711    Hx of deep venous thrombosis Z86.718    Rhinitis, chronic J31.0    Anticoagulant adverse reaction T45.515A    Epistaxis R04.0    Lung nodule R91.1    Protein C deficiency D68.59    Protein S deficiency D68.59    Atrial fibrillation, chronic I48.20    Symptomatic anemia D64.9    S/P CABG (coronary artery bypass graft) Z95.1    Stage 2 moderate COPD by GOLD classification J44.9    Mass of upper lobe of right lung R91.8     SURGERIES:  Past Surgical History:   Procedure Laterality Date    ABDOMINAL AORTIC ANEURYSM REPAIR      x2    APPENDECTOMY      BACK SURGERY      x2    BRONCHOSCOPY N/A 12/10/2024    Procedure: BRONCHOSCOPY WITH ENDOBRONCHIAL ULTRASOUND;  Surgeon: Lizandro Pagan MD;  Location:  [facility] OR;  Service: Pulmonary;  Laterality: N/A;  preop; RUL mass   postop RUL mass   PCP Henrietta Scruggs    BRONCHOSCOPY WITH ION ROBOTIC ASSIST N/A 12/10/2024    Procedure: BRONCHOSCOPY WITH ION ROBOT and BRONCHOSCOPY WITH ENDOBRONCHIAL ULTRASOUND;  Surgeon: Lizandro Pagan MD;  Location:  [facility] OR;  Service: Robotics - Pulmonary;  Laterality: N/A;   preop; RUL mass   postop RUL mass   PCP Henrietta Scruggs    CARDIAC CATHETERIZATION      CORONARY ANGIOPLASTY      CORONARY ARTERY BYPASS GRAFT      1990 and 2008    CORONARY STENT PLACEMENT      ENDOSCOPY N/A 10/25/2024    Procedure: ESOPHAGOGASTRODUODENOSCOPY WITH ANESTHESIA;  Surgeon: Fareed Velasco MD;  Location: [facility] ENDOSCOPY;  Service: Gastroenterology;  Laterality: N/A;  Pre: Symptomatic anemia;  Post: Bleeding gastric AVM;  Nausea and vomiting    HEMORRHOIDECTOMY      x2    INGUINAL HERNIA REPAIR      KNEE SURGERY Right     LUNG BIOPSY      left lower lung    OTHER SURGICAL HISTORY      TYPE IA ENDOLEAK REPAIR    OTHER SURGICAL HISTORY      CRANIAL CYST     HEALTH MAINTENANCE ITEMS:  Health Maintenance Due   Topic Date Due    COLORECTAL CANCER SCREENING  Never done    ZOSTER VACCINE (1 of 2) Never done    HEPATITIS C SCREENING  Never done    ANNUAL WELLNESS VISIT  Never done    LIPID PANEL  07/09/2021    RSV Vaccine - Adults (1 - 1-dose 75+ series) Never done    COVID-19 Vaccine (5 - 2024-25 season) 09/01/2024       <no information>  Last Completed Colonoscopy       This patient has no relevant Health Maintenance data.          Immunization History   Administered Date(s) Administered    COVID-19 (MODERNA) 1st,2nd,3rd Dose Monovalent 03/24/2021, 04/21/2021    COVID-19 (MODERNA) BIVALENT 12+YRS 12/13/2022    COVID-19 (MODERNA) Monovalent Original Booster 01/18/2022    Fluzone (or Fluarix & Flulaval for VFC) >6mos 12/13/2022    Fluzone High-Dose 65+YRS 10/27/2021    Fluzone High-Dose 65+yrs 11/17/2020, 12/13/2023    Influenza Seasonal Injectable 11/15/2017, 10/30/2018    Pneumococcal Polysaccharide (PPSV23) 10/27/2021    Tdap 07/08/2020, 12/13/2023     Last Completed Mammogram       This patient has no relevant Health Maintenance data.              FAMILY HISTORY:  Family History   Problem Relation Age of Onset    Heart disease Mother     Heart disease Father     Heart disease Other     Hypertension Other   "   Diabetes Other      SOCIAL HISTORY:  Social History     Socioeconomic History    Marital status:    Tobacco Use    Smoking status: Every Day     Current packs/day: 1.00     Average packs/day: 1 pack/day for 67.6 years (67.6 ttl pk-yrs)     Types: Cigarettes     Start date: 6/1/1957     Passive exposure: Current    Smokeless tobacco: Never    Tobacco comments:     Don't inhale.   Vaping Use    Vaping status: Never Used   Substance and Sexual Activity    Alcohol use: Yes     Alcohol/week: 3.0 - 4.0 standard drinks of alcohol     Types: 3 - 4 Cans of beer per week     Comment: SOCIAL    Drug use: No    Sexual activity: Not Currently     Partners: Female         Review of Systems:   Constitutional: Fatigue and weakness improved, \"been feeling stronger.\"  Good appetite.  No fever / No chills / No sweats.  Is up and about \"all of the time cause I have to.\"  Manages his ADLs including chores and is again driving.  Lives alone  HEENT:  No sore throat / No hoarseness / No vision changes  CV:  No chest pain / No palpitations / No orthopnea  Respiratory:  No cough /+ exertional shortness of breath but no sob with routine activities nor at rest/ No sputum / No hemoptysis  GI:  No nausea / No vomiting / No abdominal pain / No diarrhea / No constipation/no melena/no hematochezia.  Last bowel movement this morning was described as, \"normal brown color\".  :  No dysuria / No hesitancy / No urgency / No hematuria  Neuro:  Muscle weakness overall much improved \"back to my old self\" / No dysphagia / No headache / No paresthesias  Musculoskeletal:  + Chronic edema / No cyanosis / No pain      VITAL SIGNS: /98   Pulse 83   Temp 96.9 °F (36.1 °C) (Temporal)   Resp 18   Ht 183 cm (72.05\")   Wt 121 kg (266 lb 4.8 oz)   SpO2 94%   BMI 36.07 kg/m² Body surface area is 2.41 meters squared.  Pain Score    12/20/24 1051   PainSc: 0-No pain            Physical Exam:  General appearance: Pleasant, obese, modestly kept " elderly male who appears comfortable seated.  Alert, appears stated age and cooperative. ECOG 1 (prior: 1-2)  Has lost 1 lb (had gained 2 lb at his prior visit) since last visit  Skin:  Skin color is less pale. No rashes or lesions  HEENT:  Head: Normal, normocephalic, atraumatic.  Neck:  no adenopathy, no tenderness/mass/nodules  Lungs:  clear to auscultation bilaterally  Heart:  regular rate and rhythm  Abdomen:  soft, globose, nontender.  Extremities: Symmetric with 1+ bipedal edema.  Neurologic:  Mental status: Alert, oriented, thought content appropriate    LABS    Lab Results - Last 18 Months   Lab Units 12/10/24  1310 11/20/24  0958 11/01/24  0804 10/31/24  2347 10/31/24  1559 10/31/24  1251 10/30/24  2317 10/26/24  1634 10/26/24  0651 10/25/24  1246 10/25/24  0656 10/24/24  2023 10/23/24  1300   HEMOGLOBIN g/dL 11.4* 9.9* 8.7* 8.4* 8.2* 8.8* 8.4*   < > 7.6*   < > 7.4*   < > 7.7*   HEMATOCRIT % 35.6*  --  28.8* 28.3* 27.6* 29.2* 28.9*   < > 26.0*   < > 25.5*  --  27.1*   MCV fL 84.8  --   --   --   --  82.5  --   --  84.1  --  81.7  --  83   WBC 10*3/mm3 5.83  --   --   --   --  6.19  --   --  4.60  --  4.47  --  7.7   RDW % 23.0*  --   --   --   --  16.7*  --   --  16.4*  --  16.4*  --  15.6*   MPV fL 9.5  --   --   --   --  10.2  --   --  8.9  --  8.9  --   --    PLATELETS 10*3/mm3 204 174  --   --   --  172  --   --  143  --  169  --  262   IMM GRAN % %  --   --   --   --   --  0.2  --   --  0.4  --  0.4  --   --    NEUTROS ABS 10*3/mm3  --   --   --   --   --  4.01  --   --  3.10  --  2.81  --  5.3   LYMPHS ABS 10*3/mm3  --   --   --   --   --  0.96  --   --  0.71  --  0.85  --  1.3   MONOS ABS 10*3/mm3  --   --   --   --   --  0.94*  --   --  0.55  --  0.58  --  0.9   EOS ABS 10*3/mm3  --   --   --   --   --  0.24  --   --  0.20  --  0.19  --  0.1   BASOS ABS 10*3/mm3  --   --   --   --   --  0.03  --   --  0.02  --  0.02  --  0.0   IMMATURE GRANS (ABS) 10*3/mm3  --   --   --   --   --  0.01  --   --   "0.02  --  0.02  --   --    NRBC /100 WBC  --   --   --   --   --  0.0  --   --  0.0  --  0.0  --   --     < > = values in this interval not displayed.       Lab Results - Last 18 Months   Lab Units 12/10/24  1310 11/14/24  0638 10/25/24  0656 10/23/24  1300 03/12/24  1059   GLUCOSE mg/dL 101*  --  99 84  --    SODIUM mmol/L 141  --  139 139  --    POTASSIUM mmol/L 4.4  --  4.2 4.9  --    CO2 mmol/L 23.0  --  21.0* 20  --    CHLORIDE mmol/L 107  --  106 105  --    ANION GAP mmol/L 11.0  --  12.0  --   --    CREATININE mg/dL 0.92 1.40* 0.88 1.12 1.10   BUN mg/dL 17  --  20 21  --    BUN / CREAT RATIO  18.5  --  22.7 19  --    CALCIUM mg/dL 9.7  --  8.9 9.3  --    ALK PHOS U/L  --   --  63 73  --    TOTAL PROTEIN g/dL  --   --  6.1  --   --    ALT (SGPT) U/L  --   --  13 18  --    AST (SGOT) U/L  --   --  19 27  --    BILIRUBIN mg/dL  --   --  0.7 0.6  --    ALBUMIN g/dL  --   --  3.5 4.3  --    GLOBULIN gm/dL  --   --  2.6  --   --        No results for input(s): \"MSPIKE\", \"KAPPALAMB\", \"IGLFLC\", \"URICACID\", \"FREEKAPPAL\", \"CEA\", \"LDH\", \"REFLABREPO\" in the last 65325 hours.    Lab Results - Last 18 Months   Lab Units 10/25/24  0656   IRON mcg/dL 26*   TIBC mcg/dL 429   IRON SATURATION (TSAT) % 6*   FERRITIN ng/mL 40.63         ASSESSMENT:   Pulmonary non-small cell carcinoma, right upper lobe  --Original tumor stage:  IIB (cT3, cN0, M0)  --Original tumor burden:    -11/7/24- PET scan- 6 cm hypermetabolic spiculated right upper lobe lung mass, most likely representing a primary lung neoplasm. No evidence of hypermetabolic metastatic disease in the neck, chest, abdomen, or pelvis. No hypermetabolic lymphadenopathy in the chest. 2 cm solid round circumscribed nodule in the left lower lobe with max SUV 2.9, below liver background level. This nodule has slowly increased in size, measuring 1.3 cm on an exam from 2022. I suspect this represents a noncalcified granuloma or hamartoma but a slow-growing neoplasm is not entirely " "excluded and continued imaging surveillance is recommended.  - 12/10/24-  Flexible fiberoptic bronchoscopy, Bronchoscopy, Diagnostic, Ion robotic shape sensing navigational bronchoscopy, Radial probe endobronchial ultrasound, Linear endobronchial ultrasound, Bronchial wash, Transbronchial biopsy, Transbronchial needle aspirate of mediastinum, and Transbronchial needle aspirate of lung with navigational and fluoroscopic guidance.  Findings: normal endobronchial anatomy, concentric radial probe view. Enlarged subcarinal nodes.    Final diagnosis:  1.  Lung, right upper lobe, Ion fine-needle aspiration: MALIGNANT-   Non-small cell carcinoma. 2.  Mediastinum, station 7 EBUS: BENIGN-   Bloody with small lymphocytes and benign-appearing respiratory epithelial cells.3.  Bronchus, bronchial washing with cellblock:  BENIGN-   Macrophages, benign-appearing respiratory epithelial cells (some reactive), and a few small lymphocytes,: In a bloody, proteinaceous, and mucoid background. 4.  Lung, right upper lobe, biopsy, three blocks:  BENIGN-   Variably disrupted respiratory mucosa, and fresh clot.    -- Tumor status:  Untreated    2.   Iron deficiency anemia from recent/acute upper GI bleed.  -Has received at least 1 unit RBC (received 1 unit RBC prior to transfer to this facility) and 3 units FFP transfusions this admission.  -10/25/2024- EGD normal proximal esophagus and mid esophagus. - Z-line irregular, at the gastroesophageal junction. - Red blood in the stomach. - A single bleeding angioectasia in the stomach. Treated with argon plasma coagulation (APC). Injected  - Hgb 11.7, 12/12/2024 (prior manohar: Hgb 7.7, 10/23/2024)  - 12/12/2024-ferritin 97, fe 73, fe sat 23  3.   History of protein S deficiency with prior DVT x 3 of the left leg ([id number]) and prior bilateral PE (1986).  Patient states that 3 of his sisters and \"13 maternal cousins\" carry the genetic deficiency.  -10/31/24- Protein S Ag total 28 (ref: %); " Protein S Ag Free 14 (ref: %)  4.   Paroxysmal atrial fibrillation   5.   Maintenance anticoagulation (since resumed).  States that he maintains INR = 2-2.4  6.   COPD  7.   >  60-pack-year cigarette smoking history.  8.   CAD with prior coronary stent, CABG, and MI.        PLAN:  Labs ([facility]), 12/12/24 -CEA 5.6, ferritin 97, fe 73, fe sat 23, normal CMP, Hgb 11.7 otherwise normal CBC, INR 1.0 (followed by pcp- CAIT Palencia)  Note MRI brain, 11/14/24 (above).  No mets and labs from 12/10/24 with stable anemia, normal BMP and INR 1.12  Discussed results of bronchoscopy/EBUS, 12/10/24 (above).  +NSCca RUL, negative mediastinal node biopsies  Review PET scan, 11/7/2024 (above).  Solitary 6 cm RUL mass and 2 cm LLL mass  Patient insists on anticoagulation fully aware of the risk of rebleeding given the background of acute GI bleed.  Thus, since he has not had any recent thrombotic episodes, I suggested new INR goal of ~ 1.5-2.2 range (followed by PCP)  Send biopsy specimens from 12/10/24  for Tempus xT lung molecular profile- including PD-L1, EGFR, BRAF, ALK, ROS1, RET, MET, HER2, etc.    Review NCCN guidelines non-small cell lung cancer, 11.2024-Stage IIB (T3N0), IIIA (T3, N1). Evaluate for perioperative therapy, PFTs, pathologic mediastinal node evaluation, PET/CT scan and brain MRI- NO tierra disease- Operable:  Preop systemic therapy then resection+mediastinal node sampling/exploration.  If medically inoperable, high risk surgical risk per CTS or decline surgery:  Definitive RT (preferable SABR)-Consider adjuvant chemo for high riak stage II (poorly differentiated tumors, including lung neuroendocrine tumors [excluding well-differentiated neuroendocrine tumors])     Re: Discussed the potential toxicities of paclitaxel (to include but not limited to: Myelosuppression, opportunistic infection, neuropathy, hypersensitivity reaction, anaphylaxis, cardiac conduction disturbance, bradycardia,  arrhythmias, hypothyroidism, syncope, hypertension, thromboembolism, myocardial infarction, severe injection site reaction, pulmonary toxicity, neurotoxicity, GI obstruction/perforation, paralytic ileus, ischemic colitis, pancreatitis, hepatotoxicity, severe skin reaction, febrile neutropenia, alopecia, arthralgias/myalgias, nausea/vomiting, diarrhea, mucositis, asthenia, bleeding, bradycardia, edema). Questions answered.  He will agree to a trial of therapy.    Re: Discussed potential toxicities of carboplatin (to include but not limited to: Anaphylactic reaction, nephrotoxicity, myelosuppression, O2 toxicity, neuropathy, nausea/ vomiting, vision loss, severe hypokalemia, hyponatremia, hypocalcemia, hypomagnesemia, elevated liver enzymes, pain, asthenia, paresthesias, abdominal pain, diarrhea, constipation, mucositis, bleeding, alopecia, injection site reaction).  Questions answered.  He will agree to a trial of therapy.   Re:  Nivolumab.  The potential toxicities were noted - to include but not limited to: Gastrointestinal toxicity (diarrhea/colitis), hepatotoxicity, nephrotoxicity, pulmonary toxicity (immune mediated pneumonitis/interstitial lung disease), thyroid disorders (immune mediated hyperthyroidism and hypothyroidism), other immune mediated toxicities (adrenal insufficiency, autoimmune neuropathy, demyelination, facial/abducens nerve paresis, motor dysfunction, pancreatitis, uveitis, vasculitis, diabetic ketoacidosis, Guillain-Barré syndrome, hypopituitarism, myasthenic syndrome, and up to 2% deaths from pneumonitis. Questions were answered to the best of my ability and to his apparent satisfaction. He agrees to a trial of therapy     11.. Schedule treatment (plan: Every 21 days x 3 cycles)- C1, 12/30/2024; C2, 1/20/2025; C3, 2/10/2025        Nivolumab (Opdivo) 360 mg IV per administration guidelines   Taxol 175 mg/m2 per administration guidelines   Carboplatin AUC 5 per administration  guidelines.      Premedicate with:   Aloxi 0.25 mg IV before chemo   Decadron 10 mg p.o. IV before chemo   Pepcid 20 mg IV   Benadryl 50 mg IV     12.  TSH, T4, cortisol, ACTH, CMP and CBC weekly. Hold if creatinine > 1.5, total bilirubin > 1.5, AST/ALT > 3x ULN, TSH <0.5 or > 2. Administer Procrit 40,000 units subcutaneously if hemoglobin less than 10 or hematocrit less than 30. Neupogen 480 mcg sc x 3 days if ANC < 1.0     13.  Opdivo questioner to be filled out before each infusion.          -Communicate to physician:   If moderate to severe renal toxicity, administer corticosteroids and hold treatment until it resolves.   If moderate hepatic toxicity, administer corticosteroids and hold treatment until it resolves.   If great 2 or 3 colitis, administer corticosteroids and hold treatment until it resolves.   If great 2 pneumonitis, administer corticosteroids and hold treatment until it resolves.   Permanently discontinue if unable to reduce prednisone to 10 mg or less within 12 weeks; creatinine greater than 6 times upper limit of normal; LFTs greater than 5 times upper limit of normal; total bilirubin greater than 3 times upper limit of normal; grade 4 colitis; grade 3-4 pneumonitis; if TSH less than 0.5 or greater than 2, then obtain a free T4 every subsequent cycle.     14.  Refer to general surgery Re: Port placement     15.  eRx:                     Zofran 8 mg p.o. 3 times daily as needed, #30 - Rx     16.  Reappoint to Dr. Hawkins ~ 4 weeks after completion of systemic therapy and surgery in ~ 6 weeks post systemic therapy    17.  Return to office in 6 weeks with preoffice CEA, CMP, CBC w diff and CEA     MEDICAL DECISION MAKING: High Complexity   AMOUNT OF DATA: Extensive   RISK OF COMPLICATIONS: High      Time: I spent ~93 minutes caring for Michael on this date of service. This time includes time spent by me in the following activities: preparing for the visit, reviewing tests, performing a medically  appropriate examination and/or evaluation, counseling and educating the patient/family/caregiver, ordering medications, tests, or procedures and documenting information in the medical record

## 2024-12-20 ENCOUNTER — OFFICE VISIT (OUTPATIENT)
Dept: ONCOLOGY | Facility: CLINIC | Age: 79
End: 2024-12-20
Payer: MEDICARE

## 2024-12-20 VITALS
TEMPERATURE: 96.9 F | DIASTOLIC BLOOD PRESSURE: 98 MMHG | HEIGHT: 72 IN | BODY MASS INDEX: 36.07 KG/M2 | SYSTOLIC BLOOD PRESSURE: 152 MMHG | RESPIRATION RATE: 18 BRPM | OXYGEN SATURATION: 94 % | HEART RATE: 83 BPM | WEIGHT: 266.3 LBS

## 2024-12-20 DIAGNOSIS — R94.6 ABNORMAL RESULTS OF THYROID FUNCTION STUDIES: ICD-10-CM

## 2024-12-20 DIAGNOSIS — C34.90 NON-SMALL CELL LUNG CANCER, UNSPECIFIED LATERALITY: Primary | ICD-10-CM

## 2024-12-20 RX ORDER — FLUTICASONE FUROATE, UMECLIDINIUM BROMIDE AND VILANTEROL TRIFENATATE 100; 62.5; 25 UG/1; UG/1; UG/1
1 POWDER RESPIRATORY (INHALATION) DAILY
COMMUNITY
Start: 2024-11-15

## 2024-12-23 ENCOUNTER — TELEPHONE (OUTPATIENT)
Dept: SURGERY | Facility: CLINIC | Age: 79
End: 2024-12-23
Payer: MEDICARE

## 2024-12-23 ENCOUNTER — PATIENT ROUNDING (BHMG ONLY) (OUTPATIENT)
Dept: SURGERY | Facility: CLINIC | Age: 79
End: 2024-12-23
Payer: MEDICARE

## 2024-12-23 ENCOUNTER — OFFICE VISIT (OUTPATIENT)
Dept: SURGERY | Facility: CLINIC | Age: 79
End: 2024-12-23
Payer: MEDICARE

## 2024-12-23 VITALS
WEIGHT: 266 LBS | HEART RATE: 83 BPM | DIASTOLIC BLOOD PRESSURE: 78 MMHG | SYSTOLIC BLOOD PRESSURE: 154 MMHG | HEIGHT: 72 IN | BODY MASS INDEX: 36.03 KG/M2 | OXYGEN SATURATION: 92 %

## 2024-12-23 DIAGNOSIS — C34.90 NON-SMALL CELL LUNG CANCER, UNSPECIFIED LATERALITY: ICD-10-CM

## 2024-12-23 DIAGNOSIS — Z79.899 ENCOUNTER FOR LONG-TERM (CURRENT) USE OF HIGH-RISK MEDICATION: Primary | ICD-10-CM

## 2024-12-23 DIAGNOSIS — Z79.01 CHRONIC ANTICOAGULATION: ICD-10-CM

## 2024-12-23 DIAGNOSIS — R91.8 MASS OF UPPER LOBE OF RIGHT LUNG: ICD-10-CM

## 2024-12-23 DIAGNOSIS — Z45.2 ENCOUNTER FOR CARE RELATED TO PORT-A-CATH: Primary | ICD-10-CM

## 2024-12-23 RX ORDER — FAMOTIDINE 10 MG/ML
20 INJECTION, SOLUTION INTRAVENOUS ONCE
OUTPATIENT
Start: 2025-01-02

## 2024-12-23 RX ORDER — DIPHENHYDRAMINE HYDROCHLORIDE 50 MG/ML
50 INJECTION INTRAMUSCULAR; INTRAVENOUS AS NEEDED
OUTPATIENT
Start: 2025-01-02

## 2024-12-23 RX ORDER — FAMOTIDINE 10 MG/ML
20 INJECTION, SOLUTION INTRAVENOUS AS NEEDED
OUTPATIENT
Start: 2025-01-02

## 2024-12-23 RX ORDER — ENOXAPARIN SODIUM 100 MG/ML
1 INJECTION SUBCUTANEOUS EVERY 12 HOURS SCHEDULED
Qty: 9.6 ML | Refills: 0 | Status: SHIPPED | OUTPATIENT
Start: 2024-12-23 | End: 2024-12-27

## 2024-12-23 RX ORDER — HYDROCORTISONE SODIUM SUCCINATE 100 MG/2ML
100 INJECTION INTRAMUSCULAR; INTRAVENOUS AS NEEDED
OUTPATIENT
Start: 2025-01-02

## 2024-12-23 RX ORDER — SODIUM CHLORIDE 9 MG/ML
20 INJECTION, SOLUTION INTRAVENOUS ONCE
OUTPATIENT
Start: 2025-01-02

## 2024-12-23 RX ORDER — PALONOSETRON 0.05 MG/ML
0.25 INJECTION, SOLUTION INTRAVENOUS ONCE
OUTPATIENT
Start: 2025-01-02

## 2024-12-23 RX ORDER — DIPHENHYDRAMINE HYDROCHLORIDE 50 MG/ML
50 INJECTION INTRAMUSCULAR; INTRAVENOUS ONCE
Start: 2025-01-02 | End: 2025-01-02

## 2024-12-23 NOTE — TELEPHONE ENCOUNTER
Called patient to let him know that the RX Lovenox has been sent to [facility] and to take 4 days prior to surgery. No shots the morning of surgery.  Restart coumadin the day after surgery. Pt understood.

## 2024-12-23 NOTE — PROGRESS NOTES
Office New Patient History and Physical:     Referring Provider: Miguel Snyder MD    Chief Complaint   Patient presents with    Lung Cancer       Subjective .     History of present illness:  Michael Paz is a 79 y.o. male who presents to the clinic for discussion of port placement. He is in need of a port for treatment of non-small cell lung cancer of the right upper lobe. He has never had a port before. He does not have any other devices in his chest. He does wear 2L NC at night time.     BMI is 36.03. He takes 4mg of Coumadin QD. He is an every day smoker of 0.5 PPD. He has bridged with Lovenox. 2 shots starting 5 days before, nothing the day before, starting Coumadin the day after surgery.     History of Present Illness         Review of Systems    Review of Systems - General ROS: negative  ENT ROS: negative  Respiratory ROS: positive for - RUL lung cancer  Cardiovascular ROS: no chest pain or dyspnea on exertion  Gastrointestinal ROS: no abdominal pain, change in bowel habits, or black or bloody stools  Genito-Urinary ROS: no dysuria, trouble voiding, or hematuria  Dermatological ROS: negative   Breast ROS: negative for breast lumps  Hematological and Lymphatic ROS: negative  Musculoskeletal ROS: negative   Neurological ROS: no TIA or stroke symptoms    Psychological ROS: negative  Endocrine ROS: negative    History  Past Medical History:   Diagnosis Date    AAA (abdominal aortic aneurysm)     Aneurysm     Arthritis     Atrial fibrillation 1990    CAD (coronary artery disease)     CAD (coronary artery disease)     Clotting disorder 2012    Deep vein thrombosis [id number]    Depression     Endoleak post endovascular aneurysm repair     History of DVT (deep vein thrombosis)     History of foot fracture     BILATERAL FOOT FRACTURES    History of fracture of left ankle     History of pulmonary embolus (PE)     History of transfusion 2012 and 2020    Hyperlipidemia     Hypertension     Mass of upper lobe of  right lung 11/20/2024    Myocardial infarction 1990    Peripheral vascular disease     Pneumonia 4 times    Pulmonary embolism 1986    Tobacco abuse    ,   Past Surgical History:   Procedure Laterality Date    ABDOMINAL AORTIC ANEURYSM REPAIR      x2    APPENDECTOMY      BACK SURGERY      x2    BRONCHOSCOPY N/A 12/10/2024    Procedure: BRONCHOSCOPY WITH ENDOBRONCHIAL ULTRASOUND;  Surgeon: Lizandro Pagan MD;  Location: [facility] OR;  Service: Pulmonary;  Laterality: N/A;  preop; RUL mass   postop RUL mass   PCP Henrietta Scruggs    BRONCHOSCOPY WITH ION ROBOTIC ASSIST N/A 12/10/2024    Procedure: BRONCHOSCOPY WITH ION ROBOT and BRONCHOSCOPY WITH ENDOBRONCHIAL ULTRASOUND;  Surgeon: Lizandro Pagan MD;  Location:  [facility] OR;  Service: Robotics - Pulmonary;  Laterality: N/A;  preop; RUL mass   postop RUL mass   PCP Henrietta Scruggs    CARDIAC CATHETERIZATION      CORONARY ANGIOPLASTY      CORONARY ARTERY BYPASS GRAFT      1990 and 2008    CORONARY STENT PLACEMENT      ENDOSCOPY N/A 10/25/2024    Procedure: ESOPHAGOGASTRODUODENOSCOPY WITH ANESTHESIA;  Surgeon: Fareed Velasco MD;  Location: [facility] ENDOSCOPY;  Service: Gastroenterology;  Laterality: N/A;  Pre: Symptomatic anemia;  Post: Bleeding gastric AVM;  Nausea and vomiting    HEMORRHOIDECTOMY      x2    INGUINAL HERNIA REPAIR      KNEE SURGERY Right     LUNG BIOPSY      left lower lung    OTHER SURGICAL HISTORY      TYPE IA ENDOLEAK REPAIR    OTHER SURGICAL HISTORY      CRANIAL CYST   ,   Family History   Problem Relation Age of Onset    Heart disease Mother     Heart disease Father     Heart disease Other     Hypertension Other     Diabetes Other    ,   Social History     Tobacco Use    Smoking status: Every Day     Current packs/day: 1.00     Average packs/day: 1 pack/day for 67.6 years (67.6 ttl pk-yrs)     Types: Cigarettes     Start date: 6/1/1957     Passive exposure: Current    Smokeless tobacco: Never    Tobacco comments:     Don't inhale.   Vaping Use     "Vaping status: Never Used   Substance Use Topics    Alcohol use: Yes     Alcohol/week: 3.0 - 4.0 standard drinks of alcohol     Types: 3 - 4 Cans of beer per week     Comment: SOCIAL    Drug use: No   , (Not in a hospital admission)   and Allergies:  Patient has no known allergies.    Current Outpatient Medications:     Ascorbic Acid (VITAMIN C PO), Take 2,000 mg by mouth Daily., Disp: , Rfl:     candesartan (ATACAND) 16 MG tablet, Take 1 tablet by mouth Daily., Disp: , Rfl:     ferrous sulfate 325 (65 FE) MG tablet, Take 1 tablet by mouth Daily With Breakfast., Disp: 90 tablet, Rfl: 0    metoprolol succinate XL (TOPROL-XL) 25 MG 24 hr tablet, Take 1 tablet by mouth Daily., Disp: , Rfl:     Multiple Vitamins-Minerals (ONE A DAY MEN 50 PLUS PO), Take 1 tablet by mouth Daily., Disp: , Rfl:     simvastatin (ZOCOR) 80 MG tablet, Take 1 tablet by mouth Every Night., Disp: , Rfl:     Trelegy Ellipta 100-62.5-25 MCG/ACT inhaler, Inhale 1 puff Daily., Disp: , Rfl:     warfarin (COUMADIN) 4 MG tablet, Take 1 tablet by mouth Every Other Day. Alternates with 5mg, pt states been on hold and been doing lovonox injections, Disp: , Rfl:     Enoxaparin Sodium (LOVENOX) 80 MG/0.8ML solution prefilled syringe syringe, Inject 1.2 mL under the skin into the appropriate area as directed Every 12 (Twelve) Hours for 4 days., Disp: 9.6 mL, Rfl: 0    Objective     Vital Signs   /78   Pulse 83   Ht 183 cm (72.05\")   Wt 121 kg (266 lb)   SpO2 92%   BMI 36.03 kg/m²      Physical Exam:  General appearance - alert, well appearing, and in no distress  Mental status - normal mood, behavior, speech, dress, motor activity, and thought processes  Eyes - sclera anicteric  Neck - supple, no significant adenopathy  Chest - no tachypnea, retractions or cyanosis  Heart - normal rate and regular rhythm  Neurological - alert, oriented, normal speech, no focal findings or movement disorder noted  Extremities - no pedal edema, no clubbing or " cyanosis  Skin - normal coloration and turgor, no rashes, no suspicious skin lesions noted    Physical Exam      Results         Assessment & Plan     Diagnoses and all orders for this visit:    1. Encounter for care related to Port-a-Cath (Primary)  -     Case Request; Standing  -     ceFAZolin (ANCEF) 3,000 mg in sodium chloride 0.9 % 100 mL IVPB  -     Case Request    2. Mass of upper lobe of right lung  -     Case Request; Standing  -     ceFAZolin (ANCEF) 3,000 mg in sodium chloride 0.9 % 100 mL IVPB  -     Case Request    3. Chronic anticoagulation  -     Enoxaparin Sodium (LOVENOX) 80 MG/0.8ML solution prefilled syringe syringe; Inject 1.2 mL under the skin into the appropriate area as directed Every 12 (Twelve) Hours for 4 days.  Dispense: 9.6 mL; Refill: 0    Other orders  -     Follow Anesthesia Guidelines / Protocol; Future  -     Follow Anesthesia Guidelines / Protocol; Standing  -     Verify / Perform Chlorhexidine Skin Prep; Standing  -     Provide NPO Instructions to Patient; Future  -     Chlorhexidine Skin Prep; Future  -     Notify physician (specify); Standing  -     Instructions on coughing, deep breathing, and incentive spirometry.; Standing  -     Oxygen Therapy-; Standing  -     Place Sequential Compression Device; Standing  -     Maintain Sequential Compression Device; Standing         Assessment & Plan         Michael Paz is a 79 y.o. male with a need for port placement for treatment of non-small cell lung cancer of the right upper lobe. After a discussion of risks (including bleeding, port infection with need for removal, damage to surrounding structures including the arteries, pneumothorax and possible port malfunction) and benefits, the patient wishes to proceed with single lumen port placement with fluoroscopy. The patient is currently scheduled for this procedure on 12/31/24 with Dr. Carnes at 10am, with arrival at 8am that morning. Lovenox called in to pharmacy for him to bridge with 5  days prior to procedure.     I also discussed with the patient post-operative pain management including multimodal pain control utilizing Tylenol, ibuprofen, and tramadol for breakthrough pain. I will plan to given the patient 5 tabs of 50mg Ultram post-operatively for break through pain.     Follow up:     Return if symptoms worsen or fail to improve.        Louise Gunter PA-C  12/23/24  16:12 CST

## 2024-12-23 NOTE — H&P (VIEW-ONLY)
Office New Patient History and Physical:     Referring Provider: Miguel Snyder MD    Chief Complaint   Patient presents with    Lung Cancer       Subjective .     History of present illness:  Michael Paz is a 79 y.o. male who presents to the clinic for discussion of port placement. He is in need of a port for treatment of non-small cell lung cancer of the right upper lobe. He has never had a port before. He does not have any other devices in his chest. He does wear 2L NC at night time.     BMI is 36.03. He takes 4mg of Coumadin QD. He is an every day smoker of 0.5 PPD. He has bridged with Lovenox. 2 shots starting 5 days before, nothing the day before, starting Coumadin the day after surgery.     History of Present Illness         Review of Systems    Review of Systems - General ROS: negative  ENT ROS: negative  Respiratory ROS: positive for - RUL lung cancer  Cardiovascular ROS: no chest pain or dyspnea on exertion  Gastrointestinal ROS: no abdominal pain, change in bowel habits, or black or bloody stools  Genito-Urinary ROS: no dysuria, trouble voiding, or hematuria  Dermatological ROS: negative   Breast ROS: negative for breast lumps  Hematological and Lymphatic ROS: negative  Musculoskeletal ROS: negative   Neurological ROS: no TIA or stroke symptoms    Psychological ROS: negative  Endocrine ROS: negative    History  Past Medical History:   Diagnosis Date    AAA (abdominal aortic aneurysm)     Aneurysm     Arthritis     Atrial fibrillation 1990    CAD (coronary artery disease)     CAD (coronary artery disease)     Clotting disorder 2012    Deep vein thrombosis [id number]    Depression     Endoleak post endovascular aneurysm repair     History of DVT (deep vein thrombosis)     History of foot fracture     BILATERAL FOOT FRACTURES    History of fracture of left ankle     History of pulmonary embolus (PE)     History of transfusion 2012 and 2020    Hyperlipidemia     Hypertension     Mass of upper lobe of  right lung 11/20/2024    Myocardial infarction 1990    Peripheral vascular disease     Pneumonia 4 times    Pulmonary embolism 1986    Tobacco abuse    ,   Past Surgical History:   Procedure Laterality Date    ABDOMINAL AORTIC ANEURYSM REPAIR      x2    APPENDECTOMY      BACK SURGERY      x2    BRONCHOSCOPY N/A 12/10/2024    Procedure: BRONCHOSCOPY WITH ENDOBRONCHIAL ULTRASOUND;  Surgeon: Lizandro Pagan MD;  Location: [facility] OR;  Service: Pulmonary;  Laterality: N/A;  preop; RUL mass   postop RUL mass   PCP Henrietta Scruggs    BRONCHOSCOPY WITH ION ROBOTIC ASSIST N/A 12/10/2024    Procedure: BRONCHOSCOPY WITH ION ROBOT and BRONCHOSCOPY WITH ENDOBRONCHIAL ULTRASOUND;  Surgeon: Lizandro Pagan MD;  Location:  [facility] OR;  Service: Robotics - Pulmonary;  Laterality: N/A;  preop; RUL mass   postop RUL mass   PCP Henrietta Scruggs    CARDIAC CATHETERIZATION      CORONARY ANGIOPLASTY      CORONARY ARTERY BYPASS GRAFT      1990 and 2008    CORONARY STENT PLACEMENT      ENDOSCOPY N/A 10/25/2024    Procedure: ESOPHAGOGASTRODUODENOSCOPY WITH ANESTHESIA;  Surgeon: Fareed Velasco MD;  Location: [facility] ENDOSCOPY;  Service: Gastroenterology;  Laterality: N/A;  Pre: Symptomatic anemia;  Post: Bleeding gastric AVM;  Nausea and vomiting    HEMORRHOIDECTOMY      x2    INGUINAL HERNIA REPAIR      KNEE SURGERY Right     LUNG BIOPSY      left lower lung    OTHER SURGICAL HISTORY      TYPE IA ENDOLEAK REPAIR    OTHER SURGICAL HISTORY      CRANIAL CYST   ,   Family History   Problem Relation Age of Onset    Heart disease Mother     Heart disease Father     Heart disease Other     Hypertension Other     Diabetes Other    ,   Social History     Tobacco Use    Smoking status: Every Day     Current packs/day: 1.00     Average packs/day: 1 pack/day for 67.6 years (67.6 ttl pk-yrs)     Types: Cigarettes     Start date: 6/1/1957     Passive exposure: Current    Smokeless tobacco: Never    Tobacco comments:     Don't inhale.   Vaping Use     "Vaping status: Never Used   Substance Use Topics    Alcohol use: Yes     Alcohol/week: 3.0 - 4.0 standard drinks of alcohol     Types: 3 - 4 Cans of beer per week     Comment: SOCIAL    Drug use: No   , (Not in a hospital admission)   and Allergies:  Patient has no known allergies.    Current Outpatient Medications:     Ascorbic Acid (VITAMIN C PO), Take 2,000 mg by mouth Daily., Disp: , Rfl:     candesartan (ATACAND) 16 MG tablet, Take 1 tablet by mouth Daily., Disp: , Rfl:     ferrous sulfate 325 (65 FE) MG tablet, Take 1 tablet by mouth Daily With Breakfast., Disp: 90 tablet, Rfl: 0    metoprolol succinate XL (TOPROL-XL) 25 MG 24 hr tablet, Take 1 tablet by mouth Daily., Disp: , Rfl:     Multiple Vitamins-Minerals (ONE A DAY MEN 50 PLUS PO), Take 1 tablet by mouth Daily., Disp: , Rfl:     simvastatin (ZOCOR) 80 MG tablet, Take 1 tablet by mouth Every Night., Disp: , Rfl:     Trelegy Ellipta 100-62.5-25 MCG/ACT inhaler, Inhale 1 puff Daily., Disp: , Rfl:     warfarin (COUMADIN) 4 MG tablet, Take 1 tablet by mouth Every Other Day. Alternates with 5mg, pt states been on hold and been doing lovonox injections, Disp: , Rfl:     Enoxaparin Sodium (LOVENOX) 80 MG/0.8ML solution prefilled syringe syringe, Inject 1.2 mL under the skin into the appropriate area as directed Every 12 (Twelve) Hours for 4 days., Disp: 9.6 mL, Rfl: 0    Objective     Vital Signs   /78   Pulse 83   Ht 183 cm (72.05\")   Wt 121 kg (266 lb)   SpO2 92%   BMI 36.03 kg/m²      Physical Exam:  General appearance - alert, well appearing, and in no distress  Mental status - normal mood, behavior, speech, dress, motor activity, and thought processes  Eyes - sclera anicteric  Neck - supple, no significant adenopathy  Chest - no tachypnea, retractions or cyanosis  Heart - normal rate and regular rhythm  Neurological - alert, oriented, normal speech, no focal findings or movement disorder noted  Extremities - no pedal edema, no clubbing or " cyanosis  Skin - normal coloration and turgor, no rashes, no suspicious skin lesions noted    Physical Exam      Results         Assessment & Plan     Diagnoses and all orders for this visit:    1. Encounter for care related to Port-a-Cath (Primary)  -     Case Request; Standing  -     ceFAZolin (ANCEF) 3,000 mg in sodium chloride 0.9 % 100 mL IVPB  -     Case Request    2. Mass of upper lobe of right lung  -     Case Request; Standing  -     ceFAZolin (ANCEF) 3,000 mg in sodium chloride 0.9 % 100 mL IVPB  -     Case Request    3. Chronic anticoagulation  -     Enoxaparin Sodium (LOVENOX) 80 MG/0.8ML solution prefilled syringe syringe; Inject 1.2 mL under the skin into the appropriate area as directed Every 12 (Twelve) Hours for 4 days.  Dispense: 9.6 mL; Refill: 0    Other orders  -     Follow Anesthesia Guidelines / Protocol; Future  -     Follow Anesthesia Guidelines / Protocol; Standing  -     Verify / Perform Chlorhexidine Skin Prep; Standing  -     Provide NPO Instructions to Patient; Future  -     Chlorhexidine Skin Prep; Future  -     Notify physician (specify); Standing  -     Instructions on coughing, deep breathing, and incentive spirometry.; Standing  -     Oxygen Therapy-; Standing  -     Place Sequential Compression Device; Standing  -     Maintain Sequential Compression Device; Standing         Assessment & Plan         Michael Paz is a 79 y.o. male with a need for port placement for treatment of non-small cell lung cancer of the right upper lobe. After a discussion of risks (including bleeding, port infection with need for removal, damage to surrounding structures including the arteries, pneumothorax and possible port malfunction) and benefits, the patient wishes to proceed with single lumen port placement with fluoroscopy. The patient is currently scheduled for this procedure on 12/31/24 with Dr. Carnes at 10am, with arrival at 8am that morning. Lovenox called in to pharmacy for him to bridge with 5  days prior to procedure.     I also discussed with the patient post-operative pain management including multimodal pain control utilizing Tylenol, ibuprofen, and tramadol for breakthrough pain. I will plan to given the patient 5 tabs of 50mg Ultram post-operatively for break through pain.     Follow up:     Return if symptoms worsen or fail to improve.        Louise Gunter PA-C  12/23/24  16:12 CST

## 2024-12-24 ENCOUNTER — TELEPHONE (OUTPATIENT)
Dept: ONCOLOGY | Facility: CLINIC | Age: 79
End: 2024-12-24
Payer: MEDICARE

## 2024-12-24 DIAGNOSIS — C34.90 NON-SMALL CELL LUNG CANCER, UNSPECIFIED LATERALITY: Primary | ICD-10-CM

## 2024-12-24 NOTE — TELEPHONE ENCOUNTER
Caller: Michael Paz    Relationship: Self    Best call back number: [phone]    What is the best time to reach you: ANYTIME     Who are you requesting to speak with (clinical staff, provider,  specific staff member): NURSE/CLINICAL         What was the call regarding:     WANTED TO TALK WITH NURSE, SCHEDULED TO HAVE PORT PLACED 12/31 , SEEN ON MY CHART SCHEDULED FOR INFUSIONS STARTING 01/02/24     HAS NOT BEEN INFORMED OR HAD CHEMO EDUCATION    WOULD LIKE TO GET CLARIFICATION AND PLAN OF CARE      Is it okay if the provider responds through MyChart: NO

## 2024-12-26 NOTE — TELEPHONE ENCOUNTER
Spoke with patient. He is frustrated that he will need to come in for an education. He would like to do it over the phone. I explained that we are not able to do visits over the phone. We will set him up for education next week either after his port placement or before his chemo appt next week.     While on the phone he voiced concerns with transportation and the cost to get here from [place] for Treatment. I let him know that I would make a referral to our  to see if there are any resources for him.

## 2024-12-30 ENCOUNTER — TELEPHONE (OUTPATIENT)
Dept: RADIATION ONCOLOGY | Facility: HOSPITAL | Age: 79
End: 2024-12-30
Payer: MEDICARE

## 2024-12-30 RX ORDER — ENOXAPARIN SODIUM 150 MG/ML
120 INJECTION SUBCUTANEOUS
COMMUNITY

## 2024-12-30 NOTE — TELEPHONE ENCOUNTER
KIMBERLY received a referral on Mr. Paz. KIMBERLY called him and he did not answer and a voicemail was left. KIMBERLY will remain available.

## 2024-12-30 NOTE — SIGNIFICANT NOTE
Home Rx;  Coumadin(warfarin); per physician instructions, pt has stopped.  Lovenox; last dose taken was Sunday, 12/29/24.

## 2024-12-30 NOTE — TELEPHONE ENCOUNTER
Received return call from Mr. Paz. He is scheduled to start treatment on 1-2-25 for non-small cell lung cancer. Mr. Paz is 79 years old and lives alone. His support system includes his siblings, but he only has one sister who lives near him all other family lives in [place] or [place]. Mr. Paz is a retired . He lives far from [facility] and would prefer to have multiple appointments on the same day, if possible. KIMBERLY did speak to him regarding utility/gas assistance. He does not take any medication for anxiety/depression and does not see a counselor. He is nervous about possible side effects from treatment and states he plans to ask questions during his chemo education. KIMBERLY will follow up with Mr. Paz once he starts treatment.

## 2024-12-31 ENCOUNTER — APPOINTMENT (OUTPATIENT)
Dept: GENERAL RADIOLOGY | Facility: HOSPITAL | Age: 79
End: 2024-12-31
Payer: MEDICARE

## 2024-12-31 ENCOUNTER — HOSPITAL ENCOUNTER (OUTPATIENT)
Facility: HOSPITAL | Age: 79
Setting detail: HOSPITAL OUTPATIENT SURGERY
Discharge: HOME | End: 2024-12-31
Attending: SURGERY | Admitting: SURGERY
Payer: MEDICARE

## 2024-12-31 ENCOUNTER — ANESTHESIA EVENT (OUTPATIENT)
Dept: PERIOP | Facility: HOSPITAL | Age: 79
End: 2024-12-31
Payer: MEDICARE

## 2024-12-31 ENCOUNTER — ANESTHESIA (OUTPATIENT)
Dept: PERIOP | Facility: HOSPITAL | Age: 79
End: 2024-12-31
Payer: MEDICARE

## 2024-12-31 VITALS
WEIGHT: 266.76 LBS | OXYGEN SATURATION: 94 % | TEMPERATURE: 97.1 F | RESPIRATION RATE: 16 BRPM | HEART RATE: 89 BPM | DIASTOLIC BLOOD PRESSURE: 76 MMHG | HEIGHT: 74 IN | SYSTOLIC BLOOD PRESSURE: 145 MMHG | BODY MASS INDEX: 34.23 KG/M2

## 2024-12-31 DIAGNOSIS — R91.8 MASS OF UPPER LOBE OF RIGHT LUNG: ICD-10-CM

## 2024-12-31 DIAGNOSIS — Z45.2 ENCOUNTER FOR CARE RELATED TO PORT-A-CATH: ICD-10-CM

## 2024-12-31 PROCEDURE — 25010000002 CEFAZOLIN 3 G RECONSTITUTED SOLUTION 1 EACH VIAL

## 2024-12-31 PROCEDURE — 25010000002 PROPOFOL 1000 MG/100ML EMULSION: Performed by: NURSE ANESTHETIST, CERTIFIED REGISTERED

## 2024-12-31 PROCEDURE — 25010000002 HEPARIN LOCK FLUSH PER 10 UNITS: Performed by: SURGERY

## 2024-12-31 PROCEDURE — 71045 X-RAY EXAM CHEST 1 VIEW: CPT

## 2024-12-31 PROCEDURE — 25810000003 SODIUM CHLORIDE PER 500 ML: Performed by: SURGERY

## 2024-12-31 PROCEDURE — 25010000002 LIDOCAINE 1% - EPINEPHRINE 1:100000 1 %-1:100000 SOLUTION: Performed by: SURGERY

## 2024-12-31 PROCEDURE — 25510000001 IOPAMIDOL 61 % SOLUTION: Performed by: SURGERY

## 2024-12-31 PROCEDURE — 76000 FLUOROSCOPY <1 HR PHYS/QHP: CPT

## 2024-12-31 PROCEDURE — C1788 PORT, INDWELLING, IMP: HCPCS | Performed by: SURGERY

## 2024-12-31 DEVICE — POWERPORT CLEARVUE IMPLANTABLE PORT WITH ATTACHABLE 8F POLYURETHANE OPEN-ENDED SINGLE-LUMEN VENOUS CATHETER INTERMEDIATE KIT
Type: IMPLANTABLE DEVICE | Site: CHEST | Status: FUNCTIONAL
Brand: POWERPORT CLEARVUE

## 2024-12-31 RX ORDER — IOPAMIDOL 612 MG/ML
INJECTION, SOLUTION INTRAVASCULAR AS NEEDED
Status: DISCONTINUED | OUTPATIENT
Start: 2024-12-31 | End: 2024-12-31 | Stop reason: HOSPADM

## 2024-12-31 RX ORDER — FLUMAZENIL 0.1 MG/ML
0.2 INJECTION INTRAVENOUS AS NEEDED
Status: DISCONTINUED | OUTPATIENT
Start: 2024-12-31 | End: 2024-12-31 | Stop reason: HOSPADM

## 2024-12-31 RX ORDER — FENTANYL CITRATE 50 UG/ML
50 INJECTION, SOLUTION INTRAMUSCULAR; INTRAVENOUS
Status: DISCONTINUED | OUTPATIENT
Start: 2024-12-31 | End: 2024-12-31 | Stop reason: HOSPADM

## 2024-12-31 RX ORDER — SODIUM CHLORIDE 0.9 % (FLUSH) 0.9 %
3-10 SYRINGE (ML) INJECTION AS NEEDED
Status: DISCONTINUED | OUTPATIENT
Start: 2024-12-31 | End: 2024-12-31 | Stop reason: HOSPADM

## 2024-12-31 RX ORDER — LABETALOL HYDROCHLORIDE 5 MG/ML
5 INJECTION, SOLUTION INTRAVENOUS
Status: DISCONTINUED | OUTPATIENT
Start: 2024-12-31 | End: 2024-12-31 | Stop reason: HOSPADM

## 2024-12-31 RX ORDER — HYDROCODONE BITARTRATE AND ACETAMINOPHEN 10; 325 MG/1; MG/1
1 TABLET ORAL EVERY 4 HOURS PRN
Status: DISCONTINUED | OUTPATIENT
Start: 2024-12-31 | End: 2024-12-31 | Stop reason: HOSPADM

## 2024-12-31 RX ORDER — SODIUM CHLORIDE, SODIUM LACTATE, POTASSIUM CHLORIDE, CALCIUM CHLORIDE 600; 310; 30; 20 MG/100ML; MG/100ML; MG/100ML; MG/100ML
1000 INJECTION, SOLUTION INTRAVENOUS CONTINUOUS
Status: DISCONTINUED | OUTPATIENT
Start: 2024-12-31 | End: 2024-12-31 | Stop reason: HOSPADM

## 2024-12-31 RX ORDER — ONDANSETRON 2 MG/ML
4 INJECTION INTRAMUSCULAR; INTRAVENOUS ONCE AS NEEDED
Status: DISCONTINUED | OUTPATIENT
Start: 2024-12-31 | End: 2024-12-31 | Stop reason: HOSPADM

## 2024-12-31 RX ORDER — SODIUM CHLORIDE 9 MG/ML
40 INJECTION, SOLUTION INTRAVENOUS AS NEEDED
Status: DISCONTINUED | OUTPATIENT
Start: 2024-12-31 | End: 2024-12-31 | Stop reason: HOSPADM

## 2024-12-31 RX ORDER — PROPOFOL 10 MG/ML
INJECTION, EMULSION INTRAVENOUS CONTINUOUS PRN
Status: DISCONTINUED | OUTPATIENT
Start: 2024-12-31 | End: 2024-12-31 | Stop reason: SURG

## 2024-12-31 RX ORDER — LIDOCAINE HYDROCHLORIDE 10 MG/ML
0.5 INJECTION, SOLUTION EPIDURAL; INFILTRATION; INTRACAUDAL; PERINEURAL ONCE AS NEEDED
Status: DISCONTINUED | OUTPATIENT
Start: 2024-12-31 | End: 2024-12-31 | Stop reason: HOSPADM

## 2024-12-31 RX ORDER — HEPARIN SODIUM (PORCINE) LOCK FLUSH IV SOLN 100 UNIT/ML 100 UNIT/ML
SOLUTION INTRAVENOUS AS NEEDED
Status: DISCONTINUED | OUTPATIENT
Start: 2024-12-31 | End: 2024-12-31 | Stop reason: HOSPADM

## 2024-12-31 RX ORDER — SODIUM CHLORIDE 0.9 % (FLUSH) 0.9 %
3 SYRINGE (ML) INJECTION AS NEEDED
Status: DISCONTINUED | OUTPATIENT
Start: 2024-12-31 | End: 2024-12-31 | Stop reason: HOSPADM

## 2024-12-31 RX ORDER — NALOXONE HCL 0.4 MG/ML
0.4 VIAL (ML) INJECTION AS NEEDED
Status: DISCONTINUED | OUTPATIENT
Start: 2024-12-31 | End: 2024-12-31 | Stop reason: HOSPADM

## 2024-12-31 RX ORDER — LIDOCAINE HYDROCHLORIDE AND EPINEPHRINE 10; 10 MG/ML; UG/ML
INJECTION, SOLUTION INFILTRATION; PERINEURAL AS NEEDED
Status: DISCONTINUED | OUTPATIENT
Start: 2024-12-31 | End: 2024-12-31 | Stop reason: HOSPADM

## 2024-12-31 RX ORDER — HYDROCODONE BITARTRATE AND ACETAMINOPHEN 5; 325 MG/1; MG/1
1 TABLET ORAL EVERY 4 HOURS PRN
Status: DISCONTINUED | OUTPATIENT
Start: 2024-12-31 | End: 2024-12-31 | Stop reason: HOSPADM

## 2024-12-31 RX ORDER — SODIUM CHLORIDE 0.9 % (FLUSH) 0.9 %
3 SYRINGE (ML) INJECTION EVERY 12 HOURS SCHEDULED
Status: DISCONTINUED | OUTPATIENT
Start: 2024-12-31 | End: 2024-12-31 | Stop reason: HOSPADM

## 2024-12-31 RX ORDER — SODIUM CHLORIDE 9 MG/ML
INJECTION, SOLUTION INTRAVENOUS AS NEEDED
Status: DISCONTINUED | OUTPATIENT
Start: 2024-12-31 | End: 2024-12-31 | Stop reason: HOSPADM

## 2024-12-31 RX ORDER — SODIUM CHLORIDE, SODIUM LACTATE, POTASSIUM CHLORIDE, CALCIUM CHLORIDE 600; 310; 30; 20 MG/100ML; MG/100ML; MG/100ML; MG/100ML
100 INJECTION, SOLUTION INTRAVENOUS CONTINUOUS
Status: DISCONTINUED | OUTPATIENT
Start: 2024-12-31 | End: 2024-12-31 | Stop reason: HOSPADM

## 2024-12-31 RX ADMIN — SODIUM CHLORIDE 3000 MG: 900 INJECTION INTRAVENOUS at 09:34

## 2024-12-31 RX ADMIN — PROPOFOL INJECTABLE EMULSION 100 MCG/KG/MIN: 10 INJECTION, EMULSION INTRAVENOUS at 09:33

## 2024-12-31 NOTE — OP NOTE
PREOPERATIVE DIAGNOSIS:  RUL lung cancer        POSTOPERATIVE DIAGNOSIS:  Same        PROCEDURE(S): Right subclavian Mediport placement, intraoperative fluoroscopy, central venogram        ATTENDING SURGEON:  Antwan Carnes MD        ADDITIONAL SURGEON:  None        ASSISTANT(S):  Shlomo CST, Annalisa CST, Italia ALSTON        SPECIMENS:  None        ANESTHESIA:  MAC with local       ESTIMATED BLOOD LOSS:  5 mL        FLUIDS:  500  mL        WOUND CLASSIFICATION:  Class 1, clean        IMPLANTS: Bard 8 Beninese single-lumen Mediport        DRAINS:  None        FINDINGS:   1.  Left subclavian vein accessed difficulty however wire would not cross midline.  Venogram showed high-grade stenosis or occlusion of the proximal left subclavian vein versus innominate vein.  Numerous attempts were made to traverse the lesion without success.  2.  Right subclavian vein accessed with a single stick.  Wire easily placed into the superior vena cava confirmed under fluoroscopy.  8 Beninese single-lumen Mediport with 16 cm catheter length with the tip at the caval atrial junction.  Meticulous hemostasis        INDICATIONS: 79 y.o. male with right upper lobe lung cancer with plans to start chemotherapy        DESCRIPTION OF PROCEDURE:   The patient was seen in the preoperative holding area. Informed consent was obtained from the patient. The patient was taken to the operating room and placed on the operating room table in the supine position. SCDs were placed on both legs.  Sedation was administered while the airway was monitored.  2 g of Ancef was administered for preoperative antibiotics.  The patient was prepped and draped in the usual sterile fashion. A full surgical timeout was performed verifying the correct patient, correct procedure and correct site for surgery.      10 mL of 1% lidocaine with epinephrine was infiltrated into the left deltopectoral groove. The patient was placed in Trendelenburg. Next, a finder needle was inserted into the  left subclavian vein with a single stick thereby aspirating dark venous nonpulsatile blood.  A wire was passed through the needle with ease however would not cross the midline.  Several sticks were attempted with numerous attempts, however the wire would not traverse the lesion.  A venogram was performed that showed either a high-grade stenosis or proximal occlusion of the versus innominate vein.     At this time, I switched to the right side.  10 mL of 1% lidocaine with epinephrine was injected around the right deltopectoral groove.  A finder needle was inserted into the right subclavian vein with a single stick aspirating dark venous nonpulsatile blood.  A wire was then able to be passed easily through the needle into the superior vena cava which was confirmed on fluoroscopy.  A transverse incision was made at the stick site using a 15 blade scalpel.  Once supplemental oxygen was decreased, electrocautery was used to create a pocket.  Next, the dilator and pull-apart sheath he dilator and pull-apart sheath were advanced for the wire which were both removed in its entirety.  A single-lumen 8 Pashto Mediport was assembled and the catheter tip was cut to 16  cm.  This was advanced through the pull-apart sheath, which was also removed in its entirety.  The Mediport micheal and flushed easily.  The Mediport locked with full strength heparin. The subcutaneous pump was then sutured to the fascia using 2-0 PDS sutures x 2.  Final fluoroscopy showed good position of the catheter tip in the cavoatrial junction with catheter straight and Mediport in good position.  The deep dermal layer was closed using a running 3-0 Vicryl suture followed by running subcuticular 4-0 Vicryl suture for the skin.  The incision was dressed with Mastisol and Steri-Strips.     At the completion of the procedure, all sharp, sponge and instrument counts were correct x2. The patient was awoken from anesthesia and was taken to the postanesthesia care  unit in stable condition without any immediate complications. A stat portable chest X ray was ordered for the recovery area.             Antwan Carnes MD

## 2024-12-31 NOTE — ANESTHESIA POSTPROCEDURE EVALUATION
Patient: Michael Paz    Procedure Summary       Date: 12/31/24 Room / Location:  [facility] OR  /  [facility]    Anesthesia Start: 0924 Anesthesia Stop: 1006    Procedure: Single Lumen Port-a-cath insertion with flouroscopy (Chin to Nipples) Diagnosis:       Encounter for care related to Port-a-Cath      Mass of upper lobe of right lung      (Encounter for care related to Port-a-Cath [Z45.2])      (Mass of upper lobe of right lung [R91.8])    Surgeons: Antwan Carnes MD Provider: Delgado Rosenberg CRNA    Anesthesia Type: MAC ASA Status: 3            Anesthesia Type: MAC    Vitals  Vitals Value Taken Time   /55 12/31/24 1011   Temp     Pulse 114 12/31/24 1014   Resp 16 12/31/24 1008   SpO2 91 % 12/31/24 1014   Vitals shown include unfiled device data.        Post Anesthesia Care and Evaluation    Patient location during evaluation: PHASE II  Patient participation: complete - patient participated  Level of consciousness: awake and alert  Pain score: 0  Pain management: adequate    Airway patency: patent  Anesthetic complications: No anesthetic complications  PONV Status: none  Cardiovascular status: acceptable and stable  Respiratory status: acceptable  Hydration status: acceptable

## 2024-12-31 NOTE — DISCHARGE INSTRUCTIONS
[facility] may be accessed immediately if needed. Okay to shower 48 hours after surgery.  No soaking under water for 2 weeks.  Resume regular activity.  Alternate ibuprofen and tylenol scheduled around-the-clock. Take prescription pain medication exactly as directed.  Take a stool softener while on prescription pain medication.  Okay to drive once off of pain medication. Resume a diet.  Deep sutures will dissolve on their own and Steri-Strips should peel off in 2 to 4 weeks.  Resume Lovenox the day after surgery along with regular Coumadin dose.  Continue Lovenox until INR is therapeutic.

## 2024-12-31 NOTE — ANESTHESIA PREPROCEDURE EVALUATION
Anesthesia Evaluation     no history of anesthetic complications:   NPO Solid Status: > 8 hours  NPO Liquid Status: > 8 hours           Airway   Mallampati: I  TM distance: >3 FB  No difficulty expected  Dental    (+) upper dentures and lower dentures    Pulmonary    (+) pulmonary embolism, a smoker Current, lung cancer, COPD,  Cardiovascular   Exercise tolerance: good (4-7 METS)    (+) hypertension, past MI , CAD, CABG >6 Months, cardiac stents more than 12 months ago , dysrhythmias Atrial Fib, PVD (AAA), DVT resolved, hyperlipidemia      Neuro/Psych  (-) seizures, TIA, CVA  GI/Hepatic/Renal/Endo    (+) obesity, GI bleeding resolved  (-) liver disease, no renal disease, diabetes    Musculoskeletal     Abdominal    Substance History      OB/GYN          Other   arthritis, blood dyscrasia anemia,   history of cancer                  Anesthesia Plan    ASA 3     MAC     intravenous induction     Anesthetic plan, risks, benefits, and alternatives have been provided, discussed and informed consent has been obtained with: patient.    CODE STATUS:

## 2025-01-02 ENCOUNTER — TELEPHONE (OUTPATIENT)
Dept: SURGERY | Facility: CLINIC | Age: 80
End: 2025-01-02
Payer: MEDICARE

## 2025-01-02 ENCOUNTER — LAB (OUTPATIENT)
Dept: LAB | Facility: HOSPITAL | Age: 80
End: 2025-01-02
Payer: MEDICARE

## 2025-01-02 ENCOUNTER — CLINICAL SUPPORT (OUTPATIENT)
Dept: ONCOLOGY | Facility: CLINIC | Age: 80
End: 2025-01-02
Payer: MEDICARE

## 2025-01-02 ENCOUNTER — INFUSION (OUTPATIENT)
Dept: ONCOLOGY | Facility: HOSPITAL | Age: 80
End: 2025-01-02
Payer: MEDICARE

## 2025-01-02 VITALS
BODY MASS INDEX: 32.95 KG/M2 | SYSTOLIC BLOOD PRESSURE: 169 MMHG | HEART RATE: 56 BPM | RESPIRATION RATE: 18 BRPM | WEIGHT: 265 LBS | OXYGEN SATURATION: 93 % | TEMPERATURE: 97.6 F | HEIGHT: 75 IN | DIASTOLIC BLOOD PRESSURE: 91 MMHG

## 2025-01-02 DIAGNOSIS — C34.90 NON-SMALL CELL LUNG CANCER, UNSPECIFIED LATERALITY: Primary | ICD-10-CM

## 2025-01-02 DIAGNOSIS — Z79.899 ENCOUNTER FOR LONG-TERM (CURRENT) USE OF HIGH-RISK MEDICATION: ICD-10-CM

## 2025-01-02 DIAGNOSIS — R94.6 ABNORMAL RESULTS OF THYROID FUNCTION STUDIES: ICD-10-CM

## 2025-01-02 DIAGNOSIS — C34.90 NON-SMALL CELL LUNG CANCER, UNSPECIFIED LATERALITY: ICD-10-CM

## 2025-01-02 DIAGNOSIS — Z45.2 ENCOUNTER FOR CARE RELATED TO PORT-A-CATH: ICD-10-CM

## 2025-01-02 LAB
ALBUMIN SERPL-MCNC: 4.2 G/DL (ref 3.5–5.2)
ALBUMIN/GLOB SERPL: 1.4 G/DL
ALP SERPL-CCNC: 77 U/L (ref 39–117)
ALT SERPL W P-5'-P-CCNC: 19 U/L (ref 1–41)
ANION GAP SERPL CALCULATED.3IONS-SCNC: 9 MMOL/L (ref 5–15)
AST SERPL-CCNC: 27 U/L (ref 1–40)
BASOPHILS # BLD AUTO: 0.02 10*3/MM3 (ref 0–0.2)
BASOPHILS NFR BLD AUTO: 0.3 % (ref 0–1.5)
BILIRUB SERPL-MCNC: 0.7 MG/DL (ref 0–1.2)
BUN SERPL-MCNC: 13 MG/DL (ref 8–23)
BUN/CREAT SERPL: 15.1 (ref 7–25)
CALCIUM SPEC-SCNC: 9.6 MG/DL (ref 8.6–10.5)
CHLORIDE SERPL-SCNC: 106 MMOL/L (ref 98–107)
CO2 SERPL-SCNC: 26 MMOL/L (ref 22–29)
CORTIS SERPL-MCNC: 16.7 MCG/DL
CREAT SERPL-MCNC: 0.86 MG/DL (ref 0.76–1.27)
DEPRECATED RDW RBC AUTO: 68.8 FL (ref 37–54)
EGFRCR SERPLBLD CKD-EPI 2021: 89.2 ML/MIN/1.73
EOSINOPHIL # BLD AUTO: 0.32 10*3/MM3 (ref 0–0.4)
EOSINOPHIL NFR BLD AUTO: 4.5 % (ref 0.3–6.2)
ERYTHROCYTE [DISTWIDTH] IN BLOOD BY AUTOMATED COUNT: 21.2 % (ref 12.3–15.4)
GLOBULIN UR ELPH-MCNC: 2.9 GM/DL
GLUCOSE SERPL-MCNC: 129 MG/DL (ref 65–99)
HCT VFR BLD AUTO: 39.3 % (ref 37.5–51)
HGB BLD-MCNC: 12.1 G/DL (ref 13–17.7)
IMM GRANULOCYTES # BLD AUTO: 0.02 10*3/MM3 (ref 0–0.05)
IMM GRANULOCYTES NFR BLD AUTO: 0.3 % (ref 0–0.5)
LYMPHOCYTES # BLD AUTO: 1.22 10*3/MM3 (ref 0.7–3.1)
LYMPHOCYTES NFR BLD AUTO: 17.1 % (ref 19.6–45.3)
MCH RBC QN AUTO: 27.6 PG (ref 26.6–33)
MCHC RBC AUTO-ENTMCNC: 30.8 G/DL (ref 31.5–35.7)
MCV RBC AUTO: 89.7 FL (ref 79–97)
MONOCYTES # BLD AUTO: 0.69 10*3/MM3 (ref 0.1–0.9)
MONOCYTES NFR BLD AUTO: 9.7 % (ref 5–12)
NEUTROPHILS NFR BLD AUTO: 4.86 10*3/MM3 (ref 1.7–7)
NEUTROPHILS NFR BLD AUTO: 68.1 % (ref 42.7–76)
NRBC BLD AUTO-RTO: 0 /100 WBC (ref 0–0.2)
PLATELET # BLD AUTO: 180 10*3/MM3 (ref 140–450)
PMV BLD AUTO: 9.2 FL (ref 6–12)
POTASSIUM SERPL-SCNC: 4.9 MMOL/L (ref 3.5–5.2)
PROT SERPL-MCNC: 7.1 G/DL (ref 6–8.5)
RBC # BLD AUTO: 4.38 10*6/MM3 (ref 4.14–5.8)
SODIUM SERPL-SCNC: 141 MMOL/L (ref 136–145)
T4 FREE SERPL-MCNC: 1.01 NG/DL (ref 0.93–1.7)
TSH SERPL DL<=0.05 MIU/L-ACNC: 3.69 UIU/ML (ref 0.27–4.2)
WBC NRBC COR # BLD AUTO: 7.13 10*3/MM3 (ref 3.4–10.8)

## 2025-01-02 PROCEDURE — 25010000002 PALONOSETRON PER 25 MCG: Performed by: INTERNAL MEDICINE

## 2025-01-02 PROCEDURE — 84439 ASSAY OF FREE THYROXINE: CPT

## 2025-01-02 PROCEDURE — 80053 COMPREHEN METABOLIC PANEL: CPT

## 2025-01-02 PROCEDURE — 82024 ASSAY OF ACTH: CPT

## 2025-01-02 PROCEDURE — 25010000002 NIVOLUMAB 120 MG/12ML SOLUTION 12 ML VIAL: Performed by: INTERNAL MEDICINE

## 2025-01-02 PROCEDURE — 25810000003 SODIUM CHLORIDE 0.9 % SOLUTION 500 ML FLEX CONT: Performed by: INTERNAL MEDICINE

## 2025-01-02 PROCEDURE — 25010000002 DEXAMETHASONE PER 1 MG: Performed by: INTERNAL MEDICINE

## 2025-01-02 PROCEDURE — 25010000002 HEPARIN LOCK FLUSH PER 10 UNITS: Performed by: INTERNAL MEDICINE

## 2025-01-02 PROCEDURE — 85025 COMPLETE CBC W/AUTO DIFF WBC: CPT

## 2025-01-02 PROCEDURE — 96417 CHEMO IV INFUS EACH ADDL SEQ: CPT

## 2025-01-02 PROCEDURE — 25010000002 DIPHENHYDRAMINE PER 50 MG: Performed by: INTERNAL MEDICINE

## 2025-01-02 PROCEDURE — 25010000002 PACLITAXEL PER 1 MG: Performed by: INTERNAL MEDICINE

## 2025-01-02 PROCEDURE — 96375 TX/PRO/DX INJ NEW DRUG ADDON: CPT

## 2025-01-02 PROCEDURE — 96415 CHEMO IV INFUSION ADDL HR: CPT

## 2025-01-02 PROCEDURE — 84443 ASSAY THYROID STIM HORMONE: CPT

## 2025-01-02 PROCEDURE — 96413 CHEMO IV INFUSION 1 HR: CPT

## 2025-01-02 PROCEDURE — 25810000003 SODIUM CHLORIDE 0.9 % SOLUTION: Performed by: INTERNAL MEDICINE

## 2025-01-02 PROCEDURE — 36415 COLL VENOUS BLD VENIPUNCTURE: CPT

## 2025-01-02 PROCEDURE — 25810000003 SODIUM CHLORIDE 0.9 % SOLUTION 250 ML FLEX CONT: Performed by: INTERNAL MEDICINE

## 2025-01-02 PROCEDURE — 25010000002 CARBOPLATIN PER 50 MG: Performed by: INTERNAL MEDICINE

## 2025-01-02 PROCEDURE — 82533 TOTAL CORTISOL: CPT

## 2025-01-02 RX ORDER — HEPARIN SODIUM (PORCINE) LOCK FLUSH IV SOLN 100 UNIT/ML 100 UNIT/ML
500 SOLUTION INTRAVENOUS AS NEEDED
Status: CANCELLED | OUTPATIENT
Start: 2025-01-02

## 2025-01-02 RX ORDER — SODIUM CHLORIDE 9 MG/ML
20 INJECTION, SOLUTION INTRAVENOUS ONCE
Status: COMPLETED | OUTPATIENT
Start: 2025-01-02 | End: 2025-01-02

## 2025-01-02 RX ORDER — DIPHENHYDRAMINE HYDROCHLORIDE 50 MG/ML
50 INJECTION INTRAMUSCULAR; INTRAVENOUS AS NEEDED
Status: DISCONTINUED | OUTPATIENT
Start: 2025-01-02 | End: 2025-01-02 | Stop reason: HOSPADM

## 2025-01-02 RX ORDER — HEPARIN SODIUM (PORCINE) LOCK FLUSH IV SOLN 100 UNIT/ML 100 UNIT/ML
500 SOLUTION INTRAVENOUS AS NEEDED
Status: DISCONTINUED | OUTPATIENT
Start: 2025-01-02 | End: 2025-01-02 | Stop reason: HOSPADM

## 2025-01-02 RX ORDER — SODIUM CHLORIDE 0.9 % (FLUSH) 0.9 %
10 SYRINGE (ML) INJECTION AS NEEDED
Status: DISCONTINUED | OUTPATIENT
Start: 2025-01-02 | End: 2025-01-02 | Stop reason: HOSPADM

## 2025-01-02 RX ORDER — FAMOTIDINE 10 MG/ML
20 INJECTION, SOLUTION INTRAVENOUS ONCE
Status: COMPLETED | OUTPATIENT
Start: 2025-01-02 | End: 2025-01-02

## 2025-01-02 RX ORDER — DIPHENHYDRAMINE HYDROCHLORIDE 50 MG/ML
50 INJECTION INTRAMUSCULAR; INTRAVENOUS ONCE
Status: COMPLETED | OUTPATIENT
Start: 2025-01-02 | End: 2025-01-02

## 2025-01-02 RX ORDER — PALONOSETRON 0.05 MG/ML
0.25 INJECTION, SOLUTION INTRAVENOUS ONCE
Status: COMPLETED | OUTPATIENT
Start: 2025-01-02 | End: 2025-01-02

## 2025-01-02 RX ORDER — PROCHLORPERAZINE MALEATE 10 MG
10 TABLET ORAL EVERY 6 HOURS PRN
Qty: 60 TABLET | Refills: 0 | Status: SHIPPED | OUTPATIENT
Start: 2025-01-02

## 2025-01-02 RX ORDER — SODIUM CHLORIDE 0.9 % (FLUSH) 0.9 %
10 SYRINGE (ML) INJECTION AS NEEDED
Status: CANCELLED | OUTPATIENT
Start: 2025-01-02

## 2025-01-02 RX ORDER — ONDANSETRON 8 MG/1
8 TABLET, FILM COATED ORAL EVERY 8 HOURS PRN
Qty: 30 TABLET | Refills: 1 | Status: SHIPPED | OUTPATIENT
Start: 2025-01-02

## 2025-01-02 RX ORDER — FAMOTIDINE 10 MG/ML
20 INJECTION, SOLUTION INTRAVENOUS AS NEEDED
Status: DISCONTINUED | OUTPATIENT
Start: 2025-01-02 | End: 2025-01-02 | Stop reason: HOSPADM

## 2025-01-02 RX ORDER — SODIUM CHLORIDE 0.9 % (FLUSH) 0.9 %
10 SYRINGE (ML) INJECTION AS NEEDED
OUTPATIENT
Start: 2025-01-02

## 2025-01-02 RX ORDER — HEPARIN SODIUM (PORCINE) LOCK FLUSH IV SOLN 100 UNIT/ML 100 UNIT/ML
500 SOLUTION INTRAVENOUS AS NEEDED
OUTPATIENT
Start: 2025-01-02

## 2025-01-02 RX ORDER — DEXAMETHASONE SODIUM PHOSPHATE 10 MG/ML
10 INJECTION INTRAMUSCULAR; INTRAVENOUS ONCE
Status: COMPLETED | OUTPATIENT
Start: 2025-01-02 | End: 2025-01-02

## 2025-01-02 RX ORDER — HYDROCORTISONE SODIUM SUCCINATE 100 MG/2ML
100 INJECTION INTRAMUSCULAR; INTRAVENOUS AS NEEDED
Status: DISCONTINUED | OUTPATIENT
Start: 2025-01-02 | End: 2025-01-02 | Stop reason: HOSPADM

## 2025-01-02 RX ADMIN — CARBOPLATIN 600 MG: 10 INJECTION INTRAVENOUS at 14:36

## 2025-01-02 RX ADMIN — HEPARIN 500 UNITS: 100 SYRINGE at 15:37

## 2025-01-02 RX ADMIN — PACLITAXEL 420 MG: 6 INJECTION, SOLUTION INTRAVENOUS at 11:08

## 2025-01-02 RX ADMIN — SODIUM CHLORIDE 20 ML/HR: 9 INJECTION, SOLUTION INTRAVENOUS at 09:24

## 2025-01-02 RX ADMIN — FAMOTIDINE 20 MG: 10 INJECTION INTRAVENOUS at 09:29

## 2025-01-02 RX ADMIN — Medication 10 ML: at 15:37

## 2025-01-02 RX ADMIN — DEXAMETHASONE SODIUM PHOSPHATE 10 MG: 10 INJECTION INTRAMUSCULAR; INTRAVENOUS at 09:28

## 2025-01-02 RX ADMIN — PALONOSETRON HYDROCHLORIDE 0.25 MG: 0.25 INJECTION, SOLUTION INTRAVENOUS at 09:24

## 2025-01-02 RX ADMIN — DIPHENHYDRAMINE HYDROCHLORIDE 50 MG: 50 INJECTION, SOLUTION INTRAMUSCULAR; INTRAVENOUS at 09:26

## 2025-01-02 RX ADMIN — SODIUM CHLORIDE 360 MG: 9 INJECTION, SOLUTION INTRAVENOUS at 10:22

## 2025-01-02 NOTE — PROGRESS NOTES
Per Charis Funk Rn  the patient signed his consent form and filled out his distress paper and will upload it in. FAUZIA Jimenez

## 2025-01-02 NOTE — PROGRESS NOTES
Subjective     PATIENT NAME:  Michael Paz  YOB: 1945  PATIENTS AGE:  79 y.o.  PATIENTS SEX:  male  DATE OF SERVICE:  01/02/2025  PROVIDER:  [facility] NURSE      ____________________PATIENT EDUCATION____________________    PATIENT EDUCATION:  Today I met with the patient Michael Paz to discuss the chemotherapy regimen Carboplatin/Taxol/Opdivo recommended for treatment of his disease NSCLC.  The patient was given explanation of treatment premed side effects including office policy that prohibits patients to drive if sedating medications are administered, MD explanation given regarding benefits, side effects, toxicities and goals of treatment.  The patient received a Chemotherapy/Biotherapy Plan Summary including diagnosis and specific treatment plan.    SIDE EFFECTS:  Common side effects were discussed with the patient and/or significant other.  Discussion included hair loss/discoloration, anemia/fatigue, infection/chills/fever, appetite, bleeding risk/precautions, constipation, diarrhea, mouth sores, taste alteration, loss of appetite,nausea/vomiting, peripheral neuropathy, skin/nail changes, rash, muscle aches/weakness, photosensitivity, weight gain/loss, hearing loss, dizziness, sterility, high blood pressure, heart damage, liver damage, lung damage, kidney damage, DVT/PE risk, fluid retention, pleural/pericardial effusion, somnolence, electrolyte/LFT imbalance, vein exercises and/or the possible need for vascular access/port placement.  The patient was advice that although uncommon, leakage of an infused medication from the vein or venous access device (port) may lead to skin breakdown and/or other tissue damage.  The patient was advised that he/she may have pain, bleeding, and/or bruising from the insertion of a needle in their vein or venous access device (port).  The patient was further advised that, in spite of proper technique, infection with redness and irritation may rarely occur at  the site where the needle was inserted.  The patient was advised that if complications occur, additional medical treatment is available.    Discussion also included side effects specific to drugs in the treatment plan, specifically: Carboplatin, Taxol/Opdivo:  n/v, diarrhea, weakness, fatigue, constipation, muscle aches and pain, dizziness, confusion, vision changes, skin dryness, mouth sores, wt loss, wt gain, bone pain, rash, itching, redness, loss of appetite,     Reproductive risks were discussed, including appropriate use of birth control and protection during sexual relations.    A total of 45 minutes were spent with the patient, with 100% of time spent in education and counseling.

## 2025-01-02 NOTE — TELEPHONE ENCOUNTER
Post OP phone call visit:    Type of surgery: Single lumen port-a-cath insertion.   How are you feeling? Sitting in the chair getting chemo right now.   Are you having any pain or Nausea? No pain. No nausea.   Do you have a normal appetite? Yes.   How is your activity? Okay.   Any drainage or fever? No redness. No drainage. No fever.

## 2025-01-03 ENCOUNTER — DOCUMENTATION (OUTPATIENT)
Dept: RADIATION ONCOLOGY | Facility: HOSPITAL | Age: 80
End: 2025-01-03
Payer: MEDICARE

## 2025-01-03 LAB — ACTH PLAS-MCNC: 44.4 PG/ML (ref 7.2–63.3)

## 2025-01-03 NOTE — PROGRESS NOTES
KIMBERLY did receive a referral due to his distress score of 1-2-25 from his chemo education visit. He started treatment for non-small cell lung cancer. Mr. Paz did express her was nervous about possible side effects from the treatment. This writer will follow up with him during his next chemo appointment on 1-23-25.

## 2025-01-06 PROBLEM — R91.8 MASS OF UPPER LOBE OF RIGHT LUNG: Status: RESOLVED | Noted: 2024-12-03 | Resolved: 2025-01-06

## 2025-01-06 NOTE — PROGRESS NOTES
"Chief Complaint  Lung Nodule    Subjective    History of Present Illness {CC  Problem List  Visit Diagnosis   Encounters  Notes  Medications  Labs  Result Review Imaging  Media: 23}    Michael Paz presents to [facility] for:    History of Present Illness  Management of COPD and lung nodules.  Lung nodule identified on his CT of his abdomen in March 2024 for follow-up of repaired aneurysm.  He did have a lapse in follow-up due to issues with insurance.        Most recent FEV1 71.  He denies having any childhood history of asthma or COPD.  He did have pneumonia at the age of 19.  He denies any family history of lung disease.  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     He has been smoking since the age of 12.  1 pack/day.      He is on Trelegy 100 after failing Advair.  He believes that was very beneficial.  He does eliminated his cough and nearly eliminated his shortness of breath.  He does not need refills.      We did ambulate him for portable oxygen.  He did not qualify.  I ordered an overnight oximetry.  He was started on 2 L of oxygen with sleep.  He is doing better overall and would like to see if he can get rid of it.        He reports having bilateral pulmonary emboli in 1986 on 2 separate occasions.  First occasion on the right side, second occasion on the left side.       He has had 5 open heart surgeries.  The first 1 occurring at the age of 45.  He was also diagnosed with \"a leaky valve\" at that time.  The other 4 operations all occurring in 2008 following a \"leak from one of my bypass grafts\".  He reports having multiple surgeries during that hospitalization in 2008 for leaking as well as MRSA in the wounds.  Due to his extended stay in the hospital he did have to go to a rehab facility.  During some of that time in 2008 he developed a left lower " extremity DVT.  They did determine a few years later that he has protein C and protein S deficiency.  They recommended lifelong anticoagulation.  He is on Coumadin.  He is also on a full-strength aspirin.     He was recently hospitalized for bleeding gastric AVM in the fundus.  They recommended he continue Coumadin but to lower his INR threshold.  They also started him on Protonix twice daily.  He is taking the Protonix as prescribed.  Recent recheck of labs showed his hemoglobin to be greater than 10.  He is now on iron replacement.  Hemoglobin has remained in the 12 range.     He has a history of atrial fibrillation.  He is on low-dose beta-blocker.  He has an appointment with electrophysiology but not until February 2025 for bradycardia.  He indicates his heart rates have been better as well.  He is considering canceling that appointment.     He has a 2 cm nodule left lower lobe and a 6 cm nodule right upper lobe.  He underwent a needle biopsy of the left lower lobe which was negative.  We made arrangements for an Ion biopsy of the right upper lobe given negative mediastinum on the PET.  Plans to consider surgical resection of the lesion if positive.  He underwent Ion bronchoscopy with biopsy on January 10.  It was positive for cancer.  Nodes negative.  PET suggestive of isolated disease.  He was referred for consideration of surgical resection and to oncology.    He had a port placed.  He has had chemo without issue.  The plan is to receive a short course of chemo followed by surgical resection.  He is awaiting the appointment for surgery with Dr. Hawkins.  He is doing okay overall.  He is having some anxiety but is not wanting to commit to any medication for this yet         Current Outpatient Medications:     Ascorbic Acid (VITAMIN C PO), Take 2,000 mg by mouth Daily., Disp: , Rfl:     candesartan (ATACAND) 16 MG tablet, Take 1 tablet by mouth Daily., Disp: , Rfl:     ferrous sulfate 325 (65 FE) MG tablet, Take 1  "tablet by mouth Daily With Breakfast., Disp: 90 tablet, Rfl: 0    metoprolol succinate XL (TOPROL-XL) 25 MG 24 hr tablet, Take 1 tablet by mouth Every Night., Disp: , Rfl:     Multiple Vitamins-Minerals (ONE A DAY MEN 50 PLUS PO), Take 1 tablet by mouth Daily., Disp: , Rfl:     ondansetron (ZOFRAN) 8 MG tablet, Take 1 tablet by mouth Every 8 (Eight) Hours As Needed for Nausea or Vomiting., Disp: 30 tablet, Rfl: 1    prochlorperazine (COMPAZINE) 10 MG tablet, Take 1 tablet by mouth Every 6 (Six) Hours As Needed for Nausea or Vomiting., Disp: 60 tablet, Rfl: 0    simvastatin (ZOCOR) 80 MG tablet, Take 1 tablet by mouth Every Night., Disp: , Rfl:     Trelegy Ellipta 100-62.5-25 MCG/ACT inhaler, Inhale 1 puff Daily., Disp: , Rfl:     warfarin (COUMADIN) 4 MG tablet, Take 1 tablet by mouth Every Other Day. Alternates with 5mg, pt states been on hold and been doing lovonox injections, Disp: , Rfl:     Enoxaparin Sodium (LOVENOX) 120 MG/0.8ML solution prefilled syringe syringe, Inject 0.8 mL under the skin into the appropriate area as directed Daily. (Patient not taking: Reported on 1/14/2025), Disp: , Rfl:     Social History     Socioeconomic History    Marital status:    Tobacco Use    Smoking status: Every Day     Current packs/day: 1.00     Average packs/day: 1 pack/day for 67.6 years (67.6 ttl pk-yrs)     Types: Cigarettes     Start date: 6/1/1957     Passive exposure: Current    Smokeless tobacco: Never    Tobacco comments:     Don't inhale.   Vaping Use    Vaping status: Never Used   Substance and Sexual Activity    Alcohol use: Yes     Alcohol/week: 3.0 - 4.0 standard drinks of alcohol     Types: 3 - 4 Cans of beer per week     Comment: SOCIAL    Drug use: No    Sexual activity: Defer       Objective   Vital Signs:   /80   Pulse 86   Ht 190.5 cm (75\")   Wt 122 kg (270 lb)   SpO2 97% Comment: RA  BMI 33.75 kg/m²     Physical Exam  Constitutional:       Appearance: He is obese.   Cardiovascular:    "   Rate and Rhythm: Normal rate and regular rhythm.      Heart sounds: No murmur heard.  Pulmonary:      Effort: Pulmonary effort is normal.      Breath sounds: Normal breath sounds.   Musculoskeletal:      Right lower leg: No edema.      Left lower leg: No edema.   Neurological:      Mental Status: He is alert and oriented to person, place, and time.        Result Review :    PFT Values          11/7/2024    13:45   Pre Drug PFT Results   FVC 90   FEV1 71   FEF 25-75% 33   FEV1/FVC 61.06   Other Tests PFT Results   DLCO 116   D/VAsb 120     My interpretation of the PFT : none    Results for orders placed in visit on 11/07/24    Spirometry with Diffusion Capacity    Narrative  Spirometry with Diffusion Capacity    Performed by: James Hadley CMA  Authorized by: Melisa López APRN  Pre Drug % Predicted  FVC: 90%  FEV1: 71%  FEF 25-75%: 33%  FEV1/FVC: 61.06%  DLCO: 116%  D/VAsb: 120%    Interpretation  Spirometry  Spirometry shows moderate obstruction. There is reduced midflow suggesting small airway/airflow obstruction.  Diffusion Capacity  The patient's diffusion capacity is normal.  Diffusion capacity is normal when corrected for alveolar volume.    Rest/Exercise Pulse Ox Values          10/23/2024    13:00   Rest/Exercise Pulse Ox Results   Rest room air SAT % 98   Exercise room air SAT % 95       My interpretation of imaging:  none    My interpretation of labs: none      Assessment and Plan {CC Problem List  Visit Diagnosis  ROS  Review (Popup)  Health Maintenance  Quality  BestPractice  Medications  SmartSets  SnapShot Encounters  Media : 23}    Diagnoses and all orders for this visit:    1. Stage 2 moderate COPD by GOLD classification (Primary)  Comments:  Continue Trelegy 100.  Use albuterol as needed    2. Lung nodule  Comments:  Positive for malignancy.  He is receiving chemo and awaiting surgical resection.  Monitor    3. Tobacco abuse  Comments:  Recommend smoking cessation    4. Protein S  deficiency  Comments:  Doing well on Coumadin    5. Hx pulmonary embolism  Comments:  Doing well on Coumadin    6. Hx of deep venous thrombosis  Comments:  Doing well on Coumadin    7. Abdominal aortic aneurysm (AAA) without rupture, unspecified part  Comments:  CT surgery aware, monitoring for now    8. Shortness of breath  Comments:  Breathing better.  Overnight oximetry on room air to see if he can discontinue nocturnal oxygen  Orders:  -     Overnight Sleep Oximetry Study; Future      Michael Paz  reports that he has been smoking cigarettes. He started smoking about 67 years ago. He has a 67.6 pack-year smoking history. He has been exposed to tobacco smoke. He has never used smokeless tobacco.  He is not ready to quit.  I spent 1 minute asking about his smoking history       Melisa López, APRN  1/14/2025  11:41 CST    Follow Up   Return in about 3 months (around 4/14/2025).    Patient was given instructions and counseling regarding his condition or for health maintenance advice. Please see specific information pulled into the AVS if appropriate.

## 2025-01-13 ENCOUNTER — TELEPHONE (OUTPATIENT)
Dept: SURGERY | Facility: CLINIC | Age: 80
End: 2025-01-13
Payer: MEDICARE

## 2025-01-13 NOTE — TELEPHONE ENCOUNTER
Patient called to cancel his appointment. He stated he has to many appointments right now and is not driving to [place] for a 10 minute visit. I requested he call us if he has any issues and recommended he see us asap for his post op appt. He voiced understanding and stated he would call us to schedule when he had another appointment in town and could do both appointments.    CBC  01/13

## 2025-01-14 ENCOUNTER — OFFICE VISIT (OUTPATIENT)
Dept: PULMONOLOGY | Facility: CLINIC | Age: 80
End: 2025-01-14
Payer: MEDICARE

## 2025-01-14 VITALS
DIASTOLIC BLOOD PRESSURE: 80 MMHG | WEIGHT: 270 LBS | OXYGEN SATURATION: 97 % | HEART RATE: 86 BPM | SYSTOLIC BLOOD PRESSURE: 132 MMHG | HEIGHT: 75 IN | BODY MASS INDEX: 33.57 KG/M2

## 2025-01-14 DIAGNOSIS — Z86.711 HX PULMONARY EMBOLISM: Chronic | ICD-10-CM

## 2025-01-14 DIAGNOSIS — R06.02 SHORTNESS OF BREATH: ICD-10-CM

## 2025-01-14 DIAGNOSIS — Z72.0 TOBACCO ABUSE: Chronic | ICD-10-CM

## 2025-01-14 DIAGNOSIS — I71.40 ABDOMINAL AORTIC ANEURYSM (AAA) WITHOUT RUPTURE, UNSPECIFIED PART: Chronic | ICD-10-CM

## 2025-01-14 DIAGNOSIS — Z86.718 HX OF DEEP VENOUS THROMBOSIS: Chronic | ICD-10-CM

## 2025-01-14 DIAGNOSIS — R91.1 LUNG NODULE: Chronic | ICD-10-CM

## 2025-01-14 DIAGNOSIS — D68.59 PROTEIN S DEFICIENCY: Chronic | ICD-10-CM

## 2025-01-14 DIAGNOSIS — J44.9 STAGE 2 MODERATE COPD BY GOLD CLASSIFICATION: Primary | Chronic | ICD-10-CM

## 2025-01-14 PROCEDURE — 3079F DIAST BP 80-89 MM HG: CPT | Performed by: NURSE PRACTITIONER

## 2025-01-14 PROCEDURE — 3075F SYST BP GE 130 - 139MM HG: CPT | Performed by: NURSE PRACTITIONER

## 2025-01-14 PROCEDURE — 99214 OFFICE O/P EST MOD 30 MIN: CPT | Performed by: NURSE PRACTITIONER

## 2025-01-15 DIAGNOSIS — C34.90 NON-SMALL CELL LUNG CANCER, UNSPECIFIED LATERALITY: ICD-10-CM

## 2025-01-15 DIAGNOSIS — Z79.899 ENCOUNTER FOR LONG-TERM (CURRENT) USE OF HIGH-RISK MEDICATION: Primary | ICD-10-CM

## 2025-01-15 RX ORDER — DIPHENHYDRAMINE HYDROCHLORIDE 50 MG/ML
50 INJECTION INTRAMUSCULAR; INTRAVENOUS AS NEEDED
OUTPATIENT
Start: 2025-01-23

## 2025-01-15 RX ORDER — FAMOTIDINE 10 MG/ML
20 INJECTION, SOLUTION INTRAVENOUS AS NEEDED
OUTPATIENT
Start: 2025-01-23

## 2025-01-15 RX ORDER — PALONOSETRON 0.05 MG/ML
0.25 INJECTION, SOLUTION INTRAVENOUS ONCE
OUTPATIENT
Start: 2025-01-23

## 2025-01-15 RX ORDER — SODIUM CHLORIDE 9 MG/ML
20 INJECTION, SOLUTION INTRAVENOUS ONCE
OUTPATIENT
Start: 2025-01-23

## 2025-01-15 RX ORDER — HYDROCORTISONE SODIUM SUCCINATE 100 MG/2ML
100 INJECTION INTRAMUSCULAR; INTRAVENOUS AS NEEDED
OUTPATIENT
Start: 2025-01-23

## 2025-01-15 RX ORDER — DIPHENHYDRAMINE HYDROCHLORIDE 50 MG/ML
50 INJECTION INTRAMUSCULAR; INTRAVENOUS ONCE
Start: 2025-01-23 | End: 2025-01-23

## 2025-01-15 RX ORDER — FAMOTIDINE 10 MG/ML
20 INJECTION, SOLUTION INTRAVENOUS ONCE
OUTPATIENT
Start: 2025-01-23

## 2025-01-16 ENCOUNTER — TELEPHONE (OUTPATIENT)
Dept: ONCOLOGY | Facility: CLINIC | Age: 80
End: 2025-01-16
Payer: MEDICARE

## 2025-01-16 NOTE — TELEPHONE ENCOUNTER
Caller: Michael Paz    Relationship: Self    Best call back number: [phone]    What is the best time to reach you: ANYTIME    Who are you requesting to speak with (clinical staff, provider,  specific staff member): CLINICAL    What was the call regarding: PT IS CALLING IN REGARDS TO A LETTER HE RECEIVED FROM [facility] WITH DR BARBOZA NAME ON IT, HE WOULD LIKE TO KNOW IF THIS IS SOMETHING HE NEEDS TO COMPLETE OR THROW AWAY.     PLEASE ADVISE

## 2025-01-20 ENCOUNTER — TELEPHONE (OUTPATIENT)
Dept: SURGERY | Facility: CLINIC | Age: 80
End: 2025-01-20
Payer: MEDICARE

## 2025-01-20 NOTE — TELEPHONE ENCOUNTER
Spoke with pt regarding a cancelled 3 week post op appointment. Pt stated that he couldn't come on any of the available appointments dates we had. Pt needs an appointment that he can come to when he has multiple in [place] that day since he lives in [place]- per pt. I gave him our office phone number and told him to call us when he had a day in mind that he could come in for this appointment.     ECC  1/20/2025

## 2025-01-23 ENCOUNTER — DOCUMENTATION (OUTPATIENT)
Dept: RADIATION ONCOLOGY | Facility: HOSPITAL | Age: 80
End: 2025-01-23
Payer: MEDICARE

## 2025-01-23 ENCOUNTER — LAB (OUTPATIENT)
Dept: LAB | Facility: HOSPITAL | Age: 80
End: 2025-01-23
Payer: MEDICARE

## 2025-01-23 ENCOUNTER — TELEPHONE (OUTPATIENT)
Dept: ONCOLOGY | Facility: CLINIC | Age: 80
End: 2025-01-23
Payer: MEDICARE

## 2025-01-23 ENCOUNTER — HOSPITAL ENCOUNTER (OUTPATIENT)
Dept: GENERAL RADIOLOGY | Facility: HOSPITAL | Age: 80
Discharge: HOME | End: 2025-01-23
Payer: MEDICARE

## 2025-01-23 ENCOUNTER — INFUSION (OUTPATIENT)
Dept: ONCOLOGY | Facility: HOSPITAL | Age: 80
End: 2025-01-23
Payer: MEDICARE

## 2025-01-23 VITALS
DIASTOLIC BLOOD PRESSURE: 57 MMHG | RESPIRATION RATE: 18 BRPM | TEMPERATURE: 96.9 F | OXYGEN SATURATION: 99 % | BODY MASS INDEX: 32.85 KG/M2 | WEIGHT: 264.2 LBS | HEIGHT: 75 IN | SYSTOLIC BLOOD PRESSURE: 152 MMHG | HEART RATE: 58 BPM

## 2025-01-23 DIAGNOSIS — R05.1 ACUTE COUGH: Primary | ICD-10-CM

## 2025-01-23 DIAGNOSIS — C34.90 NON-SMALL CELL LUNG CANCER, UNSPECIFIED LATERALITY: Primary | ICD-10-CM

## 2025-01-23 DIAGNOSIS — R91.1 LUNG NODULE: Chronic | ICD-10-CM

## 2025-01-23 DIAGNOSIS — R94.6 ABNORMAL RESULTS OF THYROID FUNCTION STUDIES: ICD-10-CM

## 2025-01-23 DIAGNOSIS — D68.59 PROTEIN S DEFICIENCY: Chronic | ICD-10-CM

## 2025-01-23 DIAGNOSIS — R79.89 ELEVATED SERUM CREATININE: ICD-10-CM

## 2025-01-23 DIAGNOSIS — R05.1 ACUTE COUGH: ICD-10-CM

## 2025-01-23 DIAGNOSIS — R97.8 OTHER ABNORMAL TUMOR MARKERS: ICD-10-CM

## 2025-01-23 DIAGNOSIS — Z79.899 ENCOUNTER FOR LONG-TERM (CURRENT) USE OF HIGH-RISK MEDICATION: ICD-10-CM

## 2025-01-23 DIAGNOSIS — D64.9 SYMPTOMATIC ANEMIA: ICD-10-CM

## 2025-01-23 LAB
ALBUMIN SERPL-MCNC: 4.2 G/DL (ref 3.5–5.2)
ALBUMIN/GLOB SERPL: 1.5 G/DL
ALP SERPL-CCNC: 71 U/L (ref 39–117)
ALT SERPL W P-5'-P-CCNC: 19 U/L (ref 1–41)
ANION GAP SERPL CALCULATED.3IONS-SCNC: 10 MMOL/L (ref 5–15)
AST SERPL-CCNC: 29 U/L (ref 1–40)
BASOPHILS # BLD AUTO: 0.02 10*3/MM3 (ref 0–0.2)
BASOPHILS NFR BLD AUTO: 0.5 % (ref 0–1.5)
BILIRUB SERPL-MCNC: 0.7 MG/DL (ref 0–1.2)
BUN SERPL-MCNC: 20 MG/DL (ref 8–23)
BUN/CREAT SERPL: 18.9 (ref 7–25)
CALCIUM SPEC-SCNC: 8.7 MG/DL (ref 8.6–10.5)
CEA SERPL-MCNC: 5.58 NG/ML
CHLORIDE SERPL-SCNC: 98 MMOL/L (ref 98–107)
CO2 SERPL-SCNC: 24 MMOL/L (ref 22–29)
CORTIS SERPL-MCNC: 23 MCG/DL
CREAT SERPL-MCNC: 1.06 MG/DL (ref 0.76–1.27)
DEPRECATED RDW RBC AUTO: 63 FL (ref 37–54)
EGFRCR SERPLBLD CKD-EPI 2021: 72.3 ML/MIN/1.73
EOSINOPHIL # BLD AUTO: 0 10*3/MM3 (ref 0–0.4)
EOSINOPHIL NFR BLD AUTO: 0 % (ref 0.3–6.2)
ERYTHROCYTE [DISTWIDTH] IN BLOOD BY AUTOMATED COUNT: 18.9 % (ref 12.3–15.4)
FERRITIN SERPL-MCNC: 215.9 NG/ML (ref 30–400)
GLOBULIN UR ELPH-MCNC: 2.8 GM/DL
GLUCOSE SERPL-MCNC: 115 MG/DL (ref 65–99)
HCT VFR BLD AUTO: 35 % (ref 37.5–51)
HGB BLD-MCNC: 11.6 G/DL (ref 13–17.7)
HOLD SPECIMEN: NORMAL
IMM GRANULOCYTES # BLD AUTO: 0.03 10*3/MM3 (ref 0–0.05)
IMM GRANULOCYTES NFR BLD AUTO: 0.7 % (ref 0–0.5)
INR PPP: 1.76 (ref 0.91–1.09)
IRON 24H UR-MRATE: 42 MCG/DL (ref 59–158)
IRON SATN MFR SERPL: 12 % (ref 20–50)
LYMPHOCYTES # BLD AUTO: 0.71 10*3/MM3 (ref 0.7–3.1)
LYMPHOCYTES NFR BLD AUTO: 16.4 % (ref 19.6–45.3)
MCH RBC QN AUTO: 29.8 PG (ref 26.6–33)
MCHC RBC AUTO-ENTMCNC: 33.1 G/DL (ref 31.5–35.7)
MCV RBC AUTO: 90 FL (ref 79–97)
MONOCYTES # BLD AUTO: 0.82 10*3/MM3 (ref 0.1–0.9)
MONOCYTES NFR BLD AUTO: 19 % (ref 5–12)
NEUTROPHILS NFR BLD AUTO: 2.74 10*3/MM3 (ref 1.7–7)
NEUTROPHILS NFR BLD AUTO: 63.4 % (ref 42.7–76)
NRBC BLD AUTO-RTO: 0 /100 WBC (ref 0–0.2)
PLATELET # BLD AUTO: 119 10*3/MM3 (ref 140–450)
PMV BLD AUTO: 8.5 FL (ref 6–12)
POTASSIUM SERPL-SCNC: 4.6 MMOL/L (ref 3.5–5.2)
PROT SERPL-MCNC: 7 G/DL (ref 6–8.5)
PROTHROMBIN TIME: 21.6 SECONDS (ref 11.8–14.8)
RBC # BLD AUTO: 3.89 10*6/MM3 (ref 4.14–5.8)
SODIUM SERPL-SCNC: 132 MMOL/L (ref 136–145)
T4 FREE SERPL-MCNC: 1.07 NG/DL (ref 0.93–1.7)
TIBC SERPL-MCNC: 352 MCG/DL (ref 298–536)
TRANSFERRIN SERPL-MCNC: 236 MG/DL (ref 200–360)
TSH SERPL DL<=0.05 MIU/L-ACNC: 3.36 UIU/ML (ref 0.27–4.2)
WBC NRBC COR # BLD AUTO: 4.32 10*3/MM3 (ref 3.4–10.8)

## 2025-01-23 PROCEDURE — 85610 PROTHROMBIN TIME: CPT

## 2025-01-23 PROCEDURE — 82533 TOTAL CORTISOL: CPT

## 2025-01-23 PROCEDURE — 84466 ASSAY OF TRANSFERRIN: CPT

## 2025-01-23 PROCEDURE — 84439 ASSAY OF FREE THYROXINE: CPT

## 2025-01-23 PROCEDURE — 82378 CARCINOEMBRYONIC ANTIGEN: CPT

## 2025-01-23 PROCEDURE — 80053 COMPREHEN METABOLIC PANEL: CPT

## 2025-01-23 PROCEDURE — 85025 COMPLETE CBC W/AUTO DIFF WBC: CPT

## 2025-01-23 PROCEDURE — 82024 ASSAY OF ACTH: CPT

## 2025-01-23 PROCEDURE — 82728 ASSAY OF FERRITIN: CPT

## 2025-01-23 PROCEDURE — 83540 ASSAY OF IRON: CPT

## 2025-01-23 PROCEDURE — 84443 ASSAY THYROID STIM HORMONE: CPT

## 2025-01-23 PROCEDURE — G0463 HOSPITAL OUTPT CLINIC VISIT: HCPCS

## 2025-01-23 PROCEDURE — 36415 COLL VENOUS BLD VENIPUNCTURE: CPT

## 2025-01-23 PROCEDURE — 71046 X-RAY EXAM CHEST 2 VIEWS: CPT

## 2025-01-23 NOTE — TELEPHONE ENCOUNTER
Message from Beverly CAGE with the infusion center: Michael Brandi, [birth year], here for consideration of C2 Opdivo/Taxol/Carbo, labs look okay, but patient has nasal congestion and is coughing, states both have clear secretions; he is also short of air with exertion.  He is sneezing quite a lot and states cough is making chest and abdomen sore because so forceful.  Had diarrhea the last two days, poor appetite, and rates fatigue at 9.  He is unsteady on  his feet today. Please have Dr. Snyder review labs and advise about treatment today.  ALSO, on his plan, there is no Emend, nor is it in the note, BUT it is authorized from insurance; should he have it or not, it was not given last treatment.     Response from Dr. Snyder: brandi-hold treatment this week sent to PCP    Routed INR result to Henrietta HAYNES.

## 2025-01-23 NOTE — TELEPHONE ENCOUNTER
----- Message from Miguel Snyder sent at 1/23/2025  8:43 AM CST -----  INR 1.76-please fax these labs to PCP (or who manages his Coumadin)  ----- Message -----  From: Lab, Background User  Sent: 1/23/2025   7:44 AM CST  To: Miguel Snyder MD

## 2025-01-23 NOTE — PROGRESS NOTES
KIMBERLY met with Mr. Paz today who was here to receive treatment for non-small cell lung cancer. Unfortunately, he was not able to receive treatment today. He is 79 years old and lives alone. His sister drove him to treatment today. Mr. Paz states he was feeling tired today and had other symptoms. He did bring in a utility bill and it was sent to the [employer]. KIMBERLY will remain available.

## 2025-01-23 NOTE — PROGRESS NOTES
0930-Per Dr. Snyder, hold treatment and move out a week, get chest xray today.  I called scheduling and spoke with James about rescheduling tx.

## 2025-01-23 NOTE — TELEPHONE ENCOUNTER
----- Message from Miguel Snyder sent at 1/23/2025 11:08 AM CST -----  Lets start prednisone 50 mg daily x 7 days  ----- Message -----  From: Ruth Vidal MA  Sent: 1/23/2025  10:52 AM CST  To: Miguel Snyder MD    3-4 times yesterday and once today , then had accident and had to clean himself up again before coming here today.  ----- Message -----  From: Miguel Snyder MD  Sent: 1/23/2025  10:27 AM CST  To: Ruth Vidal MA    How much diarrhea is he having?  ----- Message -----  From: Silke Rad Results [place]  Sent: 1/23/2025  10:18 AM CST  To: Miguel Snyder MD

## 2025-01-23 NOTE — PROGRESS NOTES
Message to Hem/Onc: Brandi Rodriguez, [birth year], here for consideration of C2 Opdivo/Taxol/Carbo, labs look okay, but patient has nasal congestion and is coughing, states both have clear secretions; he is also short of air with exertion.  He is sneezing quite a lot and states cough is making chest and abdomen sore because so forceful.  Had diarrhea the last two days, poor appetite, and rates fatigue at 9.  He is unsteady on  his feet today. Please have Dr. Snyder review labs and advise about treatment today.  ALSO, on his plan, there is no Emend, nor is it in the note, BUT it is authorized from insurance; should he have it or not, it was not given last treatment.

## 2025-01-24 LAB — ACTH PLAS-MCNC: 26.3 PG/ML (ref 7.2–63.3)

## 2025-01-24 RX ORDER — PREDNISONE 50 MG/1
50 TABLET ORAL DAILY
Qty: 7 TABLET | Refills: 0 | Status: SHIPPED | OUTPATIENT
Start: 2025-01-24 | End: 2025-01-31

## 2025-01-24 NOTE — TELEPHONE ENCOUNTER
Spoke with patient and he will start the prednisone. He also reports cough with clear sputum. He asked if it was okay to take mucinex otc. I let him know that should be fine.

## 2025-01-31 ENCOUNTER — INFUSION (OUTPATIENT)
Dept: ONCOLOGY | Facility: HOSPITAL | Age: 80
End: 2025-01-31
Payer: MEDICARE

## 2025-01-31 ENCOUNTER — APPOINTMENT (OUTPATIENT)
Dept: LAB | Facility: HOSPITAL | Age: 80
End: 2025-01-31
Payer: MEDICARE

## 2025-01-31 ENCOUNTER — LAB (OUTPATIENT)
Dept: LAB | Facility: HOSPITAL | Age: 80
End: 2025-01-31
Payer: MEDICARE

## 2025-01-31 VITALS
BODY MASS INDEX: 31.81 KG/M2 | DIASTOLIC BLOOD PRESSURE: 72 MMHG | HEIGHT: 75 IN | WEIGHT: 255.8 LBS | SYSTOLIC BLOOD PRESSURE: 152 MMHG | RESPIRATION RATE: 18 BRPM | TEMPERATURE: 97.1 F | OXYGEN SATURATION: 94 % | HEART RATE: 65 BPM

## 2025-01-31 DIAGNOSIS — Z45.2 ENCOUNTER FOR CARE RELATED TO PORT-A-CATH: ICD-10-CM

## 2025-01-31 DIAGNOSIS — C34.90 NON-SMALL CELL LUNG CANCER, UNSPECIFIED LATERALITY: ICD-10-CM

## 2025-01-31 DIAGNOSIS — Z79.899 ENCOUNTER FOR LONG-TERM (CURRENT) USE OF HIGH-RISK MEDICATION: ICD-10-CM

## 2025-01-31 DIAGNOSIS — C34.90 NON-SMALL CELL LUNG CANCER, UNSPECIFIED LATERALITY: Primary | ICD-10-CM

## 2025-01-31 DIAGNOSIS — R94.6 ABNORMAL RESULTS OF THYROID FUNCTION STUDIES: ICD-10-CM

## 2025-01-31 LAB
ALBUMIN SERPL-MCNC: 4.3 G/DL (ref 3.5–5.2)
ALBUMIN/GLOB SERPL: 1.3 G/DL
ALP SERPL-CCNC: 73 U/L (ref 39–117)
ALT SERPL W P-5'-P-CCNC: 35 U/L (ref 1–41)
ANION GAP SERPL CALCULATED.3IONS-SCNC: 12 MMOL/L (ref 5–15)
AST SERPL-CCNC: 27 U/L (ref 1–40)
BASOPHILS # BLD AUTO: 0.01 10*3/MM3 (ref 0–0.2)
BASOPHILS NFR BLD AUTO: 0.1 % (ref 0–1.5)
BILIRUB SERPL-MCNC: 0.8 MG/DL (ref 0–1.2)
BUN SERPL-MCNC: 23 MG/DL (ref 8–23)
BUN/CREAT SERPL: 22.3 (ref 7–25)
CALCIUM SPEC-SCNC: 9.4 MG/DL (ref 8.6–10.5)
CEA SERPL-MCNC: 6.22 NG/ML
CHLORIDE SERPL-SCNC: 98 MMOL/L (ref 98–107)
CO2 SERPL-SCNC: 27 MMOL/L (ref 22–29)
CORTIS SERPL-MCNC: 7.83 MCG/DL
CREAT SERPL-MCNC: 1.03 MG/DL (ref 0.76–1.27)
DEPRECATED RDW RBC AUTO: 56.8 FL (ref 37–54)
EGFRCR SERPLBLD CKD-EPI 2021: 74.4 ML/MIN/1.73
EOSINOPHIL # BLD AUTO: 0.01 10*3/MM3 (ref 0–0.4)
EOSINOPHIL NFR BLD AUTO: 0.1 % (ref 0.3–6.2)
ERYTHROCYTE [DISTWIDTH] IN BLOOD BY AUTOMATED COUNT: 17.7 % (ref 12.3–15.4)
GLOBULIN UR ELPH-MCNC: 3.3 GM/DL
GLUCOSE SERPL-MCNC: 93 MG/DL (ref 65–99)
HCT VFR BLD AUTO: 39.4 % (ref 37.5–51)
HGB BLD-MCNC: 13.2 G/DL (ref 13–17.7)
IMM GRANULOCYTES # BLD AUTO: 0.06 10*3/MM3 (ref 0–0.05)
IMM GRANULOCYTES NFR BLD AUTO: 0.6 % (ref 0–0.5)
LYMPHOCYTES # BLD AUTO: 1.44 10*3/MM3 (ref 0.7–3.1)
LYMPHOCYTES NFR BLD AUTO: 14 % (ref 19.6–45.3)
MCH RBC QN AUTO: 29.6 PG (ref 26.6–33)
MCHC RBC AUTO-ENTMCNC: 33.5 G/DL (ref 31.5–35.7)
MCV RBC AUTO: 88.3 FL (ref 79–97)
MONOCYTES # BLD AUTO: 0.79 10*3/MM3 (ref 0.1–0.9)
MONOCYTES NFR BLD AUTO: 7.7 % (ref 5–12)
NEUTROPHILS NFR BLD AUTO: 7.94 10*3/MM3 (ref 1.7–7)
NEUTROPHILS NFR BLD AUTO: 77.5 % (ref 42.7–76)
NRBC BLD AUTO-RTO: 0 /100 WBC (ref 0–0.2)
PLATELET # BLD AUTO: 154 10*3/MM3 (ref 140–450)
PMV BLD AUTO: 9.5 FL (ref 6–12)
POTASSIUM SERPL-SCNC: 4.8 MMOL/L (ref 3.5–5.2)
PROT SERPL-MCNC: 7.6 G/DL (ref 6–8.5)
RBC # BLD AUTO: 4.46 10*6/MM3 (ref 4.14–5.8)
SODIUM SERPL-SCNC: 137 MMOL/L (ref 136–145)
T4 FREE SERPL-MCNC: 1.45 NG/DL (ref 0.93–1.7)
TSH SERPL DL<=0.05 MIU/L-ACNC: 3.54 UIU/ML (ref 0.27–4.2)
WBC NRBC COR # BLD AUTO: 10.25 10*3/MM3 (ref 3.4–10.8)

## 2025-01-31 PROCEDURE — 25810000003 SODIUM CHLORIDE 0.9 % SOLUTION: Performed by: INTERNAL MEDICINE

## 2025-01-31 PROCEDURE — 96413 CHEMO IV INFUSION 1 HR: CPT

## 2025-01-31 PROCEDURE — A9270 NON-COVERED ITEM OR SERVICE: HCPCS | Performed by: INTERNAL MEDICINE

## 2025-01-31 PROCEDURE — 96375 TX/PRO/DX INJ NEW DRUG ADDON: CPT

## 2025-01-31 PROCEDURE — 85025 COMPLETE CBC W/AUTO DIFF WBC: CPT

## 2025-01-31 PROCEDURE — 36415 COLL VENOUS BLD VENIPUNCTURE: CPT

## 2025-01-31 PROCEDURE — 25810000003 SODIUM CHLORIDE 0.9 % SOLUTION 250 ML FLEX CONT: Performed by: INTERNAL MEDICINE

## 2025-01-31 PROCEDURE — 25010000002 DEXAMETHASONE PER 1 MG: Performed by: INTERNAL MEDICINE

## 2025-01-31 PROCEDURE — 25010000002 DIPHENHYDRAMINE PER 50 MG: Performed by: INTERNAL MEDICINE

## 2025-01-31 PROCEDURE — 25810000003 SODIUM CHLORIDE 0.9 % SOLUTION 500 ML FLEX CONT: Performed by: INTERNAL MEDICINE

## 2025-01-31 PROCEDURE — 82024 ASSAY OF ACTH: CPT

## 2025-01-31 PROCEDURE — 80053 COMPREHEN METABOLIC PANEL: CPT

## 2025-01-31 PROCEDURE — 82533 TOTAL CORTISOL: CPT

## 2025-01-31 PROCEDURE — 82378 CARCINOEMBRYONIC ANTIGEN: CPT

## 2025-01-31 PROCEDURE — 84439 ASSAY OF FREE THYROXINE: CPT

## 2025-01-31 PROCEDURE — 96415 CHEMO IV INFUSION ADDL HR: CPT

## 2025-01-31 PROCEDURE — 96417 CHEMO IV INFUS EACH ADDL SEQ: CPT

## 2025-01-31 PROCEDURE — 25010000002 HEPARIN LOCK FLUSH PER 10 UNITS: Performed by: INTERNAL MEDICINE

## 2025-01-31 PROCEDURE — 96367 TX/PROPH/DG ADDL SEQ IV INF: CPT

## 2025-01-31 PROCEDURE — 25010000002 NIVOLUMAB 120 MG/12ML SOLUTION 12 ML VIAL: Performed by: INTERNAL MEDICINE

## 2025-01-31 PROCEDURE — 25010000002 CARBOPLATIN PER 50 MG: Performed by: INTERNAL MEDICINE

## 2025-01-31 PROCEDURE — 84443 ASSAY THYROID STIM HORMONE: CPT

## 2025-01-31 PROCEDURE — 25010000002 PALONOSETRON PER 25 MCG: Performed by: INTERNAL MEDICINE

## 2025-01-31 PROCEDURE — 25010000002 PACLITAXEL PER 1 MG: Performed by: INTERNAL MEDICINE

## 2025-01-31 PROCEDURE — 63710000001 APREPITANT PER 5 MG: Performed by: INTERNAL MEDICINE

## 2025-01-31 RX ORDER — DEXAMETHASONE SODIUM PHOSPHATE 10 MG/ML
10 INJECTION INTRAMUSCULAR; INTRAVENOUS ONCE
Status: COMPLETED | OUTPATIENT
Start: 2025-01-31 | End: 2025-01-31

## 2025-01-31 RX ORDER — FAMOTIDINE 10 MG/ML
20 INJECTION, SOLUTION INTRAVENOUS ONCE
Status: COMPLETED | OUTPATIENT
Start: 2025-01-31 | End: 2025-01-31

## 2025-01-31 RX ORDER — SODIUM CHLORIDE 0.9 % (FLUSH) 0.9 %
10 SYRINGE (ML) INJECTION AS NEEDED
OUTPATIENT
Start: 2025-01-31

## 2025-01-31 RX ORDER — HEPARIN SODIUM (PORCINE) LOCK FLUSH IV SOLN 100 UNIT/ML 100 UNIT/ML
500 SOLUTION INTRAVENOUS AS NEEDED
OUTPATIENT
Start: 2025-01-31

## 2025-01-31 RX ORDER — HEPARIN SODIUM (PORCINE) LOCK FLUSH IV SOLN 100 UNIT/ML 100 UNIT/ML
500 SOLUTION INTRAVENOUS AS NEEDED
Status: DISCONTINUED | OUTPATIENT
Start: 2025-01-31 | End: 2025-01-31 | Stop reason: HOSPADM

## 2025-01-31 RX ORDER — DIPHENHYDRAMINE HYDROCHLORIDE 50 MG/ML
50 INJECTION INTRAMUSCULAR; INTRAVENOUS ONCE
Status: DISCONTINUED | OUTPATIENT
Start: 2025-01-31 | End: 2025-01-31

## 2025-01-31 RX ORDER — HYDROCORTISONE SODIUM SUCCINATE 100 MG/2ML
100 INJECTION INTRAMUSCULAR; INTRAVENOUS AS NEEDED
Status: DISCONTINUED | OUTPATIENT
Start: 2025-01-31 | End: 2025-01-31 | Stop reason: HOSPADM

## 2025-01-31 RX ORDER — FAMOTIDINE 10 MG/ML
20 INJECTION, SOLUTION INTRAVENOUS AS NEEDED
Status: DISCONTINUED | OUTPATIENT
Start: 2025-01-31 | End: 2025-01-31 | Stop reason: HOSPADM

## 2025-01-31 RX ORDER — DIPHENHYDRAMINE HYDROCHLORIDE 50 MG/ML
50 INJECTION INTRAMUSCULAR; INTRAVENOUS AS NEEDED
Status: DISCONTINUED | OUTPATIENT
Start: 2025-01-31 | End: 2025-01-31 | Stop reason: HOSPADM

## 2025-01-31 RX ORDER — APREPITANT 40 MG/1
80 CAPSULE ORAL ONCE
Status: COMPLETED | OUTPATIENT
Start: 2025-01-31 | End: 2025-01-31

## 2025-01-31 RX ORDER — PALONOSETRON 0.05 MG/ML
0.25 INJECTION, SOLUTION INTRAVENOUS ONCE
Status: COMPLETED | OUTPATIENT
Start: 2025-01-31 | End: 2025-01-31

## 2025-01-31 RX ORDER — SODIUM CHLORIDE 0.9 % (FLUSH) 0.9 %
10 SYRINGE (ML) INJECTION AS NEEDED
Status: DISCONTINUED | OUTPATIENT
Start: 2025-01-31 | End: 2025-01-31 | Stop reason: HOSPADM

## 2025-01-31 RX ORDER — SODIUM CHLORIDE 9 MG/ML
20 INJECTION, SOLUTION INTRAVENOUS ONCE
Status: COMPLETED | OUTPATIENT
Start: 2025-01-31 | End: 2025-01-31

## 2025-01-31 RX ADMIN — Medication 10 ML: at 13:46

## 2025-01-31 RX ADMIN — SODIUM CHLORIDE 360 MG: 9 INJECTION, SOLUTION INTRAVENOUS at 09:12

## 2025-01-31 RX ADMIN — PALONOSETRON HYDROCHLORIDE 0.25 MG: 0.25 INJECTION, SOLUTION INTRAVENOUS at 08:35

## 2025-01-31 RX ADMIN — Medication 500 UNITS: at 13:46

## 2025-01-31 RX ADMIN — DEXAMETHASONE SODIUM PHOSPHATE 10 MG: 10 INJECTION INTRAMUSCULAR; INTRAVENOUS at 08:40

## 2025-01-31 RX ADMIN — CARBOPLATIN 600 MG: 600 INJECTION, SOLUTION INTRAVENOUS at 13:02

## 2025-01-31 RX ADMIN — FAMOTIDINE 20 MG: 10 INJECTION INTRAVENOUS at 08:38

## 2025-01-31 RX ADMIN — PACLITAXEL 420 MG: 6 INJECTION, SOLUTION INTRAVENOUS at 09:57

## 2025-01-31 RX ADMIN — SODIUM CHLORIDE 20 ML/HR: 9 INJECTION, SOLUTION INTRAVENOUS at 08:30

## 2025-01-31 RX ADMIN — DIPHENHYDRAMINE HYDROCHLORIDE 50 MG: 50 INJECTION, SOLUTION INTRAMUSCULAR; INTRAVENOUS at 08:45

## 2025-01-31 RX ADMIN — APREPITANT 80 MG: 40 CAPSULE ORAL at 08:56

## 2025-02-02 LAB — ACTH PLAS-MCNC: 31.9 PG/ML (ref 7.2–63.3)

## 2025-02-02 NOTE — PROGRESS NOTES
[facility] HEMATOLOGY & ONCOLOGY  [address]  [facility] [address] [phone]    Patient Name: Michael Paz  Encounter Date: 02/07/2025  YOB: 1945  Patient Number: [id number]    REASON FOR VISIT: Michael Paz 79 y.o. male who returns in follow-up of microcytic anemia from acute UGIB.  He has received IV Ferrlecit 250 mg on 10/31/24 and 11/1/24.  He is currently on oral ferrous sulfate 325 p.o. daily.  He has a history of congenital protein S deficiency with prior DVT of the lower extremities and bilateral PE for which he is on long term Coumadin.  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 Pagan who also noted mediastinum negative on EBUS, PET scan negative for abnormal uptake elsewhere and PFTs adequate for surgery.  The patient has been seen by Dr. Hawkins of CT surgery who has recommended neoadjuvant systemic therapy.  He is currently receiving neoadjuvant carboplatin+paclitaxel+nivolumab-C1, 1/2/25.  C2 due on 1/23/25 withheld due to cough and diarrhea.  C2 resumed, 1/31/25.  He is here with a sister    I have reviewed the HPI and verified with the patient the accuracy of it. No changes to interval history since the information was documented. Miguel Snyder MD 02/07/25      Diagnostic abnormalities:  - Medical history includes AAA, AAA repair, arthritis, atrial fibrillation (1990), CAD, CABG, coronary stent placement, coronary angioplasty, DVT x 3 ([id number])/bilateral PE (1986), protein S deficiency on maintenance anticoagulation (was on Coumadin), PVD, hyperlipidemia, hypertension, myocardial infarction (1990s), pneumonia (4 times), anemia, EGD (10/25/2024), EtOH use (3-4 standard drinks of alcohol per week) and tobacco abuse (67-pack-year smoker).  -10/24/2024-presented to [facility] ER with weakness, dyspnea, and dark stools.  There  he was found to be anemic and was transferred to [facility] for further eval.  Over the past few months patient has had worsening fatigue weakness and dark stools.  On admission hemoglobin 7.7, iron saturation 6%.  Received 1 unit of RBC transfusion with improvement of the hemoglobin to 8.5.  Repeat on 10/25/2024 however showed hemoglobin 7.4.  INR prolonged at 2.94 (patient on Coumadin).  -10/25/2024-seen for GI by   Recommended transfusion of RBC and 2 units FFP prior to EGD.  -10/25/2024-EGD showed normal proximal esophagus and mid esophagus. - Z-line irregular, at the gastroesophageal junction. - Red blood in the stomach. - A single bleeding angioectasia in the stomach. Treated with argon plasma coagulation (APC). Injected-recommendation: Return patient to [facility] for ongoing care. - NPO. - Continue present medications. - Will give additonal unit of FFP and 1 unit of PRBC now. - Monitor for any further bleeding. Monitor hgb/hct. - Normal first portion of the duodenum and second portion of the duodenum. - No specimens collected.  -10/26/2024- Hgb 7.6; MCV 84.1, platelets 143,000, WBC 4.6. CMP normal.  Iron 26 (L), iron saturation 6% (L), TIBC 429, ferritin 40.6 (L), subsequently 3 u FFP, 1 u RBC (per bloodbank)  -10/29/24- CT chest- 1. Spiculated opacity at the RIGHT upper lobe measuring 6.0 x 3.4 cm. Appearance concerning for malignancy. There are several small airways leading to this opacity and endobronchial sampling could be considered. 2. Increased size LEFT lower lobe pulmonary nodule now measuring 2.2 cm, previously 1.9 cm on 3/12/2024. PET/CT may be useful if not previously performed. 3. Enlarged main pulmonary artery, which can be seen with pulmonary artery hypertension. 4. Increased size of lobular mass in the RIGHT coronary artery distribution, favor thrombosed aneurysm or other post operative changes. Difficult to determine change due to available prior exams although appears increased compared to  4/25/2019 with measurements as above.   -10/30/24- Consult pulmonary, Dr. Pagan. A/P: Newly identified mass RUL apex worrisome for malignancy.  Increasing LLL nodule.  >  60-pack-year smoker.  Acute blood loss anemia.  A-fib.  Anticoagulated, meds held due to bleeding.  History of hypercoagulable state.  Recommendations/plan:  CT is reviewed. The rul lesion is approachable by ion bronchoscopy.  Unfortunately there is a blood vessel running the entire course along the portion of the airway from which we would try to approach the LLL lesion, so this one might not be accessible by ion bronchoscopy.  There is high risk of morbidity from additional diagnostic testing or treatment   PET scanning would be helpful in outpatient setting and would help direct biopsy efforts.  We can order this on discharge with outpatient office follow up for consideration for bronchoscopy or other biopsy technique.  This would require holding anticoagulation again.  Add aerosol bronchodilators to see if this helps him  Outpatient PFT.     -10/30/24- Hgb 8.4, Hct 28.9, PT 17.8, INR 1.41  -10/31/24- Protein S Ag total 28 (ref: %); Protein S Ag Free 14 (ref: %)  -11/7/24- PET scan-1.  6 cm hypermetabolic spiculated right upper lobe lung mass, most likely representing a primary lung neoplasm. 2.  No evidence of hypermetabolic metastatic disease in the neck, chest, abdomen, or pelvis. No hypermetabolic lymphadenopathy in the chest. 3.  2 cm solid round circumscribed nodule in the left lower lobe with  max SUV 2.9, below liver background level. This nodule has slowly increased in size, measuring 1.3 cm on an exam from 2022. I suspect this represents a noncalcified granuloma or hamartoma but a slow-growing neoplasm is not entirely excluded and continued imaging surveillance is recommended.  -11/14/24- MRI brain- Age-related changes with atrophy and small vessel disease.  No intracranial metastases.  - 12/10/24-  Flexible fiberoptic  bronchoscopy, Bronchoscopy, Diagnostic, Ion robotic shape sensing navigational bronchoscopy, Radial probe endobronchial ultrasound, Linear endobronchial ultrasound, Bronchial wash, Transbronchial biopsy, Transbronchial needle aspirate of mediastinum, and Transbronchial needle aspirate of lung with navigational and fluoroscopic guidance.  Findings: normal endobronchial anatomy, concentric radial probe view. Enlarged subcarinal nodes.    Final diagnosis:  1.  Lung, right upper lobe, Ion fine-needle aspiration: MALIGNANT-   Non-small cell carcinoma. 2.  Mediastinum, station 7 EBUS: BENIGN-   Bloody with small lymphocytes and benign-appearing respiratory epithelial cells.3.  Bronchus, bronchial washing with cellblock:  BENIGN-   Macrophages, benign-appearing respiratory epithelial cells (some reactive), and a few small lymphocytes,: In a bloody, proteinaceous, and mucoid background. 4.  Lung, right upper lobe, biopsy, three blocks:  BENIGN-   Variably disrupted respiratory mucosa, and fresh clot.     Previous interventions:  -Received 1 unit RBC (received 1 unit RBC prior to transfer to this facility, 10/24/24) and 3 units FFP transfusions, 10/25/24  -Ferrlecit 250 mg IV on 10/31/24 and 11/1/24.    -Ferrous sulfate 325 p.o. daily, 11/2/2024-present  -Neoadjuvant carboplatin+paclitaxel+nivolumab-C1, 1/2/25.  C2 due on 1/23/25 withheld due to cough and diarrhea. C2 resumed, 1/31/25    Problem List Items Addressed This Visit          Other    Non-small cell lung cancer - Primary     Oncology/Hematology History   Non-small cell lung cancer   12/20/2024 Initial Diagnosis    Non-small cell lung cancer     1/2/2025 -  Chemotherapy    OP LUNG  Nivolumab 360mg /  PACLitaxel / CARBOplatin AUC=5 (Q21D)         PAST MEDICAL HISTORY:  ALLERGIES:  No Known Allergies  CURRENT MEDICATIONS:  Outpatient Encounter Medications as of 2/7/2025   Medication Sig Dispense Refill    Ascorbic Acid (VITAMIN C PO) Take 2,000 mg by mouth Daily.       candesartan (ATACAND) 16 MG tablet Take 1 tablet by mouth Daily.      Enoxaparin Sodium (LOVENOX) 120 MG/0.8ML solution prefilled syringe syringe Inject 0.8 mL under the skin into the appropriate area as directed Daily. (Patient not taking: Reported on 2025)      ferrous sulfate 325 (65 FE) MG tablet Take 1 tablet by mouth Daily With Breakfast. 90 tablet 0    metoprolol succinate XL (TOPROL-XL) 25 MG 24 hr tablet Take 1 tablet by mouth Every Night.      Multiple Vitamins-Minerals (ONE A DAY MEN 50 PLUS PO) Take 1 tablet by mouth Daily.      ondansetron (ZOFRAN) 8 MG tablet Take 1 tablet by mouth Every 8 (Eight) Hours As Needed for Nausea or Vomiting. 30 tablet 1    [] predniSONE (DELTASONE) 50 MG tablet Take 1 tablet by mouth Daily for 7 days. 7 tablet 0    prochlorperazine (COMPAZINE) 10 MG tablet Take 1 tablet by mouth Every 6 (Six) Hours As Needed for Nausea or Vomiting. 60 tablet 0    simvastatin (ZOCOR) 80 MG tablet Take 1 tablet by mouth Every Night.      Trelegy Ellipta 100-62.5-25 MCG/ACT inhaler Inhale 1 puff Daily.      warfarin (COUMADIN) 4 MG tablet Take 1 tablet by mouth Every Other Day. Alternates with 5mg, pt states been on hold and been doing lovonox injections       No facility-administered encounter medications on file as of 2025.     ADULT ILLNESSES:  Patient Active Problem List   Diagnosis Code    Tobacco abuse Z72.0    Hypertension I10    Hyperlipidemia E78.5    AAA (abdominal aortic aneurysm) I71.40    Aneurysm I72.9    CAD (coronary artery disease) I25.10    Right-sided epistaxis R04.0    Hx pulmonary embolism Z86.711    Hx of deep venous thrombosis Z86.718    Rhinitis, chronic J31.0    Anticoagulant adverse reaction T45.515A    Epistaxis R04.0    Lung nodule R91.1    Protein C deficiency D68.59    Protein S deficiency D68.59    Atrial fibrillation, chronic I48.20    Symptomatic anemia D64.9    S/P CABG (coronary artery bypass graft) Z95.1    Stage 2 moderate COPD by GOLD  classification J44.9    Non-small cell lung cancer C34.90    Encounter for long-term (current) use of high-risk medication Z79.899    Encounter for care related to Port-a-Cath Z45.2     SURGERIES:  Past Surgical History:   Procedure Laterality Date    ABDOMINAL AORTIC ANEURYSM REPAIR      x2    APPENDECTOMY      BACK SURGERY      x2    BRONCHOSCOPY N/A 12/10/2024    Procedure: BRONCHOSCOPY WITH ENDOBRONCHIAL ULTRASOUND;  Surgeon: Lizandro Pagan MD;  Location: [facility] OR;  Service: Pulmonary;  Laterality: N/A;  preop; RUL mass   postop RUL mass   PCP Henrietta Scruggs    BRONCHOSCOPY WITH ION ROBOTIC ASSIST N/A 12/10/2024    Procedure: BRONCHOSCOPY WITH ION ROBOT and BRONCHOSCOPY WITH ENDOBRONCHIAL ULTRASOUND;  Surgeon: Lizandro Pagan MD;  Location: [facility] OR;  Service: Robotics - Pulmonary;  Laterality: N/A;  preop; RUL mass   postop RUL mass   PCP Henrietta Scruggs    CARDIAC CATHETERIZATION      CORONARY ANGIOPLASTY      CORONARY ARTERY BYPASS GRAFT      1990 and 2008    CORONARY STENT PLACEMENT      ENDOSCOPY N/A 10/25/2024    Procedure: ESOPHAGOGASTRODUODENOSCOPY WITH ANESTHESIA;  Surgeon: Fareed Velasco MD;  Location: [facility] ENDOSCOPY;  Service: Gastroenterology;  Laterality: N/A;  Pre: Symptomatic anemia;  Post: Bleeding gastric AVM;  Nausea and vomiting    HEMORRHOIDECTOMY      x2    INGUINAL HERNIA REPAIR      KNEE SURGERY Right     LUNG BIOPSY      left lower lung    OTHER SURGICAL HISTORY      TYPE IA ENDOLEAK REPAIR    OTHER SURGICAL HISTORY      CRANIAL CYST    VENOUS ACCESS DEVICE (PORT) INSERTION N/A 12/31/2024    Procedure: Single Lumen Port-a-cath insertion with flouroscopy;  Surgeon: Antwan Carnes MD;  Location: [facility] OR;  Service: General;  Laterality: N/A;     HEALTH MAINTENANCE ITEMS:  Health Maintenance Due   Topic Date Due    COLORECTAL CANCER SCREENING  Never done    ZOSTER VACCINE (1 of 2) Never done    HEPATITIS C SCREENING  Never done    ANNUAL WELLNESS VISIT  Never done    LIPID  PANEL  07/09/2021    RSV Vaccine - Adults (1 - 1-dose 75+ series) Never done    COVID-19 Vaccine (5 - 2024-25 season) 09/01/2024       <no information>  Last Completed Colonoscopy       This patient has no relevant Health Maintenance data.          Immunization History   Administered Date(s) Administered    COVID-19 (MODERNA) 1st,2nd,3rd Dose Monovalent 03/24/2021, 04/21/2021    COVID-19 (MODERNA) BIVALENT 12+YRS 12/13/2022    COVID-19 (MODERNA) Monovalent Original Booster 01/18/2022    Fluzone (or Fluarix & Flulaval for VFC) >6mos 12/13/2022    Fluzone High-Dose 65+YRS 10/27/2021    Fluzone High-Dose 65+yrs 11/17/2020, 12/13/2023    Influenza Seasonal Injectable 11/15/2017, 10/30/2018    Pneumococcal Polysaccharide (PPSV23) 10/27/2021    Tdap 07/08/2020, 12/13/2023     Last Completed Mammogram       This patient has no relevant Health Maintenance data.              FAMILY HISTORY:  Family History   Problem Relation Age of Onset    Heart disease Mother     Heart disease Father     Heart disease Other     Hypertension Other     Diabetes Other      SOCIAL HISTORY:  Social History     Socioeconomic History    Marital status:    Tobacco Use    Smoking status: Every Day     Current packs/day: 1.00     Average packs/day: 1 pack/day for 67.7 years (67.7 ttl pk-yrs)     Types: Cigarettes     Start date: 6/1/1957     Passive exposure: Current    Smokeless tobacco: Never    Tobacco comments:     Don't inhale.   Vaping Use    Vaping status: Never Used   Substance and Sexual Activity    Alcohol use: Yes     Alcohol/week: 3.0 - 4.0 standard drinks of alcohol     Types: 3 - 4 Cans of beer per week     Comment: SOCIAL    Drug use: No    Sexual activity: Defer         Review of Systems:   Carboplatin+paclitaxel+nivolumab-tolerance.  C2 due on 1/23/25 withheld due to cough and diarrhea.  After C3 on 1/31/2025 with well for 2 days but subsequently developed soft stools after each meal, has had approximately 4-5 loose bowel  "movements daily.  \"Especially after I eat\".  Unchanged fatigue.  No mouth sores.  No nausea.  No vomiting.  No skin changes.  No neuropathy.  Constitutional: Lingering fatigue and weakness appetite is only fair.  No fever / No chills / No sweats.  Is up and about \"all of the time cause I have to.\"  Manages his ADLs including chores but has not been driving.  Lives alone.  His sister helps him out  HEENT:  No sore throat / No hoarseness / No vision changes  CV:  No chest pain / No palpitations / No orthopnea  Respiratory:  No cough /+ exertional shortness of breath but no sob with routine activities nor at rest/ No sputum / No hemoptysis  GI:  No nausea / No vomiting / No abdominal pain / No diarrhea / No constipation/no melena/no hematochezia.  Last bowel movement this morning was described as, \"somewhat watery brown color\".  :  No dysuria / No hesitancy / No urgency / No hematuria  Neuro: Lingering generalized uscle weakness/No dysphagia / No headache / No paresthesias  Musculoskeletal:  + Chronic edema / No cyanosis / No pain      VITAL SIGNS: /58   Pulse 70   Temp 97.1 °F (36.2 °C) (Temporal)   Resp 22   Ht 180.3 cm (71\")   Wt 119 kg (263 lb)   SpO2 99%   BMI 36.68 kg/m² Body surface area is 2.37 meters squared.  Pain Score    02/07/25 1031   PainSc:   5   PainLoc: Abdomen         Physical Exam:  General appearance: Pleasant, obese, modestly kept elderly male who appears comfortable seated.  Alert, appears stated age and cooperative.  Brought in by wheelchair but ambulatory in the room ECOG 1-2 (prior: 1)  Has lost 3 lb (in addition to 1 lb at his prior visit) since last visit  Skin:  Skin color is less pale. No rashes or lesions  HEENT:  Head: Normal, normocephalic, atraumatic.  Neck:  no adenopathy, no tenderness/mass/nodules  Lungs:  clear to auscultation bilaterally  Chest:  Port in the right upper chest is well seated  Heart:  regular rate and rhythm  Abdomen:  soft, globose, " nontender.  Extremities: Symmetric with 1+ bipedal edema.  Neurologic:  Mental status: Alert, oriented, thought content appropriate    LABS    Lab Results - Last 18 Months   Lab Units 02/07/25  1010 01/31/25  0719 01/23/25  0731 01/02/25  0744 12/10/24  1310 11/20/24  0958 11/01/24  0804 10/31/24  1559 10/31/24  1251 10/26/24  1634 10/26/24  0651   HEMOGLOBIN g/dL 11.4* 13.2 11.6* 12.1* 11.4* 9.9* 8.7*   < > 8.8*   < > 7.6*   HEMATOCRIT % 33.8* 39.4 35.0* 39.3 35.6*  --  28.8*   < > 29.2*   < > 26.0*   MCV fL 88.9 88.3 90.0 89.7 84.8  --   --   --  82.5  --  84.1   WBC 10*3/mm3 3.51 10.25 4.32 7.13 5.83  --   --   --  6.19  --  4.60   RDW % 15.0 17.7* 18.9* 21.2* 23.0*  --   --   --  16.7*  --  16.4*   MPV fL 9.2 9.5 8.5 9.2 9.5  --   --   --  10.2  --  8.9   PLATELETS 10*3/mm3 145 154 119* 180 204 174  --   --  172  --  143   IMM GRAN % % 1.4* 0.6* 0.7* 0.3  --   --   --   --  0.2  --  0.4   NEUTROS ABS 10*3/mm3 2.73 7.94* 2.74 4.86  --   --   --   --  4.01  --  3.10   LYMPHS ABS 10*3/mm3 0.57* 1.44 0.71 1.22  --   --   --   --  0.96  --  0.71   MONOS ABS 10*3/mm3 0.10 0.79 0.82 0.69  --   --   --   --  0.94*  --  0.55   EOS ABS 10*3/mm3 0.05 0.01 0.00 0.32  --   --   --   --  0.24  --  0.20   BASOS ABS 10*3/mm3 0.01 0.01 0.02 0.02  --   --   --   --  0.03  --  0.02   IMMATURE GRANS (ABS) 10*3/mm3 0.05 0.06* 0.03 0.02  --   --   --   --  0.01  --  0.02   NRBC /100 WBC 0.0 0.0 0.0 0.0  --   --   --   --  0.0  --  0.0    < > = values in this interval not displayed.       Lab Results - Last 18 Months   Lab Units 02/07/25  1010 01/31/25  0719 01/23/25  0731 01/02/25  0744 12/10/24  1310 11/14/24  0638 10/25/24  0656 10/23/24  1300   GLUCOSE mg/dL 124* 93 115* 129* 101*  --  99 84   SODIUM mmol/L 134* 137 132* 141 141  --  139 139   POTASSIUM mmol/L 4.7 4.8 4.6 4.9 4.4  --  4.2 4.9   CO2 mmol/L 21.0* 27.0 24.0 26.0 23.0  --  21.0* 20   CHLORIDE mmol/L 103 98 98 106 107  --  106 105   ANION GAP mmol/L 10.0 12.0 10.0 9.0  11.0  --  12.0  --    CREATININE mg/dL 0.93 1.03 1.06 0.86 0.92 1.40* 0.88 1.12   BUN mg/dL 21 23 20 13 17  --  20 21   BUN / CREAT RATIO  22.6 22.3 18.9 15.1 18.5  --  22.7 19   CALCIUM mg/dL 9.0 9.4 8.7 9.6 9.7  --  8.9 9.3   ALK PHOS U/L 63 73 71 77  --   --  63 73   TOTAL PROTEIN g/dL 6.6 7.6 7.0 7.1  --   --  6.1  --    ALT (SGPT) U/L 29 35 19 19  --   --  13 18   AST (SGOT) U/L 31 27 29 27  --   --  19 27   BILIRUBIN mg/dL 1.2 0.8 0.7 0.7  --   --  0.7 0.6   ALBUMIN g/dL 3.7 4.3 4.2 4.2  --   --  3.5 4.3   GLOBULIN gm/dL 2.9 3.3 2.8 2.9  --   --  2.6  --        Lab Results - Last 18 Months   Lab Units 01/31/25  0719 01/23/25  0731   CEA ng/mL 6.22 5.58       Lab Results - Last 18 Months   Lab Units 02/07/25  1010 01/31/25  0719 01/23/25  0731 01/02/25  0744 10/25/24  0656   IRON mcg/dL  --   --  42*  --  26*   TIBC mcg/dL  --   --  352  --  429   IRON SATURATION (TSAT) %  --   --  12*  --  6*   FERRITIN ng/mL  --   --  215.90  --  40.63   TSH uIU/mL 1.570 3.540 3.360 3.690  --          ASSESSMENT:   Grade 1 diarrhea.  Chemo +/- nivolumab   Pulmonary non-small cell carcinoma, right upper lobe  --Original tumor stage:  IIB (cT3, cN0, M0)  --Original tumor burden:    -11/7/24- PET scan- 6 cm hypermetabolic spiculated right upper lobe lung mass, most likely representing a primary lung neoplasm. No evidence of hypermetabolic metastatic disease in the neck, chest, abdomen, or pelvis. No hypermetabolic lymphadenopathy in the chest. 2 cm solid round circumscribed nodule in the left lower lobe with max SUV 2.9, below liver background level. This nodule has slowly increased in size, measuring 1.3 cm on an exam from 2022. I suspect this represents a noncalcified granuloma or hamartoma but a slow-growing neoplasm is not entirely excluded and continued imaging surveillance is recommended.  -12/10/24- CT chest- No significant change in 6 cm spiculated right upper lobe lung mass, highly suspicious for a primary lung  neoplasm. No significant change in the previously biopsied 2 cm left lower lobe pulmonary nodule with small central calcification. This is favored to represent a granuloma. Severe emphysema. Ascending aorta is dilated measuring 4.2 cm diameter. Redemonstrated postoperative change of CABG with bypass graft  aneurysm.  - 12/10/24-  Flexible fiberoptic bronchoscopy, Bronchoscopy, Diagnostic, Ion robotic shape sensing navigational bronchoscopy, Radial probe endobronchial ultrasound, Linear endobronchial ultrasound, Bronchial wash, Transbronchial biopsy, Transbronchial needle aspirate of mediastinum, and Transbronchial needle aspirate of lung with navigational and fluoroscopic guidance.  Findings: normal endobronchial anatomy, concentric radial probe view. Enlarged subcarinal nodes.  Final diagnosis:  1.  Lung, right upper lobe, Ion fine-needle aspiration: MALIGNANT-   Non-small cell carcinoma. 2.  Mediastinum, station 7 EBUS: BENIGN-   Bloody with small lymphocytes and benign-appearing respiratory epithelial cells.3.  Bronchus, bronchial washing with cellblock: BENIGN-   Macrophages, benign-appearing respiratory epithelial cells (some reactive), and a few small lymphocytes,: In a bloody, proteinaceous, and mucoid background. 4.  Lung, right upper lobe, biopsy, three blocks: BENIGN-   Variably disrupted respiratory mucosa, and fresh clot.    -- Tumor status:   - 11/11/24- Consult Dr. Hawkins-A/P: I have reviewed his imaging and I am very concerned that the right upper lobe mass is a primary lung malignancy.  I am also concerned about the increasing left lower lobe nodule that is now greater than 2 cm.  I discussed the case extensively with Dr. Pagan and more recently with Dr. Noland.  There is a blood vessel between the airway and nodule in the left lower lobe making bronchoscopic biopsy very difficult and risky.  Given this I am hopeful that there can be an IR CT-guided biopsy.  Will also need to get biopsy of right upper  "lobe with Dr. Pagan.  If right upper lobe is primary lung malignancy and non-small cell but left lower lobe is nonmalignant then can consider neoadjuvant therapy with Checkmate protocol followed by surgical resection of the right upper lobe.  If both are malignant then not a surgical candidate and will need to undergo definitive chemo and radiation.    -Neoadjuvant carboplatin+paclitaxel+nivolumab-C1, 1/2/25.  C2 due on 1/23/25 withheld due to cough and diarrhea, C2 given on 1/31/2025    3.   Iron deficiency anemia from recent/acute upper GI bleed.  -Has received at least 1 unit RBC (received 1 unit RBC prior to transfer to this facility) and 3 units FFP transfusions this admission.  -10/25/2024- EGD normal proximal esophagus and mid esophagus. - Z-line irregular, at the gastroesophageal junction. - Red blood in the stomach. - A single bleeding angioectasia in the stomach. Treated with argon plasma coagulation (APC). Injected  - Hgb 11.4, 2/7/2025 (prior manohar: Hgb 7.7, 10/23/2024)  - 12/12/2024-ferritin 97, fe 73, fe sat 23  4.   History of protein S deficiency with prior DVT x 3 of the left leg ([id number]) and prior bilateral PE (1986).  Patient states that 3 of his sisters and \"13 maternal cousins\" carry the genetic deficiency.  -10/31/24- Protein S Ag total 28 (ref: %); Protein S Ag Free 14 (ref: %)  5.   Paroxysmal atrial fibrillation   6.   Maintenance anticoagulation (since resumed).  States that he maintains INR = 2-2.4  7.   COPD  8.   >  60-pack-year cigarette smoking history.  9.   CAD with prior coronary stent, CABG, and MI.        PLAN:  Labs 2/7/2025 glucose 124, sodium 134 otherwise normal CMP, FT4 1.3/TSH 1.57 (both normal), Hgb 11.4 (stable) otherwise normal CBC with ANC of 2.73  Labs 1/23/2025 CEA 5.6, ferritin 215, fe 42, fe sat 12 (prior: 6), gluc 115, sodium 132, otherwise normal CMP, Hgb 11.6 otherwise normal CBC, INR 1.76 (followed by pcp- Naila Jin, ARNP), CEA 5.58. ACTH 26, " cortisol 23, FT4 1.07, TSH 3.3  Carbo+paclitaxel+nivolumab tolerance.  Loose stools without overt diarrhea. Started on prednisone 50 mg po qd x 7d since 1/23/25.  Diarrhea teaching conducted.  Imodium protocol discussed/explained.  1 L IV fluids today  Chest xray, 1/23/25. Nodular opacity right upper lobe is stable to improved.  Chronic changes of emphysema and mild interstitial prominence.  Previously discussed results of bronchoscopy/EBUS, 12/10/24 (above).  +NSCca RUL, negative mediastinal node biopsies  Previously reviewed PET scan, 11/7/2024 (above).  Solitary 6 cm RUL mass and 2 cm LLL mass (favored to represent granuloma on CT chest, 12/10/24)  Patient insists on anticoagulation fully aware of the risk of rebleeding given the background of acute GI bleed.  Thus, since he has not had any recent thrombotic episodes, I suggested new INR goal of ~ 1.5-2.2 range (followed by PCP)  Send biopsy specimens from 12/10/24  for Tempus xT lung molecular profile- including PD-L1, EGFR, BRAF, ALK, ROS1, RET, MET, HER2, etc.    Review NCCN guidelines non-small cell lung cancer, 11.2024-Stage IIB (T3N0), IIIA (T3, N1). Evaluate for perioperative therapy, PFTs, pathologic mediastinal node evaluation, PET/CT scan and brain MRI- NO tierra disease- Operable:  Preop systemic therapy then resection+mediastinal node sampling/exploration.  If medically inoperable, high risk surgical risk per CTS or decline surgery:  Definitive RT (preferable SABR)-Consider adjuvant chemo for high riak stage II (poorly differentiated tumors, including lung neuroendocrine tumors excluding well-differentiated neuroendocrine tumors)     Re: Discussed the potential toxicities of paclitaxel (to include but not limited to: Myelosuppression, opportunistic infection, neuropathy, hypersensitivity reaction, anaphylaxis, cardiac conduction disturbance, bradycardia, arrhythmias, hypothyroidism, syncope, hypertension, thromboembolism, myocardial infarction,  severe injection site reaction, pulmonary toxicity, neurotoxicity, GI obstruction/perforation, paralytic ileus, ischemic colitis, pancreatitis, hepatotoxicity, severe skin reaction, febrile neutropenia, alopecia, arthralgias/myalgias, nausea/vomiting, diarrhea, mucositis, asthenia, bleeding, bradycardia, edema). Questions answered.  He will agree to a trial of therapy.    Re: Discussed potential toxicities of carboplatin (to include but not limited to: Anaphylactic reaction, nephrotoxicity, myelosuppression, O2 toxicity, neuropathy, nausea/ vomiting, vision loss, severe hypokalemia, hyponatremia, hypocalcemia, hypomagnesemia, elevated liver enzymes, pain, asthenia, paresthesias, abdominal pain, diarrhea, constipation, mucositis, bleeding, alopecia, injection site reaction).  Questions answered.  He agree to press on with therapy.   Re:  Nivolumab.  The potential toxicities were noted - to include but not limited to: Gastrointestinal toxicity (diarrhea/colitis), hepatotoxicity, nephrotoxicity, pulmonary toxicity (immune mediated pneumonitis/interstitial lung disease), thyroid disorders (immune mediated hyperthyroidism and hypothyroidism), other immune mediated toxicities (adrenal insufficiency, autoimmune neuropathy, demyelination, facial/abducens nerve paresis, motor dysfunction, pancreatitis, uveitis, vasculitis, diabetic ketoacidosis, Guillain-Barré syndrome, hypopituitarism, myasthenic syndrome, and up to 2% deaths from pneumonitis. Questions were answered to the best of my ability and to his apparent satisfaction. He agrees to press on with therapy     13.. Schedule treatment (plan: Every 21 days x 3 cycles)- C2, 3/21/2025       Nivolumab (Opdivo) 360 mg IV per administration guidelines   Taxol 175 mg/m2 per administration guidelines   Carboplatin AUC 5 per administration guidelines.      Premedicate with:   Aloxi 0.25 mg IV before chemo   Decadron 10 mg p.o. IV before chemo   Pepcid 20 mg IV    Benadryl 50 mg IV     14.  TSH, T4, cortisol, ACTH, CMP and CBC weekly. Hold if creatinine > 1.5, total bilirubin > 1.5, AST/ALT > 3x ULN, TSH <0.5 or > 2. Administer Procrit 40,000 units subcutaneously if hemoglobin less than 10 or hematocrit less than 30. Neupogen 480 mcg sc x 3 days if ANC < 1.0     15.  Opdivo questioner to be filled out before each infusion.          -Communicate to physician:   If moderate to severe renal toxicity, administer corticosteroids and hold treatment until it resolves.   If moderate hepatic toxicity, administer corticosteroids and hold treatment until it resolves.   If great 2 or 3 colitis, administer corticosteroids and hold treatment until it resolves.   If great 2 pneumonitis, administer corticosteroids and hold treatment until it resolves.   Permanently discontinue if unable to reduce prednisone to 10 mg or less within 12 weeks; creatinine greater than 6 times upper limit of normal; LFTs greater than 5 times upper limit of normal; total bilirubin greater than 3 times upper limit of normal; grade 4 colitis; grade 3-4 pneumonitis; if TSH less than 0.5 or greater than 2, then obtain a free T4 every subsequent cycle.     16.  eRx:                     Zofran 8 mg p.o. 3 times daily as needed, #30 - Rx                    Imodium 2 mg-2 tablets after first initial loose bowel movement then 1 tablet after each loose bowel movement     17.   Reappoint to Dr. Hawkins ~ 4 weeks after completion of systemic therapy and surgery in ~ 6 weeks post systemic therapy    18.  Return to office in 6 weeks (~3/21/25) with preoffice CEA, CMP, CBC + diff and CEA     MEDICAL DECISION MAKING: High Complexity   AMOUNT OF DATA: Extensive   RISK OF COMPLICATIONS: High      Time: I spent ~78 minutes caring for Michael on this date of service. This time includes time spent by me in the following activities: preparing for the visit, reviewing tests, performing a medically appropriate examination and/or  evaluation, counseling and educating the patient/family/caregiver, ordering medications, tests, or procedures and documenting information in the medical record

## 2025-02-07 ENCOUNTER — OFFICE VISIT (OUTPATIENT)
Dept: ONCOLOGY | Facility: CLINIC | Age: 80
End: 2025-02-07
Payer: MEDICARE

## 2025-02-07 ENCOUNTER — LAB (OUTPATIENT)
Dept: LAB | Facility: HOSPITAL | Age: 80
End: 2025-02-07
Payer: MEDICARE

## 2025-02-07 ENCOUNTER — INFUSION (OUTPATIENT)
Dept: ONCOLOGY | Facility: HOSPITAL | Age: 80
End: 2025-02-07
Payer: MEDICARE

## 2025-02-07 VITALS
TEMPERATURE: 98.2 F | DIASTOLIC BLOOD PRESSURE: 48 MMHG | SYSTOLIC BLOOD PRESSURE: 98 MMHG | RESPIRATION RATE: 18 BRPM | OXYGEN SATURATION: 97 % | HEART RATE: 79 BPM

## 2025-02-07 VITALS
RESPIRATION RATE: 22 BRPM | HEART RATE: 70 BPM | BODY MASS INDEX: 36.82 KG/M2 | DIASTOLIC BLOOD PRESSURE: 58 MMHG | WEIGHT: 263 LBS | HEIGHT: 71 IN | OXYGEN SATURATION: 99 % | SYSTOLIC BLOOD PRESSURE: 114 MMHG | TEMPERATURE: 97.1 F

## 2025-02-07 DIAGNOSIS — C34.90 NON-SMALL CELL LUNG CANCER, UNSPECIFIED LATERALITY: Primary | ICD-10-CM

## 2025-02-07 DIAGNOSIS — R94.6 ABNORMAL RESULTS OF THYROID FUNCTION STUDIES: ICD-10-CM

## 2025-02-07 DIAGNOSIS — Z45.2 ENCOUNTER FOR CARE RELATED TO PORT-A-CATH: Primary | ICD-10-CM

## 2025-02-07 DIAGNOSIS — R19.7 DIARRHEA, UNSPECIFIED TYPE: ICD-10-CM

## 2025-02-07 DIAGNOSIS — C34.90 NON-SMALL CELL LUNG CANCER, UNSPECIFIED LATERALITY: ICD-10-CM

## 2025-02-07 LAB
ALBUMIN SERPL-MCNC: 3.7 G/DL (ref 3.5–5.2)
ALBUMIN/GLOB SERPL: 1.3 G/DL
ALP SERPL-CCNC: 63 U/L (ref 39–117)
ALT SERPL W P-5'-P-CCNC: 29 U/L (ref 1–41)
ANION GAP SERPL CALCULATED.3IONS-SCNC: 10 MMOL/L (ref 5–15)
AST SERPL-CCNC: 31 U/L (ref 1–40)
BASOPHILS # BLD AUTO: 0.01 10*3/MM3 (ref 0–0.2)
BASOPHILS NFR BLD AUTO: 0.3 % (ref 0–1.5)
BILIRUB SERPL-MCNC: 1.2 MG/DL (ref 0–1.2)
BUN SERPL-MCNC: 21 MG/DL (ref 8–23)
BUN/CREAT SERPL: 22.6 (ref 7–25)
CALCIUM SPEC-SCNC: 9 MG/DL (ref 8.6–10.5)
CHLORIDE SERPL-SCNC: 103 MMOL/L (ref 98–107)
CO2 SERPL-SCNC: 21 MMOL/L (ref 22–29)
CORTIS SERPL-MCNC: 16.1 MCG/DL
CREAT SERPL-MCNC: 0.93 MG/DL (ref 0.76–1.27)
DEPRECATED RDW RBC AUTO: 47.4 FL (ref 37–54)
EGFRCR SERPLBLD CKD-EPI 2021: 84 ML/MIN/1.73
EOSINOPHIL # BLD AUTO: 0.05 10*3/MM3 (ref 0–0.4)
EOSINOPHIL NFR BLD AUTO: 1.4 % (ref 0.3–6.2)
ERYTHROCYTE [DISTWIDTH] IN BLOOD BY AUTOMATED COUNT: 15 % (ref 12.3–15.4)
GLOBULIN UR ELPH-MCNC: 2.9 GM/DL
GLUCOSE SERPL-MCNC: 124 MG/DL (ref 65–99)
HCT VFR BLD AUTO: 33.8 % (ref 37.5–51)
HGB BLD-MCNC: 11.4 G/DL (ref 13–17.7)
IMM GRANULOCYTES # BLD AUTO: 0.05 10*3/MM3 (ref 0–0.05)
IMM GRANULOCYTES NFR BLD AUTO: 1.4 % (ref 0–0.5)
LYMPHOCYTES # BLD AUTO: 0.57 10*3/MM3 (ref 0.7–3.1)
LYMPHOCYTES NFR BLD AUTO: 16.2 % (ref 19.6–45.3)
MCH RBC QN AUTO: 30 PG (ref 26.6–33)
MCHC RBC AUTO-ENTMCNC: 33.7 G/DL (ref 31.5–35.7)
MCV RBC AUTO: 88.9 FL (ref 79–97)
MONOCYTES # BLD AUTO: 0.1 10*3/MM3 (ref 0.1–0.9)
MONOCYTES NFR BLD AUTO: 2.8 % (ref 5–12)
NEUTROPHILS NFR BLD AUTO: 2.73 10*3/MM3 (ref 1.7–7)
NEUTROPHILS NFR BLD AUTO: 77.9 % (ref 42.7–76)
NRBC BLD AUTO-RTO: 0 /100 WBC (ref 0–0.2)
PLATELET # BLD AUTO: 145 10*3/MM3 (ref 140–450)
PMV BLD AUTO: 9.2 FL (ref 6–12)
POTASSIUM SERPL-SCNC: 4.7 MMOL/L (ref 3.5–5.2)
PROT SERPL-MCNC: 6.6 G/DL (ref 6–8.5)
RBC # BLD AUTO: 3.8 10*6/MM3 (ref 4.14–5.8)
SODIUM SERPL-SCNC: 134 MMOL/L (ref 136–145)
T4 FREE SERPL-MCNC: 1.33 NG/DL (ref 0.93–1.7)
TSH SERPL DL<=0.05 MIU/L-ACNC: 1.57 UIU/ML (ref 0.27–4.2)
WBC NRBC COR # BLD AUTO: 3.51 10*3/MM3 (ref 3.4–10.8)

## 2025-02-07 PROCEDURE — 25810000003 SODIUM CHLORIDE 0.9 % SOLUTION: Performed by: INTERNAL MEDICINE

## 2025-02-07 PROCEDURE — 96361 HYDRATE IV INFUSION ADD-ON: CPT

## 2025-02-07 PROCEDURE — 80053 COMPREHEN METABOLIC PANEL: CPT

## 2025-02-07 PROCEDURE — 96360 HYDRATION IV INFUSION INIT: CPT

## 2025-02-07 PROCEDURE — 84443 ASSAY THYROID STIM HORMONE: CPT

## 2025-02-07 PROCEDURE — 82024 ASSAY OF ACTH: CPT

## 2025-02-07 PROCEDURE — 36415 COLL VENOUS BLD VENIPUNCTURE: CPT

## 2025-02-07 PROCEDURE — 84439 ASSAY OF FREE THYROXINE: CPT

## 2025-02-07 PROCEDURE — 85025 COMPLETE CBC W/AUTO DIFF WBC: CPT

## 2025-02-07 PROCEDURE — 82533 TOTAL CORTISOL: CPT

## 2025-02-07 PROCEDURE — 25010000002 HEPARIN LOCK FLUSH PER 10 UNITS: Performed by: INTERNAL MEDICINE

## 2025-02-07 RX ORDER — HEPARIN SODIUM (PORCINE) LOCK FLUSH IV SOLN 100 UNIT/ML 100 UNIT/ML
500 SOLUTION INTRAVENOUS AS NEEDED
OUTPATIENT
Start: 2025-02-07

## 2025-02-07 RX ORDER — SODIUM CHLORIDE 0.9 % (FLUSH) 0.9 %
10 SYRINGE (ML) INJECTION AS NEEDED
Status: DISCONTINUED | OUTPATIENT
Start: 2025-02-07 | End: 2025-02-07 | Stop reason: HOSPADM

## 2025-02-07 RX ORDER — SODIUM CHLORIDE 0.9 % (FLUSH) 0.9 %
10 SYRINGE (ML) INJECTION AS NEEDED
OUTPATIENT
Start: 2025-02-07

## 2025-02-07 RX ORDER — LOPERAMIDE HYDROCHLORIDE 2 MG/1
TABLET ORAL
Qty: 30 TABLET | Refills: 0 | Status: SHIPPED | OUTPATIENT
Start: 2025-02-07

## 2025-02-07 RX ORDER — HEPARIN SODIUM (PORCINE) LOCK FLUSH IV SOLN 100 UNIT/ML 100 UNIT/ML
500 SOLUTION INTRAVENOUS AS NEEDED
Status: DISCONTINUED | OUTPATIENT
Start: 2025-02-07 | End: 2025-02-07 | Stop reason: HOSPADM

## 2025-02-07 RX ADMIN — HEPARIN 500 UNITS: 100 SYRINGE at 14:51

## 2025-02-07 RX ADMIN — SODIUM CHLORIDE 1000 ML: 9 INJECTION, SOLUTION INTRAVENOUS at 12:33

## 2025-02-07 RX ADMIN — Medication 10 ML: at 14:51

## 2025-02-09 LAB — ACTH PLAS-MCNC: 12.3 PG/ML (ref 7.2–63.3)

## 2025-02-10 ENCOUNTER — TELEPHONE (OUTPATIENT)
Dept: CARDIOLOGY | Facility: CLINIC | Age: 80
End: 2025-02-10

## 2025-02-10 DIAGNOSIS — D50.8 OTHER IRON DEFICIENCY ANEMIA: Primary | ICD-10-CM

## 2025-02-10 RX ORDER — FERROUS SULFATE 325(65) MG
325 TABLET ORAL
Qty: 90 TABLET | Refills: 0 | Status: SHIPPED | OUTPATIENT
Start: 2025-02-10

## 2025-02-10 NOTE — TELEPHONE ENCOUNTER
"Caller: Michael Paz \"KELLI\"    Relationship: Self    Best call back number: [phone]    Requested Prescriptions:   Requested Prescriptions     Pending Prescriptions Disp Refills    ferrous sulfate 325 (65 FE) MG tablet 90 tablet 0     Sig: Take 1 tablet by mouth Daily With Breakfast.        Pharmacy where request should be sent: [facility] [address] [place], [place] [address]Y [address] [facility] [phone] [facility] [phone] FX     Last office visit with prescribing clinician: 2/7/2025   Last telemedicine visit with prescribing clinician: Visit date not found   Next office visit with prescribing clinician: 3/21/2025     Does the patient have less than a 3 day supply:  [] Yes  [x] No    Would you like a call back once the refill request has been completed: [] Yes [x] No    If the office needs to give you a call back, can they leave a voicemail: [] Yes [x] No      "

## 2025-02-10 NOTE — TELEPHONE ENCOUNTER
Call placed to patient, offered a sooner appt, and he denied stating he is still not able to get around after his last round of chemo. He is going to keep appt in July and let us know if he is going to be able to keep that or not based on date of lung procedure.

## 2025-02-10 NOTE — TELEPHONE ENCOUNTER
"  Caller: Michael Paz \"KELLI\"    Relationship: Self    Best call back number: [phone] (home)      What was the call regarding: PT WAS SCHEDULED FOR THIS NEW PT APPT WITH DR MARIA BACK IN OCTOBER OF LAST YEAR, SINCE THEN PT HAS BEEN DIAGNOSED WITH CANCER AND IR UNDERGOING CHEMO. HE HAS CHEMO ON 2.21.25, 4 DAYS BEFORE THIS NEW PT APPT AND HE SAYS HE IR NORMALLY A VEGETABLE FOR ABOUT 2 WEEKS AFTER HIS CHEMO SO HE WON'T BE ABLE TO MAKE APPT. HE IS ALSO GETTING READY TO SCHEDULE SURGERY TO REMOVE PARTS OF HIS LUNG SO HE WAS UNSURE WHEN HE COULD COME IN FOR THIS APPT BUT DIDN'T WANT TO MOVE IT OUT TOO FAR. SCHEDULED PT FOR NEXT VERONICA WHICH WAS JULY 1ST. PT STATED HE WAS GOING TO CALL AND LET US KNOW WHEN HIS LUNG SURGERY IS GOING TO BE SCHEDULED FOR ONCE HE KNOWS. PLEASE ADVISE NEXT VERONICA APPT 7.1.25 IS OKAY FOR PT.      "

## 2025-02-11 DIAGNOSIS — C34.90 NON-SMALL CELL LUNG CANCER, UNSPECIFIED LATERALITY: ICD-10-CM

## 2025-02-11 DIAGNOSIS — Z79.899 ENCOUNTER FOR LONG-TERM (CURRENT) USE OF HIGH-RISK MEDICATION: Primary | ICD-10-CM

## 2025-02-11 DIAGNOSIS — C34.90 NON-SMALL CELL LUNG CANCER, UNSPECIFIED LATERALITY: Primary | ICD-10-CM

## 2025-02-11 RX ORDER — DIPHENHYDRAMINE HYDROCHLORIDE 50 MG/ML
50 INJECTION INTRAMUSCULAR; INTRAVENOUS ONCE
Start: 2025-02-21 | End: 2025-02-21

## 2025-02-11 RX ORDER — PALONOSETRON 0.05 MG/ML
0.25 INJECTION, SOLUTION INTRAVENOUS ONCE
OUTPATIENT
Start: 2025-02-21

## 2025-02-11 RX ORDER — FAMOTIDINE 10 MG/ML
20 INJECTION, SOLUTION INTRAVENOUS AS NEEDED
OUTPATIENT
Start: 2025-02-21

## 2025-02-11 RX ORDER — FAMOTIDINE 10 MG/ML
20 INJECTION, SOLUTION INTRAVENOUS ONCE
OUTPATIENT
Start: 2025-02-21

## 2025-02-11 RX ORDER — APREPITANT 80 MG/1
80 CAPSULE ORAL ONCE
Start: 2025-02-21 | End: 2025-02-21

## 2025-02-11 RX ORDER — DIPHENHYDRAMINE HYDROCHLORIDE 50 MG/ML
50 INJECTION INTRAMUSCULAR; INTRAVENOUS AS NEEDED
OUTPATIENT
Start: 2025-02-21

## 2025-02-11 RX ORDER — SODIUM CHLORIDE 9 MG/ML
20 INJECTION, SOLUTION INTRAVENOUS ONCE
OUTPATIENT
Start: 2025-02-21

## 2025-02-11 RX ORDER — HYDROCORTISONE SODIUM SUCCINATE 100 MG/2ML
100 INJECTION INTRAMUSCULAR; INTRAVENOUS AS NEEDED
OUTPATIENT
Start: 2025-02-21

## 2025-02-12 ENCOUNTER — TELEPHONE (OUTPATIENT)
Dept: CARDIAC SURGERY | Facility: CLINIC | Age: 80
End: 2025-02-12
Payer: MEDICARE

## 2025-02-12 NOTE — TELEPHONE ENCOUNTER
Patient's last dose of chemo is scheduled for 2/21/2025.  He needs a follow-up with Dr. Hawkins to weeks after last dose with repeat PET scan and CT chest without contrast.  Please schedule imaging and call patient to schedule appointment.

## 2025-02-13 ENCOUNTER — TELEPHONE (OUTPATIENT)
Dept: ONCOLOGY | Facility: CLINIC | Age: 80
End: 2025-02-13
Payer: MEDICARE

## 2025-02-13 DIAGNOSIS — C34.90 NON-SMALL CELL LUNG CANCER, UNSPECIFIED LATERALITY: Primary | ICD-10-CM

## 2025-02-13 DIAGNOSIS — R91.1 LUNG NODULE: Primary | ICD-10-CM

## 2025-02-13 PROBLEM — D70.1 CHEMOTHERAPY INDUCED NEUTROPENIA: Status: ACTIVE | Noted: 2025-02-13

## 2025-02-13 PROBLEM — T45.1X5A CHEMOTHERAPY INDUCED NEUTROPENIA: Status: ACTIVE | Noted: 2025-02-13

## 2025-02-13 NOTE — TELEPHONE ENCOUNTER
I have set an office appt for 3-6-25 with Dr Hawkins to hold a slot.  If this needs to change for testing or pt's sched, can let me know/pepe

## 2025-02-13 NOTE — TELEPHONE ENCOUNTER
----- Message from Miguel Snyder sent at 2/13/2025 12:20 PM CST -----  Neupogen 480 mcg sc x 3 days if ANC < 1.0  ----- Message -----  From: Adriana Edouard Incoming  Sent: 2/13/2025  11:58 AM CST  To: Miguel Snyder MD

## 2025-02-13 NOTE — TELEPHONE ENCOUNTER
Contacted patient Michael Paz, he was informed we had received his outside lab results 2/12/25  WBC: 2.1  ANC: 0.9  Per Dr García instructions, he recommends 3 days of Neupogen  Patient was frustrated that he would have to come to [place] for the injections, and said he would not be able to come in today due to diarrhea issues which have started again, previously resolved. He is currently using OTC Imodium with results, he reports there is no way I can come in today, due to diarrhea and need for transportation.  Apt was [facility] for tomorrow 2/14/25 for first injection  [facility] @ 11 am  2nd apt [facility]: Monday 2/17/25 @ 11 am with lab check  Patient v/u of time and date for apts

## 2025-02-14 ENCOUNTER — INFUSION (OUTPATIENT)
Dept: ONCOLOGY | Facility: HOSPITAL | Age: 80
End: 2025-02-14
Payer: MEDICARE

## 2025-02-14 VITALS
HEIGHT: 75 IN | OXYGEN SATURATION: 99 % | BODY MASS INDEX: 32 KG/M2 | TEMPERATURE: 97.4 F | WEIGHT: 257.4 LBS | HEART RATE: 53 BPM | RESPIRATION RATE: 18 BRPM | SYSTOLIC BLOOD PRESSURE: 150 MMHG | DIASTOLIC BLOOD PRESSURE: 81 MMHG

## 2025-02-14 DIAGNOSIS — T45.1X5A CHEMOTHERAPY INDUCED NEUTROPENIA: Primary | ICD-10-CM

## 2025-02-14 DIAGNOSIS — D70.1 CHEMOTHERAPY INDUCED NEUTROPENIA: Primary | ICD-10-CM

## 2025-02-14 PROCEDURE — 96372 THER/PROPH/DIAG INJ SC/IM: CPT

## 2025-02-14 PROCEDURE — 25010000002 FILGRASTIM-SNDZ 480 MCG/0.8ML SOLUTION PREFILLED SYRINGE: Performed by: INTERNAL MEDICINE

## 2025-02-14 RX ADMIN — FILGRASTIM-SNDZ 480 MCG: 480 INJECTION, SOLUTION INTRAVENOUS; SUBCUTANEOUS at 11:15

## 2025-02-17 ENCOUNTER — INFUSION (OUTPATIENT)
Dept: ONCOLOGY | Facility: HOSPITAL | Age: 80
End: 2025-02-17
Payer: MEDICARE

## 2025-02-17 ENCOUNTER — LAB (OUTPATIENT)
Dept: LAB | Facility: HOSPITAL | Age: 80
End: 2025-02-17
Payer: MEDICARE

## 2025-02-17 VITALS
SYSTOLIC BLOOD PRESSURE: 154 MMHG | BODY MASS INDEX: 31.71 KG/M2 | HEIGHT: 75 IN | OXYGEN SATURATION: 96 % | DIASTOLIC BLOOD PRESSURE: 93 MMHG | WEIGHT: 255 LBS | RESPIRATION RATE: 18 BRPM | HEART RATE: 69 BPM | TEMPERATURE: 98.1 F

## 2025-02-17 DIAGNOSIS — Z79.899 ENCOUNTER FOR LONG-TERM (CURRENT) USE OF HIGH-RISK MEDICATION: ICD-10-CM

## 2025-02-17 DIAGNOSIS — R94.6 ABNORMAL RESULTS OF THYROID FUNCTION STUDIES: ICD-10-CM

## 2025-02-17 DIAGNOSIS — C34.90 NON-SMALL CELL LUNG CANCER, UNSPECIFIED LATERALITY: ICD-10-CM

## 2025-02-17 DIAGNOSIS — R91.1 LUNG NODULE: Primary | ICD-10-CM

## 2025-02-17 LAB
ALBUMIN SERPL-MCNC: 3.8 G/DL (ref 3.5–5.2)
ALBUMIN/GLOB SERPL: 1.3 G/DL
ALP SERPL-CCNC: 82 U/L (ref 39–117)
ALT SERPL W P-5'-P-CCNC: 26 U/L (ref 1–41)
ANION GAP SERPL CALCULATED.3IONS-SCNC: 10 MMOL/L (ref 5–15)
ANISOCYTOSIS BLD QL: ABNORMAL
AST SERPL-CCNC: 29 U/L (ref 1–40)
BASOPHILS # BLD MANUAL: 0 10*3/MM3 (ref 0–0.2)
BASOPHILS NFR BLD MANUAL: 0 % (ref 0–1.5)
BILIRUB SERPL-MCNC: 0.5 MG/DL (ref 0–1.2)
BUN SERPL-MCNC: 7 MG/DL (ref 8–23)
BUN/CREAT SERPL: 8.1 (ref 7–25)
CALCIUM SPEC-SCNC: 9 MG/DL (ref 8.6–10.5)
CHLORIDE SERPL-SCNC: 102 MMOL/L (ref 98–107)
CO2 SERPL-SCNC: 25 MMOL/L (ref 22–29)
CORTIS SERPL-MCNC: 12.1 MCG/DL
CREAT SERPL-MCNC: 0.86 MG/DL (ref 0.76–1.27)
DEPRECATED RDW RBC AUTO: 51.6 FL (ref 37–54)
EGFRCR SERPLBLD CKD-EPI 2021: 88.6 ML/MIN/1.73
ELLIPTOCYTES BLD QL SMEAR: ABNORMAL
EOSINOPHIL # BLD MANUAL: 0 10*3/MM3 (ref 0–0.4)
EOSINOPHIL NFR BLD MANUAL: 0 % (ref 0.3–6.2)
ERYTHROCYTE [DISTWIDTH] IN BLOOD BY AUTOMATED COUNT: 15.8 % (ref 12.3–15.4)
GIANT PLATELETS: ABNORMAL
GLOBULIN UR ELPH-MCNC: 3 GM/DL
GLUCOSE SERPL-MCNC: 102 MG/DL (ref 65–99)
HCT VFR BLD AUTO: 33.2 % (ref 37.5–51)
HGB BLD-MCNC: 10.8 G/DL (ref 13–17.7)
LYMPHOCYTES # BLD MANUAL: 1.08 10*3/MM3 (ref 0.7–3.1)
LYMPHOCYTES NFR BLD MANUAL: 8.1 % (ref 5–12)
MCH RBC QN AUTO: 30.2 PG (ref 26.6–33)
MCHC RBC AUTO-ENTMCNC: 32.5 G/DL (ref 31.5–35.7)
MCV RBC AUTO: 92.7 FL (ref 79–97)
MICROCYTES BLD QL: ABNORMAL
MONOCYTES # BLD: 0.48 10*3/MM3 (ref 0.1–0.9)
NEUTROPHILS # BLD AUTO: 4.39 10*3/MM3 (ref 1.7–7)
NEUTROPHILS NFR BLD MANUAL: 65.7 % (ref 42.7–76)
NEUTS BAND NFR BLD MANUAL: 8.1 % (ref 0–5)
OVALOCYTES BLD QL SMEAR: ABNORMAL
PLATELET # BLD AUTO: 125 10*3/MM3 (ref 140–450)
PMV BLD AUTO: 9.4 FL (ref 6–12)
POIKILOCYTOSIS BLD QL SMEAR: ABNORMAL
POLYCHROMASIA BLD QL SMEAR: ABNORMAL
POTASSIUM SERPL-SCNC: 4.4 MMOL/L (ref 3.5–5.2)
PROT SERPL-MCNC: 6.8 G/DL (ref 6–8.5)
RBC # BLD AUTO: 3.58 10*6/MM3 (ref 4.14–5.8)
SMALL PLATELETS BLD QL SMEAR: ABNORMAL
SODIUM SERPL-SCNC: 137 MMOL/L (ref 136–145)
T4 FREE SERPL-MCNC: 1.12 NG/DL (ref 0.93–1.7)
TOXIC GRANULATION: ABNORMAL
TSH SERPL DL<=0.05 MIU/L-ACNC: 1.66 UIU/ML (ref 0.27–4.2)
VARIANT LYMPHS NFR BLD MANUAL: 10.1 % (ref 19.6–45.3)
VARIANT LYMPHS NFR BLD MANUAL: 8.1 % (ref 0–5)
WBC NRBC COR # BLD AUTO: 5.95 10*3/MM3 (ref 3.4–10.8)

## 2025-02-17 PROCEDURE — 85025 COMPLETE CBC W/AUTO DIFF WBC: CPT

## 2025-02-17 PROCEDURE — 82533 TOTAL CORTISOL: CPT

## 2025-02-17 PROCEDURE — 80053 COMPREHEN METABOLIC PANEL: CPT

## 2025-02-17 PROCEDURE — 85007 BL SMEAR W/DIFF WBC COUNT: CPT

## 2025-02-17 PROCEDURE — 82024 ASSAY OF ACTH: CPT

## 2025-02-17 PROCEDURE — 36415 COLL VENOUS BLD VENIPUNCTURE: CPT

## 2025-02-17 PROCEDURE — 84443 ASSAY THYROID STIM HORMONE: CPT

## 2025-02-17 PROCEDURE — G0463 HOSPITAL OUTPT CLINIC VISIT: HCPCS

## 2025-02-17 PROCEDURE — 84439 ASSAY OF FREE THYROXINE: CPT

## 2025-02-17 NOTE — PROGRESS NOTES
Double checked with Dr. Snyder's and Carol CAGE said ok to discharge patient do not give injections today. Patient to come back Friday for treatment. Mariely Camacho RN

## 2025-02-18 LAB — ACTH PLAS-MCNC: 29.3 PG/ML (ref 7.2–63.3)

## 2025-02-21 ENCOUNTER — LAB (OUTPATIENT)
Dept: LAB | Facility: HOSPITAL | Age: 80
End: 2025-02-21
Payer: MEDICARE

## 2025-02-21 ENCOUNTER — INFUSION (OUTPATIENT)
Dept: ONCOLOGY | Facility: HOSPITAL | Age: 80
End: 2025-02-21
Payer: MEDICARE

## 2025-02-21 VITALS
HEIGHT: 75 IN | OXYGEN SATURATION: 91 % | SYSTOLIC BLOOD PRESSURE: 163 MMHG | HEART RATE: 68 BPM | WEIGHT: 253.4 LBS | TEMPERATURE: 97.6 F | DIASTOLIC BLOOD PRESSURE: 76 MMHG | RESPIRATION RATE: 18 BRPM | BODY MASS INDEX: 31.51 KG/M2

## 2025-02-21 DIAGNOSIS — Z79.899 ENCOUNTER FOR LONG-TERM (CURRENT) USE OF HIGH-RISK MEDICATION: ICD-10-CM

## 2025-02-21 DIAGNOSIS — C34.90 NON-SMALL CELL LUNG CANCER, UNSPECIFIED LATERALITY: Primary | ICD-10-CM

## 2025-02-21 DIAGNOSIS — Z45.2 ENCOUNTER FOR CARE RELATED TO PORT-A-CATH: ICD-10-CM

## 2025-02-21 DIAGNOSIS — C34.90 NON-SMALL CELL LUNG CANCER, UNSPECIFIED LATERALITY: ICD-10-CM

## 2025-02-21 LAB
ALBUMIN SERPL-MCNC: 3.7 G/DL (ref 3.5–5.2)
ALBUMIN/GLOB SERPL: 1.2 G/DL
ALP SERPL-CCNC: 79 U/L (ref 39–117)
ALT SERPL W P-5'-P-CCNC: 20 U/L (ref 1–41)
ANION GAP SERPL CALCULATED.3IONS-SCNC: 13 MMOL/L (ref 5–15)
AST SERPL-CCNC: 27 U/L (ref 1–40)
BASOPHILS # BLD AUTO: 0.02 10*3/MM3 (ref 0–0.2)
BASOPHILS NFR BLD AUTO: 0.4 % (ref 0–1.5)
BILIRUB SERPL-MCNC: 0.7 MG/DL (ref 0–1.2)
BUN SERPL-MCNC: 12 MG/DL (ref 8–23)
BUN/CREAT SERPL: 11.7 (ref 7–25)
CALCIUM SPEC-SCNC: 8.8 MG/DL (ref 8.6–10.5)
CHLORIDE SERPL-SCNC: 100 MMOL/L (ref 98–107)
CO2 SERPL-SCNC: 24 MMOL/L (ref 22–29)
CREAT SERPL-MCNC: 1.03 MG/DL (ref 0.76–1.27)
DEPRECATED RDW RBC AUTO: 53.7 FL (ref 37–54)
EGFRCR SERPLBLD CKD-EPI 2021: 74.4 ML/MIN/1.73
EOSINOPHIL # BLD AUTO: 0.02 10*3/MM3 (ref 0–0.4)
EOSINOPHIL NFR BLD AUTO: 0.4 % (ref 0.3–6.2)
ERYTHROCYTE [DISTWIDTH] IN BLOOD BY AUTOMATED COUNT: 16.5 % (ref 12.3–15.4)
GLOBULIN UR ELPH-MCNC: 3 GM/DL
GLUCOSE SERPL-MCNC: 100 MG/DL (ref 65–99)
HCT VFR BLD AUTO: 32.1 % (ref 37.5–51)
HGB BLD-MCNC: 10.6 G/DL (ref 13–17.7)
IMM GRANULOCYTES # BLD AUTO: 0.07 10*3/MM3 (ref 0–0.05)
IMM GRANULOCYTES NFR BLD AUTO: 1.4 % (ref 0–0.5)
LYMPHOCYTES # BLD AUTO: 0.86 10*3/MM3 (ref 0.7–3.1)
LYMPHOCYTES NFR BLD AUTO: 17.7 % (ref 19.6–45.3)
MCH RBC QN AUTO: 30.5 PG (ref 26.6–33)
MCHC RBC AUTO-ENTMCNC: 33 G/DL (ref 31.5–35.7)
MCV RBC AUTO: 92.2 FL (ref 79–97)
MONOCYTES # BLD AUTO: 0.71 10*3/MM3 (ref 0.1–0.9)
MONOCYTES NFR BLD AUTO: 14.6 % (ref 5–12)
NEUTROPHILS NFR BLD AUTO: 3.17 10*3/MM3 (ref 1.7–7)
NEUTROPHILS NFR BLD AUTO: 65.5 % (ref 42.7–76)
NRBC BLD AUTO-RTO: 0 /100 WBC (ref 0–0.2)
PLATELET # BLD AUTO: 136 10*3/MM3 (ref 140–450)
PMV BLD AUTO: 9 FL (ref 6–12)
POTASSIUM SERPL-SCNC: 4.5 MMOL/L (ref 3.5–5.2)
PROT SERPL-MCNC: 6.7 G/DL (ref 6–8.5)
RBC # BLD AUTO: 3.48 10*6/MM3 (ref 4.14–5.8)
SODIUM SERPL-SCNC: 137 MMOL/L (ref 136–145)
WBC NRBC COR # BLD AUTO: 4.85 10*3/MM3 (ref 3.4–10.8)

## 2025-02-21 PROCEDURE — 25010000002 DEXAMETHASONE PER 1 MG: Performed by: INTERNAL MEDICINE

## 2025-02-21 PROCEDURE — 96413 CHEMO IV INFUSION 1 HR: CPT

## 2025-02-21 PROCEDURE — 25010000002 NIVOLUMAB 120 MG/12ML SOLUTION 12 ML VIAL: Performed by: INTERNAL MEDICINE

## 2025-02-21 PROCEDURE — 25010000002 DIPHENHYDRAMINE PER 50 MG: Performed by: INTERNAL MEDICINE

## 2025-02-21 PROCEDURE — 25810000003 SODIUM CHLORIDE 0.9 % SOLUTION 500 ML FLEX CONT: Performed by: INTERNAL MEDICINE

## 2025-02-21 PROCEDURE — 63710000001 APREPITANT PER 5 MG: Performed by: INTERNAL MEDICINE

## 2025-02-21 PROCEDURE — 96415 CHEMO IV INFUSION ADDL HR: CPT

## 2025-02-21 PROCEDURE — 25010000002 HEPARIN LOCK FLUSH PER 10 UNITS: Performed by: INTERNAL MEDICINE

## 2025-02-21 PROCEDURE — 25010000002 CARBOPLATIN PER 50 MG: Performed by: INTERNAL MEDICINE

## 2025-02-21 PROCEDURE — 25010000002 PALONOSETRON PER 25 MCG: Performed by: INTERNAL MEDICINE

## 2025-02-21 PROCEDURE — 85025 COMPLETE CBC W/AUTO DIFF WBC: CPT

## 2025-02-21 PROCEDURE — A9270 NON-COVERED ITEM OR SERVICE: HCPCS | Performed by: INTERNAL MEDICINE

## 2025-02-21 PROCEDURE — 96417 CHEMO IV INFUS EACH ADDL SEQ: CPT

## 2025-02-21 PROCEDURE — 36415 COLL VENOUS BLD VENIPUNCTURE: CPT

## 2025-02-21 PROCEDURE — 25010000002 PACLITAXEL PER 1 MG: Performed by: INTERNAL MEDICINE

## 2025-02-21 PROCEDURE — 25810000003 SODIUM CHLORIDE 0.9 % SOLUTION: Performed by: INTERNAL MEDICINE

## 2025-02-21 PROCEDURE — 96375 TX/PRO/DX INJ NEW DRUG ADDON: CPT

## 2025-02-21 PROCEDURE — 25810000003 SODIUM CHLORIDE 0.9 % SOLUTION 250 ML FLEX CONT: Performed by: INTERNAL MEDICINE

## 2025-02-21 PROCEDURE — 80053 COMPREHEN METABOLIC PANEL: CPT

## 2025-02-21 RX ORDER — SODIUM CHLORIDE 0.9 % (FLUSH) 0.9 %
10 SYRINGE (ML) INJECTION AS NEEDED
Status: DISCONTINUED | OUTPATIENT
Start: 2025-02-21 | End: 2025-02-21 | Stop reason: HOSPADM

## 2025-02-21 RX ORDER — SODIUM CHLORIDE 9 MG/ML
20 INJECTION, SOLUTION INTRAVENOUS ONCE
Status: COMPLETED | OUTPATIENT
Start: 2025-02-21 | End: 2025-02-21

## 2025-02-21 RX ORDER — FAMOTIDINE 10 MG/ML
20 INJECTION, SOLUTION INTRAVENOUS AS NEEDED
Status: DISCONTINUED | OUTPATIENT
Start: 2025-02-21 | End: 2025-02-21 | Stop reason: HOSPADM

## 2025-02-21 RX ORDER — SODIUM CHLORIDE 0.9 % (FLUSH) 0.9 %
10 SYRINGE (ML) INJECTION AS NEEDED
OUTPATIENT
Start: 2025-02-21

## 2025-02-21 RX ORDER — HEPARIN SODIUM (PORCINE) LOCK FLUSH IV SOLN 100 UNIT/ML 100 UNIT/ML
500 SOLUTION INTRAVENOUS AS NEEDED
OUTPATIENT
Start: 2025-02-21

## 2025-02-21 RX ORDER — HYDROCORTISONE SODIUM SUCCINATE 100 MG/2ML
100 INJECTION INTRAMUSCULAR; INTRAVENOUS AS NEEDED
Status: DISCONTINUED | OUTPATIENT
Start: 2025-02-21 | End: 2025-02-21 | Stop reason: HOSPADM

## 2025-02-21 RX ORDER — DEXAMETHASONE SODIUM PHOSPHATE 10 MG/ML
10 INJECTION INTRAMUSCULAR; INTRAVENOUS ONCE
Status: COMPLETED | OUTPATIENT
Start: 2025-02-21 | End: 2025-02-21

## 2025-02-21 RX ORDER — HEPARIN SODIUM (PORCINE) LOCK FLUSH IV SOLN 100 UNIT/ML 100 UNIT/ML
500 SOLUTION INTRAVENOUS AS NEEDED
Status: DISCONTINUED | OUTPATIENT
Start: 2025-02-21 | End: 2025-02-21 | Stop reason: HOSPADM

## 2025-02-21 RX ORDER — DIPHENHYDRAMINE HYDROCHLORIDE 50 MG/ML
50 INJECTION INTRAMUSCULAR; INTRAVENOUS AS NEEDED
Status: DISCONTINUED | OUTPATIENT
Start: 2025-02-21 | End: 2025-02-21 | Stop reason: HOSPADM

## 2025-02-21 RX ORDER — DIPHENHYDRAMINE HYDROCHLORIDE 50 MG/ML
50 INJECTION INTRAMUSCULAR; INTRAVENOUS ONCE
Status: COMPLETED | OUTPATIENT
Start: 2025-02-21 | End: 2025-02-21

## 2025-02-21 RX ORDER — PALONOSETRON 0.05 MG/ML
0.25 INJECTION, SOLUTION INTRAVENOUS ONCE
Status: COMPLETED | OUTPATIENT
Start: 2025-02-21 | End: 2025-02-21

## 2025-02-21 RX ORDER — FAMOTIDINE 10 MG/ML
20 INJECTION, SOLUTION INTRAVENOUS ONCE
Status: COMPLETED | OUTPATIENT
Start: 2025-02-21 | End: 2025-02-21

## 2025-02-21 RX ORDER — APREPITANT 40 MG/1
80 CAPSULE ORAL ONCE
Status: COMPLETED | OUTPATIENT
Start: 2025-02-21 | End: 2025-02-21

## 2025-02-21 RX ADMIN — Medication 10 ML: at 13:35

## 2025-02-21 RX ADMIN — PACLITAXEL 420 MG: 6 INJECTION, SOLUTION INTRAVENOUS at 09:33

## 2025-02-21 RX ADMIN — HEPARIN 500 UNITS: 100 SYRINGE at 13:35

## 2025-02-21 RX ADMIN — CARBOPLATIN 600 MG: 600 INJECTION, SOLUTION INTRAVENOUS at 12:50

## 2025-02-21 RX ADMIN — DEXAMETHASONE SODIUM PHOSPHATE 10 MG: 10 INJECTION INTRAMUSCULAR; INTRAVENOUS at 08:26

## 2025-02-21 RX ADMIN — DIPHENHYDRAMINE HYDROCHLORIDE 50 MG: 50 INJECTION, SOLUTION INTRAMUSCULAR; INTRAVENOUS at 08:32

## 2025-02-21 RX ADMIN — PALONOSETRON HYDROCHLORIDE 0.25 MG: 0.25 INJECTION, SOLUTION INTRAVENOUS at 08:35

## 2025-02-21 RX ADMIN — SODIUM CHLORIDE 360 MG: 9 INJECTION, SOLUTION INTRAVENOUS at 08:56

## 2025-02-21 RX ADMIN — APREPITANT 80 MG: 40 CAPSULE ORAL at 08:25

## 2025-02-21 RX ADMIN — SODIUM CHLORIDE 20 ML/HR: 9 INJECTION, SOLUTION INTRAVENOUS at 08:20

## 2025-02-21 RX ADMIN — FAMOTIDINE 20 MG: 10 INJECTION INTRAVENOUS at 08:30

## 2025-02-28 ENCOUNTER — LAB (OUTPATIENT)
Dept: LAB | Facility: HOSPITAL | Age: 80
End: 2025-02-28
Payer: MEDICARE

## 2025-02-28 DIAGNOSIS — C34.90 NON-SMALL CELL LUNG CANCER, UNSPECIFIED LATERALITY: ICD-10-CM

## 2025-02-28 DIAGNOSIS — R94.6 ABNORMAL RESULTS OF THYROID FUNCTION STUDIES: ICD-10-CM

## 2025-02-28 LAB
ALBUMIN SERPL-MCNC: 3.8 G/DL (ref 3.5–5.2)
ALBUMIN/GLOB SERPL: 1.4 G/DL
ALP SERPL-CCNC: 71 U/L (ref 39–117)
ALT SERPL W P-5'-P-CCNC: 26 U/L (ref 1–41)
ANION GAP SERPL CALCULATED.3IONS-SCNC: 8 MMOL/L (ref 5–15)
AST SERPL-CCNC: 30 U/L (ref 1–40)
BASOPHILS # BLD AUTO: 0.02 10*3/MM3 (ref 0–0.2)
BASOPHILS NFR BLD AUTO: 1 % (ref 0–1.5)
BILIRUB SERPL-MCNC: 0.7 MG/DL (ref 0–1.2)
BUN SERPL-MCNC: 18 MG/DL (ref 8–23)
BUN/CREAT SERPL: 22.2 (ref 7–25)
CALCIUM SPEC-SCNC: 8.6 MG/DL (ref 8.6–10.5)
CHLORIDE SERPL-SCNC: 103 MMOL/L (ref 98–107)
CO2 SERPL-SCNC: 24 MMOL/L (ref 22–29)
CORTIS SERPL-MCNC: 9.93 MCG/DL
CREAT SERPL-MCNC: 0.81 MG/DL (ref 0.76–1.27)
DEPRECATED RDW RBC AUTO: 53.3 FL (ref 37–54)
EGFRCR SERPLBLD CKD-EPI 2021: 90.2 ML/MIN/1.73
EOSINOPHIL # BLD AUTO: 0.04 10*3/MM3 (ref 0–0.4)
EOSINOPHIL NFR BLD AUTO: 1.9 % (ref 0.3–6.2)
ERYTHROCYTE [DISTWIDTH] IN BLOOD BY AUTOMATED COUNT: 15.8 % (ref 12.3–15.4)
GLOBULIN UR ELPH-MCNC: 2.8 GM/DL
GLUCOSE SERPL-MCNC: 116 MG/DL (ref 65–99)
HCT VFR BLD AUTO: 30.4 % (ref 37.5–51)
HGB BLD-MCNC: 9.9 G/DL (ref 13–17.7)
IMM GRANULOCYTES # BLD AUTO: 0.04 10*3/MM3 (ref 0–0.05)
IMM GRANULOCYTES NFR BLD AUTO: 1.9 % (ref 0–0.5)
LYMPHOCYTES # BLD AUTO: 0.59 10*3/MM3 (ref 0.7–3.1)
LYMPHOCYTES NFR BLD AUTO: 28.6 % (ref 19.6–45.3)
MCH RBC QN AUTO: 30.4 PG (ref 26.6–33)
MCHC RBC AUTO-ENTMCNC: 32.6 G/DL (ref 31.5–35.7)
MCV RBC AUTO: 93.3 FL (ref 79–97)
MONOCYTES # BLD AUTO: 0.09 10*3/MM3 (ref 0.1–0.9)
MONOCYTES NFR BLD AUTO: 4.4 % (ref 5–12)
NEUTROPHILS NFR BLD AUTO: 1.28 10*3/MM3 (ref 1.7–7)
NEUTROPHILS NFR BLD AUTO: 62.2 % (ref 42.7–76)
NRBC BLD AUTO-RTO: 0 /100 WBC (ref 0–0.2)
PLATELET # BLD AUTO: 111 10*3/MM3 (ref 140–450)
PMV BLD AUTO: 9.8 FL (ref 6–12)
POTASSIUM SERPL-SCNC: 4.1 MMOL/L (ref 3.5–5.2)
PROT SERPL-MCNC: 6.6 G/DL (ref 6–8.5)
RBC # BLD AUTO: 3.26 10*6/MM3 (ref 4.14–5.8)
SODIUM SERPL-SCNC: 135 MMOL/L (ref 136–145)
T4 FREE SERPL-MCNC: 1.15 NG/DL (ref 0.93–1.7)
TSH SERPL DL<=0.05 MIU/L-ACNC: 1.88 UIU/ML (ref 0.27–4.2)
WBC NRBC COR # BLD AUTO: 2.06 10*3/MM3 (ref 3.4–10.8)

## 2025-02-28 PROCEDURE — 84439 ASSAY OF FREE THYROXINE: CPT

## 2025-02-28 PROCEDURE — 36415 COLL VENOUS BLD VENIPUNCTURE: CPT

## 2025-02-28 PROCEDURE — 80053 COMPREHEN METABOLIC PANEL: CPT

## 2025-02-28 PROCEDURE — 84443 ASSAY THYROID STIM HORMONE: CPT

## 2025-02-28 PROCEDURE — 82533 TOTAL CORTISOL: CPT

## 2025-02-28 PROCEDURE — 82024 ASSAY OF ACTH: CPT

## 2025-02-28 PROCEDURE — 85025 COMPLETE CBC W/AUTO DIFF WBC: CPT

## 2025-03-02 LAB — ACTH PLAS-MCNC: 20 PG/ML (ref 7.2–63.3)

## 2025-03-04 ENCOUNTER — HOSPITAL ENCOUNTER (OUTPATIENT)
Dept: CT IMAGING | Facility: HOSPITAL | Age: 80
Discharge: HOME | End: 2025-03-04
Payer: MEDICARE

## 2025-03-04 DIAGNOSIS — R91.1 LUNG NODULE: ICD-10-CM

## 2025-03-04 PROCEDURE — A9552 F18 FDG: HCPCS | Performed by: NURSE PRACTITIONER

## 2025-03-04 PROCEDURE — 34310000005 FLUDEOXYGLUCOSE F18 SOLUTION: Performed by: NURSE PRACTITIONER

## 2025-03-04 PROCEDURE — 71250 CT THORAX DX C-: CPT

## 2025-03-04 PROCEDURE — 78815 PET IMAGE W/CT SKULL-THIGH: CPT

## 2025-03-04 RX ADMIN — FLUDEOXYGLUCOSE F18 1 DOSE: 300 INJECTION INTRAVENOUS at 07:43

## 2025-03-05 ENCOUNTER — TELEPHONE (OUTPATIENT)
Dept: CARDIAC SURGERY | Facility: CLINIC | Age: 80
End: 2025-03-05
Payer: MEDICARE

## 2025-03-05 DIAGNOSIS — R91.1 LUNG NODULE: Primary | ICD-10-CM

## 2025-03-05 NOTE — TELEPHONE ENCOUNTER
After long discussion with patient regarding multiple social factors hindering him to make multiple trips to [place] for testing and office visits, he is agreeable to come early tomorrow morning for PFTs prior to appointment with Dr. Hawkins that was originally scheduled for 1115.  He is also supposed to get labs done for oncology tomorrow as well and does not have the gas money to make multiple trips for multiple appointments.  Patient reassured and after a long discussion, he is agreeable to this if scheduling is able to be arranged to accommodate PFTs tomorrow morning rather than Friday morning.  I have discussed with Angela who will call respiratory and see if PFTs can be done on 3/6/2025 prior to patient's appointment with Dr. Hawkins at 1115.  Patient is agreeable to this plan.

## 2025-03-05 NOTE — TELEPHONE ENCOUNTER
Pt called back stating he missed a call from our office at 11am.  Checked with clinical staff on this and was told our office had not called at that time as we have been waiting for Dr Hawkins to be out of surgery and available to discuss this issue as PFT not able to be worked in ahead of pt's appt tomorrow.  Explained to pt we were waiting to talk with Dr Hawkins about how and if we could get everything sched tomorrow according to pt's request.  Informed pt at some length that Dr Hawkins just out of surgery and Latasha HAYNES has gone to meet him in the hosp and will be discussing this situation with him.  Advised pt we would call him back as soon as we know how things will be arranged.  Pt states he has other appts that he rearranged to have on the same day as his appt with Dr Hawkins and he needs to know ASAP if he needs to resched these again.  Pt is very concerned he will run out of time to call these other locations.  Advised pt mult times that we have had to wait for Dr Hawkins to finish surgery in order to discuss everything with him and that we will call him back as soon as we have an answer.  Pt ultimately voiced understanding/kahm

## 2025-03-05 NOTE — TELEPHONE ENCOUNTER
Pt is scheduled for 03/07/2025 - check in at main registration at 0630. No bronchodilators for 4 hours prior.  Called to inform pt. He is very angry as he had to move his appointments around to make less trips to [place]. I did explain to him that Dr. Hawkins needs updated PFT's prior to his office visit and that's why his appointments were changed. Pt began yelling on the phone. Wants to speak with IVY Cruz. Called and spoke with Latasha. Transferred pt.

## 2025-03-05 NOTE — TELEPHONE ENCOUNTER
Spoke with pt re: this. He states he already had made arrangements with his sister to have PFT on Friday 3/7 and then see Dr Hawkins on 3/10. He does not want to do PFT on 3/11 as he has too many appts that day already and does not want OV with Dr Hawkins tomorrow. I am working on getting PFT put back on 3/7 and then we can move OV accordingly.

## 2025-03-05 NOTE — TELEPHONE ENCOUNTER
See above message.  Dr. Hawkins will see patient in the office tomorrow at regularly scheduled time even though PFTs cannot be arranged prior to that appointment.  Okay to schedule PFTs at patient's convenience next week.

## 2025-03-05 NOTE — TELEPHONE ENCOUNTER
Please notify patient that Dr. Hawkins will see him tomorrow without PFTs if this is more convenient for him.  Can keep PFTs as scheduled.

## 2025-03-05 NOTE — TELEPHONE ENCOUNTER
Office appt resched for 3-10-25 at 12:30 as directed.  Pt not informed of this change yet in case another change is made based on PFT availability/kahm

## 2025-03-05 NOTE — TELEPHONE ENCOUNTER
Called and spoke with Shira in scheduling. She attempted to schedule PFT for tomorrow, but nothing available. She is sending an email to her for add on tomorrow before office visit.

## 2025-03-05 NOTE — TELEPHONE ENCOUNTER
Dr. Hawkins would like updated full PFTs.  I have ordered these.  Can we see if they can get him in this week and move his appointment with Dr. Hawkins to Monday, 3/10/2025 at 1230 so he has these to review with the patient.

## 2025-03-05 NOTE — TELEPHONE ENCOUNTER
PFT moved back to 3/7 @ 0700. OV on 3/10 @ 1230. Patient aware of both appt dates/times and instructions. He voiced appreciation and understanding to all.

## 2025-03-07 ENCOUNTER — HOSPITAL ENCOUNTER (OUTPATIENT)
Dept: PULMONOLOGY | Facility: HOSPITAL | Age: 80
Discharge: HOME | End: 2025-03-07
Payer: MEDICARE

## 2025-03-07 ENCOUNTER — LAB (OUTPATIENT)
Dept: LAB | Facility: HOSPITAL | Age: 80
End: 2025-03-07
Payer: MEDICARE

## 2025-03-07 DIAGNOSIS — R91.1 LUNG NODULE: ICD-10-CM

## 2025-03-07 DIAGNOSIS — C34.90 NON-SMALL CELL LUNG CANCER, UNSPECIFIED LATERALITY: ICD-10-CM

## 2025-03-07 DIAGNOSIS — R94.6 ABNORMAL RESULTS OF THYROID FUNCTION STUDIES: ICD-10-CM

## 2025-03-07 LAB
ALBUMIN SERPL-MCNC: 4.1 G/DL (ref 3.5–5.2)
ALBUMIN/GLOB SERPL: 1.5 G/DL
ALP SERPL-CCNC: 66 U/L (ref 39–117)
ALT SERPL W P-5'-P-CCNC: 26 U/L (ref 1–41)
ANION GAP SERPL CALCULATED.3IONS-SCNC: 11 MMOL/L (ref 5–15)
ANISOCYTOSIS BLD QL: ABNORMAL
ARTERIAL PATENCY WRIST A: ABNORMAL
AST SERPL-CCNC: 28 U/L (ref 1–40)
ATMOSPHERIC PRESS: 751 MMHG
BASE EXCESS BLDA CALC-SCNC: -1.3 MMOL/L (ref 0–2)
BASOPHILS # BLD MANUAL: 0.02 10*3/MM3 (ref 0–0.2)
BASOPHILS NFR BLD MANUAL: 1 % (ref 0–1.5)
BDY SITE: ABNORMAL
BILIRUB SERPL-MCNC: 0.9 MG/DL (ref 0–1.2)
BODY TEMPERATURE: 37
BUN SERPL-MCNC: 13 MG/DL (ref 8–23)
BUN/CREAT SERPL: 14.6 (ref 7–25)
CA-I BLD-MCNC: 4.8 MG/DL (ref 4.6–5.4)
CALCIUM SPEC-SCNC: 9 MG/DL (ref 8.6–10.5)
CHLORIDE SERPL-SCNC: 104 MMOL/L (ref 98–107)
CLUMPED PLATELETS: PRESENT
CO2 SERPL-SCNC: 23 MMOL/L (ref 22–29)
COHGB MFR BLD: 1.4 % (ref 0–5)
CORTIS SERPL-MCNC: 7.74 MCG/DL
CREAT SERPL-MCNC: 0.89 MG/DL (ref 0.76–1.27)
DEPRECATED RDW RBC AUTO: 59.4 FL (ref 37–54)
EGFRCR SERPLBLD CKD-EPI 2021: 87.7 ML/MIN/1.73
EOSINOPHIL # BLD MANUAL: 0.04 10*3/MM3 (ref 0–0.4)
EOSINOPHIL NFR BLD MANUAL: 2 % (ref 0.3–6.2)
ERYTHROCYTE [DISTWIDTH] IN BLOOD BY AUTOMATED COUNT: 17.9 % (ref 12.3–15.4)
GIANT PLATELETS: ABNORMAL
GLOBULIN UR ELPH-MCNC: 2.8 GM/DL
GLUCOSE SERPL-MCNC: 95 MG/DL (ref 65–99)
HCO3 BLDA-SCNC: 22.6 MMOL/L (ref 20–26)
HCT VFR BLD AUTO: 30.9 % (ref 37.5–51)
HCT VFR BLD CALC: 31.9 % (ref 38–51)
HGB BLD-MCNC: 10.1 G/DL (ref 13–17.7)
HGB BLDA-MCNC: 10.4 G/DL (ref 14–18)
INHALED O2 CONCENTRATION: 21 %
LYMPHOCYTES # BLD MANUAL: 0.55 10*3/MM3 (ref 0.7–3.1)
LYMPHOCYTES NFR BLD MANUAL: 22 % (ref 5–12)
Lab: ABNORMAL
MCH RBC QN AUTO: 31.1 PG (ref 26.6–33)
MCHC RBC AUTO-ENTMCNC: 32.7 G/DL (ref 31.5–35.7)
MCV RBC AUTO: 95.1 FL (ref 79–97)
METHGB BLD QL: 0 % (ref 0–3)
MODALITY: ABNORMAL
MONOCYTES # BLD: 0.43 10*3/MM3 (ref 0.1–0.9)
NEUTROPHILS # BLD AUTO: 0.92 10*3/MM3 (ref 1.7–7)
NEUTROPHILS NFR BLD MANUAL: 47 % (ref 42.7–76)
OXYHGB MFR BLDV: 95.9 % (ref 94–99)
PCO2 BLDA: 34 MM HG (ref 35–45)
PCO2 TEMP ADJ BLD: 34 MM HG (ref 35–45)
PH BLDA: 7.43 PH UNITS (ref 7.35–7.45)
PH, TEMP CORRECTED: 7.43 PH UNITS (ref 7.35–7.45)
PLATELET # BLD AUTO: 136 10*3/MM3 (ref 140–450)
PMV BLD AUTO: 9.6 FL (ref 6–12)
PO2 BLDA: 71.1 MM HG (ref 83–108)
PO2 TEMP ADJ BLD: 71.1 MM HG (ref 83–108)
POIKILOCYTOSIS BLD QL SMEAR: ABNORMAL
POTASSIUM BLDA-SCNC: 4.1 MMOL/L (ref 3.5–5.2)
POTASSIUM SERPL-SCNC: 3.9 MMOL/L (ref 3.5–5.2)
PROT SERPL-MCNC: 6.9 G/DL (ref 6–8.5)
RBC # BLD AUTO: 3.25 10*6/MM3 (ref 4.14–5.8)
SAO2 % BLDCOA: 95.7 % (ref 94–99)
SMALL PLATELETS BLD QL SMEAR: ABNORMAL
SODIUM BLDA-SCNC: 140 MMOL/L (ref 136–145)
SODIUM SERPL-SCNC: 138 MMOL/L (ref 136–145)
T4 FREE SERPL-MCNC: 1.22 NG/DL (ref 0.93–1.7)
TSH SERPL DL<=0.05 MIU/L-ACNC: 1.83 UIU/ML (ref 0.27–4.2)
VARIANT LYMPHS NFR BLD MANUAL: 13 % (ref 0–5)
VARIANT LYMPHS NFR BLD MANUAL: 15 % (ref 19.6–45.3)
VENTILATOR MODE: ABNORMAL
WBC MORPH BLD: NORMAL
WBC NRBC COR # BLD AUTO: 1.95 10*3/MM3 (ref 3.4–10.8)

## 2025-03-07 PROCEDURE — 94729 DIFFUSING CAPACITY: CPT

## 2025-03-07 PROCEDURE — 82024 ASSAY OF ACTH: CPT

## 2025-03-07 PROCEDURE — 83050 HGB METHEMOGLOBIN QUAN: CPT

## 2025-03-07 PROCEDURE — 94726 PLETHYSMOGRAPHY LUNG VOLUMES: CPT

## 2025-03-07 PROCEDURE — 85025 COMPLETE CBC W/AUTO DIFF WBC: CPT

## 2025-03-07 PROCEDURE — 84443 ASSAY THYROID STIM HORMONE: CPT

## 2025-03-07 PROCEDURE — 82375 ASSAY CARBOXYHB QUANT: CPT

## 2025-03-07 PROCEDURE — 94060 EVALUATION OF WHEEZING: CPT

## 2025-03-07 PROCEDURE — 36415 COLL VENOUS BLD VENIPUNCTURE: CPT

## 2025-03-07 PROCEDURE — 80053 COMPREHEN METABOLIC PANEL: CPT

## 2025-03-07 PROCEDURE — 85007 BL SMEAR W/DIFF WBC COUNT: CPT

## 2025-03-07 PROCEDURE — 84439 ASSAY OF FREE THYROXINE: CPT

## 2025-03-07 PROCEDURE — 82533 TOTAL CORTISOL: CPT

## 2025-03-07 PROCEDURE — 82805 BLOOD GASES W/O2 SATURATION: CPT

## 2025-03-07 PROCEDURE — 36600 WITHDRAWAL OF ARTERIAL BLOOD: CPT

## 2025-03-07 RX ORDER — ALBUTEROL SULFATE 0.83 MG/ML
2.5 SOLUTION RESPIRATORY (INHALATION) ONCE
Status: COMPLETED | OUTPATIENT
Start: 2025-03-07 | End: 2025-03-07

## 2025-03-07 RX ADMIN — ALBUTEROL SULFATE 2.5 MG: 2.5 SOLUTION RESPIRATORY (INHALATION) at 08:07

## 2025-03-09 LAB — ACTH PLAS-MCNC: 19.4 PG/ML (ref 7.2–63.3)

## 2025-03-10 ENCOUNTER — INFUSION (OUTPATIENT)
Dept: ONCOLOGY | Facility: HOSPITAL | Age: 80
End: 2025-03-10
Payer: MEDICARE

## 2025-03-10 ENCOUNTER — OFFICE VISIT (OUTPATIENT)
Dept: CARDIAC SURGERY | Facility: CLINIC | Age: 80
End: 2025-03-10
Payer: MEDICARE

## 2025-03-10 VITALS
RESPIRATION RATE: 18 BRPM | OXYGEN SATURATION: 98 % | SYSTOLIC BLOOD PRESSURE: 161 MMHG | DIASTOLIC BLOOD PRESSURE: 80 MMHG | TEMPERATURE: 97.2 F | HEART RATE: 73 BPM

## 2025-03-10 VITALS
SYSTOLIC BLOOD PRESSURE: 143 MMHG | BODY MASS INDEX: 31.33 KG/M2 | OXYGEN SATURATION: 94 % | WEIGHT: 252 LBS | DIASTOLIC BLOOD PRESSURE: 88 MMHG | HEIGHT: 75 IN | HEART RATE: 65 BPM

## 2025-03-10 DIAGNOSIS — C34.90 NON-SMALL CELL LUNG CANCER, UNSPECIFIED LATERALITY: Primary | ICD-10-CM

## 2025-03-10 DIAGNOSIS — T45.1X5A CHEMOTHERAPY INDUCED NEUTROPENIA: Primary | ICD-10-CM

## 2025-03-10 DIAGNOSIS — R91.1 LUNG NODULE: Primary | ICD-10-CM

## 2025-03-10 DIAGNOSIS — D70.1 CHEMOTHERAPY INDUCED NEUTROPENIA: Primary | ICD-10-CM

## 2025-03-10 PROCEDURE — 25010000002 FILGRASTIM-SNDZ 480 MCG/0.8ML SOLUTION PREFILLED SYRINGE: Performed by: INTERNAL MEDICINE

## 2025-03-10 PROCEDURE — 1159F MED LIST DOCD IN RCRD: CPT | Performed by: SURGERY

## 2025-03-10 PROCEDURE — 3077F SYST BP >= 140 MM HG: CPT | Performed by: SURGERY

## 2025-03-10 PROCEDURE — 3079F DIAST BP 80-89 MM HG: CPT | Performed by: SURGERY

## 2025-03-10 PROCEDURE — 99214 OFFICE O/P EST MOD 30 MIN: CPT | Performed by: SURGERY

## 2025-03-10 PROCEDURE — 96372 THER/PROPH/DIAG INJ SC/IM: CPT

## 2025-03-10 PROCEDURE — 1160F RVW MEDS BY RX/DR IN RCRD: CPT | Performed by: SURGERY

## 2025-03-10 RX ORDER — WARFARIN SODIUM 3 MG/1
3 TABLET ORAL
COMMUNITY
Start: 2025-02-26

## 2025-03-10 RX ADMIN — FILGRASTIM-SNDZ 480 MCG: 480 INJECTION, SOLUTION INTRAVENOUS; SUBCUTANEOUS at 13:48

## 2025-03-10 NOTE — PROGRESS NOTES
Per hem/onc pt is to have 3 zarxio injections, added him on for Wednesday 3/12/25 and labs to be rechecked on Friday 3/14/25.

## 2025-03-10 NOTE — LETTER
March 10, 2025     IVY Duff  [id number] [facility] [address]    Patient: Michael Paz   YOB: 1945   Date of Visit: 3/10/2025       Dear IVY Duff,    Michael Paz was in my office today. Below are the relevant portions of my assessment and plan of care.    Mr. Paz is a 80-year-old male who presents with complex medical history and complex lung problem.  He has a right upper lobe lung mass that is now biopsy-proven non-small cell carcinoma.  He also had a 2 cm left lower lobe nodularity that was biopsied and not consistent with malignancy, had no PET activity.  He returns now after neoadjuvant chemo and immunotherapy.  This is shown a good treatment response to the non-small cell right upper lobe malignancy with shrinkage from 6 cm down to around 4 cm.  No change in the suspected granuloma in the left lower lobe.  He has had repeat scans and repeat PFTs since before neoadjuvant therapy.  The repeat CT scan does show a very small nodule, around 5 mm in the left upper lobe that was not present on previous scans.    Repeat PFTs show picture more consistent with his severe COPD with a DLCO of 45% of predicted, FEV1 unchanged.  I suspect that this is a more accurate measurement of his lung function compared to previous study that showed 100% DLCO and I suspect this has to do with poor washout on office-based study on the first test.  Given this and his comorbidities, I do not believe he is a good candidate for right upper lobectomy.  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  He has been short of breath with minimal activity recently and using a walker.  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  His post predicted DLCO would be less than 40% of predicted and therefore I do not believe he is a good candidate for right upper  lobectomy.  Furthermore he has this new nodularity in the left upper lobe and although it is difficult to say if this represents a metastasis I believe this needs to be monitored closely.    I would like to send Mr. Paz to radiation oncology to further discuss radiation treatment options with possible SBRT versus definitive radiation to the right upper lobe mass.  Can continue to observe the left upper lobe small nodularity.  Patient is adamant he does not want any further chemotherapy but I encouraged him to discuss this further with Dr. Jorgensen on with regards to other regimens that would be better tolerated.  Not a candidate for surgical resection given possible metastatic disease and poor candidacy for right upper lobectomy after neoadjuvant therapy.    Thank you for trusting me the care of Mr. Paz.  I am happy to discuss his case at any time.  Please do not hesitate to call questions or concerns.         Sincerely,        Kailash Hawkins MD        CC: MD Rubén Victoria III, MD

## 2025-03-10 NOTE — PROGRESS NOTES
[facility] Cardiothoracic Surgery  PROGRESS NOTE   CC: Lung cancer, follow-up after neoadjuvant therapy    Subjective:   History of Present Illness  The patient presents for evaluation of lung cancer.    He reports persistent dyspnea, which he attributes to his current medication regimen. He has been experiencing severe coughing episodes, one of which resulted in a nosebleed during a recent doctor's visit. The intensity of his cough is causing discomfort in his neck, chest, and abdomen. He also reports frequent wheezing and a crackling sound in his lungs. Despite not having any recent illnesses, he has sought medical attention from two different physicians who prescribed antibiotics, steroids, and Tessalon Perles, none of which have alleviated his symptoms. His physicians have suggested that his cough may be a side effect of chemotherapy. He describes his sputum as clear and sticky. He has expressed concerns about undergoing surgery due to his lung condition. He has undergone pulmonary function tests prior to starting chemotherapy and immunotherapy, which were within normal limits. However, repeat tests conducted on Friday showed a decline in lung function. He has expressed reluctance to continue with chemotherapy due to severe side effects, including diarrhea and immobility, which have significantly impacted his quality of life. He has been informed that his tumor has shrunk from 6.3 cm to 4.4 cm. He has also been informed of new nodules in his left lung, which were not present in previous scans. He has a scheduled appointment with Dr. Sawant next week. He has expressed interest in exploring the option of immunotherapy.    He has a history of two cardiac surgeries and has never been diagnosed with emphysema or COPD. He has been receiving treatment for stage 2 COPD with Trelegy and albuterol, although he does not use the albuterol.    FAMILY HISTORY  The patient has three brothers who had  "lung cancer.    MEDICATIONS  Current: Trelegy, Zarzio, Coumadin  Discontinued: Albuterol, Tessalon Perles      ROS: Cardiovascular ROS: no chest pain or dyspnea on exertion      Objective:      /88   Pulse 65   Ht 190.5 cm (75\")   Wt 114 kg (252 lb)   SpO2 94%   BMI 31.50 kg/m²     [unfilled]    PE:  Vitals:    03/10/25 1227   BP: 143/88   Pulse: 65   SpO2: 94%     Physical Exam  Lungs were auscultated.      Lab Results (last 72 hours)       ** No results found for the last 72 hours. **              Results  I personally reviewed CT scan chest the following is my interpretation:  Positive treatment response for right upper lobe lung mass, now measuring around 4 cm in maximum dimension compared to 6 cm prior.  In the anterior left upper lobe there is a new sub-5 mm nodularity that could represent further granuloma or possible small metastatic area    I personally reviewed PET scan the following is my interpretation:  Metabolic activity significantly decreased in right upper lobe lung mass, no evidence of metabolic activity but nodularity is less than 5 mm in size and left upper lobe    Repeat PFT:  FEV1 67% of predicted  DLCO 45% of predicted    Assessment & Plan     Assessment & Plan    Mr. Paz is a 80-year-old male who presents with complex medical history and complex lung problem.  He has a right upper lobe lung mass that is now biopsy-proven non-small cell carcinoma.  He also had a 2 cm left lower lobe nodularity that was biopsied and not consistent with malignancy, had no PET activity.  He returns now after neoadjuvant chemo and immunotherapy.  This is shown a good treatment response to the non-small cell right upper lobe malignancy with shrinkage from 6 cm down to around 4 cm.  No change in the suspected granuloma in the left lower lobe.  He has had repeat scans and repeat PFTs since before neoadjuvant therapy.  The repeat CT scan does show a very small nodule, around 5 mm in the left upper lobe that " was not present on previous scans.    Repeat PFTs show picture more consistent with his severe COPD with a DLCO of 45% of predicted, FEV1 unchanged.  I suspect that this is a more accurate measurement of his lung function compared to previous study that showed 100% DLCO and I suspect this has to do with poor washout on office-based study on the first test.  Given this and his comorbidities, I do not believe he is a good candidate for right upper lobectomy.  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  He has been short of breath with minimal activity recently and using a walker.  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  His post predicted DLCO would be less than 40% of predicted and therefore I do not believe he is a good candidate for right upper lobectomy.  Furthermore he has this new nodularity in the left upper lobe and although it is difficult to say if this represents a metastasis I believe this needs to be monitored closely.    I would like to send Mr. Paz to radiation oncology to further discuss radiation treatment options with possible SBRT versus definitive radiation to the right upper lobe mass.  Can continue to observe the left upper lobe small nodularity.  Patient is adamant he does not want any further chemotherapy but I encouraged him to discuss this further with Dr. Jorgensen on with regards to other regimens that would be better tolerated.  Not a candidate for surgical resection given possible metastatic disease and poor candidacy for right upper lobectomy after neoadjuvant therapy.    Thank you for trusting me the care of Mr. Paz.  I am happy to discuss his case at any time.  Please do not hesitate to call questions or concerns.        Kailash Hawkins MD   Cardiothoracic Surgeon    Patient or patient representative verbalized consent for the use of  Ambient Listening during the visit with  Kailash Hawkins MD for chart documentation. 3/10/2025  13:15 CDT

## 2025-03-11 ENCOUNTER — HOSPITAL ENCOUNTER (OUTPATIENT)
Dept: ULTRASOUND IMAGING | Facility: HOSPITAL | Age: 80
Discharge: HOME | End: 2025-03-11
Payer: MEDICARE

## 2025-03-11 ENCOUNTER — LAB (OUTPATIENT)
Dept: LAB | Facility: HOSPITAL | Age: 80
End: 2025-03-11
Payer: MEDICARE

## 2025-03-11 ENCOUNTER — APPOINTMENT (OUTPATIENT)
Dept: PULMONOLOGY | Facility: HOSPITAL | Age: 80
End: 2025-03-11
Payer: MEDICARE

## 2025-03-11 ENCOUNTER — HOSPITAL ENCOUNTER (OUTPATIENT)
Dept: CT IMAGING | Facility: HOSPITAL | Age: 80
Discharge: HOME | End: 2025-03-11
Payer: MEDICARE

## 2025-03-11 ENCOUNTER — INFUSION (OUTPATIENT)
Dept: ONCOLOGY | Facility: HOSPITAL | Age: 80
End: 2025-03-11
Payer: MEDICARE

## 2025-03-11 VITALS
OXYGEN SATURATION: 91 % | HEART RATE: 112 BPM | HEIGHT: 75 IN | TEMPERATURE: 98.1 F | RESPIRATION RATE: 20 BRPM | SYSTOLIC BLOOD PRESSURE: 138 MMHG | DIASTOLIC BLOOD PRESSURE: 73 MMHG | BODY MASS INDEX: 31.5 KG/M2

## 2025-03-11 DIAGNOSIS — I71.43 INFRARENAL ABDOMINAL AORTIC ANEURYSM (AAA) WITHOUT RUPTURE: ICD-10-CM

## 2025-03-11 DIAGNOSIS — T45.1X5A CHEMOTHERAPY INDUCED NEUTROPENIA: Primary | ICD-10-CM

## 2025-03-11 DIAGNOSIS — I72.4 POPLITEAL ARTERY ANEURYSM, BILATERAL: ICD-10-CM

## 2025-03-11 DIAGNOSIS — I65.23 BILATERAL CAROTID ARTERY STENOSIS: ICD-10-CM

## 2025-03-11 DIAGNOSIS — D70.1 CHEMOTHERAPY INDUCED NEUTROPENIA: Primary | ICD-10-CM

## 2025-03-11 PROCEDURE — 93925 LOWER EXTREMITY STUDY: CPT | Performed by: STUDENT IN AN ORGANIZED HEALTH CARE EDUCATION/TRAINING PROGRAM

## 2025-03-11 PROCEDURE — 74174 CTA ABD&PLVS W/CONTRAST: CPT

## 2025-03-11 PROCEDURE — 93880 EXTRACRANIAL BILAT STUDY: CPT | Performed by: STUDENT IN AN ORGANIZED HEALTH CARE EDUCATION/TRAINING PROGRAM

## 2025-03-11 PROCEDURE — 96372 THER/PROPH/DIAG INJ SC/IM: CPT

## 2025-03-11 PROCEDURE — 25510000001 IOPAMIDOL PER 1 ML: Performed by: NURSE PRACTITIONER

## 2025-03-11 PROCEDURE — 93880 EXTRACRANIAL BILAT STUDY: CPT

## 2025-03-11 PROCEDURE — 25010000002 FILGRASTIM-SNDZ 480 MCG/0.8ML SOLUTION PREFILLED SYRINGE: Performed by: INTERNAL MEDICINE

## 2025-03-11 PROCEDURE — 93925 LOWER EXTREMITY STUDY: CPT

## 2025-03-11 RX ORDER — IOPAMIDOL 755 MG/ML
100 INJECTION, SOLUTION INTRAVASCULAR
Status: COMPLETED | OUTPATIENT
Start: 2025-03-11 | End: 2025-03-11

## 2025-03-11 RX ADMIN — IOPAMIDOL 100 ML: 755 INJECTION, SOLUTION INTRAVENOUS at 09:10

## 2025-03-11 RX ADMIN — FILGRASTIM-SNDZ 480 MCG: 480 INJECTION, SOLUTION INTRAVENOUS; SUBCUTANEOUS at 12:11

## 2025-03-11 NOTE — PROGRESS NOTES
Patient here for Day 2 of Zarxio for a 3 day Zarxio regimen. Patient stated he did not have the gas money to be coming back and forth to [place] every day. I explained to him what the injections were for and why they are given 3 days in a row. Patient also scheduled to come back on Friday for labs to be checked. Patient stated he would not come back tomorrow for his third Zarxio injection but agreed to come back on Friday to check labs. Dr Sexton's office notified. Patient aware of risks involved when counts are low due to neutropenia.

## 2025-03-12 ENCOUNTER — INFUSION (OUTPATIENT)
Dept: ONCOLOGY | Facility: HOSPITAL | Age: 80
End: 2025-03-12
Payer: MEDICARE

## 2025-03-12 DIAGNOSIS — T45.1X5A CHEMOTHERAPY INDUCED NEUTROPENIA: Primary | ICD-10-CM

## 2025-03-12 DIAGNOSIS — D70.1 CHEMOTHERAPY INDUCED NEUTROPENIA: Primary | ICD-10-CM

## 2025-03-14 ENCOUNTER — LAB (OUTPATIENT)
Dept: LAB | Facility: HOSPITAL | Age: 80
End: 2025-03-14
Payer: MEDICARE

## 2025-03-14 ENCOUNTER — INFUSION (OUTPATIENT)
Dept: ONCOLOGY | Facility: HOSPITAL | Age: 80
End: 2025-03-14
Payer: MEDICARE

## 2025-03-14 VITALS
DIASTOLIC BLOOD PRESSURE: 86 MMHG | WEIGHT: 252.6 LBS | OXYGEN SATURATION: 94 % | BODY MASS INDEX: 31.57 KG/M2 | TEMPERATURE: 97 F | RESPIRATION RATE: 18 BRPM | SYSTOLIC BLOOD PRESSURE: 156 MMHG | HEART RATE: 60 BPM

## 2025-03-14 DIAGNOSIS — C34.90 NON-SMALL CELL LUNG CANCER, UNSPECIFIED LATERALITY: Primary | ICD-10-CM

## 2025-03-14 DIAGNOSIS — C34.90 NON-SMALL CELL LUNG CANCER, UNSPECIFIED LATERALITY: ICD-10-CM

## 2025-03-14 DIAGNOSIS — R94.6 ABNORMAL RESULTS OF THYROID FUNCTION STUDIES: ICD-10-CM

## 2025-03-14 LAB
ALBUMIN SERPL-MCNC: 3.9 G/DL (ref 3.5–5.2)
ALBUMIN/GLOB SERPL: 1.3 G/DL
ALP SERPL-CCNC: 96 U/L (ref 39–117)
ALT SERPL W P-5'-P-CCNC: 23 U/L (ref 1–41)
ANION GAP SERPL CALCULATED.3IONS-SCNC: 12 MMOL/L (ref 5–15)
AST SERPL-CCNC: 28 U/L (ref 1–40)
BASOPHILS # BLD AUTO: 0.05 10*3/MM3 (ref 0–0.2)
BASOPHILS NFR BLD AUTO: 0.7 % (ref 0–1.5)
BILIRUB SERPL-MCNC: 0.8 MG/DL (ref 0–1.2)
BUN SERPL-MCNC: 11 MG/DL (ref 8–23)
BUN/CREAT SERPL: 13.4 (ref 7–25)
CALCIUM SPEC-SCNC: 8.8 MG/DL (ref 8.6–10.5)
CEA SERPL-MCNC: 6.76 NG/ML
CHLORIDE SERPL-SCNC: 104 MMOL/L (ref 98–107)
CO2 SERPL-SCNC: 24 MMOL/L (ref 22–29)
CORTIS SERPL-MCNC: 11 MCG/DL
CREAT SERPL-MCNC: 0.82 MG/DL (ref 0.76–1.27)
DEPRECATED RDW RBC AUTO: 64.2 FL (ref 37–54)
EGFRCR SERPLBLD CKD-EPI 2021: 89.9 ML/MIN/1.73
EOSINOPHIL # BLD AUTO: 0.05 10*3/MM3 (ref 0–0.4)
EOSINOPHIL NFR BLD AUTO: 0.7 % (ref 0.3–6.2)
ERYTHROCYTE [DISTWIDTH] IN BLOOD BY AUTOMATED COUNT: 18.5 % (ref 12.3–15.4)
GLOBULIN UR ELPH-MCNC: 3 GM/DL
GLUCOSE SERPL-MCNC: 103 MG/DL (ref 65–99)
HCT VFR BLD AUTO: 33.3 % (ref 37.5–51)
HGB BLD-MCNC: 10.7 G/DL (ref 13–17.7)
IMM GRANULOCYTES # BLD AUTO: 0.05 10*3/MM3 (ref 0–0.05)
IMM GRANULOCYTES NFR BLD AUTO: 0.7 % (ref 0–0.5)
LYMPHOCYTES # BLD AUTO: 1.13 10*3/MM3 (ref 0.7–3.1)
LYMPHOCYTES NFR BLD AUTO: 15.8 % (ref 19.6–45.3)
MCH RBC QN AUTO: 30.8 PG (ref 26.6–33)
MCHC RBC AUTO-ENTMCNC: 32.1 G/DL (ref 31.5–35.7)
MCV RBC AUTO: 96 FL (ref 79–97)
MONOCYTES # BLD AUTO: 0.72 10*3/MM3 (ref 0.1–0.9)
MONOCYTES NFR BLD AUTO: 10.1 % (ref 5–12)
NEUTROPHILS NFR BLD AUTO: 5.16 10*3/MM3 (ref 1.7–7)
NEUTROPHILS NFR BLD AUTO: 72 % (ref 42.7–76)
NRBC BLD AUTO-RTO: 0 /100 WBC (ref 0–0.2)
PLATELET # BLD AUTO: 132 10*3/MM3 (ref 140–450)
PMV BLD AUTO: 10 FL (ref 6–12)
POTASSIUM SERPL-SCNC: 3.8 MMOL/L (ref 3.5–5.2)
PROT SERPL-MCNC: 6.9 G/DL (ref 6–8.5)
RBC # BLD AUTO: 3.47 10*6/MM3 (ref 4.14–5.8)
SODIUM SERPL-SCNC: 140 MMOL/L (ref 136–145)
T4 FREE SERPL-MCNC: 1.17 NG/DL (ref 0.93–1.7)
TSH SERPL DL<=0.05 MIU/L-ACNC: 2.52 UIU/ML (ref 0.27–4.2)
WBC NRBC COR # BLD AUTO: 7.16 10*3/MM3 (ref 3.4–10.8)

## 2025-03-14 PROCEDURE — G0463 HOSPITAL OUTPT CLINIC VISIT: HCPCS

## 2025-03-14 PROCEDURE — 36415 COLL VENOUS BLD VENIPUNCTURE: CPT

## 2025-03-14 PROCEDURE — 82024 ASSAY OF ACTH: CPT

## 2025-03-14 PROCEDURE — 84439 ASSAY OF FREE THYROXINE: CPT

## 2025-03-14 PROCEDURE — 82378 CARCINOEMBRYONIC ANTIGEN: CPT

## 2025-03-14 PROCEDURE — 82533 TOTAL CORTISOL: CPT

## 2025-03-14 PROCEDURE — 85025 COMPLETE CBC W/AUTO DIFF WBC: CPT

## 2025-03-14 PROCEDURE — 84443 ASSAY THYROID STIM HORMONE: CPT

## 2025-03-14 PROCEDURE — 80053 COMPREHEN METABOLIC PANEL: CPT

## 2025-03-14 NOTE — PROGRESS NOTES
Message sent to Dr. Snyder. Michael Paz [birth year].  Zarxio given Tuesday and Wednesday. Please review labs. Does he need it today? Thanks.

## 2025-03-16 LAB — ACTH PLAS-MCNC: 26.3 PG/ML (ref 7.2–63.3)

## 2025-03-17 NOTE — PROGRESS NOTES
"[facility] ONC [facility] HEMATOLOGY & ONCOLOGY  [address]  [facility] [address] [phone]    Patient Name: Michael Paz  Encounter Date: 03/21/2025  YOB: 1945  Patient Number: [id number]      REASON FOR VISIT: Michael \"David\" EDILBERTO Paz 80 y.o. male who returns in follow-up of microcytic anemia from acute UGIB.  He has received IV Ferrlecit 250 mg on 10/31/24 and 11/1/24.  He is currently on oral ferrous sulfate 325 p.o. daily.  He has a history of congenital protein S deficiency with prior DVT of the lower extremities and bilateral PE for which he is on long term Coumadin.  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 Pagan who also noted mediastinum negative on EBUS, PET scan negative for abnormal uptake elsewhere and PFTs adequate for surgery.  The patient has been seen by Dr. Hawkins of CT surgery who has recommended neoadjuvant systemic therapy.  He is currently receiving neoadjuvant carboplatin+paclitaxel+nivolumab-C1, 1/2/25.  C2 due on 1/23/25 withheld due to cough and diarrhea.  C2 resumed, 1/31/25; then C3, 2/21/25.  He is here with a sister    I have reviewed the HPI and verified with the patient the accuracy of it. No changes to interval history since the information was documented. Miguel Snyder MD 03/21/25      Diagnostic abnormalities:  - Medical history includes AAA, AAA repair, arthritis, atrial fibrillation (1990), CAD, CABG, coronary stent placement, coronary angioplasty, DVT x 3 ([id number])/bilateral PE (1986), protein S deficiency on maintenance anticoagulation (was on Coumadin), PVD, hyperlipidemia, hypertension, myocardial infarction (1990s), pneumonia (4 times), anemia, EGD (10/25/2024), EtOH use (3-4 standard drinks of alcohol per week) and tobacco abuse (67-pack-year smoker).  -10/24/2024-presented to [facility] ER with weakness, " dyspnea, and dark stools.  There he was found to be anemic and was transferred to [facility] for further eval.  Over the past few months patient has had worsening fatigue weakness and dark stools.  On admission hemoglobin 7.7, iron saturation 6%.  Received 1 unit of RBC transfusion with improvement of the hemoglobin to 8.5.  Repeat on 10/25/2024 however showed hemoglobin 7.4.  INR prolonged at 2.94 (patient on Coumadin).  -10/25/2024-seen for GI by   Recommended transfusion of RBC and 2 units FFP prior to EGD.  -10/25/2024-EGD showed normal proximal esophagus and mid esophagus. - Z-line irregular, at the gastroesophageal junction. - Red blood in the stomach. - A single bleeding angioectasia in the stomach. Treated with argon plasma coagulation (APC). Injected-recommendation: Return patient to hospital [facility] for ongoing care. - NPO. - Continue present medications. - Will give additonal unit of FFP and 1 unit of PRBC now. - Monitor for any further bleeding. Monitor hgb/hct. - Normal first portion of the duodenum and second portion of the duodenum. - No specimens collected.  -10/26/2024- Hgb 7.6; MCV 84.1, platelets 143,000, WBC 4.6. CMP normal.  Iron 26 (L), iron saturation 6% (L), TIBC 429, ferritin 40.6 (L), subsequently 3 u FFP, 1 u RBC (per bloodbank)  -10/29/24- CT chest- 1. Spiculated opacity at the RIGHT upper lobe measuring 6.0 x 3.4 cm. Appearance concerning for malignancy. There are several small airways leading to this opacity and endobronchial sampling could be considered. 2. Increased size LEFT lower lobe pulmonary nodule now measuring 2.2 cm, previously 1.9 cm on 3/12/2024. PET/CT may be useful if not previously performed. 3. Enlarged main pulmonary artery, which can be seen with pulmonary artery hypertension. 4. Increased size of lobular mass in the RIGHT coronary artery distribution, favor thrombosed aneurysm or other post operative changes. Difficult to determine change due to available prior exams  although appears increased compared to 4/25/2019 with measurements as above.   -10/30/24- Consult pulmonary, Dr. Pagan. A/P: Newly identified mass RUL apex worrisome for malignancy.  Increasing LLL nodule.  >  60-pack-year smoker.  Acute blood loss anemia.  A-fib.  Anticoagulated, meds held due to bleeding.  History of hypercoagulable state.  Recommendations/plan:  CT is reviewed. The rul lesion is approachable by ion bronchoscopy.  Unfortunately there is a blood vessel running the entire course along the portion of the airway from which we would try to approach the LLL lesion, so this one might not be accessible by ion bronchoscopy.  There is high risk of morbidity from additional diagnostic testing or treatment   PET scanning would be helpful in outpatient setting and would help direct biopsy efforts.  We can order this on discharge with outpatient office follow up for consideration for bronchoscopy or other biopsy technique.  This would require holding anticoagulation again.  Add aerosol bronchodilators to see if this helps him  Outpatient PFT.     -10/30/24- Hgb 8.4, Hct 28.9, PT 17.8, INR 1.41  -10/31/24- Protein S Ag total 28 (ref: %); Protein S Ag Free 14 (ref: %)  -11/7/24- PET scan-1.  6 cm hypermetabolic spiculated right upper lobe lung mass, most likely representing a primary lung neoplasm. 2.  No evidence of hypermetabolic metastatic disease in the neck, chest, abdomen, or pelvis. No hypermetabolic lymphadenopathy in the chest. 3.  2 cm solid round circumscribed nodule in the left lower lobe with  max SUV 2.9, below liver background level. This nodule has slowly increased in size, measuring 1.3 cm on an exam from 2022. I suspect this represents a noncalcified granuloma or hamartoma but a slow-growing neoplasm is not entirely excluded and continued imaging surveillance is recommended.  -11/14/24- MRI brain- Age-related changes with atrophy and small vessel disease.  No intracranial  metastases.  - 12/10/24-  Flexible fiberoptic bronchoscopy, Bronchoscopy, Diagnostic, Ion robotic shape sensing navigational bronchoscopy, Radial probe endobronchial ultrasound, Linear endobronchial ultrasound, Bronchial wash, Transbronchial biopsy, Transbronchial needle aspirate of mediastinum, and Transbronchial needle aspirate of lung with navigational and fluoroscopic guidance.  Findings: normal endobronchial anatomy, concentric radial probe view. Enlarged subcarinal nodes.    Final diagnosis:  1.  Lung, right upper lobe, Ion fine-needle aspiration: MALIGNANT-   Non-small cell carcinoma. 2.  Mediastinum, station 7 EBUS: BENIGN-   Bloody with small lymphocytes and benign-appearing respiratory epithelial cells.3.  Bronchus, bronchial washing with cellblock:  BENIGN-   Macrophages, benign-appearing respiratory epithelial cells (some reactive), and a few small lymphocytes,: In a bloody, proteinaceous, and mucoid background. 4.  Lung, right upper lobe, biopsy, three blocks:  BENIGN-   Variably disrupted respiratory mucosa, and fresh clot.     Previous interventions:  -Received 1 unit RBC (received 1 unit RBC prior to transfer to this facility, 10/24/24) and 3 units FFP transfusions, 10/25/24  -Ferrlecit 250 mg IV on 10/31/24 and 11/1/24.    -Ferrous sulfate 325 p.o. daily, 11/2/2024-present  -Neoadjuvant carboplatin+paclitaxel+nivolumab-C1, 1/2/25.  C2 due on 1/23/25 withheld due to cough and diarrhea. C2 resumed, 1/31/25; C3, 2/21/25    Problem List Items Addressed This Visit          Other    Non-small cell lung cancer - Primary       Oncology/Hematology History   Non-small cell lung cancer   12/20/2024 Initial Diagnosis    Non-small cell lung cancer     1/2/2025 -  Chemotherapy    OP LUNG  Nivolumab 360mg /  PACLitaxel / CARBOplatin AUC=5 (Q21D)         PAST MEDICAL HISTORY:  ALLERGIES:  No Known Allergies  CURRENT MEDICATIONS:  Outpatient Encounter Medications as of 3/21/2025   Medication Sig Dispense Refill     Ascorbic Acid (VITAMIN C PO) Take 2,000 mg by mouth Daily.      ferrous sulfate 325 (65 FE) MG tablet Take 1 tablet by mouth Daily With Breakfast. 90 tablet 0    metoprolol succinate XL (TOPROL-XL) 25 MG 24 hr tablet Take 1 tablet by mouth Every Night.      Multiple Vitamins-Minerals (ONE A DAY MEN 50 PLUS PO) Take 1 tablet by mouth Daily.      simvastatin (ZOCOR) 80 MG tablet Take 1 tablet by mouth Every Night.      Trelegy Ellipta 100-62.5-25 MCG/ACT inhaler Inhale 1 puff Daily.      warfarin (COUMADIN) 3 MG tablet Take 1 tablet by mouth every night at bedtime.      [DISCONTINUED] Filgrastim-sndz (ZARXIO) injection 480 mcg        No facility-administered encounter medications on file as of 3/21/2025.     ADULT ILLNESSES:  Patient Active Problem List   Diagnosis Code    Tobacco abuse Z72.0    Hypertension I10    Hyperlipidemia E78.5    AAA (abdominal aortic aneurysm) I71.40    Aneurysm I72.9    CAD (coronary artery disease) I25.10    Right-sided epistaxis R04.0    Hx pulmonary embolism Z86.711    Hx of deep venous thrombosis Z86.718    Rhinitis, chronic J31.0    Anticoagulant adverse reaction T45.515A    Epistaxis R04.0    Lung nodule R91.1    Protein C deficiency D68.59    Protein S deficiency D68.59    Atrial fibrillation, chronic I48.20    Symptomatic anemia D64.9    S/P CABG (coronary artery bypass graft) Z95.1    Stage 2 moderate COPD by GOLD classification J44.9    Non-small cell lung cancer C34.90    Encounter for long-term (current) use of high-risk medication Z79.899    Encounter for care related to Port-a-Cath Z45.2    Diarrhea R19.7    Chemotherapy induced neutropenia D70.1, T45.1X5A     SURGERIES:  Past Surgical History:   Procedure Laterality Date    ABDOMINAL AORTIC ANEURYSM REPAIR      x2    APPENDECTOMY      BACK SURGERY      x2    BRONCHOSCOPY N/A 12/10/2024    Procedure: BRONCHOSCOPY WITH ENDOBRONCHIAL ULTRASOUND;  Surgeon: Lizandro Pagan MD;  Location: [facility];  Service: Pulmonary;   Laterality: N/A;  preop; RUL mass   postop RUL mass   PCP Henrietta Scruggs    BRONCHOSCOPY WITH ION ROBOTIC ASSIST N/A 12/10/2024    Procedure: BRONCHOSCOPY WITH ION ROBOT and BRONCHOSCOPY WITH ENDOBRONCHIAL ULTRASOUND;  Surgeon: Lizandro Pagan MD;  Location: [facility] OR;  Service: Robotics - Pulmonary;  Laterality: N/A;  preop; RUL mass   postop RUL mass   PCP Henrietta Scruggs    CARDIAC CATHETERIZATION      CORONARY ANGIOPLASTY      CORONARY ARTERY BYPASS GRAFT      1990 and 2008    CORONARY STENT PLACEMENT      ENDOSCOPY N/A 10/25/2024    Procedure: ESOPHAGOGASTRODUODENOSCOPY WITH ANESTHESIA;  Surgeon: Fareed Velasco MD;  Location: [facility] ENDOSCOPY;  Service: Gastroenterology;  Laterality: N/A;  Pre: Symptomatic anemia;  Post: Bleeding gastric AVM;  Nausea and vomiting    HEMORRHOIDECTOMY      x2    INGUINAL HERNIA REPAIR      KNEE SURGERY Right     LUNG BIOPSY      left lower lung    OTHER SURGICAL HISTORY      TYPE IA ENDOLEAK REPAIR    OTHER SURGICAL HISTORY      CRANIAL CYST    VENOUS ACCESS DEVICE (PORT) INSERTION N/A 12/31/2024    Procedure: Single Lumen Port-a-cath insertion with flouroscopy;  Surgeon: Antwan Carnes MD;  Location: [facility] OR;  Service: General;  Laterality: N/A;     HEALTH MAINTENANCE ITEMS:  Health Maintenance Due   Topic Date Due    COLORECTAL CANCER SCREENING  Never done    ZOSTER VACCINE (1 of 2) Never done    HEPATITIS C SCREENING  Never done    ANNUAL WELLNESS VISIT  Never done    LIPID PANEL  07/09/2021    RSV Vaccine - Adults (1 - 1-dose 75+ series) Never done    COVID-19 Vaccine (5 - 2024-25 season) 09/01/2024       <no information>  Last Completed Colonoscopy    This patient has no relevant Health Maintenance data.       Immunization History   Administered Date(s) Administered    COVID-19 (MODERNA) 1st,2nd,3rd Dose Monovalent 03/24/2021, 04/21/2021    COVID-19 (MODERNA) BIVALENT 12+YRS 12/13/2022    COVID-19 (MODERNA) Monovalent Original Booster 01/18/2022    Fluzone (or Fluarix  "& Flulaval for VFC) >6mos 12/13/2022    Fluzone High-Dose 65+YRS 10/27/2021    Fluzone High-Dose 65+yrs 11/17/2020, 12/13/2023    Influenza Seasonal Injectable 11/15/2017, 10/30/2018    Pneumococcal Polysaccharide (PPSV23) 10/27/2021    Tdap 07/08/2020, 12/13/2023     Last Completed Mammogram    This patient has no relevant Health Maintenance data.           FAMILY HISTORY:  Family History   Problem Relation Age of Onset    Heart disease Mother     Heart disease Father     Heart disease Other     Hypertension Other     Diabetes Other      SOCIAL HISTORY:  Social History     Socioeconomic History    Marital status:    Tobacco Use    Smoking status: Every Day     Current packs/day: 1.00     Average packs/day: 1 pack/day for 67.8 years (67.8 ttl pk-yrs)     Types: Cigarettes     Start date: 6/1/1957     Passive exposure: Current    Smokeless tobacco: Never    Tobacco comments:     Don't inhale.   Vaping Use    Vaping status: Never Used   Substance and Sexual Activity    Alcohol use: Yes     Alcohol/week: 3.0 - 4.0 standard drinks of alcohol     Types: 3 - 4 Cans of beer per week     Comment: SOCIAL    Drug use: No    Sexual activity: Defer         Review of Systems:   Carboplatin+paclitaxel+nivolumab-tolerance.  C2 due on 1/23/25 withheld due to cough and diarrhea.  After C3 on 1/31/2025 was well for 2 days but subsequently developed soft stools after each meal, has had approximately 4-5 loose bowel movements daily.  The latter has resolved.  Unchanged fatigue.  No mouth sores.  No nausea.  No vomiting.  No skin changes.  No neuropathy.  Constitutional: Lingering fatigue and weakness appetite is only fair.  No fever / No chills / No sweats.  Is up and about \"all of the time cause I have to.\"  Manages his ADLs including chores but has not been driving.  Lives alone.  His sister helps him out  HEENT:  No sore throat / No hoarseness / No vision changes  CV:  No chest pain / No palpitations / No " "orthopnea  Respiratory:  + Persistent cough and wheezing/+ exertional shortness of breath but no sob with routine activities nor at rest/ No sputum / No hemoptysis. Is still smoking 1/2 ppd, \"but I do not inhale\"  GI:  No nausea / No vomiting / No abdominal pain /no constipation.  Resolution of diarrhea.  :  No dysuria / No hesitancy / No urgency / No hematuria  Neuro: Lingering generalized uscle weakness/No dysphagia / No headache / No paresthesias  Musculoskeletal:  + Chronic edema / No cyanosis / No pain        VITAL SIGNS: /86   Pulse 64   Temp 97.1 °F (36.2 °C) (Temporal)   Resp 18   Ht 190.5 cm (75\")   Wt 113 kg (248 lb 12.8 oz)   SpO2 97%   BMI 31.10 kg/m² Body surface area is 2.41 meters squared.  Pain Score    03/21/25 1121   PainSc: 0-No pain       Physical Exam:  General appearance: Pleasant, obese, modestly kept elderly male who appears comfortable seated.  Alert, appears stated age and cooperative.  Ambulatory entheses previously brought in by wheelchair) ECOG 1-2 (prior: 1-2)  Has lost 15 lb (in addition to 3 lb at his prior visit) since last visit  Skin:  Skin color is less pale. No rashes or lesions  HEENT:  Head: Normal, normocephalic, atraumatic.  Neck:  no adenopathy, no tenderness/mass/nodules  Lungs: Soft scattered wheezes  Chest:  Port in the right upper chest is well seated.  Has an area of erythema at the center with what appears to be a pointing stitch  Heart:  regular rate and rhythm  Abdomen:  soft, globose, nontender.  Extremities: Symmetric with 1+ bipedal edema.  Neurologic:  Mental status: Alert, oriented, thought content appropriate.  Ambulatory with a cane    LABS    Lab Results - Last 18 Months   Lab Units 03/14/25  0953 03/07/25  0954 02/28/25  0934 02/21/25  0722 02/17/25  1040 02/07/25  1010 01/31/25  0719 01/23/25  0731   HEMOGLOBIN g/dL 10.7* 10.1* 9.9* 10.6* 10.8* 11.4* 13.2 11.6*   HEMATOCRIT % 33.3* 30.9* 30.4* 32.1* 33.2* 33.8* 39.4 35.0*   MCV fL 96.0 95.1 " 93.3 92.2 92.7 88.9 88.3 90.0   WBC 10*3/mm3 7.16 1.95* 2.06* 4.85 5.95 3.51 10.25 4.32   RDW % 18.5* 17.9* 15.8* 16.5* 15.8* 15.0 17.7* 18.9*   MPV fL 10.0 9.6 9.8 9.0 9.4 9.2 9.5 8.5   PLATELETS 10*3/mm3 132* 136* 111* 136* 125* 145 154 119*   IMM GRAN % % 0.7*  --  1.9* 1.4*  --  1.4* 0.6* 0.7*   NEUTROS ABS 10*3/mm3 5.16 0.92* 1.28* 3.17 4.39 2.73 7.94* 2.74   LYMPHS ABS 10*3/mm3 1.13  --  0.59* 0.86  --  0.57* 1.44 0.71   MONOS ABS 10*3/mm3 0.72  --  0.09* 0.71  --  0.10 0.79 0.82   EOS ABS 10*3/mm3 0.05 0.04 0.04 0.02 0.00 0.05 0.01 0.00   BASOS ABS 10*3/mm3 0.05 0.02 0.02 0.02 0.00 0.01 0.01 0.02   IMMATURE GRANS (ABS) 10*3/mm3 0.05  --  0.04 0.07*  --  0.05 0.06* 0.03   NRBC /100 WBC 0.0  --  0.0 0.0  --  0.0 0.0 0.0   NEUTROPHIL % %  --  47.0  --   --  65.7  --   --   --    MONOCYTES % %  --  22.0*  --   --  8.1  --   --   --    BASOPHIL % %  --  1.0  --   --  0.0  --   --   --    ATYP LYMPH % %  --  13.0*  --   --  8.1*  --   --   --    ANISOCYTOSIS   --  Slight/1+  --   --  Mod/2+  --   --   --    GIANT PLT   --  Mod/2+  --   --  Slight/1+  --   --   --        Lab Results - Last 18 Months   Lab Units 03/14/25  0953 03/07/25  0954 03/07/25  0719 02/28/25  0934 02/21/25  0722 02/17/25  1040 02/07/25  1010   GLUCOSE mg/dL 103* 95  --  116* 100* 102* 124*   SODIUM mmol/L 140 138  --  135* 137 137 134*   SODIUM, ARTERIAL mmol/L  --   --  140  --   --   --   --    POTASSIUM mmol/L 3.8 3.9  --  4.1 4.5 4.4 4.7   CO2 mmol/L 24.0 23.0  --  24.0 24.0 25.0 21.0*   CHLORIDE mmol/L 104 104  --  103 100 102 103   ANION GAP mmol/L 12.0 11.0  --  8.0 13.0 10.0 10.0   CREATININE mg/dL 0.82 0.89  --  0.81 1.03 0.86 0.93   BUN mg/dL 11 13  --  18 12 7* 21   BUN / CREAT RATIO  13.4 14.6  --  22.2 11.7 8.1 22.6   CALCIUM mg/dL 8.8 9.0  --  8.6 8.8 9.0 9.0   ALK PHOS U/L 96 66  --  71 79 82 63   TOTAL PROTEIN g/dL 6.9 6.9  --  6.6 6.7 6.8 6.6   ALT (SGPT) U/L 23 26  --  26 20 26 29   AST (SGOT) U/L 28 28  --  30 27 29 31    BILIRUBIN mg/dL 0.8 0.9  --  0.7 0.7 0.5 1.2   ALBUMIN g/dL 3.9 4.1  --  3.8 3.7 3.8 3.7   GLOBULIN gm/dL 3.0 2.8  --  2.8 3.0 3.0 2.9       Lab Results - Last 18 Months   Lab Units 03/14/25  0953 01/31/25  0719 01/23/25  0731   CEA ng/mL 6.76 6.22 5.58       Lab Results - Last 18 Months   Lab Units 03/14/25  0953 03/07/25  0954 02/28/25  0934 02/17/25  1040 02/07/25  1010 01/31/25  0719 01/23/25  0731 01/02/25  0744 10/25/24  0656   IRON mcg/dL  --   --   --   --   --   --  42*  --  26*   TIBC mcg/dL  --   --   --   --   --   --  352  --  429   IRON SATURATION (TSAT) %  --   --   --   --   --   --  12*  --  6*   FERRITIN ng/mL  --   --   --   --   --   --  215.90  --  40.63   TSH uIU/mL 2.520 1.830 1.880 1.660 1.570 3.540 3.360   < >  --     < > = values in this interval not displayed.         ASSESSMENT:   Pulmonary non-small cell carcinoma, right upper lobe  --Original tumor stage:  IIB (cT3, cN0, M0)  --Original tumor burden:    -11/7/24- PET scan- 6 cm hypermetabolic spiculated right upper lobe lung mass, most likely representing a primary lung neoplasm. No evidence of hypermetabolic metastatic disease in the neck, chest, abdomen, or pelvis. No hypermetabolic lymphadenopathy in the chest. 2 cm solid round circumscribed nodule in the left lower lobe with max SUV 2.9, below liver background level. This nodule has slowly increased in size, measuring 1.3 cm on an exam from 2022. I suspect this represents a noncalcified granuloma or hamartoma but a slow-growing neoplasm is not entirely excluded and continued imaging surveillance is recommended.  -12/10/24- CT chest- No significant change in 6 cm spiculated right upper lobe lung mass, highly suspicious for a primary lung neoplasm. No significant change in the previously biopsied 2 cm left lower lobe pulmonary nodule with small central calcification. This is favored to represent a granuloma. Severe emphysema. Ascending aorta is dilated measuring 4.2 cm diameter.  Redemonstrated postoperative change of CABG with bypass graft  aneurysm.  - 12/10/24-  Flexible fiberoptic bronchoscopy, Bronchoscopy, Diagnostic, Ion robotic shape sensing navigational bronchoscopy, Radial probe endobronchial ultrasound, Linear endobronchial ultrasound, Bronchial wash, Transbronchial biopsy, Transbronchial needle aspirate of mediastinum, and Transbronchial needle aspirate of lung with navigational and fluoroscopic guidance.  Findings: normal endobronchial anatomy, concentric radial probe view. Enlarged subcarinal nodes.  Final diagnosis:  1.  Lung, right upper lobe, Ion fine-needle aspiration: MALIGNANT-   Non-small cell carcinoma. 2.  Mediastinum, station 7 EBUS: BENIGN-   Bloody with small lymphocytes and benign-appearing respiratory epithelial cells.3.  Bronchus, bronchial washing with cellblock: BENIGN-   Macrophages, benign-appearing respiratory epithelial cells (some reactive), and a few small lymphocytes,: In a bloody, proteinaceous, and mucoid background. 4.  Lung, right upper lobe, biopsy, three blocks: BENIGN-   Variably disrupted respiratory mucosa, and fresh clot.    -- Tumor status:   - 11/11/24- Consult Dr. Hawkins-A/P: I have reviewed his imaging and I am very concerned that the right upper lobe mass is a primary lung malignancy.  I am also concerned about the increasing left lower lobe nodule that is now greater than 2 cm.  I discussed the case extensively with Dr. Pagan and more recently with Dr. Noland.  There is a blood vessel between the airway and nodule in the left lower lobe making bronchoscopic biopsy very difficult and risky.  Given this I am hopeful that there can be an IR CT-guided biopsy.  Will also need to get biopsy of right upper lobe with Dr. Pagan.  If right upper lobe is primary lung malignancy and non-small cell but left lower lobe is nonmalignant then can consider neoadjuvant therapy with Checkmate protocol followed by surgical resection of the right upper lobe.   If both are malignant then not a surgical candidate and will need to undergo definitive chemo and radiation.    -Neoadjuvant carboplatin+paclitaxel+nivolumab-C1, 1/2/25.  C2 due on 1/23/25 withheld due to cough and diarrhea, C2 given on 1/31/2025; C3, 22/21/25  - 3/4/255- CT chest--  Decrease in size of an area of consolidation in the right upper lobe with a partial air bronchograms. This may represent an inflammatory mass or a positive response to treatment?. Further follow-up may be obtained. A stable 2 cm nodule in the left lower lobe with a central calcification probably represent a granuloma as suggested previously. A new nodule and cluster of nodules in the left upper lobe anteromedially which was not seen in the previous study. This may represent an evolving infiltrate?. A follow-up examination in 3 months is recommended to ensure stability or resolution of these nodules. Extensive chronic emphysematous lung changes. Pulmonary arterial hypertension. A stable ectasia of the ascending thoracic aorta.  - 3/4/25- PET scan-  Slight decrease in size and marked decrease in metabolic activity of right upper lobe lung mass. Findings are in keeping with partial treatment response. No evidence of hypermetabolic metastatic disease. No change in 2 cm left lower lobe circumscribed nodule which is not metabolically active and likely presents either a granuloma or hamartoma. Redemonstrated dilatation of the ascending aorta and abdominal aortic aneurysm status post aortobiiliac endograft repair.  - 3/10/25- Follow-up Dr. Hawkins-A/P:.. I would like to send Mr. Paz to radiation oncology to further discuss radiation treatment options with possible SBRT versus definitive radiation to the right upper lobe mass.  Can continue to observe the left upper lobe small nodularity.  Patient is adamant he does not want any further chemotherapy but I encouraged him to discuss this further with Dr. Jorgensen on with regards to other regimens  "that would be better tolerated.  Not a candidate for surgical resection given possible metastatic disease and poor candidacy for right upper lobectomy after neoadjuvant therapy     2.   Iron deficiency anemia from recent/acute upper GI bleed.  -Has received at least 1 unit RBC (received 1 unit RBC prior to transfer to this facility) and 3 units FFP transfusions this admission.  -10/25/2024- EGD normal proximal esophagus and mid esophagus. - Z-line irregular, at the gastroesophageal junction. - Red blood in the stomach. - A single bleeding angioectasia in the stomach. Treated with argon plasma coagulation (APC). Injected  - Hgb 10.7, 3/14/2025 (prior manohar: Hgb 7.7, 10/23/2024)  - 12/12/2024-ferritin 97, fe 73, fe sat 23  3.   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  Patient states that 3 of his sisters and \"13 maternal cousins\" carry the genetic deficiency.  -10/31/24- Protein S Ag total 28 (ref: %); Protein S Ag Free 14 (ref: %)  4.   Paroxysmal atrial fibrillation   5.   Maintenance anticoagulation (since resumed).  States that he maintains INR = 2-2.4  6.   COPD  7.   >  60-pack-year cigarette smoking history.  8.   CAD with prior coronary stent, CABG, and MI.  9.   Port with what appears to be a localized area of erythema from what appears to be serial.        PLAN:  Labs 2/7/2025 - 3/14/25 glucose 103, sodium 140 otherwise normal CMP, FT4 1.17/TSH 2.5 (both normal), normal ACTH/cortisol, Hgb 11.4 (stable) plat 132,000 otherwise normal CBC with ANC of 5.16, CEA 6.76 (prior: 5.58-6.22), INR 1.76 (followed by pcp- CAIT Palencia)  Carbo+paclitaxel+nivolumab tolerance.  Resolution of loose stools.  Now with persistent cough and wheezing.      Follow-up 3/10/25 with Dr Hawkins of CT surgery.  Not felt to be a candidate for surgical resection.  Possible metastatic disease. Sent to radiation oncology to further discuss radiation treatment " options with possible SBRT versus definitive radiation to the right upper lobe mass. Can continue to observe the left upper lobe small nodularity. Patient is adamant he does not want any further chemotherapy.    CT chest/PET scan, 3/4/25 (above). Reviewed.  Slight decrease in size and marked decrease in metabolic activity of right upper lobe lung mass. Findings are in keeping with partial treatment response. No evidence of hypermetabolic metastatic disease.  Previously discussed results of bronchoscopy/EBUS, 12/10/24 (above).  +NSCca RUL, negative mediastinal node biopsies  Previously reviewed PET scan, 11/7/2024 (above).  Solitary 6 cm RUL mass and 2 cm LLL mass (favored to represent granuloma on CT chest, 12/10/24)  Patient insists on anticoagulation fully aware of the risk of rebleeding given the background of acute GI bleed.  Thus, since he has not had any recent thrombotic episodes, I suggested new INR goal of ~ 1.5-2.2 range (followed by PCP)  Send biopsy specimens from 12/10/24  for Tempus xT lung molecular profile- including PD-L1, EGFR, BRAF, ALK, ROS1, RET, MET, HER2, etc-2nd request.    Review NCCN guidelines non-small cell lung cancer, 11.2024-Stage IIB (T3N0), IIIA (T3, N1). Evaluate for perioperative therapy, PFTs, pathologic mediastinal node evaluation, PET/CT scan and brain MRI- NO tierra disease- Operable:  Preop systemic therapy then resection+mediastinal node sampling/exploration.  If medically inoperable, high risk surgical risk per CTS or decline surgery:  Definitive RT (preferable [facility])-Consider adjuvant chemo for high riak stage II (poorly differentiated tumors, including lung neuroendocrine tumors excluding well-differentiated neuroendocrine tumors)     Re: Discussed the potential toxicities of paclitaxel (to include but not limited to: Myelosuppression, opportunistic infection, neuropathy, hypersensitivity reaction, anaphylaxis, cardiac conduction disturbance, bradycardia, arrhythmias,  hypothyroidism, syncope, hypertension, thromboembolism, myocardial infarction, severe injection site reaction, pulmonary toxicity, neurotoxicity, GI obstruction/perforation, paralytic ileus, ischemic colitis, pancreatitis, hepatotoxicity, severe skin reaction, febrile neutropenia, alopecia, arthralgias/myalgias, nausea/vomiting, diarrhea, mucositis, asthenia, bleeding, bradycardia, edema). Questions answered.  He will agree to a trial of therapy.    Re: Discussed potential toxicities of carboplatin (to include but not limited to: Anaphylactic reaction, nephrotoxicity, myelosuppression, O2 toxicity, neuropathy, nausea/ vomiting, vision loss, severe hypokalemia, hyponatremia, hypocalcemia, hypomagnesemia, elevated liver enzymes, pain, asthenia, paresthesias, abdominal pain, diarrhea, constipation, mucositis, bleeding, alopecia, injection site reaction).  Questions answered.  He agree to press on with therapy.   Re:  Nivolumab.  The potential toxicities were noted - to include but not limited to: Gastrointestinal toxicity (diarrhea/colitis), hepatotoxicity, nephrotoxicity, pulmonary toxicity (immune mediated pneumonitis/interstitial lung disease), thyroid disorders (immune mediated hyperthyroidism and hypothyroidism), other immune mediated toxicities (adrenal insufficiency, autoimmune neuropathy, demyelination, facial/abducens nerve paresis, motor dysfunction, pancreatitis, uveitis, vasculitis, diabetic ketoacidosis, Guillain-Barré syndrome, hypopituitarism, myasthenic syndrome, and up to 2% deaths from pneumonitis. Questions were answered to the best of my ability and to his apparent satisfaction. He agrees to press on with therapy     13.. Schedule treatment (plan: Every week x6 concurrent with radiation- coordinate with RT)- C1- C6       HOLD- Nivolumab (Opdivo) 360 mg IV per administration guidelines   Taxol 45 mg/m2 per administration guidelines   Carboplatin AUC 2 per administration guidelines.       Premedicate with:   Aloxi 0.25 mg IV before chemo   Decadron 10 mg p.o. IV before chemo   Pepcid 20 mg IV   Benadryl 50 mg IV     14.  CMP and CBC weekly. Hold if creatinine > 1.5, total bilirubin > 1.5, AST/ALT > 3x ULN, TSH <0.5 or > 2. Administer Procrit 40,000 units subcutaneously if hemoglobin less than 10 or hematocrit less than 30. Neupogen 480 mcg sc x 3 days if ANC < 1.0      15.  eRx:                     Zofran 8 mg p.o. 3 times daily as needed, #30 - Rx                    Prednisone 50 mg daily x 7 days     16.   Keep appointment with radiation oncology next Friday  17.   Reappoint to Dr. Carnes Re: Needs to look at the port  18.   Send for chest x-ray today    19.  Return to office in 6 weeks with preoffice CEA, CMP, CBC + diff and CEA     MEDICAL DECISION MAKING: High Complexity   AMOUNT OF DATA: Extensive   RISK OF COMPLICATIONS: High      Time: I spent ~82 minutes caring for Michael on this date of service. This time includes time spent by me in the following activities: preparing for the visit, reviewing tests, performing a medically appropriate examination and/or evaluation, counseling and educating the patient/family/caregiver, ordering medications, tests, or procedures and documenting information in the medical record

## 2025-03-21 ENCOUNTER — OFFICE VISIT (OUTPATIENT)
Dept: ONCOLOGY | Facility: CLINIC | Age: 80
End: 2025-03-21
Payer: MEDICARE

## 2025-03-21 ENCOUNTER — CLINICAL SUPPORT (OUTPATIENT)
Dept: SURGERY | Facility: CLINIC | Age: 80
End: 2025-03-21
Payer: MEDICARE

## 2025-03-21 ENCOUNTER — HOSPITAL ENCOUNTER (OUTPATIENT)
Dept: GENERAL RADIOLOGY | Facility: HOSPITAL | Age: 80
Discharge: HOME | End: 2025-03-21
Payer: MEDICARE

## 2025-03-21 VITALS
RESPIRATION RATE: 18 BRPM | OXYGEN SATURATION: 97 % | HEIGHT: 75 IN | WEIGHT: 248.8 LBS | SYSTOLIC BLOOD PRESSURE: 132 MMHG | BODY MASS INDEX: 30.93 KG/M2 | TEMPERATURE: 97.1 F | HEART RATE: 64 BPM | DIASTOLIC BLOOD PRESSURE: 86 MMHG

## 2025-03-21 DIAGNOSIS — R05.1 ACUTE COUGH: ICD-10-CM

## 2025-03-21 DIAGNOSIS — C34.90 NON-SMALL CELL LUNG CANCER, UNSPECIFIED LATERALITY: Primary | ICD-10-CM

## 2025-03-21 PROCEDURE — 71046 X-RAY EXAM CHEST 2 VIEWS: CPT

## 2025-03-21 RX ORDER — PREDNISONE 50 MG/1
50 TABLET ORAL DAILY
Qty: 7 TABLET | Refills: 0 | Status: SHIPPED | OUTPATIENT
Start: 2025-03-21 | End: 2025-03-28

## 2025-03-21 NOTE — PROGRESS NOTES
Pt here today with complaints of suture sticking out of right port site. Removed suture. No redness or drainage.

## 2025-03-26 PROBLEM — C34.90 NON-SMALL CELL LUNG CANCER: Chronic | Status: ACTIVE | Noted: 2024-12-20

## 2025-03-26 NOTE — PROGRESS NOTES
"    [facility]  Radiation Oncology Clinic   Rubén Matthews MD, FACR  Shubham Hendricks APRTUAN  _______________________________________________  [facility]  Department of Radiation Oncology  [address] [place] [id number]  Office: [phone]  Fax: [phone]    DATE: 03/28/2025  PATIENT: Michael Paz  1945                         MEDICAL RECORD #: [id number]    REASON FOR VISIT:    Chief Complaint   Patient presents with    Lung Cancer     1. Non-small cell lung cancer, unspecified laterality    2. Stage 2 moderate COPD by GOLD classification    3. Tobacco abuse                                              REASON FOR VISIT:    Chief Complaint   Patient presents with    Lung Cancer     Michael Paz is a 80 y.o. male that has been referred to our clinic to be evaluated for consideration of radiotherapy to the lung.  He has received neoadjuvant chemotherapy in anticipation of definitive surgery.  Although he has excellent response with significant reduction of his tumor he unfortunately is not a good candidate for surgical resection upon evaluation by Dr. Kailash Hawkins.  The patient is referred at this time for evaluation for consideration of radiotherapy and hopefully a candidate for SBRT.  The patient is very adamant he does not want any additional chemotherapy and states that his previous chemotherapy and immunotherapy \"nearly killed me\".           HISTORY OF PRESENT ILLNESS  10/23/2024 - Appointment with Melisa López, APRN:  Lung nodule (Primary)  CT Chest Without Contrast; Future  Follow Up   Return in about 2 weeks (around 11/6/2024) for FVL with diffusion.    10/24/2024 - 11/01/2024 - Hospital admission due to anemia. Pulmonary consulted - Will follow outpatient.     10/27/2024 - Chest x-ray:  Chronic lung changes.  Indeterminate 4 x 3 cm spiculated opacity in the RIGHT upper lobe. Chest CT correlation is recommended.    10/29/2024 - CT Chest " with contrast:  Spiculated opacity at the RIGHT upper lobe measuring 6.0 x 3.4 cm. Appearance concerning for malignancy. There are several small airways leading to this opacity and endobronchial sampling could be considered.   Increased size LEFT lower lobe pulmonary nodule now measuring 2.2 cm, previously 1.9 cm on 3/12/2024. PET/CT may be useful if not previously performed.  Enlarged main pulmonary artery, which can be seen with pulmonary artery hypertension.  Increased size of lobular mass in the RIGHT coronary artery distribution, favor thrombosed aneurysm or other post operative changes. Difficult to determine change due to available prior exams although appears increased compared to 4/25/2019 with measurements as above.    11/07/2024 - PET Scan:  6 cm hypermetabolic spiculated right upper lobe lung mass, most likely representing a primary lung neoplasm.  No evidence of hypermetabolic metastatic disease in the neck, chest, abdomen, or pelvis. No hypermetabolic lymphadenopathy in the chest.  2 cm solid round circumscribed nodule in the left lower lobe with max SUV 2.9, below liver background level. This nodule has slowly increased in size, measuring 1.3 cm on an exam from 2022. I suspect this represents a noncalcified granuloma or hamartoma but a slow-growing neoplasm is not entirely excluded and continued imaging surveillance is recommended.    11/07/2024 - Spirometry with Diffusion Capacity   Pre Drug % Predicted   FVC: 90%  FEV1: 71%  FEF 25-75%: 33%  FEV1/FVC: 61.06%  DLCO: 116%  D/VAsb: 120%    Interpretation Spirometry   Spirometry shows moderate obstruction. There is reduced midflow suggesting small airway/airflow obstruction.   Diffusion Capacity  The patient's diffusion capacity is normal.  Diffusion capacity is normal when corrected for alveolar volume.     11/07/2024 - Appointment with Melisa López, APRN:  Mass of upper lobe of right lung  PET avid.  Not amendable to Ion due to proximity of  pulmonary artery.  Will refer to CT surgery.  Hematology will need to address anticoagulation  Follow Up   Return in about 2 months (around 1/7/2025).    11/08/2024 - Appointment with :  PLAN:  Draw CBC w diff, CMP, CEA, iron, fe sat, ferritin today  Review of available diagnostic information as outlined above.  Note stable anemia since last receipt of RBC transfusion.  Continue ferrous sulfate 325 po daily  Confirm depressed Protein S levels:  Review PET scan, 11/7/2024 (above).  Solitary 6 cm RUL mass and 2 cm LLL mass  Follow-up IVY De Souza with pulmonary, 11/7/2024-A/P: PET scan reviewed.  Referred to CT surgery  Patient insists on anticoagulation fully aware of the risk of rebleeding given the background of acute GI bleed.  Thus, since he has not had any recent thrombotic episodes, I suggested new INR goal of ~ 1.5-2.2 range (1.81 today).    Schedule MRI brain   Follow-up pcp to monitor PT/INR weekly  Return to office in 6 weeks with CBC w diff, CMP, CEA, PT/INR, iron, fe sat, ferritin.    11/11/2024 - Appointment with Dr. Hawkins:  Mr. Paz is a 79-year-old male who presents with complex medical history and complex lung problem.  He has a 6.2 cm right upper lobe mass that is likely malignancy.  He also has an enlarging left lower lobe nodule that is 2.2 cm, the right upper lobe mass was very hypermetabolic on PET left lower is not hypermetabolic.  He has a complicated past medical history including 2 coronary bypass grafting, the last complicated by wound problems and sternal reconstruction which was last in 2008.  He has also had abdominal aortic aneurysm repair with EVAR.  He has a protein S deficiency and is on chronic Coumadin for this with a history of multiple blood clots including 3 DVTs and 2 PEs in the past.  Also noted patient's ascending aorta measures 4.2 cm in maximum dimension.  This will may need continued surveillance but will have multiple CT scans for lung malignancy.  Given  his previous 2 surgeries would not be a candidate for prophylactic aortic surgery unless enlarges significantly to close to 6 cm.  I have reviewed his imaging and I am very concerned that the right upper lobe mass is a primary lung malignancy.  I am also concerned about the increasing left lower lobe nodule that is now greater than 2 cm.  I discussed the case extensively with Dr. Pagan and more recently with Dr. Noland.  There is a blood vessel between the airway and nodule in the left lower lobe making bronchoscopic biopsy very difficult and risky.  Given this I am hopeful that there can be an IR CT-guided biopsy.  Will also need to get biopsy of right upper lobe with Dr. Pagan.  If right upper lobe is primary lung malignancy and non-small cell but left lower lobe is nonmalignant then can consider neoadjuvant therapy with Checkmate protocol followed by surgical resection of the right upper lobe.  If both are malignant then not a surgical candidate and will need to undergo definitive chemo and radiation.  Discussed this with the patient he seems to understand albeit admits this is a complicated plan.  Dr. Noland to review CT scan to see about candidacy for left lower lobe biopsy.  If not candidate then we will likely refer to [facility] for possible bronchoscopic versus CT-guided biopsy.  Will let patient know results of this discussion afterwards.  He will need Lovenox bridging given his protein S deficiency in the past.    11/14/2024 - MRI Brain with and without contrast:  Age-related changes with atrophy and small vessel disease.  No intracranial metastases.    11/20/2024 - Lung, left lower lobe, CT-guided fine-needle biopsy and touch preparation:  Fragments of benign pulmonary parenchyma.  No granulomata identified.  Touch preparation reveals endobronchial cells, pneumocytes and macrophages.  No diagnostic malignant cells identified.    12/03/2024 - Appointment with :  Plan ion bronchoscopy to evaluate  the lung mass which is a threat to life and bodily function with likelihood of malignancy  We discussed high risk of morbidity from additional diagnostic testing or treatment associated with procedure including general anesthesia related risks, bleeding, especially considering anticoagulation issues, clotting from clotting disorder while off anticoagulation although that risk will be mitigated by using the Lovenox, risk for pneumothorax.  Anticipate consideration for lobectomy if we confirm malignancy.  Spirometry is favorable enough for that, with prior spirometry showing moderate obstruction and excellent diffusion capacity.  Follow Up   Return in about 5 weeks (around 1/7/2025).    12/10/2024 - CT Chest without contrast:  No significant change in 6 cm spiculated right upper lobe lung mass, highly suspicious for a primary lung neoplasm.  No significant change in the previously biopsied 2 cm left lower lobe pulmonary nodule with small central calcification. This is favored to represent a granuloma.  Severe emphysema  Ascending aorta is dilated measuring 4.2 cm diameter.  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  Cholelithiasis.    12/10/2024 - Bronchoscopy with biopsy per :  Lung, right upper lobe, Ion fine-needle aspiration:  MALIGNANT  Non-small cell carcinoma.  Mediastinum, station 7 EBUS:  BENIGN  Bloody with small lymphocytes and benign-appearing respiratory epithelial cells.  Bronchus, bronchial washing with cellblock:  BENIGN  Macrophages, benign-appearing respiratory epithelial cells (some reactive), and a few small lymphocytes,: In a bloody, proteinaceous, and mucoid background.  Lung, right upper lobe, biopsy, three blocks:  BENIGN  Variably disrupted respiratory mucosa, and fresh clot.    12/31/2024 - Port placement per .    01/02/2025 - Chemotherapy course:  Nivolumab 360mg /  PACLitaxel / CARBOplatin AUC=5 (Q21D) x 3 cycles     01/14/2025 - Appointment with  Melisa López, APRN:  Stage 2 moderate COPD by GOLD classification (Primary)  Continue Trelegy 100.  Use albuterol as needed   Lung nodule  Positive for malignancy.  He is receiving chemo and awaiting surgical resection.  Monitor  Follow Up   Return in about 3 months (around 4/14/2025).    02/07/2025 - Appointment with :   PLAN:  Labs 2/7/2025 glucose 124, sodium 134 otherwise normal CMP, FT4 1.3/TSH 1.57 (both normal), Hgb 11.4 (stable) otherwise normal CBC with ANC of 2.73  Labs 1/23/2025 CEA 5.6, ferritin 215, fe 42, fe sat 12 (prior: 6), gluc 115, sodium 132, otherwise normal CMP, Hgb 11.6 otherwise normal CBC, INR 1.76 (followed by pcp- CAIT Palencia), CEA 5.58. ACTH 26, cortisol 23, FT4 1.07, TSH 3.3  Carbo+paclitaxel+nivolumab tolerance.  Loose stools without overt diarrhea. Started on prednisone 50 mg po qd x 7d since 1/23/25.  Diarrhea teaching conducted.  Imodium protocol discussed/explained.  1 L IV fluids today  Chest xray, 1/23/25. Nodular opacity right upper lobe is stable to improved.  Chronic changes of emphysema and mild interstitial prominence.  Previously discussed results of bronchoscopy/EBUS, 12/10/24 (above).  +NSCca RUL, negative mediastinal node biopsies  Previously reviewed PET scan, 11/7/2024 (above).  Solitary 6 cm RUL mass and 2 cm LLL mass (favored to represent granuloma on CT chest, 12/10/24)  Patient insists on anticoagulation fully aware of the risk of rebleeding given the background of acute GI bleed.  Thus, since he has not had any recent thrombotic episodes, I suggested new INR goal of ~ 1.5-2.2 range (followed by PCP)  Send biopsy specimens from 12/10/24  for Tempus xT lung molecular profile- including PD-L1, EGFR, BRAF, ALK, ROS1, RET, MET, HER2, etc.    Review NCCN guidelines non-small cell lung cancer, 11.2024-Stage IIB (T3N0), IIIA (T3, N1). Evaluate for perioperative therapy, PFTs, pathologic mediastinal node evaluation, PET/CT scan and brain MRI- NO tierra  disease- Operable:  Preop systemic therapy then resection+mediastinal node sampling/exploration.  If medically inoperable, high risk surgical risk per CTS or decline surgery:  Definitive RT (preferable SABR)-Consider adjuvant chemo for high riak stage II (poorly differentiated tumors, including lung neuroendocrine tumors excluding well-differentiated neuroendocrine tumors)   Re: Discussed the potential toxicities of paclitaxel (to include but not limited to: Myelosuppression, opportunistic infection, neuropathy, hypersensitivity reaction, anaphylaxis, cardiac conduction disturbance, bradycardia, arrhythmias, hypothyroidism, syncope, hypertension, thromboembolism, myocardial infarction, severe injection site reaction, pulmonary toxicity, neurotoxicity, GI obstruction/perforation, paralytic ileus, ischemic colitis, pancreatitis, hepatotoxicity, severe skin reaction, febrile neutropenia, alopecia, arthralgias/myalgias, nausea/vomiting, diarrhea, mucositis, asthenia, bleeding, bradycardia, edema). Questions answered.  He will agree to a trial of therapy.    Re: Discussed potential toxicities of carboplatin (to include but not limited to: Anaphylactic reaction, nephrotoxicity, myelosuppression, O2 toxicity, neuropathy, nausea/ vomiting, vision loss, severe hypokalemia, hyponatremia, hypocalcemia, hypomagnesemia, elevated liver enzymes, pain, asthenia, paresthesias, abdominal pain, diarrhea, constipation, mucositis, bleeding, alopecia, injection site reaction).  Questions answered.  He agree to press on with therapy.   Re:  Nivolumab.  The potential toxicities were noted - to include but not limited to: Gastrointestinal toxicity (diarrhea/colitis), hepatotoxicity, nephrotoxicity, pulmonary toxicity (immune mediated pneumonitis/interstitial lung disease), thyroid disorders (immune mediated hyperthyroidism and hypothyroidism), other immune mediated toxicities (adrenal insufficiency, autoimmune  neuropathy, demyelination, facial/abducens nerve paresis, motor dysfunction, pancreatitis, uveitis, vasculitis, diabetic ketoacidosis, Guillain-Barré syndrome, hypopituitarism, myasthenic syndrome, and up to 2% deaths from pneumonitis. Questions were answered to the best of my ability and to his apparent satisfaction. He agrees to press on with therapy  Schedule treatment (plan: Every 21 days x 3 cycles)- C2, 3/21/2025    Nivolumab (Opdivo) 360 mg IV per administration guidelines   Taxol 175 mg/m2 per administration guidelines   Carboplatin AUC 5 per administration guidelines.  Premedicate with:   Aloxi 0.25 mg IV before chemo   Decadron 10 mg p.o. IV before chemo   Pepcid 20 mg IV   Benadryl 50 mg IV  TSH, T4, cortisol, ACTH, CMP and CBC weekly. Hold if creatinine > 1.5, total bilirubin > 1.5, AST/ALT > 3x ULN, TSH <0.5 or > 2. Administer Procrit 40,000 units subcutaneously if hemoglobin less than 10 or hematocrit less than 30. Neupogen 480 mcg sc x 3 days if ANC < 1.0  Opdivo questioner to be filled out before each infusion.   -Communicate to physician:   If moderate to severe renal toxicity, administer corticosteroids and hold treatment until it resolves.   If moderate hepatic toxicity, administer corticosteroids and hold treatment until it resolves.   If great 2 or 3 colitis, administer corticosteroids and hold treatment until it resolves.   If great 2 pneumonitis, administer corticosteroids and hold treatment until it resolves.   Permanently discontinue if unable to reduce prednisone to 10 mg or less within 12 weeks; creatinine greater than 6 times upper limit of normal; LFTs greater than 5 times upper limit of normal; total bilirubin greater than 3 times upper limit of normal; grade 4 colitis; grade 3-4 pneumonitis; if TSH less than 0.5 or greater than 2, then obtain a free T4 every subsequent cycle.  eRx:   Zofran 8 mg p.o. 3 times daily as needed, #30 - Rx  Imodium 2 mg-2 tablets after first initial loose  bowel movement then 1 tablet after each loose bowel movement  Reappoint to Dr. Hawkins ~ 4 weeks after completion of systemic therapy and surgery in ~ 6 weeks post systemic therapy  Return to office in 6 weeks (~3/21/25) with preoffice CEA, CMP, CBC + diff and CEA    03/04/2025 - CT Chest without contrast:  Decrease in size of an area of consolidation in the right upper lobe with a partial air bronchograms. This may represent an inflammatory mass or a positive response to treatment?. Further follow-up may be obtained.  A stable 2 cm nodule in the left lower lobe with a central calcification probably represent a granuloma as suggested previously.  A new nodule and cluster of nodules in the left upper lobe anteromedially which was not seen in the previous study. This may represent an evolving infiltrate?. A follow-up examination in 3 months is recommended to ensure stability or resolution of these nodules.  Extensive chronic emphysematous lung changes.  Pulmonary arterial hypertension. A stable ectasia of the ascending thoracic aorta.  Other stable nonacute findings as above.    03/04/2025 - PET Scan:  Slight decrease in size and marked decrease in metabolic activity of right upper lobe lung mass. Findings are in keeping with partial treatment response.  No evidence of hypermetabolic metastatic disease.  No change in 2 cm left lower lobe circumscribed nodule which is not metabolically active and likely presents either a granuloma or hamartoma.  Redemonstrated dilatation of the ascending aorta and abdominal aortic aneurysm status post aortobiiliac endograft repair.    03/07/2025 - Pulmonary function Tests:  Spirometry is consistent with a moderate obstructive ventilatory defect.  There is some improvement in spirometry postbronchodilator but a moderate obstructive ventilatory defect is still present.  Lung volumes are within normal limits.  There is a moderate diffusion impairment even when corrected for alveolar  volume.  Arterial blood gases on room air reveal mild hypoxemia and a mild respiratory alkalosis.  When current studies are compared to studies performed on November 7, 2024, the patient's current prebronchodilator spirometry reveals a slight but not significant drop in the FEV1 and FVC  compared to previous baseline values.  The patient's current postbronchodilator spirometry reveals slight improvement in the FEV1 and FVC compared to previous baseline values.  When corrected for alveolar volume there has been a significant drop in the patient's diffusion capacity compared to previous.    03/10/2025 - Appointment with Dr. Hawkins:  Mr. Paz is a 80-year-old male who presents with complex medical history and complex lung problem.  He has a right upper lobe lung mass that is now biopsy-proven non-small cell carcinoma.  He also had a 2 cm left lower lobe nodularity that was biopsied and not consistent with malignancy, had no PET activity.  He returns now after neoadjuvant chemo and immunotherapy.  This is shown a good treatment response to the non-small cell right upper lobe malignancy with shrinkage from 6 cm down to around 4 cm.  No change in the suspected granuloma in the left lower lobe.  He has had repeat scans and repeat PFTs since before neoadjuvant therapy.  The repeat CT scan does show a very small nodule, around 5 mm in the left upper lobe that was not present on previous scans.  Repeat PFTs show picture more consistent with his severe COPD with a DLCO of 45% of predicted, FEV1 unchanged.  I suspect that this is a more accurate measurement of his lung function compared to previous study that showed 100% DLCO and I suspect this has to do with poor washout on office-based study on the first test.  Given this and his comorbidities, I do not believe he is a good candidate for right upper lobectomy.  I discussed the reasons of this with him and his sister including previous sternotomies multiple times with  complicated course and I suspect severe intrapleural adhesions.  He has been short of breath with minimal activity recently and using a walker.  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  His post predicted DLCO would be less than 40% of predicted and therefore I do not believe he is a good candidate for right upper lobectomy.  Furthermore he has this new nodularity in the left upper lobe and although it is difficult to say if this represents a metastasis I believe this needs to be monitored closely.  I would like to send Mr. Paz to radiation oncology to further discuss radiation treatment options with possible SBRT versus definitive radiation to the right upper lobe mass.  Can continue to observe the left upper lobe small nodularity.  Patient is adamant he does not want any further chemotherapy but I encouraged him to discuss this further with Dr. Jorgensen on with regards to other regimens that would be better tolerated.  Not a candidate for surgical resection given possible metastatic disease and poor candidacy for right upper lobectomy after neoadjuvant therapy.    03/21/2025 - Appointment with Dr. Snyder:   PLAN:  Labs 2/7/2025 - 3/14/25 glucose 103, sodium 140 otherwise normal CMP, FT4 1.17/TSH 2.5 (both normal), normal ACTH/cortisol, Hgb 11.4 (stable) plat 132,000 otherwise normal CBC with ANC of 5.16, CEA 6.76 (prior: 5.58-6.22), INR 1.76 (followed by pcp- CAIT Palencia)  Carbo+paclitaxel+nivolumab tolerance.  Resolution of loose stools.  Now with persistent cough and wheezing.    Follow-up 3/10/25 with Dr Hawkins of CT surgery.  Not felt to be a candidate for surgical resection.  Possible metastatic disease. Sent to radiation oncology to further discuss radiation treatment options with possible SBRT versus definitive radiation to the right upper lobe mass. Can continue to observe the left upper lobe small nodularity. Patient is adamant he does not want any further chemotherapy.    CT  chest/PET scan, 3/4/25 (above). Reviewed.  Slight decrease in size and marked decrease in metabolic activity of right upper lobe lung mass. Findings are in keeping with partial treatment response. No evidence of hypermetabolic metastatic disease.  Previously discussed results of bronchoscopy/EBUS, 12/10/24 (above).  +NSCca RUL, negative mediastinal node biopsies  Previously reviewed PET scan, 11/7/2024 (above).  Solitary 6 cm RUL mass and 2 cm LLL mass (favored to represent granuloma on CT chest, 12/10/24)  Patient insists on anticoagulation fully aware of the risk of rebleeding given the background of acute GI bleed.  Thus, since he has not had any recent thrombotic episodes, I suggested new INR goal of ~ 1.5-2.2 range (followed by PCP)  Send biopsy specimens from 12/10/24  for Tempus xT lung molecular profile- including PD-L1, EGFR, BRAF, ALK, ROS1, RET, MET, HER2, etc-2nd request.    Review NCCN guidelines non-small cell lung cancer, 11.2024-Stage IIB (T3N0), IIIA (T3, N1). Evaluate for perioperative therapy, PFTs, pathologic mediastinal node evaluation, PET/CT scan and brain MRI- NO tierra disease- Operable:  Preop systemic therapy then resection+mediastinal node sampling/exploration.  If medically inoperable, high risk surgical risk per CTS or decline surgery:  Definitive RT (preferable SABR)-Consider adjuvant chemo for high riak stage II (poorly differentiated tumors, including lung neuroendocrine tumors excluding well-differentiated neuroendocrine tumors)   Re: Discussed the potential toxicities of paclitaxel (to include but not limited to: Myelosuppression, opportunistic infection, neuropathy, hypersensitivity reaction, anaphylaxis, cardiac conduction disturbance, bradycardia, arrhythmias, hypothyroidism, syncope, hypertension, thromboembolism, myocardial infarction, severe injection site reaction, pulmonary toxicity, neurotoxicity, GI obstruction/perforation, paralytic ileus, ischemic colitis,  pancreatitis, hepatotoxicity, severe skin reaction, febrile neutropenia, alopecia, arthralgias/myalgias, nausea/vomiting, diarrhea, mucositis, asthenia, bleeding, bradycardia, edema). Questions answered.  He will agree to a trial of therapy.    Re: Discussed potential toxicities of carboplatin (to include but not limited to: Anaphylactic reaction, nephrotoxicity, myelosuppression, O2 toxicity, neuropathy, nausea/ vomiting, vision loss, severe hypokalemia, hyponatremia, hypocalcemia, hypomagnesemia, elevated liver enzymes, pain, asthenia, paresthesias, abdominal pain, diarrhea, constipation, mucositis, bleeding, alopecia, injection site reaction).  Questions answered.  He agree to press on with therapy.   Re:  Nivolumab.  The potential toxicities were noted - to include but not limited to: Gastrointestinal toxicity (diarrhea/colitis), hepatotoxicity, nephrotoxicity, pulmonary toxicity (immune mediated pneumonitis/interstitial lung disease), thyroid disorders (immune mediated hyperthyroidism and hypothyroidism), other immune mediated toxicities (adrenal insufficiency, autoimmune neuropathy, demyelination, facial/abducens nerve paresis, motor dysfunction, pancreatitis, uveitis, vasculitis, diabetic ketoacidosis, Guillain-Barré syndrome, hypopituitarism, myasthenic syndrome, and up to 2% deaths from pneumonitis. Questions were answered to the best of my ability and to his apparent satisfaction. He agrees to press on with therapy  Schedule treatment (plan: Every week x6 concurrent with radiation- coordinate with RT)- C1- C6    HOLD- Nivolumab (Opdivo) 360 mg IV per administration guidelines   Taxol 45 mg/m2 per administration guidelines   Carboplatin AUC 2 per administration guidelines.  Premedicate with:   Aloxi 0.25 mg IV before chemo   Decadron 10 mg p.o. IV before chemo   Pepcid 20 mg IV   Benadryl 50 mg IV  CMP and CBC weekly. Hold if creatinine > 1.5, total bilirubin > 1.5, AST/ALT > 3x ULN, TSH <0.5  or > 2. Administer Procrit 40,000 units subcutaneously if hemoglobin less than 10 or hematocrit less than 30. Neupogen 480 mcg sc x 3 days if ANC < 1.0  eRx:   Zofran 8 mg p.o. 3 times daily as needed, #30 - Rx  Prednisone 50 mg daily x 7 days  Keep appointment with radiation oncology next Friday  Reappoint to Dr. Carnes Re: Needs to look at the port  Send for chest x-ray today  Return to office in 6 weeks with preoffice CEA, CMP, CBC + diff and CEA        History obtained from  PATIENT, FAMILY, and CHART    PAST MEDICAL HISTORY  Past Medical History:   Diagnosis Date    AAA (abdominal aortic aneurysm)     Arthritis     Atrial fibrillation 1990    CAD (coronary artery disease)     CAD (coronary artery disease)     Clotting disorder 2012    Deep vein thrombosis [id number]    Depression     Endoleak post endovascular aneurysm repair     History of DVT (deep vein thrombosis)     History of foot fracture     BILATERAL FOOT FRACTURES    History of fracture of left ankle     History of pulmonary embolus (PE)     History of transfusion 2012 and 2020    Hyperlipidemia     Hypertension     Mass of upper lobe of right lung 11/20/2024    Myocardial infarction 1990    Non-small cell lung cancer 12/20/2024    Peripheral vascular disease     Pneumonia 4 times    Pulmonary embolism 1986    Tobacco abuse       PAST SURGICAL HISTORY  Past Surgical History:   Procedure Laterality Date    ABDOMINAL AORTIC ANEURYSM REPAIR      x2    APPENDECTOMY      BACK SURGERY      x2    BRONCHOSCOPY N/A 12/10/2024    Procedure: BRONCHOSCOPY WITH ENDOBRONCHIAL ULTRASOUND;  Surgeon: Lizandro Pagan MD;  Location:  [facility] OR;  Service: Pulmonary;  Laterality: N/A;  preop; RUL mass   postop RUL mass   PCP Henrietta Scruggs    BRONCHOSCOPY WITH ION ROBOTIC ASSIST N/A 12/10/2024    Procedure: BRONCHOSCOPY WITH ION ROBOT and BRONCHOSCOPY WITH ENDOBRONCHIAL ULTRASOUND;  Surgeon: Lizandro Pagan MD;  Location:  [facility] OR;  Service: Robotics - Pulmonary;   Laterality: N/A;  preop; RUL mass   postop RUL mass   PCP Henrietta Scruggs    CARDIAC CATHETERIZATION      CORONARY ANGIOPLASTY      CORONARY ARTERY BYPASS GRAFT      1990 and 2008    CORONARY STENT PLACEMENT      ENDOSCOPY N/A 10/25/2024    Procedure: ESOPHAGOGASTRODUODENOSCOPY WITH ANESTHESIA;  Surgeon: Fareed Velasco MD;  Location: [facility] ENDOSCOPY;  Service: Gastroenterology;  Laterality: N/A;  Pre: Symptomatic anemia;  Post: Bleeding gastric AVM;  Nausea and vomiting    HEMORRHOIDECTOMY      x2    INGUINAL HERNIA REPAIR      KNEE SURGERY Right     LUNG BIOPSY      left lower lung    OTHER SURGICAL HISTORY      TYPE IA ENDOLEAK REPAIR    OTHER SURGICAL HISTORY      CRANIAL CYST    VENOUS ACCESS DEVICE (PORT) INSERTION N/A 12/31/2024    Procedure: Single Lumen Port-a-cath insertion with flouroscopy;  Surgeon: Antwan Carnes MD;  Location: [facility] OR;  Service: General;  Laterality: N/A;      FAMILY HISTORY  family history includes Diabetes in an other family member; Heart disease in his father, mother, and another family member; Hypertension in an other family member.    SOCIAL HISTORY  Social History     Tobacco Use    Smoking status: Every Day     Current packs/day: 1.00     Average packs/day: 1 pack/day for 67.8 years (67.8 ttl pk-yrs)     Types: Cigarettes     Start date: [birth year]/1/1957     Passive exposure: Current    Smokeless tobacco: Never    Tobacco comments:     Don't inhale.   Vaping Use    Vaping status: Never Used   Substance Use Topics    Alcohol use: Yes     Alcohol/week: 3.0 - 4.0 standard drinks of alcohol     Types: 3 - 4 Cans of beer per week     Comment: SOCIAL    Drug use: No     ALLERGIES  Patient has no known allergies.     MEDICATIONS    Current Outpatient Medications:     Ascorbic Acid (VITAMIN C PO), Take 2,000 mg by mouth Daily., Disp: , Rfl:     ferrous sulfate 325 (65 FE) MG tablet, Take 1 tablet by mouth Daily With Breakfast., Disp: 90 tablet, Rfl: 0    metoprolol succinate XL (TOPROL-XL) 25  "MG 24 hr tablet, Take 1 tablet by mouth Every Night., Disp: , Rfl:     Multiple Vitamins-Minerals (ONE A DAY MEN 50 PLUS PO), Take 1 tablet by mouth Daily., Disp: , Rfl:     simvastatin (ZOCOR) 80 MG tablet, Take 1 tablet by mouth Every Night., Disp: , Rfl:     Trelegy Ellipta 100-62.5-25 MCG/ACT inhaler, Inhale 1 puff Daily., Disp: , Rfl:     warfarin (COUMADIN) 3 MG tablet, Take 1 tablet by mouth every night at bedtime., Disp: , Rfl:     The following portions of the patient's history were reviewed and updated as appropriate: allergies, current medications, past family history, past medical history, past social history, past surgical history and problem list.    REVIEW OF SYSTEMS  Review of Systems   Constitutional:  Positive for fatigue and unexpected weight change (decrease of 50 lbs over last several months). Negative for appetite change.   HENT:  Negative.     Eyes:  Positive for eye problems (\"eyes are in a haze\").   Respiratory:  Positive for cough (clear sputum, \"sticky\") and shortness of breath (with exertion). Negative for hemoptysis.    Cardiovascular: Negative.    Gastrointestinal: Negative.  Negative for nausea and vomiting.   Endocrine: Negative.    Genitourinary: Negative.     Musculoskeletal: Negative.    Skin: Negative.    Neurological: Negative.  Negative for dizziness, headaches and light-headedness.   Hematological:  Bruises/bleeds easily.   Psychiatric/Behavioral: Negative.       Implant: NO    PHYSICAL EXAM  VITAL SIGNS:   Vitals:    03/28/25 0957   BP: 167/91   Pulse: 72   SpO2: 96%  Comment: room air   Weight: 114 kg (251 lb)   Height: 190.5 cm (75\")   PainSc: 0-No pain     Physical Exam  Vitals reviewed.   Constitutional:       Appearance: Normal appearance.   HENT:      Head: Normocephalic.      Nose: Nose normal.   Eyes:      Pupils: Pupils are equal, round, and reactive to light.   Cardiovascular:      Rate and Rhythm: Normal rate and regular rhythm.      Pulses: Normal pulses.      " Heart sounds: Normal heart sounds.   Pulmonary:      Effort: Pulmonary effort is normal. No respiratory distress.      Breath sounds: Normal breath sounds. No wheezing.   Abdominal:      General: Bowel sounds are normal.      Palpations: There is no mass.   Musculoskeletal:         General: Normal range of motion.      Cervical back: Normal range of motion and neck supple. No tenderness.   Lymphadenopathy:      Cervical: No cervical adenopathy.   Skin:     General: Skin is warm and dry.      Capillary Refill: Capillary refill takes less than 2 seconds.   Neurological:      General: No focal deficit present.      Mental Status: He is alert and oriented to person, place, and time.      Motor: No weakness.   Psychiatric:         Mood and Affect: Mood normal.         Behavior: Behavior normal.          Performance Status: ECOG (2) Ambulatory and capable of self care, unable to carry out work activity, up and about > 50% or waking hours    Clinical Quality Measures  - Pain Documented by Standardized Tool, FPS Michael Paz reports a pain score of 0. Given his pain assessment as noted, treatment options were discussed and the following options were decided upon as a follow-up plan to address the patient's pain:  No pain, no plan given .  Pain Medications              predniSONE (DELTASONE) 50 MG tablet () Take 1 tablet by mouth Daily for 7 days.          - Body Mass Index Screening and Follow-Up Plan Body mass index is 31.37 kg/m². - Deferred to PCP    - Tobacco Use: Screening and Cessation Intervention  Social History    Tobacco Use      Smoking status: Every Day        Packs/day: 1.00        Years: 1 pack/day for 67.8 years (67.8 ttl pk-yrs)        Types: Cigarettes        Start date: 1957        Passive exposure: Current      Smokeless tobacco: Never      Tobacco comments: Don't inhale.    Smoking cessation information given in after visit summary.    - Advanced Care Planning Advance Care Planning  ACP  discussion was held with the patient during this visit. Patient has an advance directive in EMR which is still valid.     - PHQ-2 Depression Screening:  Little interest or pleasure in doing things? Not at all   Feeling down, depressed, or hopeless? Not at all   PHQ-2 Total Score 0     ASSESSMENT AND PLAN  1. Non-small cell lung cancer, unspecified laterality    2. Stage 2 moderate COPD by GOLD classification    3. Tobacco abuse        RECOMMENDATIONS: Michael Paz was diagnosed with T3 right upper lobe lung cancer which has been downstage to a T2 with neoadjuvant chemotherapy.  He currently has stage IIa (t2b N0 M0) right upper lobe lung cancer.    The indications and rationale of lung stereotactic/external beam radiation therapy according to the NCCN Guidelines has been discussed today. I have extensively reviewed the risks, benefits and alternatives of therapy with this diagnosis. The risks of radiation therapy includes but is not limited to radiation induced pulmonary fibrosis, progression of disease in spite of therapy with either local or systemic failure. I have seen, examined and reviewed this patient's medication list, appropriate labs and imaging studies as well as other physician notes. We discussed the goals and plans of care with the patient and family and answered all questions.     Following this discussion and in consideration of the diagnostic data/evaluation of the patient, I recommended a course of stereotactic radiosurgery to right upper lobe lung tumor, I anticipate 9930-5234 cGy over 3-5 treatments. Will simulate treatment fields today, 3D CT with MIPS to begin the planning process, final course pending. . Continue ongoing management per primary care physician and other specialists.     Michael Paz  reports that he has been smoking cigarettes. He started smoking about 67 years ago. He has a 67.8 pack-year smoking history. He has been exposed to tobacco smoke. He has never used smokeless  tobacco. I have educated him on the risk of diseases from using tobacco products such as cancer, COPD, and heart disease. I spent >10 minutes counseling the patient.    Thank you for allowing me to assist in this patients care.     Rubén Rodas III, MD  03/28/2025

## 2025-03-27 ENCOUNTER — HOSPITAL ENCOUNTER (OUTPATIENT)
Dept: RADIATION ONCOLOGY | Facility: HOSPITAL | Age: 80
Setting detail: RADIATION/ONCOLOGY SERIES
End: 2025-03-27
Payer: MEDICARE

## 2025-03-28 ENCOUNTER — APPOINTMENT (OUTPATIENT)
Dept: RADIATION ONCOLOGY | Facility: HOSPITAL | Age: 80
End: 2025-03-28
Payer: MEDICARE

## 2025-03-28 ENCOUNTER — CONSULT (OUTPATIENT)
Age: 80
End: 2025-03-28
Payer: MEDICARE

## 2025-03-28 VITALS
DIASTOLIC BLOOD PRESSURE: 91 MMHG | HEIGHT: 75 IN | WEIGHT: 251 LBS | SYSTOLIC BLOOD PRESSURE: 167 MMHG | HEART RATE: 72 BPM | BODY MASS INDEX: 31.21 KG/M2 | OXYGEN SATURATION: 96 %

## 2025-03-28 DIAGNOSIS — C34.90 NON-SMALL CELL LUNG CANCER, UNSPECIFIED LATERALITY: Primary | ICD-10-CM

## 2025-03-28 DIAGNOSIS — Z72.0 TOBACCO ABUSE: Chronic | ICD-10-CM

## 2025-03-28 DIAGNOSIS — J44.9 STAGE 2 MODERATE COPD BY GOLD CLASSIFICATION: Chronic | ICD-10-CM

## 2025-03-28 PROCEDURE — G0463 HOSPITAL OUTPT CLINIC VISIT: HCPCS | Performed by: RADIOLOGY

## 2025-03-28 PROCEDURE — 77334 RADIATION TREATMENT AID(S): CPT | Performed by: RADIOLOGY

## 2025-04-01 ENCOUNTER — HOSPITAL ENCOUNTER (OUTPATIENT)
Dept: RADIATION ONCOLOGY | Facility: HOSPITAL | Age: 80
Setting detail: RADIATION/ONCOLOGY SERIES
End: 2025-04-01
Payer: MEDICARE

## 2025-04-02 PROCEDURE — 77338 DESIGN MLC DEVICE FOR IMRT: CPT | Performed by: RADIOLOGY

## 2025-04-02 PROCEDURE — 77293 RESPIRATOR MOTION MGMT SIMUL: CPT | Performed by: RADIOLOGY

## 2025-04-02 PROCEDURE — 77300 RADIATION THERAPY DOSE PLAN: CPT | Performed by: RADIOLOGY

## 2025-04-02 PROCEDURE — 77301 RADIOTHERAPY DOSE PLAN IMRT: CPT | Performed by: RADIOLOGY

## 2025-04-10 NOTE — PROGRESS NOTES
"Chief Complaint  Lung Nodule    Subjective    History of Present Illness {CC  Problem List  Visit Diagnosis   Encounters  Notes  Medications  Labs  Result Review Imaging  Media: 23}    Michael Paz presents to [facility] for:    History of Present Illness  Management of COPD and lung cancer.  He is he is a 1 pack/day smoker for 67 years.  He is not interested in smoking cessation.      He is obese.  Most recent FEV1 67.  Lung nodule identified on his CT of his abdomen in March 2024 for follow-up of repaired aneurysm.  He did have a lapse in follow-up due to issues with insurance.        He denies having any childhood history of asthma or COPD.  He did have pneumonia at the age of 19.  He denies any family history of lung disease.  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     He is on Trelegy 100 after failing Advair.  He believes that was very beneficial.  It did help with his shortness of breath.  It did not help with his cough.  He has tried multiple cough medications, Tessalon Perles and steroids.  None of this has helped.  He indicates he was told by 2 other providers it is a \"chemo cough\".         We did ambulate him for portable oxygen.  He did not qualify.  I ordered an overnight oximetry.  He was started on 2 L of oxygen with sleep he is compliant with it.  It is helping him but he indicates his insurance is not paying for the oxygen at all.  It is costing him $180 a month.  He would like us to check with his DME to see why.  He would also like to consider pursuing a sleep study.  He is not sleeping well.       He reports having bilateral pulmonary emboli in 1986 on 2 separate occasions.  First occasion on the right side, second occasion on the left side.       He has had 5 open heart surgeries.  The first 1 occurring at the age of 45.  He was also diagnosed " "with \"a leaky valve\" at that time.  The other 4 operations all occurring in 2008 following a \"leak from one of my bypass grafts\".  He reports having multiple surgeries during that hospitalization in 2008 for leaking as well as MRSA in the wounds.  Due to his extended stay in the hospital he did have to go to a rehab facility.  During some of that time in 2008 he developed a left lower extremity DVT.  They did determine a few years later that he has protein C and protein S deficiency.  They recommended lifelong anticoagulation.  He is on Coumadin.  He is also on a full-strength aspirin.     He was recently hospitalized for bleeding gastric AVM in the fundus.  They recommended he continue Coumadin but to lower his INR threshold.  They also started him on Protonix twice daily.  He is taking the Protonix as prescribed.  Recent recheck of labs showed his hemoglobin did improve.  He is also on iron replacement.      He has a history of atrial fibrillation.  He is on low-dose beta-blocker.       He has a 2 cm nodule left lower lobe and a 6 cm nodule right upper lobe.  He underwent a needle biopsy of the left lower lobe which was negative.  We made arrangements for an Ion biopsy of the right upper lobe given negative mediastinum on the PET.  He underwent Ion bronchoscopy with biopsy on January 10.  It was positive for cancer.  Nodes negative.  PET suggestive of isolated disease.  He was referred for consideration of surgical resection and to oncology.  Unfortunately he was deemed not to be a surgical candidate.  He was referred back to oncology for chemotherapy, immune therapy and radiation.     He did try chemotherapy and immune therapy.  He did not tolerate this well.  He was referred back to radiation therapy for consideration of XRT.  He starts his first treatment today.  He will have a series of 4 treatments then follow-up with oncology on May 2.          Current Outpatient Medications:     Ascorbic Acid (VITAMIN C " "PO), Take 2,000 mg by mouth Daily., Disp: , Rfl:     ferrous sulfate 325 (65 FE) MG tablet, Take 1 tablet by mouth Daily With Breakfast., Disp: 90 tablet, Rfl: 0    metoprolol succinate XL (TOPROL-XL) 25 MG 24 hr tablet, Take 1 tablet by mouth Every Night., Disp: , Rfl:     Multiple Vitamins-Minerals (ONE A DAY MEN 50 PLUS PO), Take 1 tablet by mouth Daily., Disp: , Rfl:     simvastatin (ZOCOR) 80 MG tablet, Take 1 tablet by mouth Every Night., Disp: , Rfl:     Trelegy Ellipta 100-62.5-25 MCG/ACT inhaler, Inhale 1 puff Daily., Disp: , Rfl:     warfarin (COUMADIN) 3 MG tablet, Take 1 tablet by mouth every night at bedtime. Alternating 3 mg and 4 mg, Disp: , Rfl:     Social History     Socioeconomic History    Marital status:    Tobacco Use    Smoking status: Every Day     Current packs/day: 1.00     Average packs/day: 1 pack/day for 67.9 years (67.9 ttl pk-yrs)     Types: Cigarettes     Start date: 6/1/1955     Passive exposure: Current    Smokeless tobacco: Never    Tobacco comments:     Don't inhale.   Vaping Use    Vaping status: Never Used   Substance and Sexual Activity    Alcohol use: Yes     Alcohol/week: 3.0 - 4.0 standard drinks of alcohol     Types: 3 - 4 Cans of beer per week     Comment: SOCIAL    Drug use: No    Sexual activity: Defer       Objective   Vital Signs:   /60   Pulse 86   Ht 190.5 cm (75\")   Wt 112 kg (248 lb)   SpO2 96% Comment: RA  BMI 31.00 kg/m²     Physical Exam  Constitutional:       Appearance: He is obese.   Cardiovascular:      Rate and Rhythm: Normal rate and regular rhythm.      Heart sounds: No murmur heard.  Pulmonary:      Effort: Pulmonary effort is normal.      Breath sounds: Normal breath sounds.   Musculoskeletal:      Right lower leg: No edema.      Left lower leg: No edema.   Skin:     Coloration: Skin is pale.   Neurological:      Mental Status: He is alert and oriented to person, place, and time.        Result Review :    PFT Values          11/7/2024 "    13:45   Pre Drug PFT Results   FVC 90   FEV1 71   FEF 25-75% 33   FEV1/FVC 61.06   Other Tests PFT Results   DLCO 116   D/VAsb 120     My interpretation of the PFT : none    Results for orders placed during the hospital encounter of 03/07/25    Complete PFT - Pre & Post Bronchodilator    Narrative  [facility] - Pulmonary Function Test    [facility]  [facility]  [id number]  [phone]    Patient : Michael Paz  MRN : [id number]  CSN : [id number]  Pulmonologist : Rayray Castillo MD  Date : 3/11/2025    ______________________________________________________________________    Interpretation :  1.  Spirometry is consistent with a moderate obstructive ventilatory defect.  2.  There is some improvement in spirometry postbronchodilator but a moderate obstructive ventilatory defect is still present.  3.  Lung volumes are within normal limits.  4.  There is a moderate diffusion impairment even when corrected for alveolar volume.  5.  Arterial blood gases on room air reveal mild hypoxemia and a mild respiratory alkalosis.  6.  When current studies are compared to studies performed on November 7, 2024, the patient's current prebronchodilator spirometry reveals a slight but not significant drop in the FEV1 and FVC  compared to previous baseline values.  The patient's current postbronchodilator spirometry reveals slight improvement in the FEV1 and FVC compared to previous baseline values.  When corrected for alveolar volume there has been a significant drop in the patient's diffusion capacity compared to previous.      Rayray Castillo MD      Results for orders placed in visit on 11/07/24    Spirometry with Diffusion Capacity    Narrative  Spirometry with Diffusion Capacity    Performed by: James Hadley CMA  Authorized by: Melisa López APRN  Pre Drug % Predicted  FVC: 90%  FEV1: 71%  FEF 25-75%: 33%  FEV1/FVC: 61.06%  DLCO: 116%  D/VAsb: 120%    Interpretation  Spirometry  Spirometry shows  moderate obstruction. There is reduced midflow suggesting small airway/airflow obstruction.  Diffusion Capacity  The patient's diffusion capacity is normal.  Diffusion capacity is normal when corrected for alveolar volume.    Rest/Exercise Pulse Ox Values          10/23/2024    13:00   Rest/Exercise Pulse Ox Results   Rest room air SAT % 98   Exercise room air SAT % 95     NM PET/CT Skull Base to Mid Thigh (03/04/2025 08:55)     My interpretation of imaging: Decreased right upper lobe lung mass, 4.9 x 3.3 cm, previously 5.8 x 3.2 cm, SUV 2.5, previously 8.8, stable 2 cm nodule left lower lobe    My interpretation of labs: None      Assessment and Plan {CC Problem List  Visit Diagnosis  ROS  Review (Popup)  CP[employer] Maintenance  Quality  BestPractice  Medications  SmartSets  SnapShot Encounters  Media : 23}    Diagnoses and all orders for this visit:    1. Stage 2 moderate COPD by GOLD classification (Primary)  Comments:  Continue Trelegy 100.  Use albuterol as needed.  If breathing or coughing worsens, consider Trelegy 200    2. Lung nodule  Comments:  Surgical candidate for lung cancer due to nodules in left lung.  Keep follow-up with radiation oncology and heme oncology    3. Tobacco abuse  Comments:  Recommend smoking cessation.  Not ready to establish a plan    4. Non-small cell cancer of right lung  Comments:  Starting XRT today.    5. Hx pulmonary embolism  Comments:  Continue Coumadin.  Keep follow-up with hematology    6. Hx of deep venous thrombosis  Comments:  Continue Coumadin. Keep follow-up with hematology    7. Protein S deficiency  Comments:  Continue Coumadin. Keep follow-up with hematology    8. Abdominal aortic aneurysm (AAA) without rupture, unspecified part  Comments:  Follow-up with CT surgery    9. Hypersomnolence  Comments:  Would benefit from sleep study.  Would like to finish radiation before pursuing it.  He will call back.  Will continue nighttime oxygen for now.      Michael CASANOVA  Brandi  reports that he has been smoking cigarettes. He started smoking about 67 years ago. He has a 67.9 pack-year smoking history. He has been exposed to tobacco smoke. He has never used smokeless tobacco. I have educated him on the risk of diseases from using tobacco products such as cancer, COPD, and heart disease.     I advised him to quit and he is not willing to quit.    I spent 3  minutes counseling the patient.      Body mass index is 31 kg/m². Educational material provided after visit summary about elevated BMI                                                      [place] Sleepiness Scale    Situation Chance of Dozing or Sleeping   Sitting and reading 2 - moderate chance of dosing or sleeping   Watching TV 3 - high chance of dosing or sleeping   Sitting inactive in a public place 0 - would never dose or sleep   Being a passenger in a motor vehicle for an hour or more 0 - would never dose or sleep   Lying down in the afternoon 2 - moderate chance of dosing or sleeping   Sitting and talking to someone 0 - would never dose or sleep   Sitting quietly after lunch (no alcohol) 3 - high chance of dosing or sleeping   Stopped for a few minutes in traffic while driving 0 - would never dose or sleep   Total score (add the scores up) 10       SLEEP RELATED SYMPTOMS:   Excessive daytime sleepiness, Dry mouth, Uncontrollable need to sleep, Reflux, Non-restorative sleep, Frequent awakenings, Nocturia (frequent urination at night), Unable to stay asleep, Unable to fall asleep, Morning headaches, and Uncontrollable muscle weakness     Melisa López, IVY  4/14/2025  11:32 CDT    Follow Up   Return in about 3 months (around 7/14/2025).    Patient was given instructions and counseling regarding his condition or for health maintenance advice. Please see specific information pulled into the AVS if appropriate.

## 2025-04-14 ENCOUNTER — HOSPITAL ENCOUNTER (OUTPATIENT)
Dept: RADIATION ONCOLOGY | Facility: HOSPITAL | Age: 80
Discharge: HOME | End: 2025-04-14
Payer: MEDICARE

## 2025-04-14 ENCOUNTER — TELEPHONE (OUTPATIENT)
Dept: PULMONOLOGY | Facility: CLINIC | Age: 80
End: 2025-04-14

## 2025-04-14 ENCOUNTER — OFFICE VISIT (OUTPATIENT)
Dept: PULMONOLOGY | Facility: CLINIC | Age: 80
End: 2025-04-14
Payer: MEDICARE

## 2025-04-14 VITALS
BODY MASS INDEX: 30.84 KG/M2 | SYSTOLIC BLOOD PRESSURE: 118 MMHG | HEIGHT: 75 IN | OXYGEN SATURATION: 96 % | DIASTOLIC BLOOD PRESSURE: 60 MMHG | WEIGHT: 248 LBS | HEART RATE: 86 BPM

## 2025-04-14 VITALS — HEART RATE: 103 BPM | DIASTOLIC BLOOD PRESSURE: 67 MMHG | OXYGEN SATURATION: 96 % | SYSTOLIC BLOOD PRESSURE: 141 MMHG

## 2025-04-14 DIAGNOSIS — R91.1 LUNG NODULE: Chronic | ICD-10-CM

## 2025-04-14 DIAGNOSIS — G47.10 HYPERSOMNOLENCE: ICD-10-CM

## 2025-04-14 DIAGNOSIS — Z72.0 TOBACCO ABUSE: Chronic | ICD-10-CM

## 2025-04-14 DIAGNOSIS — Z86.718 HX OF DEEP VENOUS THROMBOSIS: Chronic | ICD-10-CM

## 2025-04-14 DIAGNOSIS — J44.9 STAGE 2 MODERATE COPD BY GOLD CLASSIFICATION: Primary | Chronic | ICD-10-CM

## 2025-04-14 DIAGNOSIS — I71.40 ABDOMINAL AORTIC ANEURYSM (AAA) WITHOUT RUPTURE, UNSPECIFIED PART: Chronic | ICD-10-CM

## 2025-04-14 DIAGNOSIS — D68.59 PROTEIN S DEFICIENCY: Chronic | ICD-10-CM

## 2025-04-14 DIAGNOSIS — Z86.711 HX PULMONARY EMBOLISM: Chronic | ICD-10-CM

## 2025-04-14 DIAGNOSIS — C34.91 NON-SMALL CELL CANCER OF RIGHT LUNG: Chronic | ICD-10-CM

## 2025-04-14 LAB
RAD ONC ARIA COURSE ID: NORMAL
RAD ONC ARIA COURSE INTENT: NORMAL
RAD ONC ARIA COURSE LAST TREATMENT DATE: NORMAL
RAD ONC ARIA COURSE START DATE: NORMAL
RAD ONC ARIA COURSE TREATMENT ELAPSED DAYS: 0
RAD ONC ARIA FIRST TREATMENT DATE: NORMAL
RAD ONC ARIA PLAN FRACTIONS TREATED TO DATE: 1
RAD ONC ARIA PLAN ID: NORMAL
RAD ONC ARIA PLAN PRESCRIBED DOSE PER FRACTION: 12.5 GY
RAD ONC ARIA PLAN PRIMARY REFERENCE POINT: NORMAL
RAD ONC ARIA PLAN TOTAL FRACTIONS PRESCRIBED: 4
RAD ONC ARIA PLAN TOTAL PRESCRIBED DOSE: 5000 CGY
RAD ONC ARIA REFERENCE POINT DOSAGE GIVEN TO DATE: 12.5 GY
RAD ONC ARIA REFERENCE POINT ID: NORMAL
RAD ONC ARIA REFERENCE POINT SESSION DOSAGE GIVEN: 12.5 GY

## 2025-04-14 PROCEDURE — 99214 OFFICE O/P EST MOD 30 MIN: CPT | Performed by: NURSE PRACTITIONER

## 2025-04-14 PROCEDURE — 1160F RVW MEDS BY RX/DR IN RCRD: CPT | Performed by: NURSE PRACTITIONER

## 2025-04-14 PROCEDURE — 77373 STRTCTC BDY RAD THER TX DLVR: CPT | Performed by: RADIOLOGY

## 2025-04-14 PROCEDURE — 3074F SYST BP LT 130 MM HG: CPT | Performed by: NURSE PRACTITIONER

## 2025-04-14 PROCEDURE — 1159F MED LIST DOCD IN RCRD: CPT | Performed by: NURSE PRACTITIONER

## 2025-04-14 PROCEDURE — 77435 SBRT MANAGEMENT: CPT | Performed by: RADIOLOGY

## 2025-04-14 PROCEDURE — 3078F DIAST BP <80 MM HG: CPT | Performed by: NURSE PRACTITIONER

## 2025-04-14 NOTE — TELEPHONE ENCOUNTER
----- Message from Jil QUEVEDO sent at 4/14/2025 12:59 PM CDT -----  Regarding: FW: O2  Called patient.  No answer.  Left message for them to call me back. Called [facility].  Patient hasn't received a bill yet.  They are wondering if he's looking at the EOB.  They want me to have the patient to call them to discuss.  ----- Message -----  From: Melisa López APRN  Sent: 4/14/2025  11:27 AM CDT  To: Jil Burrell, RRT  Subject: O2                                               I saw him in the clinic today.  He is on oxygen at night.  He tells me his insurance is not paying for the oxygen at all.  He wanted to know why.  Can you check with his DME for me please?Thank you

## 2025-04-16 ENCOUNTER — HOSPITAL ENCOUNTER (OUTPATIENT)
Dept: RADIATION ONCOLOGY | Facility: HOSPITAL | Age: 80
Discharge: HOME | End: 2025-04-16

## 2025-04-16 VITALS — HEART RATE: 86 BPM | SYSTOLIC BLOOD PRESSURE: 158 MMHG | OXYGEN SATURATION: 98 % | DIASTOLIC BLOOD PRESSURE: 69 MMHG

## 2025-04-16 LAB
RAD ONC ARIA COURSE ID: NORMAL
RAD ONC ARIA COURSE INTENT: NORMAL
RAD ONC ARIA COURSE LAST TREATMENT DATE: NORMAL
RAD ONC ARIA COURSE START DATE: NORMAL
RAD ONC ARIA COURSE TREATMENT ELAPSED DAYS: 2
RAD ONC ARIA FIRST TREATMENT DATE: NORMAL
RAD ONC ARIA PLAN FRACTIONS TREATED TO DATE: 2
RAD ONC ARIA PLAN ID: NORMAL
RAD ONC ARIA PLAN PRESCRIBED DOSE PER FRACTION: 12.5 GY
RAD ONC ARIA PLAN PRIMARY REFERENCE POINT: NORMAL
RAD ONC ARIA PLAN TOTAL FRACTIONS PRESCRIBED: 4
RAD ONC ARIA PLAN TOTAL PRESCRIBED DOSE: 5000 CGY
RAD ONC ARIA REFERENCE POINT DOSAGE GIVEN TO DATE: 25 GY
RAD ONC ARIA REFERENCE POINT ID: NORMAL
RAD ONC ARIA REFERENCE POINT SESSION DOSAGE GIVEN: 12.5 GY

## 2025-04-16 PROCEDURE — 77373 STRTCTC BDY RAD THER TX DLVR: CPT | Performed by: RADIOLOGY

## 2025-04-17 ENCOUNTER — HOSPITAL ENCOUNTER (OUTPATIENT)
Dept: RADIATION ONCOLOGY | Facility: HOSPITAL | Age: 80
Discharge: HOME | End: 2025-04-17

## 2025-04-17 VITALS — DIASTOLIC BLOOD PRESSURE: 72 MMHG | OXYGEN SATURATION: 95 % | HEART RATE: 80 BPM | SYSTOLIC BLOOD PRESSURE: 139 MMHG

## 2025-04-17 LAB
RAD ONC ARIA COURSE ID: NORMAL
RAD ONC ARIA COURSE INTENT: NORMAL
RAD ONC ARIA COURSE LAST TREATMENT DATE: NORMAL
RAD ONC ARIA COURSE START DATE: NORMAL
RAD ONC ARIA COURSE TREATMENT ELAPSED DAYS: 3
RAD ONC ARIA FIRST TREATMENT DATE: NORMAL
RAD ONC ARIA PLAN FRACTIONS TREATED TO DATE: 3
RAD ONC ARIA PLAN ID: NORMAL
RAD ONC ARIA PLAN PRESCRIBED DOSE PER FRACTION: 12.5 GY
RAD ONC ARIA PLAN PRIMARY REFERENCE POINT: NORMAL
RAD ONC ARIA PLAN TOTAL FRACTIONS PRESCRIBED: 4
RAD ONC ARIA PLAN TOTAL PRESCRIBED DOSE: 5000 CGY
RAD ONC ARIA REFERENCE POINT DOSAGE GIVEN TO DATE: 37.5 GY
RAD ONC ARIA REFERENCE POINT ID: NORMAL
RAD ONC ARIA REFERENCE POINT SESSION DOSAGE GIVEN: 12.5 GY

## 2025-04-17 PROCEDURE — 77373 STRTCTC BDY RAD THER TX DLVR: CPT | Performed by: RADIOLOGY

## 2025-04-22 ENCOUNTER — HOSPITAL ENCOUNTER (OUTPATIENT)
Dept: RADIATION ONCOLOGY | Facility: HOSPITAL | Age: 80
Discharge: HOME | End: 2025-04-22

## 2025-04-22 VITALS — HEART RATE: 73 BPM | DIASTOLIC BLOOD PRESSURE: 88 MMHG | SYSTOLIC BLOOD PRESSURE: 145 MMHG | OXYGEN SATURATION: 97 %

## 2025-04-22 DIAGNOSIS — Z92.3 HISTORY OF RADIATION THERAPY: ICD-10-CM

## 2025-04-22 DIAGNOSIS — Z72.0 TOBACCO ABUSE: ICD-10-CM

## 2025-04-22 DIAGNOSIS — J44.9 STAGE 2 MODERATE COPD BY GOLD CLASSIFICATION: ICD-10-CM

## 2025-04-22 DIAGNOSIS — C34.90 NON-SMALL CELL LUNG CANCER, UNSPECIFIED LATERALITY: Primary | ICD-10-CM

## 2025-04-22 LAB
RAD ONC ARIA COURSE END DATE: NORMAL
RAD ONC ARIA COURSE ID: NORMAL
RAD ONC ARIA COURSE ID: NORMAL
RAD ONC ARIA COURSE INTENT: NORMAL
RAD ONC ARIA COURSE INTENT: NORMAL
RAD ONC ARIA COURSE LAST TREATMENT DATE: NORMAL
RAD ONC ARIA COURSE LAST TREATMENT DATE: NORMAL
RAD ONC ARIA COURSE START DATE: NORMAL
RAD ONC ARIA COURSE START DATE: NORMAL
RAD ONC ARIA COURSE TREATMENT ELAPSED DAYS: 8
RAD ONC ARIA COURSE TREATMENT ELAPSED DAYS: 8
RAD ONC ARIA FIRST TREATMENT DATE: NORMAL
RAD ONC ARIA FIRST TREATMENT DATE: NORMAL
RAD ONC ARIA PLAN FRACTIONS TREATED TO DATE: 4
RAD ONC ARIA PLAN FRACTIONS TREATED TO DATE: 4
RAD ONC ARIA PLAN ID: NORMAL
RAD ONC ARIA PLAN ID: NORMAL
RAD ONC ARIA PLAN NAME: NORMAL
RAD ONC ARIA PLAN PRESCRIBED DOSE PER FRACTION: 12.5 GY
RAD ONC ARIA PLAN PRESCRIBED DOSE PER FRACTION: 12.5 GY
RAD ONC ARIA PLAN PRIMARY REFERENCE POINT: NORMAL
RAD ONC ARIA PLAN PRIMARY REFERENCE POINT: NORMAL
RAD ONC ARIA PLAN TOTAL FRACTIONS PRESCRIBED: 4
RAD ONC ARIA PLAN TOTAL FRACTIONS PRESCRIBED: 4
RAD ONC ARIA PLAN TOTAL PRESCRIBED DOSE: 5000 CGY
RAD ONC ARIA PLAN TOTAL PRESCRIBED DOSE: 5000 CGY
RAD ONC ARIA REFERENCE POINT DOSAGE GIVEN TO DATE: 50 GY
RAD ONC ARIA REFERENCE POINT DOSAGE GIVEN TO DATE: 50 GY
RAD ONC ARIA REFERENCE POINT ID: NORMAL
RAD ONC ARIA REFERENCE POINT ID: NORMAL
RAD ONC ARIA REFERENCE POINT SESSION DOSAGE GIVEN: 12.5 GY

## 2025-04-22 PROCEDURE — 77336 RADIATION PHYSICS CONSULT: CPT | Performed by: RADIOLOGY

## 2025-04-22 PROCEDURE — 77373 STRTCTC BDY RAD THER TX DLVR: CPT | Performed by: RADIOLOGY

## 2025-05-01 ENCOUNTER — HOSPITAL ENCOUNTER (INPATIENT)
Facility: HOSPITAL | Age: 80
LOS: 1 days | Discharge: HOME | End: 2025-05-02
Attending: FAMILY MEDICINE | Admitting: FAMILY MEDICINE
Payer: MEDICARE

## 2025-05-01 ENCOUNTER — APPOINTMENT (OUTPATIENT)
Dept: CT IMAGING | Facility: HOSPITAL | Age: 80
End: 2025-05-01
Payer: MEDICARE

## 2025-05-01 ENCOUNTER — APPOINTMENT (OUTPATIENT)
Dept: GENERAL RADIOLOGY | Facility: HOSPITAL | Age: 80
End: 2025-05-01
Payer: MEDICARE

## 2025-05-01 DIAGNOSIS — R04.2 HEMOPTYSIS: Primary | ICD-10-CM

## 2025-05-01 DIAGNOSIS — R79.1 SUPRATHERAPEUTIC INR: ICD-10-CM

## 2025-05-01 DIAGNOSIS — J18.9 PNEUMONIA OF LEFT LUNG DUE TO INFECTIOUS ORGANISM, UNSPECIFIED PART OF LUNG: ICD-10-CM

## 2025-05-01 LAB
ALBUMIN SERPL-MCNC: 3.8 G/DL (ref 3.5–5.2)
ALBUMIN/GLOB SERPL: 1.5 G/DL
ALP SERPL-CCNC: 55 U/L (ref 39–117)
ALT SERPL W P-5'-P-CCNC: 21 U/L (ref 1–41)
ANION GAP SERPL CALCULATED.3IONS-SCNC: 11 MMOL/L (ref 5–15)
APTT PPP: 85.2 SECONDS (ref 24.5–36)
AST SERPL-CCNC: 34 U/L (ref 1–40)
BASOPHILS # BLD AUTO: 0.02 10*3/MM3 (ref 0–0.2)
BASOPHILS NFR BLD AUTO: 0.3 % (ref 0–1.5)
BILIRUB SERPL-MCNC: 1.3 MG/DL (ref 0–1.2)
BUN SERPL-MCNC: 14 MG/DL (ref 8–23)
BUN/CREAT SERPL: 15.9 (ref 7–25)
CALCIUM SPEC-SCNC: 8.7 MG/DL (ref 8.6–10.5)
CHLORIDE SERPL-SCNC: 102 MMOL/L (ref 98–107)
CO2 SERPL-SCNC: 21 MMOL/L (ref 22–29)
CREAT SERPL-MCNC: 0.88 MG/DL (ref 0.76–1.27)
D DIMER PPP FEU-MCNC: 1.14 MCGFEU/ML (ref 0–0.78)
D-LACTATE SERPL-SCNC: 1.4 MMOL/L (ref 0.5–2)
DEPRECATED RDW RBC AUTO: 48 FL (ref 37–54)
EGFRCR SERPLBLD CKD-EPI 2021: 88 ML/MIN/1.73
EOSINOPHIL # BLD AUTO: 0.05 10*3/MM3 (ref 0–0.4)
EOSINOPHIL NFR BLD AUTO: 0.9 % (ref 0.3–6.2)
ERYTHROCYTE [DISTWIDTH] IN BLOOD BY AUTOMATED COUNT: 14.2 % (ref 12.3–15.4)
GEN 5 1HR TROPONIN T REFLEX: 19 NG/L
GLOBULIN UR ELPH-MCNC: 2.6 GM/DL
GLUCOSE SERPL-MCNC: 89 MG/DL (ref 65–99)
HCT VFR BLD AUTO: 32.2 % (ref 37.5–51)
HGB BLD-MCNC: 10.9 G/DL (ref 13–17.7)
IMM GRANULOCYTES # BLD AUTO: 0.02 10*3/MM3 (ref 0–0.05)
IMM GRANULOCYTES NFR BLD AUTO: 0.3 % (ref 0–0.5)
INR PPP: >10 (ref 0.91–1.09)
LYMPHOCYTES # BLD AUTO: 0.85 10*3/MM3 (ref 0.7–3.1)
LYMPHOCYTES NFR BLD AUTO: 14.5 % (ref 19.6–45.3)
MAGNESIUM SERPL-MCNC: 1.4 MG/DL (ref 1.6–2.4)
MCH RBC QN AUTO: 31.1 PG (ref 26.6–33)
MCHC RBC AUTO-ENTMCNC: 33.9 G/DL (ref 31.5–35.7)
MCV RBC AUTO: 92 FL (ref 79–97)
MONOCYTES # BLD AUTO: 0.75 10*3/MM3 (ref 0.1–0.9)
MONOCYTES NFR BLD AUTO: 12.8 % (ref 5–12)
NEUTROPHILS NFR BLD AUTO: 4.18 10*3/MM3 (ref 1.7–7)
NEUTROPHILS NFR BLD AUTO: 71.2 % (ref 42.7–76)
NRBC BLD AUTO-RTO: 0 /100 WBC (ref 0–0.2)
NT-PROBNP SERPL-MCNC: 2249 PG/ML (ref 0–1800)
PLATELET # BLD AUTO: 111 10*3/MM3 (ref 140–450)
PMV BLD AUTO: 9.5 FL (ref 6–12)
POTASSIUM SERPL-SCNC: 3.7 MMOL/L (ref 3.5–5.2)
PROT SERPL-MCNC: 6.4 G/DL (ref 6–8.5)
PROTHROMBIN TIME: >120 SECONDS (ref 11.8–14.8)
RBC # BLD AUTO: 3.5 10*6/MM3 (ref 4.14–5.8)
SODIUM SERPL-SCNC: 134 MMOL/L (ref 136–145)
TROPONIN T NUMERIC DELTA: 0 NG/L
TROPONIN T SERPL HS-MCNC: 19 NG/L
WBC NRBC COR # BLD AUTO: 5.87 10*3/MM3 (ref 3.4–10.8)

## 2025-05-01 PROCEDURE — 80053 COMPREHEN METABOLIC PANEL: CPT | Performed by: FAMILY MEDICINE

## 2025-05-01 PROCEDURE — 85025 COMPLETE CBC W/AUTO DIFF WBC: CPT | Performed by: FAMILY MEDICINE

## 2025-05-01 PROCEDURE — 85610 PROTHROMBIN TIME: CPT | Performed by: FAMILY MEDICINE

## 2025-05-01 PROCEDURE — 99285 EMERGENCY DEPT VISIT HI MDM: CPT | Performed by: FAMILY MEDICINE

## 2025-05-01 PROCEDURE — 84484 ASSAY OF TROPONIN QUANT: CPT | Performed by: FAMILY MEDICINE

## 2025-05-01 PROCEDURE — 36415 COLL VENOUS BLD VENIPUNCTURE: CPT

## 2025-05-01 PROCEDURE — 71275 CT ANGIOGRAPHY CHEST: CPT

## 2025-05-01 PROCEDURE — 85730 THROMBOPLASTIN TIME PARTIAL: CPT | Performed by: FAMILY MEDICINE

## 2025-05-01 PROCEDURE — 93005 ELECTROCARDIOGRAM TRACING: CPT | Performed by: FAMILY MEDICINE

## 2025-05-01 PROCEDURE — 71045 X-RAY EXAM CHEST 1 VIEW: CPT

## 2025-05-01 PROCEDURE — 25510000001 IOPAMIDOL PER 1 ML: Performed by: FAMILY MEDICINE

## 2025-05-01 PROCEDURE — 83605 ASSAY OF LACTIC ACID: CPT | Performed by: FAMILY MEDICINE

## 2025-05-01 PROCEDURE — 93010 ELECTROCARDIOGRAM REPORT: CPT | Performed by: INTERNAL MEDICINE

## 2025-05-01 PROCEDURE — 87040 BLOOD CULTURE FOR BACTERIA: CPT | Performed by: FAMILY MEDICINE

## 2025-05-01 PROCEDURE — 83880 ASSAY OF NATRIURETIC PEPTIDE: CPT | Performed by: FAMILY MEDICINE

## 2025-05-01 PROCEDURE — 25010000002 PHYTONADIONE 10 MG/ML SOLUTION 1 ML VIAL: Performed by: FAMILY MEDICINE

## 2025-05-01 PROCEDURE — 85379 FIBRIN DEGRADATION QUANT: CPT | Performed by: FAMILY MEDICINE

## 2025-05-01 PROCEDURE — 83735 ASSAY OF MAGNESIUM: CPT | Performed by: FAMILY MEDICINE

## 2025-05-01 RX ORDER — AMOXICILLIN 250 MG
2 CAPSULE ORAL 2 TIMES DAILY PRN
Status: DISCONTINUED | OUTPATIENT
Start: 2025-05-01 | End: 2025-05-02 | Stop reason: HOSPADM

## 2025-05-01 RX ORDER — WARFARIN SODIUM 3 MG/1
3 TABLET ORAL
Status: DISCONTINUED | OUTPATIENT
Start: 2025-05-01 | End: 2025-05-02 | Stop reason: HOSPADM

## 2025-05-01 RX ORDER — FERROUS SULFATE 325(65) MG
325 TABLET ORAL
Status: DISCONTINUED | OUTPATIENT
Start: 2025-05-02 | End: 2025-05-02 | Stop reason: HOSPADM

## 2025-05-01 RX ORDER — BISACODYL 10 MG
10 SUPPOSITORY, RECTAL RECTAL DAILY PRN
Status: DISCONTINUED | OUTPATIENT
Start: 2025-05-01 | End: 2025-05-02 | Stop reason: HOSPADM

## 2025-05-01 RX ORDER — ONDANSETRON 2 MG/ML
4 INJECTION INTRAMUSCULAR; INTRAVENOUS EVERY 6 HOURS PRN
Status: DISCONTINUED | OUTPATIENT
Start: 2025-05-01 | End: 2025-05-02 | Stop reason: HOSPADM

## 2025-05-01 RX ORDER — BUDESONIDE AND FORMOTEROL FUMARATE DIHYDRATE 160; 4.5 UG/1; UG/1
2 AEROSOL RESPIRATORY (INHALATION)
Status: DISCONTINUED | OUTPATIENT
Start: 2025-05-01 | End: 2025-05-02 | Stop reason: HOSPADM

## 2025-05-01 RX ORDER — SODIUM CHLORIDE 9 MG/ML
40 INJECTION, SOLUTION INTRAVENOUS AS NEEDED
Status: DISCONTINUED | OUTPATIENT
Start: 2025-05-01 | End: 2025-05-02 | Stop reason: HOSPADM

## 2025-05-01 RX ORDER — SODIUM CHLORIDE 0.9 % (FLUSH) 0.9 %
10 SYRINGE (ML) INJECTION AS NEEDED
Status: DISCONTINUED | OUTPATIENT
Start: 2025-05-01 | End: 2025-05-02 | Stop reason: HOSPADM

## 2025-05-01 RX ORDER — ACETAMINOPHEN 650 MG/1
650 SUPPOSITORY RECTAL EVERY 4 HOURS PRN
Status: DISCONTINUED | OUTPATIENT
Start: 2025-05-01 | End: 2025-05-02 | Stop reason: HOSPADM

## 2025-05-01 RX ORDER — METOPROLOL SUCCINATE 25 MG/1
25 TABLET, EXTENDED RELEASE ORAL NIGHTLY
Status: DISCONTINUED | OUTPATIENT
Start: 2025-05-01 | End: 2025-05-02

## 2025-05-01 RX ORDER — BISACODYL 5 MG/1
5 TABLET, DELAYED RELEASE ORAL DAILY PRN
Status: DISCONTINUED | OUTPATIENT
Start: 2025-05-01 | End: 2025-05-02 | Stop reason: HOSPADM

## 2025-05-01 RX ORDER — DIPHENOXYLATE HYDROCHLORIDE AND ATROPINE SULFATE 2.5; .025 MG/1; MG/1
1 TABLET ORAL 4 TIMES DAILY PRN
COMMUNITY

## 2025-05-01 RX ORDER — ONDANSETRON 4 MG/1
4 TABLET, ORALLY DISINTEGRATING ORAL EVERY 6 HOURS PRN
Status: DISCONTINUED | OUTPATIENT
Start: 2025-05-01 | End: 2025-05-02 | Stop reason: HOSPADM

## 2025-05-01 RX ORDER — SODIUM CHLORIDE 0.9 % (FLUSH) 0.9 %
10 SYRINGE (ML) INJECTION EVERY 12 HOURS SCHEDULED
Status: DISCONTINUED | OUTPATIENT
Start: 2025-05-01 | End: 2025-05-02 | Stop reason: HOSPADM

## 2025-05-01 RX ORDER — POLYETHYLENE GLYCOL 3350 17 G/17G
17 POWDER, FOR SOLUTION ORAL DAILY PRN
Status: DISCONTINUED | OUTPATIENT
Start: 2025-05-01 | End: 2025-05-02 | Stop reason: HOSPADM

## 2025-05-01 RX ORDER — IOPAMIDOL 755 MG/ML
100 INJECTION, SOLUTION INTRAVASCULAR
Status: COMPLETED | OUTPATIENT
Start: 2025-05-01 | End: 2025-05-01

## 2025-05-01 RX ORDER — ACETAMINOPHEN 160 MG/5ML
650 SOLUTION ORAL EVERY 4 HOURS PRN
Status: DISCONTINUED | OUTPATIENT
Start: 2025-05-01 | End: 2025-05-02 | Stop reason: HOSPADM

## 2025-05-01 RX ORDER — ATORVASTATIN CALCIUM 40 MG/1
40 TABLET, FILM COATED ORAL NIGHTLY
Status: DISCONTINUED | OUTPATIENT
Start: 2025-05-01 | End: 2025-05-02 | Stop reason: HOSPADM

## 2025-05-01 RX ORDER — WARFARIN SODIUM 4 MG/1
4 TABLET ORAL NIGHTLY
COMMUNITY
End: 2025-05-02 | Stop reason: HOSPADM

## 2025-05-01 RX ORDER — ACETAMINOPHEN 325 MG/1
650 TABLET ORAL EVERY 4 HOURS PRN
Status: DISCONTINUED | OUTPATIENT
Start: 2025-05-01 | End: 2025-05-02 | Stop reason: HOSPADM

## 2025-05-01 RX ADMIN — Medication 10 ML: at 21:13

## 2025-05-01 RX ADMIN — PHYTONADIONE 2.5 MG: 10 INJECTION, EMULSION INTRAMUSCULAR; INTRAVENOUS; SUBCUTANEOUS at 15:32

## 2025-05-01 RX ADMIN — IOPAMIDOL 100 ML: 755 INJECTION, SOLUTION INTRAVENOUS at 13:48

## 2025-05-01 RX ADMIN — ATORVASTATIN CALCIUM 40 MG: 40 TABLET, FILM COATED ORAL at 21:12

## 2025-05-01 NOTE — ED PROVIDER NOTES
Subjective   History of Present Illness  Patient states that he is here due to an abnormal lab.  He states that his PT was unable to be obtained.  He states that he has been told that he has no anticoagulants and has blood.  He states that has had dark sputum.  Look like it was blood.  Denies having bright red hemoptysis.  Patient states that he has no other symptoms at this time.          Review of Systems   All other systems reviewed and are negative.      Past Medical History:   Diagnosis Date    AAA (abdominal aortic aneurysm)     Arthritis     Atrial fibrillation 1990    CAD (coronary artery disease)     CAD (coronary artery disease)     Clotting disorder 2012    Deep vein thrombosis [id number]    Depression     Endoleak post endovascular aneurysm repair     History of DVT (deep vein thrombosis)     History of foot fracture     BILATERAL FOOT FRACTURES    History of fracture of left ankle     History of pulmonary embolus (PE)     History of transfusion 2012 and 2020    Hyperlipidemia     Hypertension     Mass of upper lobe of right lung 11/20/2024    Myocardial infarction 1990    Non-small cell lung cancer 12/20/2024    Peripheral vascular disease     Pneumonia 4 times    Pulmonary embolism 1986    Tobacco abuse        No Known Allergies    Past Surgical History:   Procedure Laterality Date    ABDOMINAL AORTIC ANEURYSM REPAIR      x2    APPENDECTOMY      BACK SURGERY      x2    BRONCHOSCOPY N/A 12/10/2024    Procedure: BRONCHOSCOPY WITH ENDOBRONCHIAL ULTRASOUND;  Surgeon: Lizandro Pagan MD;  Location:  [facility];  Service: Pulmonary;  Laterality: N/A;  preop; RUL mass   postop RUL mass   DANYELLE Scruggs    BRONCHOSCOPY WITH ION ROBOTIC ASSIST N/A 12/10/2024    Procedure: BRONCHOSCOPY WITH ION ROBOT and BRONCHOSCOPY WITH ENDOBRONCHIAL ULTRASOUND;  Surgeon: Lizandro Pagan MD;  Location:  [facility];  Service: Robotics - Pulmonary;  Laterality: N/A;  preop; RUL mass   postop RUL mass   DANYELLE Shrestha  [place]    CARDIAC CATHETERIZATION      CORONARY ANGIOPLASTY      CORONARY ARTERY BYPASS GRAFT      1990 and 2008    CORONARY STENT PLACEMENT      ENDOSCOPY N/A 10/25/2024    Procedure: ESOPHAGOGASTRODUODENOSCOPY WITH ANESTHESIA;  Surgeon: Fareed Velasco MD;  Location:  [facility];  Service: Gastroenterology;  Laterality: N/A;  Pre: Symptomatic anemia;  Post: Bleeding gastric AVM;  Nausea and vomiting    HEMORRHOIDECTOMY      x2    INGUINAL HERNIA REPAIR      KNEE SURGERY Right     LUNG BIOPSY      left lower lung    OTHER SURGICAL HISTORY      TYPE IA ENDOLEAK REPAIR    OTHER SURGICAL HISTORY      CRANIAL CYST    VENOUS ACCESS DEVICE (PORT) INSERTION N/A 12/31/2024    Procedure: Single Lumen Port-a-cath insertion with flouroscopy;  Surgeon: Antwan Carnes MD;  Location:  [facility];  Service: General;  Laterality: N/A;       Family History   Problem Relation Age of Onset    Heart disease Mother     Heart disease Father     Heart disease Other     Hypertension Other     Diabetes Other        Social History     Socioeconomic History    Marital status:    Tobacco Use    Smoking status: Every Day     Current packs/day: 1.00     Average packs/day: 1 pack/day for 67.9 years (67.9 ttl pk-yrs)     Types: Cigarettes     Start date: 6/1/1957     Passive exposure: Current    Smokeless tobacco: Never    Tobacco comments:     Don't inhale.   Vaping Use    Vaping status: Never Used   Substance and Sexual Activity    Alcohol use: Yes     Alcohol/week: 3.0 - 4.0 standard drinks of alcohol     Types: 3 - 4 Cans of beer per week     Comment: SOCIAL    Drug use: No    Sexual activity: Defer           Objective   Physical Exam  Vitals and nursing note reviewed.   Constitutional:       Appearance: Normal appearance.   HENT:      Head: Normocephalic and atraumatic.      Mouth/Throat:      Mouth: Mucous membranes are moist.   Eyes:      Extraocular Movements: Extraocular movements intact.      Pupils: Pupils are equal, round,  and reactive to light.   Cardiovascular:      Rate and Rhythm: Normal rate and regular rhythm.      Heart sounds: Normal heart sounds.   Pulmonary:      Effort: Pulmonary effort is normal.      Breath sounds: Normal breath sounds.   Abdominal:      General: Bowel sounds are normal.      Palpations: Abdomen is soft.      Tenderness: There is no abdominal tenderness.   Skin:     General: Skin is warm and dry.   Neurological:      General: No focal deficit present.      Mental Status: He is alert and oriented to person, place, and time.   Psychiatric:         Mood and Affect: Mood normal.         Behavior: Behavior normal.         Procedures           ED Course                                                     Lab Results (last 24 hours)       Procedure Component Value Units Date/Time    CBC & Differential [[id number]]  (Abnormal) Collected: 05/01/25 1229    Specimen: Blood Updated: 05/01/25 1245    Narrative:      The following orders were created for panel order CBC & Differential.  Procedure                               Abnormality         Status                     ---------                               -----------         ------                     CBC Auto Differential[[id number]]        Abnormal            Final result                 Please view results for these tests on the individual orders.    Comprehensive Metabolic Panel [[id number]]  (Abnormal) Collected: 05/01/25 1229    Specimen: Blood Updated: 05/01/25 1306     Glucose 89 mg/dL      BUN 14 mg/dL      Creatinine 0.88 mg/dL      Sodium 134 mmol/L      Potassium 3.7 mmol/L      Chloride 102 mmol/L      CO2 21.0 mmol/L      Calcium 8.7 mg/dL      Total Protein 6.4 g/dL      Albumin 3.8 g/dL      ALT (SGPT) 21 U/L      AST (SGOT) 34 U/L      Alkaline Phosphatase 55 U/L      Total Bilirubin 1.3 mg/dL      Globulin 2.6 gm/dL      A/G Ratio 1.5 g/dL      BUN/Creatinine Ratio 15.9     Anion Gap 11.0 mmol/L      eGFR 88.0 mL/min/1.73     Narrative:      GFR  Categories in Chronic Kidney Disease (CKD)              GFR Category          GFR (mL/min/1.73)    Interpretation  G1                    90 or greater        Normal or high (1)  G2                    60-89                Mild decrease (1)  G3a                   45-59                Mild to moderate decrease  G3b                   30-44                Moderate to severe decrease  G4                    15-29                Severe decrease  G5                    14 or less           Kidney failure    (1)In the absence of evidence of kidney disease, neither GFR category G1 or G2 fulfill the criteria for CKD.    eGFR calculation 2021 CKD-EPI creatinine equation, which does not include race as a factor    Protime-INR [[id number]]  (Abnormal) Collected: 05/01/25 1229    Specimen: Blood Updated: 05/01/25 1313     Protime >120.0 Seconds      INR >10.00    aPTT [[id number]]  (Abnormal) Collected: 05/01/25 1229    Specimen: Blood Updated: 05/01/25 1313     PTT 85.2 seconds     Narrative:      PTT = The equivalent PTT values for the therapeutic range of heparin levels at 0.3 to 0.7 U/ml are 77 - 99 seconds.    High Sensitivity Troponin T [[id number]]  (Normal) Collected: 05/01/25 1229    Specimen: Blood Updated: 05/01/25 1302     HS Troponin T 19 ng/L     Narrative:      High Sensitive Troponin T Reference Range:  <14.0 ng/L- Negative Female for AMI  <22.0 ng/L- Negative Male for AMI  >=14 - Abnormal Female indicating possible myocardial injury.  >=22 - Abnormal Male indicating possible myocardial injury.   Clinicians would have to utilize clinical acumen, EKG, Troponin, and serial changes to determine if it is an Acute Myocardial Infarction or myocardial injury due to an underlying chronic condition.         D-dimer, Quantitative [[id number]]  (Abnormal) Collected: 05/01/25 1229    Specimen: Blood Updated: 05/01/25 1313     D-Dimer, Quantitative 1.14 MCGFEU/mL     Narrative:      According to the assay 's published  "package insert, a normal (<0.50 MCGFEU/mL) D-dimer result in conjunction with a non-high clinical probability assessment, excludes deep vein thrombosis (DVT) and pulmonary embolism (PE) with high sensitivity.    D-dimer values increase with age and this can make VTE exclusion of an older population difficult. To address this, the [employer], based on best available evidence and recent guidelines, recommends that clinicians use age-adjusted D-dimer thresholds in patients greater than 50 years of age with: a) a low probability of PE who do not meet all Pulmonary Embolism Rule Out Criteria, or b) in those with intermediate probability of PE.   The formula for an age-adjusted D-dimer cut-off is \"age/100\".  For example, a 60 year old patient would have an age-adjusted cut-off of 0.60 MCGFEU/mL and an 80 year old 0.80 MCGFEU/mL.    BNP [[id number]]  (Abnormal) Collected: 05/01/25 1229    Specimen: Blood Updated: 05/01/25 1303     proBNP 2,249.0 pg/mL     Narrative:      This assay is used as an aid in the diagnosis of individuals suspected of having heart failure. It can be used as an aid in the diagnosis of acute decompensated heart failure (ADHF) in patients presenting with signs and symptoms of ADHF to the emergency department (ED). In addition, NT-proBNP of <300 pg/mL indicates ADHF is not likely.    Age Range Result Interpretation  NT-proBNP Concentration (pg/mL:      <50             Positive            >450                   Gray                 300-450                    Negative             <300    50-75           Positive            >900                  Gray                300-900                  Negative            <300      >75             Positive            >1800                  Gray                300-1800                  Negative            <300    Magnesium [[id number]]  (Abnormal) Collected: 05/01/25 1229    Specimen: Blood Updated: 05/01/25 1306     Magnesium 1.4 mg/dL     CBC Auto " Differential [[id number]]  (Abnormal) Collected: 05/01/25 1229    Specimen: Blood Updated: 05/01/25 1245     WBC 5.87 10*3/mm3      RBC 3.50 10*6/mm3      Hemoglobin 10.9 g/dL      Hematocrit 32.2 %      MCV 92.0 fL      MCH 31.1 pg      MCHC 33.9 g/dL      RDW 14.2 %      RDW-SD 48.0 fl      MPV 9.5 fL      Platelets 111 10*3/mm3      Neutrophil % 71.2 %      Lymphocyte % 14.5 %      Monocyte % 12.8 %      Eosinophil % 0.9 %      Basophil % 0.3 %      Immature Grans % 0.3 %      Neutrophils, Absolute 4.18 10*3/mm3      Lymphocytes, Absolute 0.85 10*3/mm3      Monocytes, Absolute 0.75 10*3/mm3      Eosinophils, Absolute 0.05 10*3/mm3      Basophils, Absolute 0.02 10*3/mm3      Immature Grans, Absolute 0.02 10*3/mm3      nRBC 0.0 /100 WBC     High Sensitivity Troponin T 1Hr [[id number]]  (Normal) Collected: 05/01/25 1339    Specimen: Blood Updated: 05/01/25 1405     HS Troponin T 19 ng/L      Troponin T Numeric Delta 0 ng/L     Narrative:      High Sensitive Troponin T Reference Range:  <14.0 ng/L- Negative Female for AMI  <22.0 ng/L- Negative Male for AMI  >=14 - Abnormal Female indicating possible myocardial injury.  >=22 - Abnormal Male indicating possible myocardial injury.   Clinicians would have to utilize clinical acumen, EKG, Troponin, and serial changes to determine if it is an Acute Myocardial Infarction or myocardial injury due to an underlying chronic condition.               CT Angiogram Chest   Final Result   1. No evidence of pulmonary embolism.   2. Persistent mild ectasia of the ascending aorta with a maximum lumen   diameter of 4.1 cm.   3. 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 No change.   4. Severe atheromatous change of coronary arteries.   5. Severe chronic emphysematous lung changes.   6. An area of consolidation with air bronchograms in the right upper   lobe posteriorly appears unchanged since the previous study. This may   represent an  inflammatory/infectious process.   7. A new area of infiltrate/consolidation located posteriorly in the   left upper lobe.   8. Small new nodules in the left upper lobe may represent evolving   inflammatory/infectious process. A follow-up examination in 6 months may   be obtained for further evaluation.   9. Nonacute findings of the remaining chest and abdomen similar to the   previous study.                                   This report was signed and finalized on 5/1/2025 2:20 PM by Dr. Bogdan Mendez MD.          XR Chest 1 View   Final Result   1. Spiculated nodule in the right upper lobe appears relatively stable.   2. Hyperinflated lungs consistent with COPD/emphysema.   3. Cardiomegaly. Prior heart bypass surgery.               This report was signed and finalized on 5/1/2025 12:19 PM by Dr. David Fernandez MD.            Medications   sodium chloride 0.9 % flush 10 mL (has no administration in time range)   piperacillin-tazobactam (ZOSYN) 3.375 g IVPB in 100 mL NS MBP (CD) (has no administration in time range)   iopamidol (ISOVUE-370) 76 % injection 100 mL (100 mL Intravenous Given 5/1/25 1348)       Medical Decision Making  80-year-old male complaining of hemoptysis.  He was found to have a supratherapeutic INR.  Patient CT angiogram the chest did not show any PE.  However showed a questionable pneumonia.  Case was discussed with Madonna PRICE with the hospitalist group.  She has accepted the patient under their group service.    Problems Addressed:  Hemoptysis: complicated acute illness or injury  Pneumonia of left lung due to infectious organism, unspecified part of lung: complicated acute illness or injury  Supratherapeutic INR: complicated acute illness or injury    Amount and/or Complexity of Data Reviewed  External Data Reviewed: labs, radiology and notes.  Labs: ordered. Decision-making details documented in ED Course.  Radiology: ordered. Decision-making details documented in ED  Course.    Risk  Prescription drug management.  Decision regarding hospitalization.        Final diagnoses:   Hemoptysis   Supratherapeutic INR   Pneumonia of left lung due to infectious organism, unspecified part of lung       ED Disposition  ED Disposition       ED Disposition   Decision to Admit    Condition   --    Comment   Level of Care: Remote Telemetry [26]   Diagnosis: Supratherapeutic INR [[id number]]   Admitting Physician: SARA ROSENTHAL [[id number]]   Attending Physician: SARA ROSENTHAL [[id number]]   Certification: I Certify That Inpatient Hospital Services Are Medically Necessary For Greater Than 2 Midnights                 No follow-up provider specified.       Medication List      No changes were made to your prescriptions during this visit.            Brandan Poole MD  05/01/25 9857

## 2025-05-01 NOTE — PLAN OF CARE
Goal Outcome Evaluation:   Pt sitting upright in bed. AOX4. VSS on RA. Pt ambulated from stretcher to chair independently. Pt agitated and argumentative. Voiding. IV to L wrist IID. One dime sized blood clot expectorated since arriving to floor. Call light within reach. Safety maintained. Care continues.

## 2025-05-02 ENCOUNTER — READMISSION MANAGEMENT (OUTPATIENT)
Dept: CALL CENTER | Facility: HOSPITAL | Age: 80
End: 2025-05-02
Payer: MEDICARE

## 2025-05-02 VITALS
BODY MASS INDEX: 28.85 KG/M2 | OXYGEN SATURATION: 99 % | RESPIRATION RATE: 18 BRPM | WEIGHT: 232 LBS | DIASTOLIC BLOOD PRESSURE: 70 MMHG | SYSTOLIC BLOOD PRESSURE: 120 MMHG | HEIGHT: 75 IN | TEMPERATURE: 97.9 F | HEART RATE: 97 BPM

## 2025-05-02 PROBLEM — E43 SEVERE PROTEIN-CALORIE MALNUTRITION: Status: ACTIVE | Noted: 2025-05-02

## 2025-05-02 LAB
DEPRECATED RDW RBC AUTO: 48.8 FL (ref 37–54)
ERYTHROCYTE [DISTWIDTH] IN BLOOD BY AUTOMATED COUNT: 14.2 % (ref 12.3–15.4)
HCT VFR BLD AUTO: 29 % (ref 37.5–51)
HGB BLD-MCNC: 9.5 G/DL (ref 13–17.7)
INR PPP: 1.97 (ref 0.91–1.09)
MCH RBC QN AUTO: 31.3 PG (ref 26.6–33)
MCHC RBC AUTO-ENTMCNC: 32.8 G/DL (ref 31.5–35.7)
MCV RBC AUTO: 95.4 FL (ref 79–97)
PLATELET # BLD AUTO: 94 10*3/MM3 (ref 140–450)
PMV BLD AUTO: 10.3 FL (ref 6–12)
PROTHROMBIN TIME: 23.6 SECONDS (ref 11.8–14.8)
QT INTERVAL: 398 MS
QTC INTERVAL: 429 MS
RBC # BLD AUTO: 3.04 10*6/MM3 (ref 4.14–5.8)
WBC NRBC COR # BLD AUTO: 3.99 10*3/MM3 (ref 3.4–10.8)

## 2025-05-02 PROCEDURE — 36415 COLL VENOUS BLD VENIPUNCTURE: CPT | Performed by: FAMILY MEDICINE

## 2025-05-02 PROCEDURE — 85610 PROTHROMBIN TIME: CPT | Performed by: FAMILY MEDICINE

## 2025-05-02 PROCEDURE — 85027 COMPLETE CBC AUTOMATED: CPT | Performed by: FAMILY MEDICINE

## 2025-05-02 RX ORDER — WARFARIN SODIUM 3 MG/1
3 TABLET ORAL
Start: 2025-05-02

## 2025-05-02 RX ORDER — METOPROLOL SUCCINATE 25 MG/1
25 TABLET, EXTENDED RELEASE ORAL DAILY
Status: DISCONTINUED | OUTPATIENT
Start: 2025-05-02 | End: 2025-05-02 | Stop reason: HOSPADM

## 2025-05-02 RX ADMIN — METOPROLOL SUCCINATE 25 MG: 25 TABLET, EXTENDED RELEASE ORAL at 09:40

## 2025-05-02 RX ADMIN — Medication 10 ML: at 08:47

## 2025-05-02 NOTE — CASE MANAGEMENT/SOCIAL WORK
Discharge Planning Assessment  [facility]     Patient Name: Michael Paz  MRN: [id number]  Today's Date: 5/2/2025    Admit Date: 5/1/2025        Discharge Needs Assessment       Row Name 05/02/25 1055       Living Environment    People in Home alone    Current Living Arrangements home    Primary Care Provided by self    Provides Primary Care For no one    Family Caregiver if Needed none    Able to Return to Prior Arrangements yes       Transition Planning    Patient/Family Anticipates Transition to home    Transportation Anticipated family or friend will provide       Discharge Needs Assessment    Equipment Currently Used at Home none    Concerns to be Addressed denies needs/concerns at this time    Current Discharge Risk lives alone    Discharge Coordination/Progress Spoke with patient for dc planning. Patient is independent and lives alone. Has a daughter that lives in [place]. Does not use dme or outpatient services at the present. Just completed chemo and radiation 2 weeks ago. Sister will provide transportation home.                   Discharge Plan    No documentation.                      Demographic Summary    No documentation.                  Functional Status    No documentation.                  Psychosocial    No documentation.                  Abuse/Neglect    No documentation.                  Legal    No documentation.                  Substance Abuse    No documentation.                  Patient Forms    No documentation.                     Merlina A Fletcher, RN

## 2025-05-02 NOTE — PLAN OF CARE
Goal Outcome Evaluation:  Plan of Care Reviewed With: patient, caregiver        Progress: improving  Outcome Evaluation: Nutrition assessment completed. Cardiac diet. Po intake 100% with breakfast this am. Patient admitted after an elevated INR in the outpatient setting yesterday. Pt reports poor appetite and weight loss since he has started chemotherapy. Pt reports he gets full after only consuming small portions. Pt reports he has lost 70 lbs Per weight report pt 267 lbs (11/20/24) weight loss of 35 lbs (13%) over the past five months. Recommended use of oral supplements to provide additional kcal/protein needs. Provided pt with Living Well handout on nutrient dense,high calorie/high protein foods. RDN name and contact information provided. Will cont to follow for plan of care.

## 2025-05-02 NOTE — DISCHARGE SUMMARY
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  Apparently INR was elevated in the outpatient follow-up yesterday.  Patient has been experiencing intermittent hemoptysis, quarter size, for several days.     Final Discharge Diagnoses:  Active Hospital Problems    Diagnosis     **Supratherapeutic INR     Severe protein-calorie malnutrition     Atrial fibrillation, chronic     Hx of deep venous thrombosis     Hx pulmonary embolism     CAD (coronary artery disease)        Consults: None    Procedures Performed: None    Pertinent Test Results:   Results for orders placed during the hospital encounter of 10/24/24    Adult Transthoracic Echo Complete w/ Color, Spectral and Contrast if Necessary Per Protocol    Interpretation Summary    Left ventricular systolic function is normal. Left ventricular ejection fraction appears to be 61 - 65%.    Left ventricular wall thickness is consistent with moderate concentric hypertrophy.    Mild to moderate aortic valve stenosis is present.    Estimated right ventricular systolic pressure from tricuspid regurgitation is normal (<35 mmHg).    The right ventricular cavity is moderately dilated, with moderately reduced systolic function.    Severe biatrial enlargement    Moderate dilation of the aortic root is present (4.6 cm).    Saline test results are negative.    Rhythm is atrial fibrillation.    No previous studies available  for comparison.      Imaging Results (All)       Procedure Component Value Units Date/Time    CT Angiogram Chest [[id number]] Collected: 05/01/25 1359     Updated: 05/01/25 1423    Narrative:      EXAMINATION: CT ANGIOGRAM CHEST-      5/1/2025 12:40 PM     HISTORY: Hemoptysis     In order to have a CT radiation dose as low as reasonably achievable  Automated Exposure Control was utilized for adjustment of the mA and/or  KV according to patient size.     Total DLP = 272.78 mGy.cm     CT angiography of the chest tube performed after intravenous contrast  enhancement.     Images are acquired in axial plane and subsequent reconstruction in the  coronal and sagittal planes.     Comparison is made with the previous study dated 3/11/2025.     There is normal opacification of the pulmonary arteries and branches  bilaterally. There are no filling defects of the opacified pulmonary  arterial bed.     RV/LV ratio is 32/42 which may suggest a mild right heart strain.     Atheromatous change of thoracic aorta are seen. There is mild ectasia of  the ascending aorta which measures 4.1 cm in diameter. No change. No  dissection.     Severe atheromatous change of coronary arteries are seen. There is  evidence of prior CABG.     There is a lobulated peripherally calcified mass/lesions in the right  anterior cardiophrenic space/anterior mediastinum which represent the  aneurysmal dilatation of the mid coronary graft. Has not changed  significantly since the previous study     There is no evidence of mediastinal or hilar mass or lymphadenopathy.     Limited visualized soft tissue of the neck are unremarkable.     There are extensive chronic emphysematous lung changes.     A spiculated density in the right upper lobe posteriorly is reidentified  and measures 4.6 x 3 cm. There is a partial air bronchograms. No  significant change since the previous study.     Another groundglass infiltrate is are consolidation/infiltrate is seen  in the  posterior aspect of the left upper lobe adjacent to the posterior  spleen and major fissure. This appears moderately more pronounced since  the previous study. This may represent an evolving infiltrate or  atelectasis.     A well-defined nodule is seen in the left lower lobe, centered at image  #19 of series 5, measuring 2.2 cm. It has not significantly changed  since the previous study. A central calcification is seen in the coronal  plane images similar to the previous study.     A band-shaped area of groundglass opacity is seen in the lingular  segment of the left upper lobe adjacent and parallel to the left side of  the mediastinum which was not seen in the previous study. This may  represent an area of discoid atelectasis or an evolving infiltrate.     Previously seen small nodule/cluster of nodules in the left upper lobe  has resolved. There are some new nodules in the left upper lobe, images  #44 through 47 which may represent small granulomas or pleural  parenchymal inflammatory changes.     The central airway is partially decompressed due to expiratory phase  images.     Limited visualized abdomen is unremarkable except for significantly  distended gallbladder with multiple small gallstones. Evidence of aortic  endograft is partially included in the study and not well evaluated.       Impression:      1. No evidence of pulmonary embolism.  2. Persistent mild ectasia of the ascending aorta with a maximum lumen  diameter of 4.1 cm.  3. 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 No change.  4. Severe atheromatous change of coronary arteries.  5. Severe chronic emphysematous lung changes.  6. An area of consolidation with air bronchograms in the right upper  lobe posteriorly appears unchanged since the previous study. This may  represent an inflammatory/infectious process.  7. A new area of infiltrate/consolidation located posteriorly in the  left upper lobe.  8. Small new  nodules in the left upper lobe may represent evolving  inflammatory/infectious process. A follow-up examination in 6 months may  be obtained for further evaluation.  9. Nonacute findings of the remaining chest and abdomen similar to the  previous study.                          This report was signed and finalized on 5/1/2025 2:20 PM by Dr. Bogdan Mendez MD.       XR Chest 1 View [[id number]] Collected: 05/01/25 1217     Updated: 05/01/25 1222    Narrative:      EXAMINATION:  XR CHEST 1 VW-  5/1/2025 10:51 AM     HISTORY: Hemoptysis.     COMPARISON: 3/21/2025.     TECHNIQUE: Single view AP image.     FINDINGS: The lungs are hyperinflated. A spiculated lesion in the right  upper lobe appears relatively stable in size. There is cardiomegaly.  There is no CHF. There has been prior median sternotomy. The thoracic  aorta is atheromatous. There is a port catheter on the right.          Impression:      1. Spiculated nodule in the right upper lobe appears relatively stable.  2. Hyperinflated lungs consistent with COPD/emphysema.  3. Cardiomegaly. Prior heart bypass surgery.           This report was signed and finalized on 5/1/2025 12:19 PM by Dr. David Fernandez MD.             LAB RESULTS:      Lab 05/02/25  0409 05/01/25  1439 05/01/25  1229   WBC 3.99  --  5.87   HEMOGLOBIN 9.5*  --  10.9*   HEMATOCRIT 29.0*  --  32.2*   PLATELETS 94*  --  111*   NEUTROS ABS  --   --  4.18   IMMATURE GRANS (ABS)  --   --  0.02   LYMPHS ABS  --   --  0.85   MONOS ABS  --   --  0.75   EOS ABS  --   --  0.05   MCV 95.4  --  92.0   LACTATE  --  1.4  --    PROTIME 23.6*  --  >120.0*   APTT  --   --  85.2*   D DIMER QUANT  --   --  1.14*         Lab 05/01/25  1229   SODIUM 134*   POTASSIUM 3.7   CHLORIDE 102   CO2 21.0*   ANION GAP 11.0   BUN 14   CREATININE 0.88   EGFR 88.0   GLUCOSE 89   CALCIUM 8.7   MAGNESIUM 1.4*         Lab 05/01/25  1229   TOTAL PROTEIN 6.4   ALBUMIN 3.8   GLOBULIN 2.6   ALT (SGPT) 21   AST (SGOT) 34   BILIRUBIN  "1.3*   ALK PHOS 55         Lab 05/02/25  0409 05/01/25  1339 05/01/25  1229   PROBNP  --   --  2,249.0*   HSTROP T  --  19 19   PROTIME 23.6*  --  >120.0*   INR 1.97*  --  >10.00*                 Brief Urine Lab Results       None          Microbiology Results (last 10 days)       ** No results found for the last 240 hours. **            Hospital Course: Prothrombin time was more than 120 and the INR was more than 10.  He reported intermittent episodes of hemoptysis, quarter-size.  Patient reported he was not taking any new medications, and that his diet has not changed. He had been taking warfarin 3 mg / 4 mg alternating days.  Last dose of warfarin was Tuesday, April 29, 2025, and at the time he took 4 mg. He was sure had not taken additional warfarin doses by mistake. He reported no melena, no rectal bleeding, no hematemesis. Patient had ended chemotherapy; per outpatient notes, Neoadjuvant  carboplatin+paclitaxel+nivolumab-C1, 1/2/25.  C2 due on 1/23/25 withheld due to cough and diarrhea. C2 resumed, 1/31/25; C3, 2/21/25.  Patient received a dose of 2.5 mg vitamin K intravenous.  He did not require reversal with prothrombin complex concentrate or fresh frozen plasma.  On 5-25, his INR had decreased to 1.97. Per prior Oncology notes, recommended target is INR 1.5-2.2.  Hemoglobin remained stable.  Patient had no major bleeding.  Beta-blocker was continued.  Recommended to restart warfarin 3 mg p.o. daily starting this evening, and follow-up with primary care provider on Monday May 5, 2025, to check PT/INR.  At that time, adjust dose based on results.  Alarm signs provided regarding episodes of gastrointestinal bleeding or worsening hemoptysis.  Fall precautions were reinforced.      Physical Exam on Discharge:  /70 (BP Location: Right arm, Patient Position: Sitting)   Pulse 97   Temp 97.9 °F (36.6 °C) (Oral)   Resp 18   Ht 190.5 cm (75\")   Wt 105 kg (232 lb)   SpO2 99%   BMI 29.00 kg/m²   Physical " Exam  Constitutional:       Appearance: Anxious, alert, oriented x 3.  No respiratory distress.  HENT:      Head: Normocephalic and atraumatic.      Nose: Nose normal.      Mouth/Throat:      Mouth: Mucous membranes are moist.   Eyes:      Extraocular Movements: Extraocular movements intact.      Conjunctiva/sclera: Conjunctivae normal.      Pupils: Pupils are equal, round  Cardiovascular:      Rate and Rhythm: irregular rhythm.      Pulses: Normal pulses.   Pulmonary:      Effort: No respiratory distress.      Breath sounds: Normal breath sounds.   Abdominal:      General: Abdomen is flat. Bowel sounds are normal.      Palpations: Abdomen is soft.      Tenderness: There is no guarding or rebound.   Extremities:  No lower extremity edema.  Skin:     Capillary Refill: Capillary refill takes less than 2 seconds.   Neurological:      General: No focal deficit present.      Mental Status: Patient is alert, oriented to place time and person.    Condition on Discharge: Stable, improved    Discharge Disposition:  Home or Self Care    Discharge Medications:     Discharge Medications        Changes to Medications        Instructions Start Date   metoprolol succinate XL 50 MG 24 hr tablet  Commonly known as: TOPROL-XL  What changed: additional instructions   50 mg, Nightly      warfarin 3 MG tablet  Commonly known as: COUMADIN  What changed:   additional instructions  Another medication with the same name was removed. Continue taking this medication, and follow the directions you see here.   3 mg, Oral, Every Night at Bedtime             Continue These Medications        Instructions Start Date   diphenoxylate-atropine 2.5-0.025 MG per tablet  Commonly known as: LOMOTIL   1 tablet, 4 Times Daily PRN      ferrous sulfate 325 (65 FE) MG tablet   325 mg, Oral, Daily With Breakfast      ONE A DAY MEN 50 PLUS PO   1 tablet, Daily      simvastatin 80 MG tablet  Commonly known as: ZOCOR   80 mg, Nightly      Trelegy Ellipta  100-62.5-25 MCG/ACT inhaler  Generic drug: Fluticasone-Umeclidin-Vilant   1 puff, Daily      VITAMIN C PO   2,000 mg, Daily               Discharge Diet:   Diet Instructions       Diet: Cardiac Diets; Healthy Heart (2-3 Na+); Regular (IDDSI 7); Thin (IDDSI 0)      Discharge Diet: Cardiac Diets    Cardiac Diet: Healthy Heart (2-3 Na+)    Texture: Regular (IDDSI 7)    Fluid Consistency: Thin (IDDSI 0)            Activity at Discharge: Fall precautions, resume prior activity    Follow-up Appointments:   Future Appointments   Date Time Provider Department Center   7/1/2025  2:00 PM Sangeetha Lyons MD [facility]   7/15/2025 11:30 AM Melisa López APRN [facility]   7/22/2025  1:00 PM Shubham Hendricks APRN [facility]       Test Results Pending at Discharge: None    Electronically signed by Adam Sands MD, 05/02/25, 11:47 CDT.    Time: 50 minutes.

## 2025-05-02 NOTE — PROGRESS NOTES
Malnutrition Severity Assessment    Patient Name:  Michael Paz  YOB: 1945  MRN: [id number]  Admit Date:  5/1/2025    Patient meets criteria for : Severe Malnutrition (Secondary signs: Generalized weakness)    Malnutrition Severity Assessment  Malnutrition Type: Chronic Disease - Related Malnutrition  Malnutrition Type (Last 8 Hours)       Malnutrition Severity Assessment       Row Name 05/02/25 1101       Malnutrition Severity Assessment    Malnutrition Type Chronic Disease - Related Malnutrition      Row Name 05/02/25 1101       Insufficient Energy Intake     Insufficient Energy Intake Findings Moderate    Insufficient Energy Intake  <75% of est. energy requirement for > or equal to 1 month      Row Name 05/02/25 1101       Unintentional Weight Loss     Unintentional Weight Loss Findings Severe    Unintentional Weight Loss  Weight loss greater than 7.5% in three months  35 lbs (13%) over the past five months      Row Name 05/02/25 1101       Muscle Loss    Loss of Muscle Mass Findings Severe    [place] Severe - deep hollowing/scooping, lack of muscle to touch, facial bones well defined    Clavicle Bone Region Severe - protruding prominent bone      Row Name 05/02/25 1101       Fat Loss    Subcutaneous Fat Loss Findings Severe    Orbital Region  Severe - pronounced hollowness/depression, dark circles, loose saggy skin      Row Name 05/02/25 1101       Criteria Met (Must meet criteria for severity in at least 2 of these categories: M Wasting, Fat Loss, Fluid, Secondary Signs, Wt. Status, Intake)    Patient meets criteria for  Severe Malnutrition  Secondary signs: Generalized weakness                    Electronically signed by:  Roula Palacios RDN, LD  05/02/25 11:07 CDT

## 2025-05-02 NOTE — PLAN OF CARE
Problem: Adult Inpatient Plan of Care  Goal: Plan of Care Review  Outcome: Progressing  Flowsheets (Taken 5/2/2025 6387)  Progress: no change  Outcome Evaluation: Pt A&Ox4, VSS. Pt can be very irritable and at times uncooperative with care. Up ad berny in room. Voiding. 2L O2 NC worn while asleep, pt states this is what he wears at home. Safety maintained, will continue to monitor.  Plan of Care Reviewed With: patient

## 2025-05-02 NOTE — PLAN OF CARE
Goal Outcome Evaluation:   Pt sitting up right in chair. AOX4. VSS on RA. Voiding. Pt being discharged this shift. Dc education completed with pt. Pt verbalized understanding. No further questions. Call light within reach. Safety maintained. Care continues. Pt wheeled to main entrance at 1218.

## 2025-05-03 NOTE — OUTREACH NOTE
Prep Survey      Flowsheet Row Responses   [facility] facility patient discharged from? [place]   Is LACE score < 7 ? No   Eligibility Readm Mgmt   Discharge diagnosis Supratherapeutic INR   Does the patient have one of the following disease processes/diagnoses(primary or secondary)? Other   Does the patient have Home health ordered? No   Is there a DME ordered? No   Prep survey completed? Yes            Tammy HEMPHILL - Registered Nurse

## 2025-05-06 LAB
BACTERIA SPEC AEROBE CULT: NORMAL
BACTERIA SPEC AEROBE CULT: NORMAL

## 2025-05-07 ENCOUNTER — TELEPHONE (OUTPATIENT)
Dept: ONCOLOGY | Facility: CLINIC | Age: 80
End: 2025-05-07
Payer: MEDICARE

## 2025-05-07 ENCOUNTER — READMISSION MANAGEMENT (OUTPATIENT)
Dept: CALL CENTER | Facility: HOSPITAL | Age: 80
End: 2025-05-07
Payer: MEDICARE

## 2025-05-07 NOTE — TELEPHONE ENCOUNTER
Received fax from [facility] that patient should not take Lomotil and compazine together.Called patient and he is aware not to take lomotil and compazine.

## 2025-05-07 NOTE — OUTREACH NOTE
"Medical Week 1 Survey      Flowsheet Row Responses   [facility] patient discharged from? [place]   Does the patient have one of the following disease processes/diagnoses(primary or secondary)? Other   Week 1 attempt successful? Yes   Call start time 1240   Call end time 1254   Discharge diagnosis Supratherapeutic INR   Meds reviewed with patient/caregiver? Yes   Is the patient having any side effects they believe may be caused by any medication additions or changes? No   Does the patient have all medications ordered at discharge? Yes   Is the patient taking all medications as directed (includes completed medication regime)? Yes   Does the patient have a primary care provider?  Yes   Has the patient kept scheduled appointments due by today? N/A   Did the patient receive a copy of their discharge instructions? Yes   Nursing interventions Reviewed instructions with patient   What is the patient's perception of their health status since discharge? Same  [diarrhea and tired/SOB/weak per pt,  encouraged pt to seek medical attention if needed]   Is the patient/caregiver able to teach back signs and symptoms related to disease process for when to call PCP? Yes   Is the patient/caregiver able to teach back signs and symptoms related to disease process for when to call 911? Yes   Is the patient/caregiver able to teach back the hierarchy of who to call/visit for symptoms/problems? PCP, Specialist, Home health nurse, Urgent Care, ED, 911 Yes   If the patient is a current smoker, are they able to teach back resources for cessation? --  [\"I don't inhale\"]   Week 1 call completed? Yes   Call end time 1254            Romana H - Registered Nurse  "

## 2025-05-12 NOTE — PROGRESS NOTES
Enter Query Response Below      Query Response: Supratherapeutic INR and hemoptysis due to warfarin              If applicable, please update the problem list.

## 2025-05-19 ENCOUNTER — READMISSION MANAGEMENT (OUTPATIENT)
Dept: CALL CENTER | Facility: HOSPITAL | Age: 80
End: 2025-05-19
Payer: MEDICARE

## 2025-05-19 NOTE — OUTREACH NOTE
Medical Week 3 Survey      Flowsheet Row Responses   [facility] patient discharged from? [place]   Does the patient have one of the following disease processes/diagnoses(primary or secondary)? Other   Week 3 attempt successful? Yes   Call start time 1546   Call end time 1547   Discharge diagnosis Supratherapeutic INR   Person spoke with today (if not patient) and relationship Patient   Does the patient have a primary care provider?  Yes   Comments regarding PCP Sees [facility] tomorrow for diarrhea   Has home health visited the patient within 72 hours of discharge? N/A   Psychosocial issues? No   Did the patient receive a copy of their discharge instructions? Yes   Nursing interventions Reviewed instructions with patient   What is the patient's perception of their health status since discharge? Same   Is the patient/caregiver able to teach back signs and symptoms related to disease process for when to call PCP? Yes   Is the patient/caregiver able to teach back signs and symptoms related to disease process for when to call 911? Yes   Is the patient/caregiver able to teach back the hierarchy of who to call/visit for symptoms/problems? PCP, Specialist, Home health nurse, Urgent Care, ED, 911 Yes   Week 3 Call Completed? Yes   Graduated Yes   Wrap up additional comments Patient reports still having diarrhea has an appt to see GI tomorrow 05/20 for ongoing diarrhea   Call end time 1547            Latasha MCGUIRE - Registered Nurse

## 2025-05-20 ENCOUNTER — OFFICE VISIT (OUTPATIENT)
Dept: GASTROENTEROLOGY | Facility: CLINIC | Age: 80
End: 2025-05-20
Payer: MEDICARE

## 2025-05-20 VITALS
WEIGHT: 224 LBS | OXYGEN SATURATION: 96 % | HEIGHT: 75 IN | BODY MASS INDEX: 27.85 KG/M2 | HEART RATE: 88 BPM | TEMPERATURE: 97 F | DIASTOLIC BLOOD PRESSURE: 78 MMHG | SYSTOLIC BLOOD PRESSURE: 128 MMHG

## 2025-05-20 DIAGNOSIS — R19.7 DIARRHEA, UNSPECIFIED TYPE: Primary | ICD-10-CM

## 2025-05-20 DIAGNOSIS — R63.4 WEIGHT LOSS: ICD-10-CM

## 2025-05-20 RX ORDER — LOPERAMIDE HYDROCHLORIDE 2 MG/1
4 TABLET ORAL 3 TIMES DAILY
Qty: 180 TABLET | Refills: 1 | Status: SHIPPED | OUTPATIENT
Start: 2025-05-20

## 2025-05-20 NOTE — PROGRESS NOTES
Chief Complaint   Patient presents with    Diarrhea     Diarrhea last 6 weeks started cancer treatment radation April       PCP: Henrietta Jin APRN  REFER: No ref. provider found    Subjective     HPI    Michael Paz presents with complains of diarrhea ongoing .  He was diagnosed with lung cacner 10/2024 by Dr Pagan.  He is followed by Dr Snyder as well as Dr Rodas.   Michael Paz also carries a history of protein S defiency with prior DVT to lower ext and bilateral PE (maintained on on long term Coumadin).   First occurrence of diarrhea occurred after starting chemo first part of Feb.  Diarrhea last 2 weeks then improved.   Diarrhea started again April 15 and continued daily until last Thur (today is Tue).   Diarrhea described as explosive and occurring up to 12 times per day.   He has stopped caffeine consumption without improvement, consumed BRAT diet without improvement. He stopped all supplements without relief.  No signs GI blood loss.  One imodium after loose stool did not provide relief, increasing imodium to 2 pills did not provide relief.  Lomotil every 6 hours did not provide relief (2 lomotil scheduled did not provide relief).    He took 2 doses of colestid last Wed (today is tue) and reports 9 occurrence of diarrhea through the night.  The following day stool described as loose, no stool Friday or Saturday and one regular bowel movement Sunday and Monday.   He has not taken antidiarrheal medications.  He has experienced weight loss.    He is no longer receiving chemo.    Stool testing has been negative for causative factor to diarrhea.     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 He has been diagnosed with 6 cm non-small cell carcinoma  from the right upper lobe via Ion bronchoscopy, 12/10/24.  Michael Paz told surgery is no longer an option due to metastasis.               NM PET/CT Skull Base to Mid Thigh (03/04/2025 08:55)     ED to Hosp-Admission (Discharged) with Adam Sands MD (05/01/2025)       Admission (Discharged) with Sebastian Mckeon MD (10/24/2024)     Upper GI Endoscopy (10/25/2024 13:44) -AVM    Past Medical History:   Diagnosis Date    AAA (abdominal aortic aneurysm)     Arthritis     Atrial fibrillation 1990    CAD (coronary artery disease)     CAD (coronary artery disease)     Clotting disorder 2012    Deep vein thrombosis [id number]    Depression     Endoleak post endovascular aneurysm repair     History of DVT (deep vein thrombosis)     History of foot fracture     BILATERAL FOOT FRACTURES    History of fracture of left ankle     History of pulmonary embolus (PE)     History of transfusion 2012 and 2020    Hyperlipidemia     Hypertension     Mass of upper lobe of right lung 11/20/2024    Myocardial infarction 1990    Non-small cell lung cancer 12/20/2024    Peripheral vascular disease     Pneumonia 4 times    Pulmonary embolism 1986    Tobacco abuse        Past Surgical History:   Procedure Laterality Date    ABDOMINAL AORTIC ANEURYSM REPAIR      x2    APPENDECTOMY      BACK SURGERY      x2    BRONCHOSCOPY N/A 12/10/2024    Procedure: BRONCHOSCOPY WITH ENDOBRONCHIAL ULTRASOUND;  Surgeon: Lizandro Pagan MD;  Location:  [facility] OR;  Service: Pulmonary;  Laterality: N/A;  preop; RUL mass   postop RUL mass   PCP Henrietta Scruggs    BRONCHOSCOPY WITH ION ROBOTIC ASSIST N/A 12/10/2024    Procedure: BRONCHOSCOPY WITH ION ROBOT and BRONCHOSCOPY WITH ENDOBRONCHIAL ULTRASOUND;  Surgeon: Lizandro Pagan MD;  Location:  [facility] OR;  Service: Robotics - Pulmonary;  Laterality: N/A;  preop; RUL mass   postop RUL mass   PCP Henrietta Scruggs    CARDIAC CATHETERIZATION      CORONARY ANGIOPLASTY      CORONARY ARTERY BYPASS GRAFT       1990 and 2008    CORONARY STENT PLACEMENT      ENDOSCOPY N/A 10/25/2024    Procedure: ESOPHAGOGASTRODUODENOSCOPY WITH ANESTHESIA;  Surgeon: Fareed Velasco MD;  Location:  [facility] ENDOSCOPY;  Service: Gastroenterology;  Laterality: N/A;  Pre: Symptomatic anemia;  Post: Bleeding gastric AVM;  Nausea and vomiting    HEMORRHOIDECTOMY      x2    INGUINAL HERNIA REPAIR      KNEE SURGERY Right     LUNG BIOPSY      left lower lung    OTHER SURGICAL HISTORY      TYPE IA ENDOLEAK REPAIR    OTHER SURGICAL HISTORY      CRANIAL CYST    VENOUS ACCESS DEVICE (PORT) INSERTION N/A 12/31/2024    Procedure: Single Lumen Port-a-cath insertion with flouroscopy;  Surgeon: Antwan Carnes MD;  Location: [facility] OR;  Service: General;  Laterality: N/A;       Outpatient Medications Marked as Taking for the 5/20/25 encounter (Office Visit) with Cal Ferrera APRN   Medication Sig Dispense Refill    metoprolol succinate XL (TOPROL-XL) 50 MG 24 hr tablet Take 1 tablet by mouth Every Night. (Patient taking differently: Take 1 tablet by mouth Every Night. Patient stated that he takes 25 mg of Metoprolol daily.)      simvastatin (ZOCOR) 80 MG tablet Take 1 tablet by mouth Every Night.      warfarin (COUMADIN) 3 MG tablet Take 1 tablet by mouth every night at bedtime.         No Known Allergies    Social History     Socioeconomic History    Marital status:    Tobacco Use    Smoking status: Former     Current packs/day: 1.00     Average packs/day: 1 pack/day for 68.0 years (68.0 ttl pk-yrs)     Types: Cigarettes     Start date: 6/1/1957     Passive exposure: Current    Smokeless tobacco: Never    Tobacco comments:     Don't inhale.   Vaping Use    Vaping status: Never Used   Substance and Sexual Activity    Alcohol use: Not Currently     Alcohol/week: 3.0 - 4.0 standard drinks of alcohol     Types: 3 - 4 Cans of beer per week     Comment: SOCIAL    Drug use: No    Sexual activity: Defer       Review of Systems   Constitutional:   "Positive for unexpected weight change. Negative for fever.   HENT:  Negative for trouble swallowing.    Respiratory:  Negative for shortness of breath.    Cardiovascular:  Negative for chest pain.   Gastrointestinal:  Positive for diarrhea. Negative for abdominal pain and anal bleeding.           Objective     Vitals:    05/20/25 1234   BP: 128/78   Pulse: 88   Temp: 97 °F (36.1 °C)   SpO2: 96%   Weight: 102 kg (224 lb)   Height: 190.5 cm (75\")     Body mass index is 28 kg/m².    Physical Exam  Constitutional:       Appearance: Normal appearance. He is well-developed.   Eyes:      General: No scleral icterus.  Cardiovascular:      Heart sounds: Normal heart sounds. No murmur heard.  Pulmonary:      Effort: Pulmonary effort is normal.   Abdominal:      General: Bowel sounds are normal. There is no distension.      Palpations: Abdomen is soft.      Tenderness: There is no abdominal tenderness. There is no guarding.   Skin:     General: Skin is warm and dry.      Coloration: Skin is not jaundiced.   Neurological:      Mental Status: He is alert.   Psychiatric:         Behavior: Behavior is cooperative.         Imaging Results (Most Recent)       None            Body mass index is 28 kg/m².    Assessment & Plan     Diagnoses and all orders for this visit:    1. Diarrhea, unspecified type (Primary)    2. Weight loss    Other orders  -     loperamide (Imodium A-D) 2 MG tablet; Take 2 tablets by mouth 3 (Three) Times a Day.  Dispense: 180 tablet; Refill: 1         * Surgery not found *    Discussed colonoscopy to obtain biopsy and for direct visualization of colon.   Michael Paz did not wish to take colestid as he reports it resulted in worsening diarrhea.      Discussed he would likely not be required to complete whole prep.  Michael Paz was also concerned as he does not currently have anyone to drive him for colonoscopy     He gets lab work on Wed to check INR, I suggested he discuss having cmp, cbc as well as INR to " determine if fluids are needed    ? Role of diarrhea in relationship to chemo    Discussed alternative option of scheduled imodium taking TID prior to eating.  He can resume regular diet but cautioned him to avoid dairy, high fat foods, caffeine, sugar substitutes as all can contribute to diarrhea.   I also encouraged fiber, as well as consumption of constipating foods (banana, peanut butter, applesauce)    PET scan unremarkable for abnormality outside of lung.  I could not guarantee no underlying factor contributing to diarrhea is not present without colonoscopy.  Michael CASANOVA Brandi verbalized understanding and wishes to proceed with dietary change, imodium prior to proceeding with colonoscopy since he does not have a .   I explained if this did not work my next recommendation would be to proceed with colonoscopy.       I spoke with [facility] pharmacy and was told Imodium was not covered on insurance but would be $8.55 based on cash price.  This is cheaper than cost found on [facility].    I spent a total of 73 min in reviewing chart, discussion/assessment of patient in room, and documentation on 05/20/2025 after patient left exam room    Cal Ferrera, APRN  05/20/25          There are no Patient Instructions on file for this visit.

## 2025-05-22 ENCOUNTER — HOSPITAL ENCOUNTER (EMERGENCY)
Facility: HOSPITAL | Age: 80
Discharge: HOME | End: 2025-05-22
Attending: EMERGENCY MEDICINE
Payer: MEDICARE

## 2025-05-22 VITALS
OXYGEN SATURATION: 95 % | SYSTOLIC BLOOD PRESSURE: 137 MMHG | HEART RATE: 77 BPM | RESPIRATION RATE: 18 BRPM | BODY MASS INDEX: 27.73 KG/M2 | TEMPERATURE: 98.3 F | WEIGHT: 223 LBS | DIASTOLIC BLOOD PRESSURE: 89 MMHG | HEIGHT: 75 IN

## 2025-05-22 DIAGNOSIS — R79.1 SUPRATHERAPEUTIC INR: Primary | ICD-10-CM

## 2025-05-22 LAB
ALBUMIN SERPL-MCNC: 4.1 G/DL (ref 3.5–5.2)
ALBUMIN/GLOB SERPL: 1.4 G/DL
ALP SERPL-CCNC: 86 U/L (ref 39–117)
ALT SERPL W P-5'-P-CCNC: 50 U/L (ref 1–41)
ANION GAP SERPL CALCULATED.3IONS-SCNC: 10 MMOL/L (ref 5–15)
AST SERPL-CCNC: 61 U/L (ref 1–40)
BASOPHILS # BLD AUTO: 0.01 10*3/MM3 (ref 0–0.2)
BASOPHILS NFR BLD AUTO: 0.3 % (ref 0–1.5)
BILIRUB SERPL-MCNC: 1.2 MG/DL (ref 0–1.2)
BUN SERPL-MCNC: 11 MG/DL (ref 8–23)
BUN/CREAT SERPL: 11.7 (ref 7–25)
CALCIUM SPEC-SCNC: 9.1 MG/DL (ref 8.6–10.5)
CHLORIDE SERPL-SCNC: 105 MMOL/L (ref 98–107)
CO2 SERPL-SCNC: 25 MMOL/L (ref 22–29)
CREAT SERPL-MCNC: 0.94 MG/DL (ref 0.76–1.27)
DEPRECATED RDW RBC AUTO: 50.8 FL (ref 37–54)
EGFRCR SERPLBLD CKD-EPI 2021: 83 ML/MIN/1.73
EOSINOPHIL # BLD AUTO: 0.24 10*3/MM3 (ref 0–0.4)
EOSINOPHIL NFR BLD AUTO: 6.1 % (ref 0.3–6.2)
ERYTHROCYTE [DISTWIDTH] IN BLOOD BY AUTOMATED COUNT: 14.6 % (ref 12.3–15.4)
GLOBULIN UR ELPH-MCNC: 3 GM/DL
GLUCOSE SERPL-MCNC: 112 MG/DL (ref 65–99)
HCT VFR BLD AUTO: 35.4 % (ref 37.5–51)
HGB BLD-MCNC: 11.7 G/DL (ref 13–17.7)
HOLD SPECIMEN: NORMAL
HOLD SPECIMEN: NORMAL
IMM GRANULOCYTES # BLD AUTO: 0.01 10*3/MM3 (ref 0–0.05)
IMM GRANULOCYTES NFR BLD AUTO: 0.3 % (ref 0–0.5)
INR PPP: 6.92 (ref 0.91–1.09)
LYMPHOCYTES # BLD AUTO: 0.86 10*3/MM3 (ref 0.7–3.1)
LYMPHOCYTES NFR BLD AUTO: 21.8 % (ref 19.6–45.3)
MCH RBC QN AUTO: 31.2 PG (ref 26.6–33)
MCHC RBC AUTO-ENTMCNC: 33.1 G/DL (ref 31.5–35.7)
MCV RBC AUTO: 94.4 FL (ref 79–97)
MONOCYTES # BLD AUTO: 0.34 10*3/MM3 (ref 0.1–0.9)
MONOCYTES NFR BLD AUTO: 8.6 % (ref 5–12)
NEUTROPHILS NFR BLD AUTO: 2.49 10*3/MM3 (ref 1.7–7)
NEUTROPHILS NFR BLD AUTO: 62.9 % (ref 42.7–76)
NRBC BLD AUTO-RTO: 0 /100 WBC (ref 0–0.2)
PLATELET # BLD AUTO: 151 10*3/MM3 (ref 140–450)
PMV BLD AUTO: 10 FL (ref 6–12)
POTASSIUM SERPL-SCNC: 4.1 MMOL/L (ref 3.5–5.2)
PROT SERPL-MCNC: 7.1 G/DL (ref 6–8.5)
PROTHROMBIN TIME: 64.4 SECONDS (ref 11.8–14.8)
RBC # BLD AUTO: 3.75 10*6/MM3 (ref 4.14–5.8)
SODIUM SERPL-SCNC: 140 MMOL/L (ref 136–145)
WBC NRBC COR # BLD AUTO: 3.95 10*3/MM3 (ref 3.4–10.8)
WHOLE BLOOD HOLD COAG: NORMAL
WHOLE BLOOD HOLD SPECIMEN: NORMAL

## 2025-05-22 PROCEDURE — 85610 PROTHROMBIN TIME: CPT | Performed by: EMERGENCY MEDICINE

## 2025-05-22 PROCEDURE — 85025 COMPLETE CBC W/AUTO DIFF WBC: CPT | Performed by: EMERGENCY MEDICINE

## 2025-05-22 PROCEDURE — 80053 COMPREHEN METABOLIC PANEL: CPT | Performed by: EMERGENCY MEDICINE

## 2025-05-22 PROCEDURE — 99283 EMERGENCY DEPT VISIT LOW MDM: CPT | Performed by: EMERGENCY MEDICINE

## 2025-05-22 RX ORDER — SODIUM CHLORIDE 0.9 % (FLUSH) 0.9 %
10 SYRINGE (ML) INJECTION AS NEEDED
Status: DISCONTINUED | OUTPATIENT
Start: 2025-05-22 | End: 2025-05-22 | Stop reason: HOSPADM

## 2025-05-22 NOTE — DISCHARGE INSTRUCTIONS
You were seen in the emergency department for an elevated INR.  INR today 6.9.  You do not have any evidence of active bleeding.  Your hemoglobin/blood count is in acceptable range.  You are safe for discharge home.  Hold your Coumadin dose today, follow-up with your primary doctor tomorrow for repeat INR testing.  Do not resume Coumadin until your INR has returned to a normal level.

## 2025-05-22 NOTE — ED PROVIDER NOTES
Subjective   History of Present Illness  80-year-old male presents to the ED with complaint of elevated INR.  He has a history of lung cancer status post chemo and radiation treatment, protein S deficiency and history of DVT/PE on anticoagulation with Coumadin, history of hypertension, hyperlipidemia.  Recent admission for INR greater than 10 in the setting of mild hemoptysis.  He received vitamin K infusion, Coumadin decreased to 3 mg daily, down from alternating doses between 4 mg of 5 mg.  INR 2.12 weeks ago, 2.4 1-week ago, 9 yesterday.  He was contacted [facility] told his eye was elevated he should come to the ED for further evaluation.  He denies gum bleeding, rectal bleeding, hemoptysis, excessive bruising or bleeding.  No shortness of breath or difficulty breathing.  States he has been taking prescribed dose of Coumadin 3 mg daily.  Denies any changes to his diet.  Denies any new recent medications or antibiotics.    History provided by:  Patient      Review of Systems   All other systems reviewed and are negative.      Past Medical History:   Diagnosis Date    AAA (abdominal aortic aneurysm)     Arthritis     Atrial fibrillation 1990    CAD (coronary artery disease)     CAD (coronary artery disease)     Clotting disorder 2012    Deep vein thrombosis [id number]    Depression     Endoleak post endovascular aneurysm repair     History of DVT (deep vein thrombosis)     History of foot fracture     BILATERAL FOOT FRACTURES    History of fracture of left ankle     History of pulmonary embolus (PE)     History of transfusion 2012 and 2020    Hyperlipidemia     Hypertension     Mass of upper lobe of right lung 11/20/2024    Myocardial infarction 1990    Non-small cell lung cancer 12/20/2024    Peripheral vascular disease     Pneumonia 4 times    Pulmonary embolism 1986    Tobacco abuse        No Known Allergies    Past Surgical History:   Procedure Laterality Date    ABDOMINAL AORTIC ANEURYSM REPAIR      x2     APPENDECTOMY      BACK SURGERY      x2    BRONCHOSCOPY N/A 12/10/2024    Procedure: BRONCHOSCOPY WITH ENDOBRONCHIAL ULTRASOUND;  Surgeon: Lizandro Pagan MD;  Location: [facility] OR;  Service: Pulmonary;  Laterality: N/A;  preop; RUL mass   postop RUL mass   PCP Henrietta Scruggs    BRONCHOSCOPY WITH ION ROBOTIC ASSIST N/A 12/10/2024    Procedure: BRONCHOSCOPY WITH ION ROBOT and BRONCHOSCOPY WITH ENDOBRONCHIAL ULTRASOUND;  Surgeon: Lizandro Pagan MD;  Location:  [facility] OR;  Service: Robotics - Pulmonary;  Laterality: N/A;  preop; RUL mass   postop RUL mass   PCP Henrietta Scruggs    CARDIAC CATHETERIZATION      CORONARY ANGIOPLASTY      CORONARY ARTERY BYPASS GRAFT      1990 and 2008    CORONARY STENT PLACEMENT      ENDOSCOPY N/A 10/25/2024    Procedure: ESOPHAGOGASTRODUODENOSCOPY WITH ANESTHESIA;  Surgeon: Fareed Velasco MD;  Location: [facility] ENDOSCOPY;  Service: Gastroenterology;  Laterality: N/A;  Pre: Symptomatic anemia;  Post: Bleeding gastric AVM;  Nausea and vomiting    HEMORRHOIDECTOMY      x2    INGUINAL HERNIA REPAIR      KNEE SURGERY Right     LUNG BIOPSY      left lower lung    OTHER SURGICAL HISTORY      TYPE IA ENDOLEAK REPAIR    OTHER SURGICAL HISTORY      CRANIAL CYST    VENOUS ACCESS DEVICE (PORT) INSERTION N/A 12/31/2024    Procedure: Single Lumen Port-a-cath insertion with flouroscopy;  Surgeon: Antwan Carnes MD;  Location: [facility] OR;  Service: General;  Laterality: N/A;       Family History   Problem Relation Age of Onset    Heart disease Mother     Heart disease Father     Heart disease Other     Hypertension Other     Diabetes Other     Colon cancer Neg Hx     Colon polyps Neg Hx     Esophageal cancer Neg Hx        Social History     Socioeconomic History    Marital status:    Tobacco Use    Smoking status: Former     Current packs/day: 1.00     Average packs/day: 1 pack/day for 68.0 years (68.0 ttl pk-yrs)     Types: Cigarettes     Start date: 6/1/1957     Passive exposure: Current     Smokeless tobacco: Never    Tobacco comments:     Don't inhale.   Vaping Use    Vaping status: Never Used   Substance and Sexual Activity    Alcohol use: Not Currently     Alcohol/week: 3.0 - 4.0 standard drinks of alcohol     Types: 3 - 4 Cans of beer per week     Comment: SOCIAL    Drug use: No    Sexual activity: Defer           Objective   Physical Exam  Vitals and nursing note reviewed.   Constitutional:       Appearance: Normal appearance. He is normal weight.   HENT:      Head: Normocephalic and atraumatic.      Nose: Nose normal. No congestion or rhinorrhea.      Mouth/Throat:      Comments: No gingival bleeding  Eyes:      Conjunctiva/sclera: Conjunctivae normal.      Pupils: Pupils are equal, round, and reactive to light.   Cardiovascular:      Rate and Rhythm: Normal rate. Rhythm irregular.      Heart sounds: Normal heart sounds. No murmur heard.  Pulmonary:      Effort: Pulmonary effort is normal.      Breath sounds: Normal breath sounds. No wheezing, rhonchi or rales.   Abdominal:      General: Abdomen is flat. Bowel sounds are normal.      Palpations: Abdomen is soft.   Musculoskeletal:      Right lower leg: No edema.      Left lower leg: No edema.   Skin:     General: Skin is warm and dry.      Capillary Refill: Capillary refill takes less than 2 seconds.      Comments: No abnormal bruising/ecchymosis   Neurological:      General: No focal deficit present.      Mental Status: He is alert and oriented to person, place, and time.         Procedures         Lab Results (last 24 hours)       Procedure Component Value Units Date/Time    CBC & Differential [[id number]]  (Abnormal) Collected: 05/22/25 0949    Specimen: Blood Updated: 05/22/25 1004    Narrative:      The following orders were created for panel order CBC & Differential.  Procedure                               Abnormality         Status                     ---------                               -----------         ------                      CBC Auto Differential[[id number]]        Abnormal            Final result                 Please view results for these tests on the individual orders.    Comprehensive Metabolic Panel [[id number]]  (Abnormal) Collected: 05/22/25 0949    Specimen: Blood Updated: 05/22/25 1027     Glucose 112 mg/dL      BUN 11 mg/dL      Creatinine 0.94 mg/dL      Sodium 140 mmol/L      Potassium 4.1 mmol/L      Chloride 105 mmol/L      CO2 25.0 mmol/L      Calcium 9.1 mg/dL      Total Protein 7.1 g/dL      Albumin 4.1 g/dL      ALT (SGPT) 50 U/L      AST (SGOT) 61 U/L      Alkaline Phosphatase 86 U/L      Total Bilirubin 1.2 mg/dL      Globulin 3.0 gm/dL      A/G Ratio 1.4 g/dL      BUN/Creatinine Ratio 11.7     Anion Gap 10.0 mmol/L      eGFR 83.0 mL/min/1.73     Narrative:      GFR Categories in Chronic Kidney Disease (CKD)              GFR Category          GFR (mL/min/1.73)    Interpretation  G1                    90 or greater        Normal or high (1)  G2                    60-89                Mild decrease (1)  G3a                   45-59                Mild to moderate decrease  G3b                   30-44                Moderate to severe decrease  G4                    15-29                Severe decrease  G5                    14 or less           Kidney failure    (1)In the absence of evidence of kidney disease, neither GFR category G1 or G2 fulfill the criteria for CKD.    eGFR calculation 2021 CKD-EPI creatinine equation, which does not include race as a factor    Protime-INR [[id number]]  (Abnormal) Collected: 05/22/25 0949    Specimen: Blood Updated: 05/22/25 1024     Protime 64.4 Seconds      INR 6.92    CBC Auto Differential [[id number]]  (Abnormal) Collected: 05/22/25 0949    Specimen: Blood Updated: 05/22/25 1004     WBC 3.95 10*3/mm3      RBC 3.75 10*6/mm3      Hemoglobin 11.7 g/dL      Hematocrit 35.4 %      MCV 94.4 fL      MCH 31.2 pg      MCHC 33.1 g/dL      RDW 14.6 %      RDW-SD 50.8 fl      MPV 10.0 fL       Platelets 151 10*3/mm3      Neutrophil % 62.9 %      Lymphocyte % 21.8 %      Monocyte % 8.6 %      Eosinophil % 6.1 %      Basophil % 0.3 %      Immature Grans % 0.3 %      Neutrophils, Absolute 2.49 10*3/mm3      Lymphocytes, Absolute 0.86 10*3/mm3      Monocytes, Absolute 0.34 10*3/mm3      Eosinophils, Absolute 0.24 10*3/mm3      Basophils, Absolute 0.01 10*3/mm3      Immature Grans, Absolute 0.01 10*3/mm3      nRBC 0.0 /100 WBC          No Radiology Exams Resulted Within Past 24 Hours   ED Course  ED Course as of 05/22/25 1302   Thu May 22, 2025   1256 80-year-old male with history of lung cancer status post chemo and radiation treatment, protein S deficiency and history of DVT/PE on anticoagulation with Coumadin, history of hypertension hyperlipidemia, and recent admission for supratherapeutic INR requiring vitamin K infusion presents to the ED with complaint of elevated INR.  Has been taking 3 mg daily, 2 weeks ago INR 2.1, last week 2.4, yesterday INR was 9.  Patient instructed to hold his Coumadin dose yesterday come to the ED today.  In ER today, repeat INR 6.9.  He has no evidence of active bleeding.  H&H has improved to 11.7/35.4, up from 9.5 and 29.  No indication for admission or vitamin K administration at this time.  Recommend patient hold his Coumadin dose again today, have INR checked tomorrow before resuming.  Once he resumes, will likely need to resume at a lower dose; patient encouraged to discuss Coumadin dosing with his primary care physician. [AW]      ED Course User Index  [AW] Yuri Smart MD                                                       Medical Decision Making  Amount and/or Complexity of Data Reviewed  Labs: ordered.    Risk  Prescription drug management.        Final diagnoses:   Supratherapeutic INR       ED Disposition  ED Disposition       ED Disposition   Discharge    Condition   Stable    Comment   --               Henrietta Jin, APRN  [address]  [facility] [address]  [phone]    Schedule an appointment as soon as possible for a visit in 1 day           Medication List        PAUSE taking these medications      warfarin 3 MG tablet  Wait to take this until: May 23, 2025  Commonly known as: COUMADIN  Take 1 tablet by mouth every night at bedtime.            Changed      metoprolol succinate XL 50 MG 24 hr tablet  Commonly known as: TOPROL-XL  What changed: additional instructions                 Yuri Smart MD  05/22/25 [id number]

## 2025-07-01 ENCOUNTER — OFFICE VISIT (OUTPATIENT)
Dept: CARDIOLOGY | Facility: CLINIC | Age: 80
End: 2025-07-01
Payer: MEDICARE

## 2025-07-01 VITALS
SYSTOLIC BLOOD PRESSURE: 130 MMHG | HEIGHT: 75 IN | DIASTOLIC BLOOD PRESSURE: 88 MMHG | BODY MASS INDEX: 27.73 KG/M2 | WEIGHT: 223 LBS | HEART RATE: 73 BPM

## 2025-07-01 DIAGNOSIS — I48.21 PERMANENT ATRIAL FIBRILLATION: Primary | ICD-10-CM

## 2025-07-01 PROCEDURE — 3079F DIAST BP 80-89 MM HG: CPT | Performed by: STUDENT IN AN ORGANIZED HEALTH CARE EDUCATION/TRAINING PROGRAM

## 2025-07-01 PROCEDURE — 93000 ELECTROCARDIOGRAM COMPLETE: CPT | Performed by: STUDENT IN AN ORGANIZED HEALTH CARE EDUCATION/TRAINING PROGRAM

## 2025-07-01 PROCEDURE — 99204 OFFICE O/P NEW MOD 45 MIN: CPT | Performed by: STUDENT IN AN ORGANIZED HEALTH CARE EDUCATION/TRAINING PROGRAM

## 2025-07-01 PROCEDURE — 3075F SYST BP GE 130 - 139MM HG: CPT | Performed by: STUDENT IN AN ORGANIZED HEALTH CARE EDUCATION/TRAINING PROGRAM

## 2025-07-01 NOTE — PROGRESS NOTES
EP NEW PATIENT VISIT    Chief Complaint  Atrial Fibrillation    Subjective        History of Present Illness    EP Problems:  1.  Permanent atrial fibrillation    Cardiology Problems:  1.  Abdominal aortic aneurysm with endoleak  2.  Coronary artery disease  3.  DVT/PE  4.  Hypertension  5.  Hyperlipidemia    Medical Problems:  1.  Lung cancer  2.  Tobacco use disorder  3.  COPD  4.  Protein S deficiency    History of Present Illness  The patient presents for atrial fibrillation, aortic aneurysm, and lung cancer.    He was diagnosed with atrial fibrillation in 1990, following his first heart attack and subsequent open-heart surgery. His cardiologist, Dr. Allen, identified a minor leak in one of his valves but deemed it not severe enough to require medication or surgery. This condition has been monitored for over 20 years. An EKG conducted in September 2024 led to his referral here. He has not consulted with Dr. Mcleod since his echo in September 2024. He has been informed that his heart function remains satisfactory despite the atrial fibrillation. He has been managing this condition for 35 years without significant discomfort. He was previously on candesartan and metoprolol 50 mg, but these were adjusted due to complications from chemotherapy and immunotherapy. He was hospitalized twice due to elevated prothrombin time (PT), exceeding 10 on one occasion. His warfarin dosage has been adjusted several times due to fluctuations in his PT levels. He currently takes warfarin 3 mg daily and undergoes weekly PT checks. His PT levels have varied, with readings of 2.2, 1.8, and 1.6. He was advised against switching from Coumadin to Eliquis due to potential risks associated with long-term Coumadin use. He reports persistent shortness of breath and cough since starting chemotherapy. A chest x-ray revealed no abnormalities, and a previous diagnosis of pneumonia has been resolved. He underwent chemotherapy and immunotherapy,  "which have caused significant disruptions to his health. He believes his balance issues would improve if his leg strength increased.    He was diagnosed with lung cancer and an aneurysm on his heart. He also had a hole in his stomach. He underwent chemotherapy, immunotherapy, and radiation.    He has protein S deficiency.    FAMILY HISTORY  His sister has protein S deficiency and is on Eliquis. His brother has a minor protein S deficiency and is also on Eliquis.    MEDICATIONS  Current: Metoprolol, warfarin.  Discontinued: Candesartan.      Objective   Vital Signs:  /88   Pulse 73   Ht 190.5 cm (75\")   Wt 101 kg (223 lb)   BMI 27.87 kg/m²   Estimated body mass index is 27.87 kg/m² as calculated from the following:    Height as of this encounter: 190.5 cm (75\").    Weight as of this encounter: 101 kg (223 lb).      Physical Exam  Vitals reviewed.   Constitutional:       Appearance: Normal appearance.   Cardiovascular:      Rate and Rhythm: Normal rate. Rhythm irregular.      Pulses: Normal pulses.      Heart sounds: Normal heart sounds.   Pulmonary:      Effort: Pulmonary effort is normal.      Breath sounds: Normal breath sounds.   Musculoskeletal:         General: No swelling.   Neurological:      Mental Status: He is alert.   Psychiatric:         Mood and Affect: Mood normal.         Judgment: Judgment normal.          Physical Exam      Result Review :  The following data was reviewed by: Sangeetha Lyons MD on 07/01/2025:  CMP          3/14/2025    09:53 5/1/2025    12:29 5/22/2025    09:49   CMP   Glucose 103  89  112    BUN 11  14  11    Creatinine 0.82  0.88  0.94    EGFR 89.9  88.0  83.0    Sodium 140  134  140    Potassium 3.8  3.7  4.1    Chloride 104  102  105    Calcium 8.8  8.7  9.1    Total Protein 6.9  6.4  7.1    Albumin 3.9  3.8  4.1    Globulin 3.0  2.6  3.0    Total Bilirubin 0.8  1.3  1.2    Alkaline Phosphatase 96  55  86    AST (SGOT) 28  34  61    ALT (SGPT) 23  21  50  "   Albumin/Globulin Ratio 1.3  1.5  1.4    BUN/Creatinine Ratio 13.4  15.9  11.7    Anion Gap 12.0  11.0  10.0      CBC          5/1/2025    12:29 5/2/2025    04:09 5/22/2025    09:49   CBC   WBC 5.87  3.99  3.95    RBC 3.50  3.04  3.75    Hemoglobin 10.9  9.5  11.7    Hematocrit 32.2  29.0  35.4    MCV 92.0  95.4  94.4    MCH 31.1  31.3  31.2    MCHC 33.9  32.8  33.1    RDW 14.2  14.2  14.6    Platelets 111  94  151      TSH          2/28/2025    09:34 3/7/2025    09:54 3/14/2025    09:53   TSH   TSH 1.880  1.830  2.520          ECG 12 Lead    Date/Time: 7/1/2025 2:49 PM  Performed by: Sangeetha Lyons MD    Authorized by: Sangeetha Lyons MD  Comparison: compared with previous ECG from 5/1/2025  Similar to previous ECG  Rhythm: atrial fibrillation  Rate: normal  BPM: 73  Conduction: right bundle branch block, left anterior fascicular block and bifascicular block  QRS axis: left  Other findings: non-specific ST-T wave changes    Clinical impression: abnormal EKG              Assessment and Plan   Diagnoses and all orders for this visit:    1. Permanent atrial fibrillation (Primary)      He has been in atrial fibrillation for approximately 35 years according to his account.  He has been minimally symptomatic leading up to this time.  With all this in mind, I do think that his findings are most consistent with permanent atrial fibrillation.  He is rate controlled at today's visit on metoprolol succinate 25 mg daily.  He can continue this dose of.  We discussed his warfarin use and very labile INRs.  I do think that he will be a reasonable candidate for anticoagulation with a NOAC should he choose to switch over.  For now, he feels like he would not like to make any changes while he is undergoing chemotherapy and having other changes.    Plan:  - Offered to switch to apixaban from warfarin given labile INRs; would like to continue warfarin for now  - Continue metoprolol for rate control  - No indication for pacemaker/AV node  ablation at this time  - Watchman could be considered in the future though given his protein S deficiency, anticoagulation certainly the preferred option  -He will follow-up in clinic as needed should his symptoms worsen in future         Follow Up   No follow-ups on file.  Patient was given instructions and counseling regarding his condition or for health maintenance advice. Please see specific information pulled into the AVS if appropriate.     Part of this note may be an electronic transcription/translation of spoken language to printed text using the Dragon Dictation System.    Patient or patient representative verbalized consent for the use of Ambient Listening during the visit with  Sangeetha Lyons MD for chart documentation. 7/1/2025  14:48 CDT

## 2025-07-02 PROBLEM — I48.21 PERMANENT ATRIAL FIBRILLATION: Chronic | Status: ACTIVE | Noted: 2024-10-16

## 2025-07-02 NOTE — PROGRESS NOTES
"Chief Complaint  COPD    Subjective    History of Present Illness {CC  Problem List  Visit Diagnosis   Encounters  Notes  Medications  Labs  Result Review Imaging  Media: 23}    Michael Paz presents to [facility] for:    History of Present Illness  Management of COPD and lung cancer.  He quit smoking. He was a 1 pack/day smoker for 67 years.         He is obese.  Most recent FEV1 67.      He denies having any childhood history of asthma or COPD.  He did have pneumonia at the age of 19.  He denies any family history of lung disease.  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     He was on Trelegy 100 after failing Advair.  He has developed cataracts.  He also had an episode of possible pneumonia.  He would like to get off the steroid-based inhaler.  He has an appointment this week with an eye doctor for cataract surgery.      He reports today he no longer feels like his cough is deep in his chest.  It feels like it is coming from his upper airway.  It feels like a tickle or drip.  He has not started any new medicines for it yet.    He did not qualify for daytime oxygen.  He still using 2 L at night.  He is compliant with this and benefiting from it.      He reports having bilateral pulmonary emboli in 1986 on 2 separate occasions.  First occasion on the right side, second occasion on the left side.       He has had 5 open heart surgeries.  The first 1 occurring at the age of 45.  He was also diagnosed with \"a leaky valve\" at that time.  The other 4 operations all occurring in 2008 following a \"leak from one of my bypass grafts\".  He reports having multiple surgeries during that hospitalization in 2008 for leaking as well as MRSA in the wounds.  Due to his extended stay in the hospital he did have to go to a rehab facility.  During some of that time in 2008 he developed " a left lower extremity DVT.  They did determine a few years later that he has protein C and protein S deficiency.  They recommended lifelong anticoagulation.  He is on Coumadin.  He is also on a full-strength aspirin.    Lung nodule identified on his CT of his abdomen in March 2024 for follow-up of repaired aneurysm.  He did have a lapse in follow-up due to issues with insurance.       He was hospitalized over the last year with bleeding gastric AVM in the fundus.  They recommended he continue Coumadin but to lower his INR threshold.  They also started him on Protonix twice daily.  He is no longer taking that.  He reports he has been hospitalized twice for supratherapeutic INR's.  They took him off of everything other than his statin, Coumadin and Toprol.  He is on Toprol for the A-fib.         He has a 2 cm nodule left lower lobe and a 6 cm nodule right upper lobe.  He underwent a needle biopsy of the left lower lobe which was negative.  We made arrangements for an Ion biopsy of the right upper lobe given negative mediastinum on the PET.  He underwent Ion bronchoscopy with biopsy on January 10.  It was positive for cancer.  Nodes negative.  PET suggestive of isolated disease.  He was referred for consideration of surgical resection and to oncology.  Unfortunately he was deemed not to be a surgical candidate.  He was referred back to oncology for chemotherapy, immune therapy and radiation.      He has been undergoing chemotherapy, radiation and immunotherapy for his lung cancer.  He has tolerated it well.  He has follow-up imaging next week. Last CT of the chest May 2025 showing a stable partially calcified 2.2 cm nodule in the left lower lobe and stable 4.6 cm x 3 cm nodule in the right upper lobe.           Current Outpatient Medications:     metoprolol succinate XL (TOPROL-XL) 50 MG 24 hr tablet, Take 1 tablet by mouth Every Night. (Patient taking differently: Take 0.5 tablets by mouth Every Night. Patient stated  "that he takes 25 mg of Metoprolol daily.), Disp: , Rfl:     simvastatin (ZOCOR) 80 MG tablet, Take 1 tablet by mouth Every Night., Disp: , Rfl:     Umeclidinium-Vilanterol (Anoro Ellipta) 62.5-25 MCG/ACT aerosol powder  inhaler, Inhale 1 puff Daily for 90 days., Disp: 3 each, Rfl: 3    warfarin (COUMADIN) 3 MG tablet, Take 1 tablet by mouth every night at bedtime., Disp: , Rfl:     Social History     Socioeconomic History    Marital status:    Tobacco Use    Smoking status: Former     Current packs/day: 1.00     Average packs/day: 1 pack/day for 68.1 years (68.1 ttl pk-yrs)     Types: Cigarettes     Start date: 6/1/1957     Passive exposure: Current    Smokeless tobacco: Never    Tobacco comments:     Don't inhale.   Vaping Use    Vaping status: Never Used   Substance and Sexual Activity    Alcohol use: Not Currently     Alcohol/week: 3.0 - 4.0 standard drinks of alcohol     Types: 3 - 4 Cans of beer per week     Comment: SOCIAL    Drug use: No    Sexual activity: Defer       Objective   Vital Signs:   /72   Pulse 75   Ht 190.5 cm (75\")   Wt 99.8 kg (220 lb)   SpO2 95% Comment: RA  BMI 27.50 kg/m²     Physical Exam  Cardiovascular:      Rate and Rhythm: Normal rate and regular rhythm.      Heart sounds: No murmur heard.  Pulmonary:      Effort: Pulmonary effort is normal.      Breath sounds: Normal breath sounds.   Musculoskeletal:      Right lower leg: No edema.      Left lower leg: No edema.   Skin:     Coloration: Skin is pale.   Neurological:      Mental Status: He is alert and oriented to person, place, and time.        Result Review :    PFT Values          11/7/2024    13:45   Pre Drug PFT Results   FVC 90   FEV1 71   FEF 25-75% 33   FEV1/FVC 61.06   Other Tests PFT Results   DLCO 116   D/VAsb 120     My interpretation of the PFT : none    Results for orders placed during the hospital encounter of 03/07/25    Complete PFT - Pre & Post Bronchodilator    Narrative  [facility] - " Pulmonary Function Test    [facility] [place]  [place]  [address]  [phone]    Patient : Michael Paz  MRN : [id number]  CSN : [id number]  Pulmonologist : Rayray Castillo MD  Date : 3/11/2025    ______________________________________________________________________    Interpretation :  1.  Spirometry is consistent with a moderate obstructive ventilatory defect.  2.  There is some improvement in spirometry postbronchodilator but a moderate obstructive ventilatory defect is still present.  3.  Lung volumes are within normal limits.  4.  There is a moderate diffusion impairment even when corrected for alveolar volume.  5.  Arterial blood gases on room air reveal mild hypoxemia and a mild respiratory alkalosis.  6.  When current studies are compared to studies performed on November 7, 2024, the patient's current prebronchodilator spirometry reveals a slight but not significant drop in the FEV1 and FVC  compared to previous baseline values.  The patient's current postbronchodilator spirometry reveals slight improvement in the FEV1 and FVC compared to previous baseline values.  When corrected for alveolar volume there has been a significant drop in the patient's diffusion capacity compared to previous.      Rayray Castillo MD      Results for orders placed in visit on 11/07/24    Spirometry with Diffusion Capacity    Narrative  Spirometry with Diffusion Capacity    Performed by: James Hadley CMA  Authorized by: Melisa López APRN  Pre Drug % Predicted  FVC: 90%  FEV1: 71%  FEF 25-75%: 33%  FEV1/FVC: 61.06%  DLCO: 116%  D/VAsb: 120%    Interpretation  Spirometry  Spirometry shows moderate obstruction. There is reduced midflow suggesting small airway/airflow obstruction.  Diffusion Capacity  The patient's diffusion capacity is normal.  Diffusion capacity is normal when corrected for alveolar volume.    Rest/Exercise Pulse Ox Values          10/23/2024    13:00   Rest/Exercise Pulse Ox Results   Rest room  air SAT % 98   Exercise room air SAT % 95       My interpretation of imaging:  none    My interpretation of labs: none      Assessment and Plan {CC Problem List  Visit Diagnosis  ROS  Review (Popup)  Health Maintenance  Quality  BestPractice  Medications  SmartSets  SnapShot Encounters  Media : 23}    Diagnoses and all orders for this visit:    1. Stage 2 moderate COPD by GOLD classification (Primary)  Comments:  Stop Trelegy.  Start Anoro.  If no better we will treat for upper airway cough.  Avoid steroid given development of cataracts  Orders:  -     Discontinue: Umeclidinium-Vilanterol (Anoro Ellipta) 62.5-25 MCG/ACT aerosol powder  inhaler; Inhale 1 puff Daily for 30 days.  Dispense: 1 each; Refill: 11  -     Umeclidinium-Vilanterol (Anoro Ellipta) 62.5-25 MCG/ACT aerosol powder  inhaler; Inhale 1 puff Daily for 90 days.  Dispense: 3 each; Refill: 3    2. Lung nodule  Comments:  Awaiting follow-up CT next week.  Keep follow-up with oncology    3. Tobacco abuse  Comments:  He is since quit smoking.  Continue to avoid smoking    4. Non-small cell cancer of right lung  Comments:  Awaiting follow-up CT next week. Keep follow-up with oncology    5. Hx pulmonary embolism  Comments:  On Coumadin, defer INR management to others    6. Hx of deep venous thrombosis  Comments:  On Coumadin, defer INR management to others    7. Protein C deficiency  Comments:  On Coumadin, defer INR management to others    8. Protein S deficiency  Comments:  On Coumadin, defer INR management to others    9. Permanent atrial fibrillation  Comments:  He is anticoagulated with Coumadin.  Tolerating beta-blocker.  Dose has been reduced.  No breathing complications      Michael EDILBERTO Paz  reports that he has quit smoking. His smoking use included cigarettes. He started smoking about 68 years ago. He has a 68.1 pack-year smoking history. He has been exposed to tobacco smoke. He has never used smokeless tobacco.            Body mass index  is 27.5 kg/m².    Melisa López, APRN  7/15/2025  11:52 CDT    Follow Up   Return in about 6 months (around 1/15/2026) for FVL with diffusion.    Patient was given instructions and counseling regarding his condition or for health maintenance advice. Please see specific information pulled into the AVS if appropriate.

## 2025-07-15 ENCOUNTER — HOSPITAL ENCOUNTER (OUTPATIENT)
Dept: RADIATION ONCOLOGY | Facility: HOSPITAL | Age: 80
Setting detail: RADIATION/ONCOLOGY SERIES
End: 2025-07-15
Payer: MEDICARE

## 2025-07-15 ENCOUNTER — OFFICE VISIT (OUTPATIENT)
Dept: PULMONOLOGY | Facility: CLINIC | Age: 80
End: 2025-07-15
Payer: MEDICARE

## 2025-07-15 VITALS
OXYGEN SATURATION: 95 % | SYSTOLIC BLOOD PRESSURE: 130 MMHG | HEART RATE: 75 BPM | BODY MASS INDEX: 27.35 KG/M2 | DIASTOLIC BLOOD PRESSURE: 72 MMHG | HEIGHT: 75 IN | WEIGHT: 220 LBS

## 2025-07-15 DIAGNOSIS — Z86.718 HX OF DEEP VENOUS THROMBOSIS: Chronic | ICD-10-CM

## 2025-07-15 DIAGNOSIS — R91.1 LUNG NODULE: Chronic | ICD-10-CM

## 2025-07-15 DIAGNOSIS — J44.9 STAGE 2 MODERATE COPD BY GOLD CLASSIFICATION: Primary | Chronic | ICD-10-CM

## 2025-07-15 DIAGNOSIS — I48.21 PERMANENT ATRIAL FIBRILLATION: Chronic | ICD-10-CM

## 2025-07-15 DIAGNOSIS — Z86.711 HX PULMONARY EMBOLISM: Chronic | ICD-10-CM

## 2025-07-15 DIAGNOSIS — D68.59 PROTEIN C DEFICIENCY: Chronic | ICD-10-CM

## 2025-07-15 DIAGNOSIS — Z72.0 TOBACCO ABUSE: Chronic | ICD-10-CM

## 2025-07-15 DIAGNOSIS — D68.59 PROTEIN S DEFICIENCY: Chronic | ICD-10-CM

## 2025-07-15 DIAGNOSIS — C34.91 NON-SMALL CELL CANCER OF RIGHT LUNG: Chronic | ICD-10-CM

## 2025-07-15 PROCEDURE — 3078F DIAST BP <80 MM HG: CPT | Performed by: NURSE PRACTITIONER

## 2025-07-15 PROCEDURE — 3075F SYST BP GE 130 - 139MM HG: CPT | Performed by: NURSE PRACTITIONER

## 2025-07-15 PROCEDURE — 99214 OFFICE O/P EST MOD 30 MIN: CPT | Performed by: NURSE PRACTITIONER

## 2025-07-15 RX ORDER — UMECLIDINIUM BROMIDE AND VILANTEROL TRIFENATATE 62.5; 25 UG/1; UG/1
1 POWDER RESPIRATORY (INHALATION)
Qty: 3 EACH | Refills: 3 | Status: SHIPPED | OUTPATIENT
Start: 2025-07-15 | End: 2025-10-13

## 2025-07-15 RX ORDER — UMECLIDINIUM BROMIDE AND VILANTEROL TRIFENATATE 62.5; 25 UG/1; UG/1
1 POWDER RESPIRATORY (INHALATION)
Qty: 1 EACH | Refills: 11 | Status: SHIPPED | OUTPATIENT
Start: 2025-07-15 | End: 2025-07-15

## 2025-07-18 NOTE — PROGRESS NOTES
[facility]  Radiation Oncology Clinic   Rubén Matthews MD, FACR  Shubham HAYNES  _______________________________________________  [facility]  Department of Radiation Oncology  [address] [place] [id number]  Office: [phone]  Fax: [phone]    DATE: 07/22/2025  PATIENT: Michael Paz  1945                         MEDICAL RECORD #: [id number]    1. History of primary non-small cell carcinoma of right lung    2. History of radiation therapy    3. Tobacco abuse                                        REASON FOR VISIT:    No chief complaint on file.    Reason for Follow up Visit:  Michael Paz is a very pleasant 80 y.o. patient that completed radiation to the lung and returns to the clinic today for oncologic surveillance .     History of Present Illness:  Diagnosed with Stage IIa (t2b N0 M0) right upper lobe lung cancer. He completed 5000 cGy in 4 fractions to the right lung on 04/22/2025.    10/23/2024 - Appointment with Melisa López, IVY:  Lung nodule (Primary)  CT Chest Without Contrast; Future  Follow Up   Return in about 2 weeks (around 11/6/2024) for FVL with diffusion.    10/24/2024 - 11/01/2024 - Hospital admission due to anemia. Pulmonary consulted - Will follow outpatient.     10/27/2024 - Chest x-ray:  Chronic lung changes.  Indeterminate 4 x 3 cm spiculated opacity in the RIGHT upper lobe. Chest CT correlation is recommended.    10/29/2024 - CT Chest with contrast:  Spiculated opacity at the RIGHT upper lobe measuring 6.0 x 3.4 cm. Appearance concerning for malignancy. There are several small airways leading to this opacity and endobronchial sampling could be considered.   Increased size LEFT lower lobe pulmonary nodule now measuring 2.2 cm, previously 1.9 cm on 3/12/2024. PET/CT may be useful if not previously performed.  Enlarged main pulmonary artery, which can be seen with pulmonary artery hypertension.  Increased  size of lobular mass in the RIGHT coronary artery distribution, favor thrombosed aneurysm or other post operative changes. Difficult to determine change due to available prior exams although appears increased compared to 4/25/2019 with measurements as above.    11/07/2024 - PET Scan:  6 cm hypermetabolic spiculated right upper lobe lung mass, most likely representing a primary lung neoplasm.  No evidence of hypermetabolic metastatic disease in the neck, chest, abdomen, or pelvis. No hypermetabolic lymphadenopathy in the chest.  2 cm solid round circumscribed nodule in the left lower lobe with max SUV 2.9, below liver background level. This nodule has slowly increased in size, measuring 1.3 cm on an exam from 2022. I suspect this represents a noncalcified granuloma or hamartoma but a slow-growing neoplasm is not entirely excluded and continued imaging surveillance is recommended.    11/07/2024 - Spirometry with Diffusion Capacity   Pre Drug % Predicted   FVC: 90%  FEV1: 71%  FEF 25-75%: 33%  FEV1/FVC: 61.06%  DLCO: 116%  D/VAsb: 120%    Interpretation Spirometry   Spirometry shows moderate obstruction. There is reduced midflow suggesting small airway/airflow obstruction.   Diffusion Capacity  The patient's diffusion capacity is normal.  Diffusion capacity is normal when corrected for alveolar volume.     11/07/2024 - Appointment with IVY Pereira:  Mass of upper lobe of right lung  PET avid.  Not amendable to Ion due to proximity of pulmonary artery.  Will refer to CT surgery.  Hematology will need to address anticoagulation  Follow Up   Return in about 2 months (around 1/7/2025).    11/08/2024 - Appointment with :  PLAN:  Draw CBC w diff, CMP, CEA, iron, fe sat, ferritin today  Review of available diagnostic information as outlined above.  Note stable anemia since last receipt of RBC transfusion.  Continue ferrous sulfate 325 po daily  Confirm depressed Protein S levels:  Review PET scan,  11/7/2024 (above).  Solitary 6 cm RUL mass and 2 cm LLL mass  Follow-up IVY De Souza with pulmonary, 11/7/2024-A/P: PET scan reviewed.  Referred to CT surgery  Patient insists on anticoagulation fully aware of the risk of rebleeding given the background of acute GI bleed.  Thus, since he has not had any recent thrombotic episodes, I suggested new INR goal of ~ 1.5-2.2 range (1.81 today).    Schedule MRI brain   Follow-up pcp to monitor PT/INR weekly  Return to office in 6 weeks with CBC w diff, CMP, CEA, PT/INR, iron, fe sat, ferritin.    11/11/2024 - Appointment with Dr. Hawkins:  Mr. Paz is a 79-year-old male who presents with complex medical history and complex lung problem.  He has a 6.2 cm right upper lobe mass that is likely malignancy.  He also has an enlarging left lower lobe nodule that is 2.2 cm, the right upper lobe mass was very hypermetabolic on PET left lower is not hypermetabolic.  He has a complicated past medical history including 2 coronary bypass grafting, the last complicated by wound problems and sternal reconstruction which was last in 2008.  He has also had abdominal aortic aneurysm repair with EVAR.  He has a protein S deficiency and is on chronic Coumadin for this with a history of multiple blood clots including 3 DVTs and 2 PEs in the past.  Also noted patient's ascending aorta measures 4.2 cm in maximum dimension.  This will may need continued surveillance but will have multiple CT scans for lung malignancy.  Given his previous 2 surgeries would not be a candidate for prophylactic aortic surgery unless enlarges significantly to close to 6 cm.  I have reviewed his imaging and I am very concerned that the right upper lobe mass is a primary lung malignancy.  I am also concerned about the increasing left lower lobe nodule that is now greater than 2 cm.  I discussed the case extensively with Dr. Pagan and more recently with Dr. Noland.  There is a blood vessel between the airway and  nodule in the left lower lobe making bronchoscopic biopsy very difficult and risky.  Given this I am hopeful that there can be an IR CT-guided biopsy.  Will also need to get biopsy of right upper lobe with Dr. Pagan.  If right upper lobe is primary lung malignancy and non-small cell but left lower lobe is nonmalignant then can consider neoadjuvant therapy with Checkmate protocol followed by surgical resection of the right upper lobe.  If both are malignant then not a surgical candidate and will need to undergo definitive chemo and radiation.  Discussed this with the patient he seems to understand albeit admits this is a complicated plan.  Dr. Noland to review CT scan to see about candidacy for left lower lobe biopsy.  If not candidate then we will likely refer to [place] for possible bronchoscopic versus CT-guided biopsy.  Will let patient know results of this discussion afterwards.  He will need Lovenox bridging given his protein S deficiency in the past.    11/14/2024 - MRI Brain with and without contrast:  Age-related changes with atrophy and small vessel disease.  No intracranial metastases.    11/20/2024 - Lung, left lower lobe, CT-guided fine-needle biopsy and touch preparation:  Fragments of benign pulmonary parenchyma.  No granulomata identified.  Touch preparation reveals endobronchial cells, pneumocytes and macrophages.  No diagnostic malignant cells identified.    12/03/2024 - Appointment with :  Plan ion bronchoscopy to evaluate the lung mass which is a threat to life and bodily function with likelihood of malignancy  We discussed high risk of morbidity from additional diagnostic testing or treatment associated with procedure including general anesthesia related risks, bleeding, especially considering anticoagulation issues, clotting from clotting disorder while off anticoagulation although that risk will be mitigated by using the Lovenox, risk for pneumothorax.  Anticipate consideration for  lobectomy if we confirm malignancy.  Spirometry is favorable enough for that, with prior spirometry showing moderate obstruction and excellent diffusion capacity.  Follow Up   Return in about 5 weeks (around 1/7/2025).    12/10/2024 - CT Chest without contrast:  No significant change in 6 cm spiculated right upper lobe lung mass, highly suspicious for a primary lung neoplasm.  No significant change in the previously biopsied 2 cm left lower lobe pulmonary nodule with small central calcification. This is favored to represent a granuloma.  Severe emphysema  Ascending aorta is dilated measuring 4.2 cm diameter.  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  Cholelithiasis.    12/10/2024 - Bronchoscopy with biopsy per :  Lung, right upper lobe, Ion fine-needle aspiration:  MALIGNANT  Non-small cell carcinoma.  Mediastinum, station 7 EBUS:  BENIGN  Bloody with small lymphocytes and benign-appearing respiratory epithelial cells.  Bronchus, bronchial washing with cellblock:  BENIGN  Macrophages, benign-appearing respiratory epithelial cells (some reactive), and a few small lymphocytes,: In a bloody, proteinaceous, and mucoid background.  Lung, right upper lobe, biopsy, three blocks:  BENIGN  Variably disrupted respiratory mucosa, and fresh clot.    12/31/2024 - Port placement per .    01/02/2025 - Chemotherapy course:  Nivolumab 360mg /  PACLitaxel / CARBOplatin AUC=5 (Q21D) x 3 cycles     01/14/2025 - Appointment with Melisa López APRN:  Stage 2 moderate COPD by GOLD classification (Primary)  Continue Trelegy 100.  Use albuterol as needed   Lung nodule  Positive for malignancy.  He is receiving chemo and awaiting surgical resection.  Monitor  Follow Up   Return in about 3 months (around 4/14/2025).    02/07/2025 - Appointment with :   PLAN:  Labs 2/7/2025 glucose 124, sodium 134 otherwise normal CMP, FT4 1.3/TSH 1.57 (both normal), Hgb 11.4 (stable) otherwise  normal CBC with ANC of 2.73  Labs 1/23/2025 CEA 5.6, ferritin 215, fe 42, fe sat 12 (prior: 6), gluc 115, sodium 132, otherwise normal CMP, Hgb 11.6 otherwise normal CBC, INR 1.76 (followed by pcp- CAIT Palencia), CEA 5.58. ACTH 26, cortisol 23, FT4 1.07, TSH 3.3  Carbo+paclitaxel+nivolumab tolerance.  Loose stools without overt diarrhea. Started on prednisone 50 mg po qd x 7d since 1/23/25.  Diarrhea teaching conducted.  Imodium protocol discussed/explained.  1 L IV fluids today  Chest xray, 1/23/25. Nodular opacity right upper lobe is stable to improved.  Chronic changes of emphysema and mild interstitial prominence.  Previously discussed results of bronchoscopy/EBUS, 12/10/24 (above).  +NSCca RUL, negative mediastinal node biopsies  Previously reviewed PET scan, 11/7/2024 (above).  Solitary 6 cm RUL mass and 2 cm LLL mass (favored to represent granuloma on CT chest, 12/10/24)  Patient insists on anticoagulation fully aware of the risk of rebleeding given the background of acute GI bleed.  Thus, since he has not had any recent thrombotic episodes, I suggested new INR goal of ~ 1.5-2.2 range (followed by PCP)  Send biopsy specimens from 12/10/24  for Tempus xT lung molecular profile- including PD-L1, EGFR, BRAF, ALK, ROS1, RET, MET, HER2, etc.    Review NCCN guidelines non-small cell lung cancer, 11.2024-Stage IIB (T3N0), IIIA (T3, N1). Evaluate for perioperative therapy, PFTs, pathologic mediastinal node evaluation, PET/CT scan and brain MRI- NO tierra disease- Operable:  Preop systemic therapy then resection+mediastinal node sampling/exploration.  If medically inoperable, high risk surgical risk per CTS or decline surgery:  Definitive RT (preferable SABR)-Consider adjuvant chemo for high riak stage II (poorly differentiated tumors, including lung neuroendocrine tumors excluding well-differentiated neuroendocrine tumors)   Re: Discussed the potential toxicities of paclitaxel (to include but not limited to:  Myelosuppression, opportunistic infection, neuropathy, hypersensitivity reaction, anaphylaxis, cardiac conduction disturbance, bradycardia, arrhythmias, hypothyroidism, syncope, hypertension, thromboembolism, myocardial infarction, severe injection site reaction, pulmonary toxicity, neurotoxicity, GI obstruction/perforation, paralytic ileus, ischemic colitis, pancreatitis, hepatotoxicity, severe skin reaction, febrile neutropenia, alopecia, arthralgias/myalgias, nausea/vomiting, diarrhea, mucositis, asthenia, bleeding, bradycardia, edema). Questions answered.  He will agree to a trial of therapy.    Re: Discussed potential toxicities of carboplatin (to include but not limited to: Anaphylactic reaction, nephrotoxicity, myelosuppression, O2 toxicity, neuropathy, nausea/ vomiting, vision loss, severe hypokalemia, hyponatremia, hypocalcemia, hypomagnesemia, elevated liver enzymes, pain, asthenia, paresthesias, abdominal pain, diarrhea, constipation, mucositis, bleeding, alopecia, injection site reaction).  Questions answered.  He agree to press on with therapy.   Re:  Nivolumab.  The potential toxicities were noted - to include but not limited to: Gastrointestinal toxicity (diarrhea/colitis), hepatotoxicity, nephrotoxicity, pulmonary toxicity (immune mediated pneumonitis/interstitial lung disease), thyroid disorders (immune mediated hyperthyroidism and hypothyroidism), other immune mediated toxicities (adrenal insufficiency, autoimmune neuropathy, demyelination, facial/abducens nerve paresis, motor dysfunction, pancreatitis, uveitis, vasculitis, diabetic ketoacidosis, Guillain-Barré syndrome, hypopituitarism, myasthenic syndrome, and up to 2% deaths from pneumonitis. Questions were answered to the best of my ability and to his apparent satisfaction. He agrees to press on with therapy  Schedule treatment (plan: Every 21 days x 3 cycles)- C2, 3/21/2025    Nivolumab (Opdivo) 360 mg IV per administration  guidelines   Taxol 175 mg/m2 per administration guidelines   Carboplatin AUC 5 per administration guidelines.  Premedicate with:   Aloxi 0.25 mg IV before chemo   Decadron 10 mg p.o. IV before chemo   Pepcid 20 mg IV   Benadryl 50 mg IV  TSH, T4, cortisol, ACTH, CMP and CBC weekly. Hold if creatinine > 1.5, total bilirubin > 1.5, AST/ALT > 3x ULN, TSH <0.5 or > 2. Administer Procrit 40,000 units subcutaneously if hemoglobin less than 10 or hematocrit less than 30. Neupogen 480 mcg sc x 3 days if ANC < 1.0  Opdivo questioner to be filled out before each infusion.   -Communicate to physician:   If moderate to severe renal toxicity, administer corticosteroids and hold treatment until it resolves.   If moderate hepatic toxicity, administer corticosteroids and hold treatment until it resolves.   If great 2 or 3 colitis, administer corticosteroids and hold treatment until it resolves.   If great 2 pneumonitis, administer corticosteroids and hold treatment until it resolves.   Permanently discontinue if unable to reduce prednisone to 10 mg or less within 12 weeks; creatinine greater than 6 times upper limit of normal; LFTs greater than 5 times upper limit of normal; total bilirubin greater than 3 times upper limit of normal; grade 4 colitis; grade 3-4 pneumonitis; if TSH less than 0.5 or greater than 2, then obtain a free T4 every subsequent cycle.  eRx:   Zofran 8 mg p.o. 3 times daily as needed, #30 - Rx  Imodium 2 mg-2 tablets after first initial loose bowel movement then 1 tablet after each loose bowel movement  Reappoint to Dr. Hawkins ~ 4 weeks after completion of systemic therapy and surgery in ~ 6 weeks post systemic therapy  Return to office in 6 weeks (~3/21/25) with preoffice CEA, CMP, CBC + diff and CEA    03/04/2025 - CT Chest without contrast:  Decrease in size of an area of consolidation in the right upper lobe with a partial air bronchograms. This may represent an inflammatory mass or a positive response to  treatment?. Further follow-up may be obtained.  A stable 2 cm nodule in the left lower lobe with a central calcification probably represent a granuloma as suggested previously.  A new nodule and cluster of nodules in the left upper lobe anteromedially which was not seen in the previous study. This may represent an evolving infiltrate?. A follow-up examination in 3 months is recommended to ensure stability or resolution of these nodules.  Extensive chronic emphysematous lung changes.  Pulmonary arterial hypertension. A stable ectasia of the ascending thoracic aorta.  Other stable nonacute findings as above.    03/04/2025 - PET Scan:  Slight decrease in size and marked decrease in metabolic activity of right upper lobe lung mass. Findings are in keeping with partial treatment response.  No evidence of hypermetabolic metastatic disease.  No change in 2 cm left lower lobe circumscribed nodule which is not metabolically active and likely presents either a granuloma or hamartoma.  Redemonstrated dilatation of the ascending aorta and abdominal aortic aneurysm status post aortobiiliac endograft repair.    03/07/2025 - Pulmonary function Tests:  Spirometry is consistent with a moderate obstructive ventilatory defect.  There is some improvement in spirometry postbronchodilator but a moderate obstructive ventilatory defect is still present.  Lung volumes are within normal limits.  There is a moderate diffusion impairment even when corrected for alveolar volume.  Arterial blood gases on room air reveal mild hypoxemia and a mild respiratory alkalosis.  When current studies are compared to studies performed on November 7, 2024, the patient's current prebronchodilator spirometry reveals a slight but not significant drop in the FEV1 and FVC  compared to previous baseline values.  The patient's current postbronchodilator spirometry reveals slight improvement in the FEV1 and FVC compared to previous baseline values.  When corrected  for alveolar volume there has been a significant drop in the patient's diffusion capacity compared to previous.    03/10/2025 - Appointment with Dr. Hawkins:  Mr. Paz is a 80-year-old male who presents with complex medical history and complex lung problem.  He has a right upper lobe lung mass that is now biopsy-proven non-small cell carcinoma.  He also had a 2 cm left lower lobe nodularity that was biopsied and not consistent with malignancy, had no PET activity.  He returns now after neoadjuvant chemo and immunotherapy.  This is shown a good treatment response to the non-small cell right upper lobe malignancy with shrinkage from 6 cm down to around 4 cm.  No change in the suspected granuloma in the left lower lobe.  He has had repeat scans and repeat PFTs since before neoadjuvant therapy.  The repeat CT scan does show a very small nodule, around 5 mm in the left upper lobe that was not present on previous scans.  Repeat PFTs show picture more consistent with his severe COPD with a DLCO of 45% of predicted, FEV1 unchanged.  I suspect that this is a more accurate measurement of his lung function compared to previous study that showed 100% DLCO and I suspect this has to do with poor washout on office-based study on the first test.  Given this and his comorbidities, I do not believe he is a good candidate for right upper lobectomy.  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  He has been short of breath with minimal activity recently and using a walker.  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  His post predicted DLCO would be less than 40% of predicted and therefore I do not believe he is a good candidate for right upper lobectomy.  Furthermore he has this new nodularity in the left upper lobe and although it is difficult to say if this represents a metastasis I believe this  needs to be monitored closely.  I would like to send Mr. Paz to radiation oncology to further discuss radiation treatment options with possible SBRT versus definitive radiation to the right upper lobe mass.  Can continue to observe the left upper lobe small nodularity.  Patient is adamant he does not want any further chemotherapy but I encouraged him to discuss this further with Dr. Jorgensen on with regards to other regimens that would be better tolerated.  Not a candidate for surgical resection given possible metastatic disease and poor candidacy for right upper lobectomy after neoadjuvant therapy.    03/21/2025 - Appointment with Dr. Snyder:   PLAN:  Labs 2/7/2025 - 3/14/25 glucose 103, sodium 140 otherwise normal CMP, FT4 1.17/TSH 2.5 (both normal), normal ACTH/cortisol, Hgb 11.4 (stable) plat 132,000 otherwise normal CBC with ANC of 5.16, CEA 6.76 (prior: 5.58-6.22), INR 1.76 (followed by pcp- CAIT Palencia)  Carbo+paclitaxel+nivolumab tolerance.  Resolution of loose stools.  Now with persistent cough and wheezing.    Follow-up 3/10/25 with Dr Hawkins of CT surgery.  Not felt to be a candidate for surgical resection.  Possible metastatic disease. Sent to radiation oncology to further discuss radiation treatment options with possible SBRT versus definitive radiation to the right upper lobe mass. Can continue to observe the left upper lobe small nodularity. Patient is adamant he does not want any further chemotherapy.    CT chest/PET scan, 3/4/25 (above). Reviewed.  Slight decrease in size and marked decrease in metabolic activity of right upper lobe lung mass. Findings are in keeping with partial treatment response. No evidence of hypermetabolic metastatic disease.  Previously discussed results of bronchoscopy/EBUS, 12/10/24 (above).  +NSCca RUL, negative mediastinal node biopsies  Previously reviewed PET scan, 11/7/2024 (above).  Solitary 6 cm RUL mass and 2 cm LLL mass (favored to represent granuloma on  CT chest, 12/10/24)  Patient insists on anticoagulation fully aware of the risk of rebleeding given the background of acute GI bleed.  Thus, since he has not had any recent thrombotic episodes, I suggested new INR goal of ~ 1.5-2.2 range (followed by PCP)  Send biopsy specimens from 12/10/24  for Tempus xT lung molecular profile- including PD-L1, EGFR, BRAF, ALK, ROS1, RET, MET, HER2, etc-2nd request.    Review NCCN guidelines non-small cell lung cancer, 11.2024-Stage IIB (T3N0), IIIA (T3, N1). Evaluate for perioperative therapy, PFTs, pathologic mediastinal node evaluation, PET/CT scan and brain MRI- NO tierra disease- Operable:  Preop systemic therapy then resection+mediastinal node sampling/exploration.  If medically inoperable, high risk surgical risk per CTS or decline surgery:  Definitive RT (preferable SABR)-Consider adjuvant chemo for high riak stage II (poorly differentiated tumors, including lung neuroendocrine tumors excluding well-differentiated neuroendocrine tumors)   Re: Discussed the potential toxicities of paclitaxel (to include but not limited to: Myelosuppression, opportunistic infection, neuropathy, hypersensitivity reaction, anaphylaxis, cardiac conduction disturbance, bradycardia, arrhythmias, hypothyroidism, syncope, hypertension, thromboembolism, myocardial infarction, severe injection site reaction, pulmonary toxicity, neurotoxicity, GI obstruction/perforation, paralytic ileus, ischemic colitis, pancreatitis, hepatotoxicity, severe skin reaction, febrile neutropenia, alopecia, arthralgias/myalgias, nausea/vomiting, diarrhea, mucositis, asthenia, bleeding, bradycardia, edema). Questions answered.  He will agree to a trial of therapy.    Re: Discussed potential toxicities of carboplatin (to include but not limited to: Anaphylactic reaction, nephrotoxicity, myelosuppression, O2 toxicity, neuropathy, nausea/ vomiting, vision loss, severe hypokalemia, hyponatremia, hypocalcemia,  hypomagnesemia, elevated liver enzymes, pain, asthenia, paresthesias, abdominal pain, diarrhea, constipation, mucositis, bleeding, alopecia, injection site reaction).  Questions answered.  He agree to press on with therapy.   Re:  Nivolumab.  The potential toxicities were noted - to include but not limited to: Gastrointestinal toxicity (diarrhea/colitis), hepatotoxicity, nephrotoxicity, pulmonary toxicity (immune mediated pneumonitis/interstitial lung disease), thyroid disorders (immune mediated hyperthyroidism and hypothyroidism), other immune mediated toxicities (adrenal insufficiency, autoimmune neuropathy, demyelination, facial/abducens nerve paresis, motor dysfunction, pancreatitis, uveitis, vasculitis, diabetic ketoacidosis, Guillain-Barré syndrome, hypopituitarism, myasthenic syndrome, and up to 2% deaths from pneumonitis. Questions were answered to the best of my ability and to his apparent satisfaction. He agrees to press on with therapy  Schedule treatment (plan: Every week x6 concurrent with radiation- coordinate with RT)- C1- C6    HOLD- Nivolumab (Opdivo) 360 mg IV per administration guidelines   Taxol 45 mg/m2 per administration guidelines   Carboplatin AUC 2 per administration guidelines.  Premedicate with:   Aloxi 0.25 mg IV before chemo   Decadron 10 mg p.o. IV before chemo   Pepcid 20 mg IV   Benadryl 50 mg IV  CMP and CBC weekly. Hold if creatinine > 1.5, total bilirubin > 1.5, AST/ALT > 3x ULN, TSH <0.5 or > 2. Administer Procrit 40,000 units subcutaneously if hemoglobin less than 10 or hematocrit less than 30. Neupogen 480 mcg sc x 3 days if ANC < 1.0  eRx:   Zofran 8 mg p.o. 3 times daily as needed, #30 - Rx  Prednisone 50 mg daily x 7 days  Keep appointment with radiation oncology next Friday  Reappoint to Dr. Carnes Re: Needs to look at the port  Send for chest x-ray today  Return to office in 6 weeks with preoffice CEA, CMP, CBC + diff and CEA    03/28/2025 - Consult with  :  Following this discussion and in consideration of the diagnostic data/evaluation of the patient, I recommended a course of stereotactic radiosurgery to right upper lobe lung tumor, I anticipate 0307-2742 cGy over 3-5 treatments.   Will simulate treatment fields today, 3D CT with MIPS to begin the planning process, final course pending.  Continue ongoing management per primary care physician and other specialists.     04/14/2025 - Appointment with Melisa López APRN:  Stage 2 moderate COPD by GOLD classification (Primary)  Continue Trelegy 100.  Use albuterol as needed.  If breathing or coughing worsens, consider Trelegy 200  Lung nodule  Surgical candidate for lung cancer due to nodules in left lung.  Keep follow-up with radiation oncology and heme oncology  Non-small cell cancer of right lung  Starting XRT today.    04/14/2025 - 04/22/2025 - Completed radiation course:  Received 5000 cGy in 4 fractions to the right lung.    07/15/2025 - Appointment with Melisa López, APRN:  Stage 2 moderate COPD by GOLD classification (Primary)  Stop Trelegy.  Start Anoro.  If no better we will treat for upper airway cough.  Avoid steroid given development of cataracts  Lung nodule  Awaiting follow-up CT next week.  Keep follow-up with oncology   Non-small cell cancer of right lung  Awaiting follow-up CT next week. Keep follow-up with oncology  Follow Up   Return in about 6 months (around 1/15/2026) for FVL with diffusion.    07/22/2025 - CT Chest without contrast:  New dependent thin consolidative opacity along the RIGHT fissure and dependent aspect, which could represent infection/inflammation or atelectasis/scarring. Small RIGHT pleural fluid. Interval resolution of LEFT upper lobe findings.  Similar RIGHT upper lobe consolidative opacity with air bronchograms, which may represent posttreatment changes. Similar LEFT lower lobe 2.2 cm pulmonary nodule compared to 5/1/2025.   Similar coronary artery partially  calcified aneurysm with measurements as above.  Similar enlarged ascending thoracic aorta measuring 4.2 cm.  Gallstones.    01/19/2026 - Scheduled appointment with Melisa López APRN:    History obtained from  PATIENT and CHART    PAST MEDICAL HISTORY  Past Medical History:   Diagnosis Date    AAA (abdominal aortic aneurysm)     Arthritis     Atrial fibrillation 1990    CAD (coronary artery disease)     CAD (coronary artery disease)     Clotting disorder 2012    Deep vein thrombosis [id number]    Depression     Endoleak post endovascular aneurysm repair     History of DVT (deep vein thrombosis)     History of foot fracture     BILATERAL FOOT FRACTURES    History of fracture of left ankle     History of pulmonary embolus (PE)     History of transfusion 2012 and 2020    Hyperlipidemia     Hypertension     Mass of upper lobe of right lung 11/20/2024    Myocardial infarction 1990    Non-small cell lung cancer 12/20/2024    Peripheral vascular disease     Pneumonia 4 times    Pulmonary embolism 1986    Tobacco abuse       PAST SURGICAL HISTORY  Past Surgical History:   Procedure Laterality Date    ABDOMINAL AORTIC ANEURYSM REPAIR      x2    APPENDECTOMY      BACK SURGERY      x2    BRONCHOSCOPY N/A 12/10/2024    Procedure: BRONCHOSCOPY WITH ENDOBRONCHIAL ULTRASOUND;  Surgeon: Lizandro Pagan MD;  Location:  [facility];  Service: Pulmonary;  Laterality: N/A;  preop; RUL mass   postop RUL mass   DANYELLE Scruggs    BRONCHOSCOPY WITH ION ROBOTIC ASSIST N/A 12/10/2024    Procedure: BRONCHOSCOPY WITH ION ROBOT and BRONCHOSCOPY WITH ENDOBRONCHIAL ULTRASOUND;  Surgeon: Lizandro Pagan MD;  Location:  [facility];  Service: Robotics - Pulmonary;  Laterality: N/A;  preop; RUL mass   postop RUL mass   DANYELLE Scruggs    CARDIAC CATHETERIZATION      CORONARY ANGIOPLASTY      CORONARY ARTERY BYPASS GRAFT      1990 and 2008    CORONARY STENT PLACEMENT      ENDOSCOPY N/A 10/25/2024    Procedure: ESOPHAGOGASTRODUODENOSCOPY  WITH ANESTHESIA;  Surgeon: Fareed Velasco MD;  Location:  [facility];  Service: Gastroenterology;  Laterality: N/A;  Pre: Symptomatic anemia;  Post: Bleeding gastric AVM;  Nausea and vomiting    HEMORRHOIDECTOMY      x2    INGUINAL HERNIA REPAIR      KNEE SURGERY Right     LUNG BIOPSY      left lower lung    OTHER SURGICAL HISTORY      TYPE IA ENDOLEAK REPAIR    OTHER SURGICAL HISTORY      CRANIAL CYST    VENOUS ACCESS DEVICE (PORT) INSERTION N/A 12/31/2024    Procedure: Single Lumen Port-a-cath insertion with flouroscopy;  Surgeon: Antwan Carnes MD;  Location:  [facility];  Service: General;  Laterality: N/A;      FAMILY HISTORY  family history includes Diabetes in an other family member; Heart disease in his father, mother, and another family member; Hypertension in an other family member.    SOCIAL HISTORY  Social History     Tobacco Use    Smoking status: Former     Current packs/day: 1.00     Average packs/day: 1 pack/day for 68.1 years (68.1 ttl pk-yrs)     Types: Cigarettes     Start date: 6/1/1957     Passive exposure: Current    Smokeless tobacco: Never    Tobacco comments:     Don't inhale.   Vaping Use    Vaping status: Never Used   Substance Use Topics    Alcohol use: Not Currently     Alcohol/week: 3.0 - 4.0 standard drinks of alcohol     Types: 3 - 4 Cans of beer per week     Comment: SOCIAL    Drug use: No     ALLERGIES  Patient has no known allergies.     MEDICATIONS    Current Outpatient Medications:     metoprolol succinate XL (TOPROL-XL) 50 MG 24 hr tablet, Take 1 tablet by mouth Every Night. (Patient taking differently: Take 0.5 tablets by mouth Every Night. Patient stated that he takes 25 mg of Metoprolol daily.), Disp: , Rfl:     simvastatin (ZOCOR) 80 MG tablet, Take 1 tablet by mouth Every Night., Disp: , Rfl:     Umeclidinium-Vilanterol (Anoro Ellipta) 62.5-25 MCG/ACT aerosol powder  inhaler, Inhale 1 puff Daily for 90 days., Disp: 3 each, Rfl: 3    warfarin (COUMADIN) 3 MG tablet, Take  "1 tablet by mouth every night at bedtime., Disp: , Rfl:     Current outpatient and discharge medications have been reconciled for the patient.  Reviewed by: IVY Nunes    The following portions of the patient's history were reviewed and updated as appropriate: allergies, current medications, past family history, past medical history, past social history, past surgical history and problem list.    REVIEW OF SYSTEMS  Review of Systems   Constitutional:  Positive for fatigue. Negative for activity change, appetite change and unexpected weight change.   HENT: Negative.  Negative for sore throat and trouble swallowing.    Eyes: Negative.    Respiratory:  Positive for cough (productive cough, \"thicky gooey phlegm\") and shortness of breath (with exertion).    Cardiovascular: Negative.    Gastrointestinal: Negative.    Endocrine: Negative.    Genitourinary: Negative.    Musculoskeletal: Negative.    Skin: Negative.    Allergic/Immunologic: Negative.    Neurological: Negative.    Hematological: Negative.    Psychiatric/Behavioral: Negative.       PHYSICAL EXAM  VITAL SIGNS:   Vitals:    07/22/25 1258   BP: 147/72   Pulse: 91   SpO2: 96%  Comment: room air   Weight: 99.3 kg (219 lb)   Height: 190.5 cm (75\")   PainSc: 2    PainLoc: Rib Cage  Comment: worse with a cough     Physical Exam  Vitals reviewed.   Constitutional:       Appearance: Normal appearance.   HENT:      Head: Normocephalic.      Nose: Nose normal.   Eyes:      Pupils: Pupils are equal, round, and reactive to light.   Cardiovascular:      Rate and Rhythm: Normal rate and regular rhythm.      Pulses: Normal pulses.      Heart sounds: Normal heart sounds.   Pulmonary:      Effort: Pulmonary effort is normal. No respiratory distress.      Breath sounds: Normal breath sounds. No wheezing.   Abdominal:      General: Bowel sounds are normal.      Palpations: There is no mass.   Musculoskeletal:         General: Normal range of motion.      Cervical back: " Normal range of motion and neck supple. No tenderness.   Lymphadenopathy:      Cervical: No cervical adenopathy.   Skin:     General: Skin is warm and dry.      Capillary Refill: Capillary refill takes less than 2 seconds.   Neurological:      General: No focal deficit present.      Mental Status: He is alert and oriented to person, place, and time.      Motor: No weakness.   Psychiatric:         Mood and Affect: Mood normal.         Behavior: Behavior normal.          Performance Status: ECOG (1) Restricted in physically strenuous activity, ambulatory and able to do work of light nature    Clinical Quality Measures  - Pain Documented by Standardized Tool, FPS   Michael Paz reports a pain score of 2.  Given his pain assessment as noted, treatment options were discussed and the following options were decided upon as a follow-up plan to address the patient's pain: continuation of current treatment plan for pain and use of non-medical modalities (ice, heat, stretching and/or behavior modifications).    - Body Mass Index Screening and Follow-Up Plan Body mass index is 27.37 kg/m².     - Tobacco Use: Screening and Cessation Intervention  Social History    Tobacco Use      Smoking status: Former        Packs/day: 1.00        Years: 1 pack/day for 68.1 years (68.1 ttl pk-yrs)        Types: Cigarettes        Start date: 6/1/1957        Passive exposure: Current      Smokeless tobacco: Never      Tobacco comments: Don't inhale.    - Advanced Care Planning Advance Care Planning  ACP discussion was held with the patient during this visit. Patient does not have an advance directive, information provided.    - PHQ-2 Depression Screening  Little interest or pleasure in doing things? Not at all   Feeling down, depressed, or hopeless? Not at all   PHQ-2 Total Score 0     ASSESSMENT AND PLAN  1. History of primary non-small cell carcinoma of right lung    2. History of radiation therapy    3. Tobacco abuse      No orders of the  defined types were placed in this encounter.    RECOMMENDATIONS: Michael Paz is status post completion of radiation therapy to the lung and presents to our clinic today for oncological surveillance. Diagnosed with Stage IIa (t2b N0 M0) right upper lobe lung cancer. He completed 5000 cGy in 4 fractions to the right lung on 04/22/2025.    CT-scan of the chest on 07/22/2025 revealed new dependent thin consolidative opacity along the RIGHT fissure and dependent aspect, which could represent infection/inflammation or atelectasis/scarring. Small RIGHT pleural fluid. Interval resolution of LEFT upper lobe findings. Similar RIGHT upper lobe consolidative opacity with air bronchograms, which may represent posttreatment changes. Similar LEFT lower lobe 2.2  cm pulmonary nodule compared to 5/1/2025. Similar coronary artery partially calcified aneurysm with measurements as above. Similar enlarged ascending thoracic aorta measuring 4.2 cm. Gallstones.    On exam, I do not see evidence for recurrent or metastatic disease at this time. We will continue routine follow-up/surveillance as discussed in 3 months with follow up CT scan before visit and I have instructed him to continue to see the other health care providers as per their scheduling.    Patient Instructions   1) Return in 3 months  2) will call you with your results    Return in about 3 months (around 10/22/2025).    Time Spent: I spent 42 minutes caring for Michael on this date of service. This time includes time spent by me in the following activities: preparing for the visit, reviewing tests, obtaining and/or reviewing a separately obtained history, performing a medically appropriate examination and/or evaluation, counseling and educating the patient/family/caregiver, ordering medications, tests, or procedures, and documenting information in the medical record.     Shubham Hendricks, IVY  07/22/2025

## 2025-07-21 ENCOUNTER — TELEPHONE (OUTPATIENT)
Dept: CARDIOLOGY | Facility: CLINIC | Age: 80
End: 2025-07-21
Payer: MEDICARE

## 2025-07-22 ENCOUNTER — HOSPITAL ENCOUNTER (OUTPATIENT)
Dept: CT IMAGING | Facility: HOSPITAL | Age: 80
Discharge: HOME | End: 2025-07-22
Payer: MEDICARE

## 2025-07-22 ENCOUNTER — TELEPHONE (OUTPATIENT)
Age: 80
End: 2025-07-22
Payer: MEDICARE

## 2025-07-22 ENCOUNTER — OFFICE VISIT (OUTPATIENT)
Age: 80
End: 2025-07-22
Payer: MEDICARE

## 2025-07-22 VITALS
SYSTOLIC BLOOD PRESSURE: 147 MMHG | DIASTOLIC BLOOD PRESSURE: 72 MMHG | HEART RATE: 91 BPM | WEIGHT: 219 LBS | HEIGHT: 75 IN | BODY MASS INDEX: 27.23 KG/M2 | OXYGEN SATURATION: 96 %

## 2025-07-22 DIAGNOSIS — Z92.3 HISTORY OF RADIATION THERAPY: ICD-10-CM

## 2025-07-22 DIAGNOSIS — C34.90 NON-SMALL CELL LUNG CANCER, UNSPECIFIED LATERALITY: ICD-10-CM

## 2025-07-22 DIAGNOSIS — J44.9 STAGE 2 MODERATE COPD BY GOLD CLASSIFICATION: ICD-10-CM

## 2025-07-22 DIAGNOSIS — Z72.0 TOBACCO ABUSE: ICD-10-CM

## 2025-07-22 DIAGNOSIS — Z85.118 HISTORY OF PRIMARY NON-SMALL CELL CARCINOMA OF RIGHT LUNG: Primary | ICD-10-CM

## 2025-07-22 PROCEDURE — 71250 CT THORAX DX C-: CPT

## 2025-07-22 PROCEDURE — G0463 HOSPITAL OUTPT CLINIC VISIT: HCPCS | Performed by: RADIOLOGY

## 2025-07-22 PROCEDURE — 99215 OFFICE O/P EST HI 40 MIN: CPT

## 2025-07-22 PROCEDURE — 1125F AMNT PAIN NOTED PAIN PRSNT: CPT

## 2025-07-22 PROCEDURE — G2211 COMPLEX E/M VISIT ADD ON: HCPCS

## 2025-07-22 PROCEDURE — 1160F RVW MEDS BY RX/DR IN RCRD: CPT

## 2025-07-22 PROCEDURE — 3077F SYST BP >= 140 MM HG: CPT

## 2025-07-22 PROCEDURE — 1159F MED LIST DOCD IN RCRD: CPT

## 2025-07-22 PROCEDURE — 3078F DIAST BP <80 MM HG: CPT

## (undated) DEVICE — ELECTRD BLD EZ CLN MOD XLNG 2.75IN

## (undated) DEVICE — SINGLE USE BIOPSY VALVE MAJ-210: Brand: SINGLE USE BIOPSY VALVE (STERILE)

## (undated) DEVICE — INTENDED FOR TISSUE SEPARATION, AND OTHER PROCEDURES THAT REQUIRE A SHARP SURGICAL BLADE TO PUNCTURE OR CUT.: Brand: BARD-PARKER ® STAINLESS STEEL BLADES

## (undated) DEVICE — ADAPT SWVL FIBROPTIC BRONCH

## (undated) DEVICE — TBG PRESS EXT

## (undated) DEVICE — SWIVEL CONNECTOR

## (undated) DEVICE — Device: Brand: BALLOON

## (undated) DEVICE — NDL SCLEROTHRPY INTERJECT 25G 4 240 CLR

## (undated) DEVICE — BIOPSY NEEDLE, 23G: Brand: FLEXISION

## (undated) DEVICE — PAD MINOR UNIVERSAL: Brand: MEDLINE INDUSTRIES, INC.

## (undated) DEVICE — THE CHANNEL CLEANING BRUSH IS A NYLON FLEXI BRUSH ATTACHED TO A FLEXIBLE PLASTIC SHEATH DESIGNED TO SAFELY REMOVE DEBRIS FROM FLEXIBLE ENDOSCOPES.

## (undated) DEVICE — SINGLE USE SUCTION VALVE MAJ-209: Brand: SINGLE USE SUCTION VALVE (STERILE)

## (undated) DEVICE — GLV SURG SENSICARE W/ALOE PF LF 7.5 STRL

## (undated) DEVICE — 3M™ IOBAN™ 2 ANTIMICROBIAL INCISE DRAPE 6650EZ: Brand: IOBAN™ 2

## (undated) DEVICE — ANTIBACTERIAL UNDYED BRAIDED (POLYGLACTIN 910), SYNTHETIC ABSORBABLE SUTURE: Brand: COATED VICRYL

## (undated) DEVICE — SENSR O2 OXIMAX FNGR A/ 18IN NONSTR

## (undated) DEVICE — STRIP,CLOSURE,WOUND,MEDI-STRIP,1/2X4: Brand: MEDLINE

## (undated) DEVICE — NDL HYPO PRECISIONGLIDE REG 25G 1 1/2

## (undated) DEVICE — TRAP FLD MINIVAC MEGADYNE 100ML

## (undated) DEVICE — SYR LL TP 10ML STRL

## (undated) DEVICE — ADHS LIQ MASTISOL 2/3ML

## (undated) DEVICE — TRAP,MUCUS SPECIMEN, 80CC: Brand: MEDLINE

## (undated) DEVICE — 4-PORT MANIFOLD: Brand: NEPTUNE 2

## (undated) DEVICE — VISION PROBE ADAPTER AND SUCTION ADAPTER

## (undated) DEVICE — DECANTER: Brand: UNBRANDED

## (undated) DEVICE — SUT PDS 2/0 CT2 27IN VIL PDP333H

## (undated) DEVICE — TBG SMPL FLTR LINE NASL 02/C02 A/ BX/100

## (undated) DEVICE — FRCP BIOP SUPERDIMENSION PREMARK

## (undated) DEVICE — KT ASP VIZISHOT 5SYRG 5BIOPSY/VLV 22G

## (undated) DEVICE — YANKAUER,BULB TIP WITH VENT: Brand: ARGYLE

## (undated) DEVICE — CUFF,BP,DISP,1 TUBE,ADULT,HP: Brand: MEDLINE

## (undated) DEVICE — Device: Brand: DEFENDO AIR/WATER/SUCTION AND BIOPSY VALVE

## (undated) DEVICE — Device: Brand: ION

## (undated) DEVICE — CVR UNIV C/ARM

## (undated) DEVICE — CONMED SCOPE SAVER BITE BLOCK, 20X27 MM: Brand: SCOPE SAVER

## (undated) DEVICE — SUT SILK 2/0 SUTUPAK TIES 24IN SA75H